# Patient Record
Sex: FEMALE | Race: WHITE | NOT HISPANIC OR LATINO | Employment: OTHER
[De-identification: names, ages, dates, MRNs, and addresses within clinical notes are randomized per-mention and may not be internally consistent; named-entity substitution may affect disease eponyms.]

---

## 2017-10-22 ENCOUNTER — HEALTH MAINTENANCE LETTER (OUTPATIENT)
Age: 27
End: 2017-10-22

## 2020-07-21 ENCOUNTER — TRANSFERRED RECORDS (OUTPATIENT)
Dept: HEALTH INFORMATION MANAGEMENT | Facility: CLINIC | Age: 30
End: 2020-07-21

## 2020-08-02 ENCOUNTER — TRANSFERRED RECORDS (OUTPATIENT)
Dept: HEALTH INFORMATION MANAGEMENT | Facility: CLINIC | Age: 30
End: 2020-08-02

## 2021-08-05 ENCOUNTER — OFFICE VISIT (OUTPATIENT)
Dept: FAMILY MEDICINE | Facility: CLINIC | Age: 31
End: 2021-08-05
Payer: COMMERCIAL

## 2021-08-05 VITALS
BODY MASS INDEX: 36.07 KG/M2 | HEART RATE: 80 BPM | DIASTOLIC BLOOD PRESSURE: 68 MMHG | TEMPERATURE: 98.8 F | OXYGEN SATURATION: 97 % | WEIGHT: 238 LBS | RESPIRATION RATE: 20 BRPM | HEIGHT: 68 IN | SYSTOLIC BLOOD PRESSURE: 116 MMHG

## 2021-08-05 DIAGNOSIS — D12.6 ADENOMATOUS POLYP OF COLON, UNSPECIFIED PART OF COLON: ICD-10-CM

## 2021-08-05 DIAGNOSIS — Z15.09 LYNCH SYNDROME: Primary | ICD-10-CM

## 2021-08-05 DIAGNOSIS — Z80.0 FH: COLON CANCER IN RELATIVE <50 YEARS OLD: ICD-10-CM

## 2021-08-05 PROBLEM — E66.813 CLASS 3 SEVERE OBESITY DUE TO EXCESS CALORIES IN ADULT (H): Status: ACTIVE | Noted: 2018-05-29

## 2021-08-05 PROBLEM — E66.01 CLASS 3 SEVERE OBESITY DUE TO EXCESS CALORIES IN ADULT (H): Status: ACTIVE | Noted: 2018-05-29

## 2021-08-05 PROCEDURE — 99385 PREV VISIT NEW AGE 18-39: CPT | Performed by: PHYSICIAN ASSISTANT

## 2021-08-05 ASSESSMENT — MIFFLIN-ST. JEOR: SCORE: 1844.09

## 2021-08-05 NOTE — PROGRESS NOTES
SUBJECTIVE:   CC: Katelyn Erickson is an 31 year old woman who presents for preventive health visit.       Patient has been advised of split billing requirements and indicates understanding: Yes  Healthy Habits:    Getting at least 3 servings of Calcium per day:  Yes    Bi-annual eye exam:  NO    Dental care twice a year:  Yes    Sleep apnea or symptoms of sleep apnea:  None    Diet:  Regular (no restrictions)    Frequency of exercise:  None    Taking medications regularly:  Not Applicable    Barriers to taking medications:  Not applicable    Medication side effects:  Not applicable    PHQ-2 Total Score:    Additional concerns today:  Yes    Katelyn is here for a physical and to establish care.  She and her family moved from [place] about a year ago.  They have not had medical insurance until just recently.    FH colon cancer: around the time of her move Katelyn did genetic counseling because her mother passed away from colon cancer at age 33 and passed away at 34.  Two maternal aunts also dx with colon cancer under age 50 and positive for the Jean Syndrome gene.    Katelyn has been doing yearly (or mostly yearly) colonoscopy screenings since approx age 19.  She had one done a year ago in which a 10mm polyp was found- not precancerous.  She will need an order today to continue colon cancer screening.  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  Possible pelvic US needed.      Pap smear done on this date: 3/2021 (approximately), by this group: [facility], results were normal.         Today's PHQ-2 Score: No flowsheet data found.    Abuse: Current or Past (Physical, Sexual or Emotional) - No  Do you feel safe in your environment? Yes    Have you ever done Advance Care Planning? (For example, a Health Directive, POLST, or a discussion with a medical provider or your loved ones about your wishes): No, advance care planning information given to patient  "to review.  Patient declined advance care planning discussion at this time.    Social History     Tobacco Use     Smoking status: Former Smoker     Types: Cigarettes     Smokeless tobacco: Never Used   Substance Use Topics     Alcohol use: Yes         No flowsheet data found.    Reviewed orders with patient.  Reviewed health maintenance and updated orders accordingly - Yes  Labs reviewed in EPIC    Breast Cancer Screening:  Any new diagnosis of family breast, ovarian, or bowel cancer? No    FHS-7: No flowsheet data found.    possible early screen due to strong FH cancer- will see oncology soon and can discuss  Pertinent mammograms are reviewed under the imaging tab.    History of abnormal Pap smear: NO - age 30-65 PAP every 5 years with negative HPV co-testing recommended- LUISITO filled out today to obtain from [facility].     Reviewed and updated as needed this visit by clinical staff  Tobacco  Allergies  Meds      Soc Hx        Reviewed and updated as needed this visit by Provider                    Review of Systems  CONSTITUTIONAL: NEGATIVE for fever, chills, change in weight  INTEGUMENTARU/SKIN: NEGATIVE for worrisome rashes, moles or lesions  EYES: NEGATIVE for vision changes or irritation  ENT: NEGATIVE for ear, mouth and throat problems  RESP: NEGATIVE for significant cough or SOB  BREAST: NEGATIVE for masses, tenderness or discharge  CV: NEGATIVE for chest pain, palpitations or peripheral edema  GI: NEGATIVE for nausea, abdominal pain, heartburn, or change in bowel habits  : NEGATIVE for unusual urinary or vaginal symptoms. Periods are regular.  MUSCULOSKELETAL: NEGATIVE for significant arthralgias or myalgia  NEURO: NEGATIVE for weakness, dizziness or paresthesias  PSYCHIATRIC: NEGATIVE for changes in mood or affect     OBJECTIVE:   /68 (BP Location: Right arm, Patient Position: Sitting, Cuff Size: Adult Large)   Pulse 80   Temp 98.8  F (37.1  C) (Oral)   Resp 20   Ht 1.721 m (5' 7.75\") " "  Wt 108 kg (238 lb)   SpO2 97%   BMI 36.46 kg/m    Physical Exam  GENERAL: healthy, alert and no distress  EYES: Eyes grossly normal to inspection, PERRL and conjunctivae and sclerae normal  HENT: ear canals and TM's normal, nose and mouth without ulcers or lesions  NECK: no adenopathy, no asymmetry, masses, or scars and thyroid normal to palpation  RESP: lungs clear to auscultation - no rales, rhonchi or wheezes  BREAST: declined  CV: regular rate and rhythm, normal S1 S2, no S3 or S4, no murmur, click or rub, no peripheral edema and peripheral pulses strong  MS: no gross musculoskeletal defects noted, no edema  SKIN: no suspicious lesions or rashes  NEURO: Normal strength and tone, mentation intact and speech normal  PSYCH: mentation appears normal, affect normal/bright    Diagnostic Test Results:  Labs reviewed in Epic    ASSESSMENT/PLAN:   1. Jean syndrome  Referral for oncology to discuss ongoing management.  Colonoscopy ordered today.  - Adult Gastro Ref - Procedure Only; Future  - Oncology/Hematology Adult Referral; Future    2. FH: colon cancer in relative <50 years old    - Adult Gastro Ref - Procedure Only; Future    3. Adenomatous polyp of colon, unspecified part of colon  Manage with GI and Oncology      Patient has been advised of split billing requirements and indicates understanding: Yes  COUNSELING:  Reviewed preventive health counseling, as reflected in patient instructions    Estimated body mass index is 36.46 kg/m  as calculated from the following:    Height as of this encounter: 1.721 m (5' 7.75\").    Weight as of this encounter: 108 kg (238 lb).    Weight management plan: Discussed healthy diet and exercise guidelines    She reports that she has quit smoking. Her smoking use included cigarettes. She has never used smokeless tobacco.      Counseling Resources:  ATP IV Guidelines  Pooled Cohorts Equation Calculator  Breast Cancer Risk Calculator  BRCA-Related Cancer Risk Assessment: FHS-7 " Tool  FRAX Risk Assessment  ICSI Preventive Guidelines  Dietary Guidelines for Americans, 2010  USDA's MyPlate  ASA Prophylaxis  Lung CA Screening    RANJIT [facility]

## 2021-08-05 NOTE — PROGRESS NOTES
{PROVIDER CHARTING PREFERENCE:216017}    Subjective   Katelyn is a 31 year old who presents for the following health issues {ACCOMPANIED BY STATEMENT (Optional):358857}    HPI     {SUPERLIST (Optional):211806}  {additonal problems for provider to add (Optional):516412}    Review of Systems   {ROS COMP (Optional):873052}      Objective    There were no vitals taken for this visit.  There is no height or weight on file to calculate BMI.  Physical Exam   {Exam List (Optional):237114}    {Diagnostic Test Results (Optional):336833}    {AMBULATORY ATTESTATION (Optional):422673}

## 2021-08-11 ENCOUNTER — TRANSCRIBE ORDERS (OUTPATIENT)
Dept: OTHER | Age: 31
End: 2021-08-11

## 2021-08-11 DIAGNOSIS — Z15.09 LYNCH SYNDROME: Primary | ICD-10-CM

## 2021-08-17 DIAGNOSIS — Z11.59 ENCOUNTER FOR SCREENING FOR OTHER VIRAL DISEASES: ICD-10-CM

## 2021-09-05 ENCOUNTER — LAB (OUTPATIENT)
Dept: URGENT CARE | Facility: URGENT CARE | Age: 31
End: 2021-09-05
Attending: INTERNAL MEDICINE
Payer: COMMERCIAL

## 2021-09-05 DIAGNOSIS — Z11.59 ENCOUNTER FOR SCREENING FOR OTHER VIRAL DISEASES: ICD-10-CM

## 2021-09-05 PROCEDURE — U0005 INFEC AGEN DETEC AMPLI PROBE: HCPCS

## 2021-09-05 PROCEDURE — U0003 INFECTIOUS AGENT DETECTION BY NUCLEIC ACID (DNA OR RNA); SEVERE ACUTE RESPIRATORY SYNDROME CORONAVIRUS 2 (SARS-COV-2) (CORONAVIRUS DISEASE [COVID-19]), AMPLIFIED PROBE TECHNIQUE, MAKING USE OF HIGH THROUGHPUT TECHNOLOGIES AS DESCRIBED BY CMS-2020-01-R: HCPCS

## 2021-09-06 LAB — SARS-COV-2 RNA RESP QL NAA+PROBE: NEGATIVE

## 2021-09-08 ENCOUNTER — HOSPITAL ENCOUNTER (OUTPATIENT)
Facility: CLINIC | Age: 31
Discharge: HOME | End: 2021-09-08
Attending: INTERNAL MEDICINE | Admitting: INTERNAL MEDICINE
Payer: COMMERCIAL

## 2021-09-08 VITALS
RESPIRATION RATE: 16 BRPM | SYSTOLIC BLOOD PRESSURE: 105 MMHG | OXYGEN SATURATION: 98 % | HEIGHT: 68 IN | WEIGHT: 240 LBS | DIASTOLIC BLOOD PRESSURE: 60 MMHG | HEART RATE: 66 BPM | BODY MASS INDEX: 36.37 KG/M2

## 2021-09-08 LAB
COLONOSCOPY: NORMAL
UPPER GI ENDOSCOPY: NORMAL

## 2021-09-08 PROCEDURE — G0500 MOD SEDAT ENDO SERVICE >5YRS: HCPCS | Performed by: INTERNAL MEDICINE

## 2021-09-08 PROCEDURE — 250N000009 HC RX 250: Performed by: INTERNAL MEDICINE

## 2021-09-08 PROCEDURE — 99153 MOD SED SAME PHYS/QHP EA: CPT | Performed by: INTERNAL MEDICINE

## 2021-09-08 PROCEDURE — 45378 DIAGNOSTIC COLONOSCOPY: CPT | Performed by: INTERNAL MEDICINE

## 2021-09-08 PROCEDURE — 250N000011 HC RX IP 250 OP 636: Performed by: INTERNAL MEDICINE

## 2021-09-08 PROCEDURE — G0105 COLORECTAL SCRN; HI RISK IND: HCPCS | Performed by: INTERNAL MEDICINE

## 2021-09-08 PROCEDURE — 43235 EGD DIAGNOSTIC BRUSH WASH: CPT | Performed by: INTERNAL MEDICINE

## 2021-09-08 RX ORDER — FENTANYL CITRATE 50 UG/ML
50-100 INJECTION, SOLUTION INTRAMUSCULAR; INTRAVENOUS
Status: COMPLETED | OUTPATIENT
Start: 2021-09-08 | End: 2021-09-08

## 2021-09-08 RX ORDER — EPINEPHRINE 1 MG/ML
0.1 INJECTION, SOLUTION, CONCENTRATE INTRAVENOUS
Status: DISCONTINUED | OUTPATIENT
Start: 2021-09-08 | End: 2021-09-08 | Stop reason: HOSPADM

## 2021-09-08 RX ORDER — ONDANSETRON 2 MG/ML
4 INJECTION INTRAMUSCULAR; INTRAVENOUS
Status: DISCONTINUED | OUTPATIENT
Start: 2021-09-08 | End: 2021-09-08 | Stop reason: HOSPADM

## 2021-09-08 RX ORDER — FLUMAZENIL 0.1 MG/ML
0.2 INJECTION, SOLUTION INTRAVENOUS
Status: DISCONTINUED | OUTPATIENT
Start: 2021-09-08 | End: 2021-09-08 | Stop reason: HOSPADM

## 2021-09-08 RX ORDER — ATROPINE SULFATE 0.4 MG/ML
0.4 AMPUL (ML) INJECTION
Status: DISCONTINUED | OUTPATIENT
Start: 2021-09-08 | End: 2021-09-08 | Stop reason: HOSPADM

## 2021-09-08 RX ORDER — SIMETHICONE 40MG/0.6ML
133 SUSPENSION, DROPS(FINAL DOSAGE FORM)(ML) ORAL
Status: DISCONTINUED | OUTPATIENT
Start: 2021-09-08 | End: 2021-09-08 | Stop reason: HOSPADM

## 2021-09-08 RX ORDER — LIDOCAINE 40 MG/G
CREAM TOPICAL
Status: DISCONTINUED | OUTPATIENT
Start: 2021-09-08 | End: 2021-09-08 | Stop reason: HOSPADM

## 2021-09-08 RX ORDER — FENTANYL CITRATE 50 UG/ML
25-50 INJECTION, SOLUTION INTRAMUSCULAR; INTRAVENOUS
Status: DISCONTINUED | OUTPATIENT
Start: 2021-09-08 | End: 2021-09-08 | Stop reason: HOSPADM

## 2021-09-08 RX ADMIN — TOPICAL ANESTHETIC 0.5 ML: 200 SPRAY DENTAL; PERIODONTAL at 11:04

## 2021-09-08 RX ADMIN — FENTANYL CITRATE 100 MCG: 50 INJECTION, SOLUTION INTRAMUSCULAR; INTRAVENOUS at 11:04

## 2021-09-08 RX ADMIN — MIDAZOLAM 1 MG: 1 INJECTION INTRAMUSCULAR; INTRAVENOUS at 11:23

## 2021-09-08 RX ADMIN — FENTANYL CITRATE 50 MCG: 50 INJECTION, SOLUTION INTRAMUSCULAR; INTRAVENOUS at 11:22

## 2021-09-08 RX ADMIN — MIDAZOLAM 2 MG: 1 INJECTION INTRAMUSCULAR; INTRAVENOUS at 11:04

## 2021-09-08 ASSESSMENT — MIFFLIN-ST. JEOR: SCORE: 1857.13

## 2021-09-08 NOTE — LETTER
August 20, 2021      Katelyn Erickson  [address] [place] [id number]        Dear Katelyn,     Please be aware that coverage of these services is subject to the terms and limitations of your health insurance plan.  Call member services at your health plan with any benefit or coverage questions.    Thank you for choosing [facility]. You are scheduled for the following service(s):    Date:  9/08/2021 Wednesday             Procedure:  COLONOSCOPY  Doctor:        Dr. Flores   Arrival Time:  2:00 pm *Enter and check in at the Main Hospital Entrance*  Procedure Time:  2:30 pm      Location:   [facility]        Endoscopy Department, First Floor         [address] [place], [place] [address]      [phone] or [phone] (Natalie) to reschedule        MIRALAX -GATORADE  PREP  Colonoscopy is the most accurate test to detect colon polyps and colon cancer; and the only test where polyps can be removed. During this procedure, a doctor examines the lining of your large intestine and rectum through a flexible tube.   Transportation  You must arrange for a ride for the day of your procedure with a responsible adult. A taxi , Uber, etc, is not an option unless you are accompanied by a responsible adult. If you fail to arrange transportation with a responsible adult, your procedure will be cancelled and rescheduled.    Purchase the  following supplies at your local pharmacy:  - 2 (two) bisacodyl tablets: each tablet contains 5 mg.  (Dulcolax  laxative NOT Dulcolax  stool softener)   - 1 (one) 8.3 oz bottle of Polyethylene Glycol (PEG) 3350 Powder   (MiraLAX , Smooth LAX , ClearLAX  or equivalent)  - 64 oz Gatorade    Regular Gatorade, Gatorade G2 , Powerade , Powerade Zero  or Pedialyte  is acceptable. Red colored flavors are not allowed; all other colors (yellow, green, orange, purple and blue) are okay. It is also okay to buy two 2.12 oz packets of powdered  Gatorade that can be mixed with water to a total volume of 64 oz of liquid.  - 1 (one) 10 oz bottle of Magnesium Citrate (Red colored flavors are not allowed)  It is also okay for you to use a 0.5 oz package of powdered magnesium citrate (17 g) mixed with 10 oz of water.      PREPARATION FOR COLONOSCOPY    7 days before:    Discontinue fiber supplements and medications containing iron. This includes Metamucil  and Fibercon ; and multivitamins with iron.    3 days before:    Begin a low-fiber diet. A low-fiber diet helps making the cleanout more effective.     Examples of a low-fiber diet include (but are not limited to): white bread, white rice, pasta, crackers, fish, chicken, eggs, ground beef, creamy peanut butter, cooked/steamed/boiled vegetables, canned fruit, bananas, melons, milk, plain yogurt cheese, salad dressing and other condiments.     The following are not allowed on a low-fiber diet: seeds, nuts, popcorn, bran, whole wheat, corn, quinoa, raw fruits and vegetables, berries and dried fruit, beans and lentils.    For additional details on low-fiber diet, please refer to the table on the last page.    2 days before:    Continue the low-fiber diet.     Drink at least 8 glasses of water throughout the day.     Stop eating solid foods at 11:45 pm.    1 day before:    In the morning: begin a clear liquid diet (liquids you can see through).     Examples of a clear liquid diet include: water, clear broth or bouillon, Gatorade, Pedialyte or Powerade, carbonated and non-carbonated soft drinks (Sprite , 7-Up , ginger ale), strained fruit juices without pulp (apple, white grape, white cranberry), Jell-O  and popsicles.     The following are not allowed on a clear liquid diet: red liquids, alcoholic beverages, dairy products (milk, creamer, and yogurt), protein shakes, creamy broths, juice with pulp and chewing tobacco.    At noon: take 2 (two) bisacodyl tablets     At 4 (and no later than 6pm): start drinking the  Miralax-Gatorade preparation (8.3 oz of Miralax mixed with 64 oz of Gatorade in a large pitcher). Drink 1(one) 8 oz glass every 15 minutes thereafter, until the mixture is gone.    COLON CLEANSING TIPS: drink adequate amounts of fluids before and after your colon cleansing to prevent dehydration. Stay near a toilet because you will have diarrhea. Even if you are sitting on the toilet, continue to drink the cleansing solution every 15 minutes. If you feel nauseous or vomit, rinse your mouth with water, take a 15 to 30-minute-break and then continue drinking the solution. You will be uncomfortable until the stool has flushed from your colon (in about 2 to 4 hours). You may feel chilled.    Day of your procedure  You may take all of your morning medications including blood pressure medications, blood thinners (if you have not been instructed to stop these by our office), methadone, anti-seizure medications with sips of water 3 hours prior to your procedure or earlier. Do not take insulin or vitamins prior to your procedure. Continue the clear liquid diet.   4 hours prior: drink 10 oz of magnesium citrate. It may be easier to drink it with a straw.    STOP consuming all liquids after that.     Do not take anything by mouth during this time.     Allow extra time to travel to your procedure as you may need to stop and use a restroom along the way.    You are ready for the procedure, if you followed all instructions and your stool is no longer formed, but clear or yellow liquid. If you are unsure whether your colon is clean, please call our office at [phone] before you leave for your appointment.    Bring the following to your procedure:  - Insurance Card/Photo ID.   - List of current medications including over-the-counter medications and supplements.   - Your rescue inhaler if you currently use one to control asthma.    Canceling or rescheduling your appointment:   If you must cancel or reschedule your appointment,  please call [phone] as soon as possible.      COLONOSCOPY PRE-PROCEDURE CHECKLIST    If you have diabetes, ask your regular doctor for diet and medication restrictions.  If you take an anticoagulant or anti-platelet medication (such as Coumadin , Lovenox , Pradaxa , Xarelto , Eliquis , etc.), please call your primary doctor for advice on holding this medication.  If you take aspirin you may continue to do so.  If you are or may be pregnant, please discuss the risks and benefits of this procedure with your doctor.        What happens during a colonoscopy?    Plan to spend up to two hours, starting at registration time, at the endoscopy center the day of your procedure. The colonoscopy takes an average of 15 to 30 minutes. Recovery time is about 30 minutes.      Before the exam:    You will change into a gown.    Your medical history and medication list will be reviewed with you, unless that has been done over the phone prior to the procedure.     A nurse will insert an intravenous (IV) line into your hand or arm.    The doctor will meet with you and will give you a consent form to sign.  During the exam:     Medicine will be given through the IV line to help you relax.     Your heart rate and oxygen levels will be monitored. If your blood pressure is low, you may be given fluids through the IV line.     The doctor will insert a flexible hollow tube, called a colonoscope, into your rectum. The scope will be advanced slowly through the large intestine (colon).    You may have a feeling of fullness or pressure.     If an abnormal tissue or a polyp is found, the doctor may remove it through the endoscope for closer examination, or biopsy. Tissue removal is painless    After the exam:           Any tissue samples removed during the exam will be sent to a lab for evaluation. It may take 5-7 working days for you to be notified of the results.     A nurse will provide you with complete discharge instructions before you  leave the endoscopy center. Be sure to ask the nurse for specific instructions if you take blood thinners such as Aspirin, Coumadin or Plavix.     The doctor will prepare a full report for you and for the physician who referred you for the procedure.     Your doctor will talk with you about the initial results of your exam.      Medication given during the exam will prohibit you from driving for the rest of the day.     Following the exam, you may resume your normal diet. Your first meal should be light, no greasy foods. Avoid alcohol until the next day.     You may resume your regular activities the day after the procedure.         LOW-FIBER DIET    Foods RECOMMENDED Foods to AVOID   Breads, Cereal, Rice and Pasta:   White bread, rolls, biscuits, croissant and katia toast.   Waffles, Singaporean toast and pancakes.   White rice, noodles, pasta, macaroni and peeled cooked potatoes.   Plain crackers and saltines.   Cooked cereals: farina, cream of rice.   Cold cereals: Puffed Rice , Rice Krispies , Corn Flakes  and Special K    Breads, Cereal, Rice and Pasta:   Breads or rolls with nuts, seeds or fruit.   Whole wheat, pumpernickel, rye breads and cornbread.   Potatoes with skin, brown or wild rice, and kasha (buckwheat).     Vegetables:   Tender cooked and canned vegetables without seeds: carrots, asparagus tips, green or wax beans, pumpkin, spinach, lima beans. Vegetables:   Raw or steamed vegetables.   Vegetables with seeds.   Sauerkraut.   Winter squash, peas, broccoli, Brussel sprouts, cabbage, onions, cauliflower, baked beans, peas and corn.   Fruits:   Strained fruit juice.   Canned fruit, except pineapple.   Ripe bananas and melon. Fruits:   Prunes and prune juice.   Raw fruits.   Dried fruits: figs, dates and raisins.   Milk/Dairy:   Milk: plain or flavored.   Yogurt, custard and ice cream.   Cheese and cottage cheese Milk/Dairy:     Meat and other proteins:   ground, well-cooked tender beef, lamb, ham, veal,  pork, fish, poultry and organ meats.   Eggs.   Peanut butter without nuts. Meat and other proteins:   Tough, fibrous meats with gristle.   Dry beans, peas and lentils.   Peanut butter with nuts.   Tofu.   Fats, Snack, Sweets, Condiments and Beverages:   Margarine, butter, oils, mayonnaise, sour cream and salad dressing, plain gravy.   Sugar, hard candy, clear jelly, honey and syrup.   Spices, cooked herbs, bouillon, broth and soups made with allowed vegetable, ketchup and mustard.   Coffee, tea and carbonated drinks.   Plain cakes, cookies and pretzels.   Gelatin, plain puddings, custard, ice cream, sherbet and popsicles. Fats, Snack, Sweets, Condiments and Beverages:   Nuts, seeds and coconut.   Jam, marmalade and preserves.   Pickles, olives, relish and horseradish.   All desserts containing nuts, seeds, dried fruit and coconut; or made from whole grains or bran.   Candy made with nuts or seeds.   Popcorn.       DIRECTIONS TO THE ENDOSCOPY DEPARTMENT    From the north ([facility])  Take [address] South, exit on [address] [address]. Get into the left hand mark, turn left (east), go one-half mile to [address] and turn left. Go north to the second stoplight, take a right on [address] and follow it to the Main Hospital entrance.    From the south ([facility])  Take 35N to the [address]E split and exit on [address] [address]. On [address] [address], turn left (west) to [address]. Turn right (north) on [address]. Go north to the second stoplight, take a right on [address] and follow it to the Main Hospital entrance.    From the east via 35E ([facility])  Take 35E south to [address] [address] exit. Turn right on [address] [address]. Go west to [address]. Turn right (north) on [address]. Go to the second stoplight, take a right on [address] to the Main Hospital entrance.    From the east via [address] ([facility])  Take [address] to [address].  Turn left (south) on [address] to [address], turn left (east) on [address] and follow it to the Main Hospital entrance.    From the west via [address] ([place])  Take [address] east to [address]. Turn right (south) on [address] to [address], turn left (east) on [address] and follow it to the Main Hospital entrance.

## 2021-09-08 NOTE — H&P
Pre-Endoscopy History and Physical     Katelyn Erickson MRN# [id number]   YOB: 1990 Age: 31 year old     Date of Procedure: 9/8/2021  Primary care provider: No Ref-Primary, Physician  Type of Endoscopy: Colonoscopy with possible biopsy, possible polypectomy and Gastroscopy with possible biopsy, possible dilation  Reason for Procedure: screen  Type of Anesthesia Anticipated: Conscious Sedation    HPI:    Katelyn is a 31 year old female who will be undergoing the above procedure.      A history and physical has been performed. The patient's medications and allergies have been reviewed. The risks and benefits of the procedure and the sedation options and risks were discussed with the patient.  All questions were answered and informed consent was obtained.      She denies a personal or family history of anesthesia complications or bleeding disorders.     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 No pertinent past medical history.     Past Surgical History:   Procedure Laterality Date     COLONOSCOPY         Social History     Tobacco Use     Smoking status: Former Smoker     Types: Cigarettes     Smokeless tobacco: Never Used   Substance Use Topics     Alcohol use: Yes     Comment: rarely       Family History   Problem Relation Age of Onset     Colon Cancer Mother      Colon Cancer Maternal Aunt      Colon Cancer Maternal Aunt      Colon Cancer Maternal Grandmother        Prior to Admission medications    Not on File       No Known Allergies     REVIEW OF SYSTEMS:   5 point ROS negative except as noted above in HPI, including Gen., Resp., CV, GI &   "system review.    PHYSICAL EXAM:   There were no vitals taken for this visit. Estimated body mass index is 36.46 kg/m  as calculated from the following:    Height as of 8/5/21: 1.721 m (5' 7.75\").    Weight as of 8/5/21: 108 kg (238 lb).   GENERAL APPEARANCE: alert, and oriented  MENTAL STATUS: alert  AIRWAY EXAM: Mallampatti Class I (visualization of the soft palate, fauces, uvula, anterior and posterior pillars)  RESP: lungs clear to auscultation - no rales, rhonchi or wheezes  CV: regular rates and rhythm  DIAGNOSTICS:    Not indicated    IMPRESSION   ASA Class 1 - Healthy patient, no medical problems    PLAN:   Plan for Colonoscopy with possible biopsy, possible polypectomy and Gastroscopy with possible biopsy, possible dilation. We discussed the risks, benefits and alternatives and the patient wished to proceed.    The above has been forwarded to the consulting provider.      Signed Electronically by: Joseph Flores MD  September 8, 2021          "

## 2021-09-08 NOTE — LETTER
August 20, 2021      Katelyn Erickson  [address] [place] [id number]        Dear Katelyn,     Thank you for choosing [facility]. You are scheduled for the following service(s).   Please be aware that coverage of these services is subject to the terms and limitations of your health insurance plan.  Call member services at your health plan with any benefit or coverage questions.    Date:    9/08/2021 Wednesday      Procedure: UPPER ENDOSCOPY & COLONOSCOPY  Doctor:  Dr. Flores          Arrival Time:   2:00 pm *Enter and check in at the Main Hospital Entrance  Procedure Time:   2:30 pm  Location:   [facility]        Endoscopy Department, First Floor (Enter throug the Main Doors) *         [address] [place], [place] [address]      [phone] or [phone] () to reschedule       Upper Endoscopy or Esophagogastroduodenoscopy (EGD) is a test performed to evaluate symptoms of persistent abdominal pain, nausea, vomiting or difficulty swallowing. It may also be used to treat various conditions of the upper gastrointestinal (GI) tract, such as bleeding, narrowing or abnormal growths. During the procedure, a doctor examines the lining of your esophagus, stomach and the first part of your small intestine through a thin, flexible tube called an endoscope. If growths or other abnormalities are found during the procedure, the doctor may remove the abnormal tissue (biopsy) for further examination.     Colonoscopy is the most accurate test to detect colon polyps and colon cancer; and the only test where polyps can be removed. During this procedure, a doctor examines the lining of your large intestine and rectum through a flexible tube.     Transportation  You must arrange for a ride for the day of your procedure with a responsible adult. A taxi , Uber, etc, is not an option unless you are accompanied by a responsible adult. If you fail to arrange  transportation with a responsible adult, your procedure will be cancelled and rescheduled.    What happens during an upper endoscopy?  On the day of your procedure, plan to spend up to one and a half hours after your arrival at the endoscopy center. The exam itself takes about 5 to 10 minutes.  Before the exam:  - You will change into a gown.   - Your medical history and medication list will be reviewed with you, unless it has already been done over the phone.   - A nurse will insert an intravenous (IV) line into your hand or arm.  - The doctor will talk to you and give you a consent form to sign.    During the exam:  - Medicine will be given through the IV line to help you relax and feel comfortable.   - Your heart rate and oxygen levels will be monitored. If your blood pressure is low, you may be given fluids through the IV line.   - The doctor will insert a flexible, hollow tube, called an endoscope, into your mouth and will advance it slowly through the esophagus, stomach and duodenum (the first part of your small intestine).   - You may have a feeling of pressure or fullness.   - If you have difficulty swallowing, and the doctor finds a narrowing in your esophagus, it may be possible for the area to be expanded-dilated during the exam.   - If abnormal tissue is found, the doctor may remove it through the endoscope (biopsy it) for closer examination. The tissue removal is painless.    After the exam:  - Any tissue samples removed during the exam will be sent to a lab for evaluation. It may take 5 to 7 working days for you to be notified of the results  - The doctor will prepare a full report for the physician who referred you for the upper endoscopy.   - The doctor will talk with you about the initial results of your exam.   - You may feel bloated after the procedure. That is normal and should not last long.   - Your throat may feel sore for a short time.   - Following the exam, you may resume your normal diet.  Avoid alcohol until the next day.   - You may resume your regular activities the day after the procedure.   - Medication given during the exam will prohibit you from driving for the rest of the day.  - A nurse will provide you with complete discharge instructions before you leave the endoscopy center. Be sure to ask the nurse for specific instructions if you take blood thinners such as Aspirin , Coumadin , Lovenox , Plavix , etc.       PREPARATION FOR THE UPPER ENDOSCOPY  To ensure a successful exam, please follow all instructions carefully.      The night before your exam:    STOP eating solid foods at 11:45 pm.     Clear liquids are okay to drink (examples: Gatorade , apple juice, clear broth,coffee or tea without milk or cream, etc.).     DO NOT drink red liquids or alcoholic beverages.    The day of your exam:    STOP drinking clear liquids 4 hours before your exam.     You may take your usual medications with 4 oz. of water, but it needs to be at least 4 hours prior to your procedure.    When you leave for the procedure:    Bring a list of all of your current medications, including any allergy or over-the-counter medications, unless you have already reviewed that with an Endoscopy RN over the phone.     Bring a photo ID as well as up-to-date insurance information, such as your insurance card and any referral forms that might be required by your payer.           COLONOSCOPY:  MIRALAX -GATORADE  PREP  Purchase the  following supplies at your local pharmacy:  - 2 (two) bisacodyl tablets: each tablet contains 5 mg.  (Dulcolax  laxative NOT Dulcolax  stool softener)   - 1 (one) 8.3 oz bottle of Polyethylene Glycol (PEG) 3350 Powder   (MiraLAX , Smooth LAX , ClearLAX  or equivalent)  - 64 oz Gatorade    Regular Gatorade, Gatorade G2 , Powerade , Powerade Zero  or Pedialyte  is acceptable. Red colored flavors are not allowed; all other colors (yellow, green, orange, purple and blue) are okay. It is also okay to buy  two 2.12 oz packets of powdered Gatorade that can be mixed with water to a total volume of 64 oz of liquid.  - 1 (one) 10 oz bottle of Magnesium Citrate (Red colored flavors are not allowed)  It is also okay for you to use a 0.5 oz package of powdered magnesium citrate (17 g) mixed with 10 oz of water.      PREPARATION FOR COLONOSCOPY  7 days before:    Discontinue fiber supplements and medications containing iron. This includes Metamucil  and Fibercon ; and multivitamins with iron.    3 days before:    Begin a low-fiber diet. A low-fiber diet helps making the cleanout more effective.     Examples of a low-fiber diet include (but are not limited to): white bread, white rice, pasta, crackers, fish, chicken, eggs, ground beef, creamy peanut butter, cooked/steamed/boiled vegetables, canned fruit, bananas, melons, milk, plain yogurt cheese, salad dressing and other condiments.     The following are not allowed on a low-fiber diet: seeds, nuts, popcorn, bran, whole wheat, corn, quinoa, raw fruits and vegetables, berries and dried fruit, beans and lentils.    For additional details on low-fiber diet, please refer to the table on the last page.    2 days before:    Continue the low-fiber diet.     Drink at least 8 glasses of water throughout the day.     Stop eating solid foods at 11:45 pm.    1 day before:    In the morning: begin a clear liquid diet (liquids you can see through).     Examples of a clear liquid diet include: water, clear broth or bouillon, Gatorade, Pedialyte or Powerade, carbonated and non-carbonated soft drinks (Sprite , 7-Up , ginger ale), strained fruit juices without pulp (apple, white grape, white cranberry), Jell-O  and popsicles.     The following are not allowed on a clear liquid diet: red liquids, alcoholic beverages, dairy products (milk, creamer, and yogurt), protein shakes, creamy broths, juice with pulp and chewing tobacco.    At noon: take 2 (two) bisacodyl tablets     At 4 (and no later  than 6pm): start drinking the Miralax-Gatorade preparation (8.3 oz of Miralax mixed with 64 oz of Gatorade in a large pitcher). Drink 1(one) 8 oz glass every 15 minutes thereafter, until the mixture is gone.    COLON CLEANSING TIPS: drink adequate amounts of fluids before and after your colon cleansing to prevent dehydration. Stay near a toilet because you will have diarrhea. Even if you are sitting on the toilet, continue to drink the cleansing solution every 15 minutes. If you feel nauseous or vomit, rinse your mouth with water, take a 15 to 30-minute-break and then continue drinking the solution. You will be uncomfortable until the stool has flushed from your colon (in about 2 to 4 hours). You may feel chilled.    Day of your procedure  You may take all of your morning medications including blood pressure medications, blood thinners (if you have not been instructed to stop these by our office), methadone, anti-seizure medications with sips of water 3 hours prior to your procedure or earlier. Do not take insulin or vitamins prior to your procedure. Continue the clear liquid diet.   4 hours prior: drink 10 oz of magnesium citrate. It may be easier to drink it with a straw.    STOP consuming all liquids after that.     Do not take anything by mouth during this time.     Allow extra time to travel to your procedure as you may need to stop and use a restroom along the way.  You are ready for the procedure, if you followed all instructions and your stool is no longer formed, but clear or yellow liquid. If you are unsure whether your colon is clean, please call our office at [phone] before you leave for your appointment.  Bring the following to your procedure:  - Insurance Card/Photo ID.   - List of current medications including over-the-counter medications and supplements.   - Your rescue inhaler if you currently use one to control asthma.    Canceling or rescheduling your appointment:   If you must cancel or  reschedule your appointment, please call [phone] as soon as possible.      COLONOSCOPY PRE-PROCEDURE CHECKLIST  If you have diabetes, ask your regular doctor for diet and medication restrictions.  If you take an anticoagulant or anti-platelet medication (such as Coumadin , Lovenox , Pradaxa , Xarelto , Eliquis , etc.), please call your primary doctor for advice on holding this medication.  If you take aspirin you may continue to do so.  If you are or may be pregnant, please discuss the risks and benefits of this procedure with your doctor.    What happens during a colonoscopy?    Plan to spend up to two hours, starting at registration time, at the endoscopy center the day of your procedure. The colonoscopy takes an average of 15 to 30 minutes. Recovery time is about 30 minutes.      Before the exam:    You will change into a gown.    Your medical history and medication list will be reviewed with you, unless that has been done over the phone prior to the procedure.     A nurse will insert an intravenous (IV) line into your hand or arm.    The doctor will meet with you and will give you a consent form to sign.  During the exam:     Medicine will be given through the IV line to help you relax.     Your heart rate and oxygen levels will be monitored. If your blood pressure is low, you may be given fluids through the IV line.     The doctor will insert a flexible hollow tube, called a colonoscope, into your rectum. The scope will be advanced slowly through the large intestine (colon).    You may have a feeling of fullness or pressure.     If an abnormal tissue or a polyp is found, the doctor may remove it through the endoscope for closer examination, or biopsy. Tissue removal is painless    After the exam:           Any tissue samples removed during the exam will be sent to a lab for evaluation. It may take 5-7 working days for you to be notified of the results.     A nurse will provide you with complete discharge  instructions before you leave the endoscopy center. Be sure to ask the nurse for specific instructions if you take blood thinners such as Aspirin, Coumadin or Plavix.     The doctor will prepare a full report for you and for the physician who referred you for the procedure.     Your doctor will talk with you about the initial results of your exam.      Medication given during the exam will prohibit you from driving for the rest of the day.     Following the exam, you may resume your normal diet. Your first meal should be light, no greasy foods. Avoid alcohol until the next day.     You may resume your regular activities the day after the procedure.     LOW-FIBER DIET    Foods RECOMMENDED Foods to AVOID   Breads, Cereal, Rice and Pasta:   White bread, rolls, biscuits, croissant and katia toast.   Waffles, Croatian toast and pancakes.   White rice, noodles, pasta, macaroni and peeled cooked potatoes.   Plain crackers and saltines.   Cooked cereals: farina, cream of rice.   Cold cereals: Puffed Rice , Rice Krispies , Corn Flakes  and Special K    Breads, Cereal, Rice and Pasta:   Breads or rolls with nuts, seeds or fruit.   Whole wheat, pumpernickel, rye breads and cornbread.   Potatoes with skin, brown or wild rice, and kasha (buckwheat).     Vegetables:   Tender cooked and canned vegetables without seeds: carrots, asparagus tips, green or wax beans, pumpkin, spinach, lima beans. Vegetables:   Raw or steamed vegetables (w/ or without seeds)   Sauerkraut.   Winter squash, peas, broccoli, Brussel sprouts, cabbage, onions, cauliflower, baked beans, peas and corn.   Fruits:   Strained fruit juice.   Canned fruit, except pineapple.   Ripe bananas and melon. Fruits:   Prunes and prune juice.   Raw fruits.   Dried fruits: figs, dates and raisins.   Milk/Dairy:   Milk: plain or flavored; yogurt; ice cream.   Custard; cheese and cottage cheese Milk/Dairy:     Meat and other proteins:   Ground, well-cooked tender beef, lamb, ham,  veal, pork, fish, poultry, organ meats and eggs.   Peanut butter without nuts. Meat and other proteins:   Tough, fibrous meats with gristle.   Dry beans and peas; lentils and Tofu   Peanut butter with nuts.   Fats, Snack, Sweets, Condiments and Beverages:   Margarine, butter, oils, mayonnaise, sour cream and salad dressing, plain gravy.   Sugar, hard candy, clear jelly, honey and syrup.   Spices, cooked herbs, bouillon, broth and soups made with allowed vegetable, ketchup and mustard.   Coffee, tea and carbonated drinks.   Plain cakes, cookies and pretzels.   Gelatin, plain puddings, custard, ice cream, sherbet and popsicles. Fats, Snack, Sweets, Condiments and Beverages:   Nuts, seeds and coconut.   Jam, marmalade and preserves.   Pickles, olives, relish and horseradish.   All desserts containing nuts, seeds, dried fruit and coconut; or made from whole grains or bran.   Candy made with nuts or seeds.   Popcorn.         DIRECTIONS TO THE ENDOSCOPY DEPARTMENT    From the north ([facility])  Take [address], exit on [address] [address]. Get into the left hand mark, turn left (east), go one-half mile to [address] and turn left. Go north to the second stoplight, take a right on [address] and follow it to the Main Hospital entrance.    From the south ([facility])  Take [address]N to the [address] split and exit on [address] [address]. On [address] [address], turn left (west) to [address]. Turn right (north) on [address]. Go north to the second stoplight, take a right on [address] and follow it to the Main Hospital entrance.    From the east via 35E ([facility])  Take [address] south to [address] [address] exit. Turn right on [address] [address]. Go west to [address]. Turn right (north) on [address]. Go to the second stoplight, take a right on [address] to the Main Hospital entrance.    From the east via [address] ([facility])  Take [address] to [address]. Turn left (south) on [address] to [address], turn left (east) on [address] and follow it to the Main Hospital entrance.    From the west via [address] ([place], [place])  Take [address] east to [address]. Turn right (south) on [address] to [address], turn left (east) on [address] and follow it to the Main Hospital entrance.

## 2021-11-21 ENCOUNTER — HEALTH MAINTENANCE LETTER (OUTPATIENT)
Age: 31
End: 2021-11-21

## 2021-12-06 ENCOUNTER — E-VISIT (OUTPATIENT)
Dept: URGENT CARE | Facility: URGENT CARE | Age: 31
End: 2021-12-06
Payer: COMMERCIAL

## 2021-12-06 DIAGNOSIS — Z20.822 SUSPECTED COVID-19 VIRUS INFECTION: Primary | ICD-10-CM

## 2021-12-06 PROCEDURE — 99421 OL DIG E/M SVC 5-10 MIN: CPT | Performed by: FAMILY MEDICINE

## 2021-12-06 NOTE — PATIENT INSTRUCTIONS
Dear Katelyn Erickson,    Your symptoms show that you may have coronavirus (COVID-19). This illness can cause fever, cough and trouble breathing. Many people get a mild case and get better on their own. Some people can get very sick.    Will I be tested for COVID-19?  We would like to test you for Covid-19 virus. I have placed orders for this test.     To schedule: go to your [employer] home page and scroll down to the section that says  You have an appointment that needs to be scheduled  and click the large green button that says  Schedule Now  and follow the steps to find the next available openings.    If you are unable to complete these [employer] scheduling steps, please call [phone] to schedule your testing.     Return to work/school/ guidance:  Please let your workplace manager and staffing office know when your quarantine ends     We can t give you an exact date as it depends on the above. You can calculate this on your own or work with your manager/staffing office to calculate this. (For example if you were exposed on 10/4, you would have to quarantine for 14 full days. That would be through 10/18. You could return on 10/19.)      If you receive a positive COVID-19 test result, follow the guidance of the those who are giving you the results. Usually the return to work is 10 (or in some cases 20 days from symptom onset.) If you work at [facility], you must also be cleared by Employee Occupational Health and Safety to return to work.        If you receive a negative COVID-19 test result and did not have a high risk exposure to someone with a known positive COVID-19 test, you can return to work once you're free of fever for 24 hours without fever-reducing medication and your symptoms are improving or resolved.      If you receive a negative COVID-19 test and If you had a high risk exposure to someone who has tested positive for COVID-19 then you can return to work 14 days after your last contact  with the positive individual    Note: If you have ongoing exposure to the covid positive person, this quarantine period may be more than 14 days. (For example, if you are continued to be exposed to your child who tested positive and cannot isolate from them, then the quarantine of 7-14 days can't start until your child is no longer contagious. This is typically 10 days from onset of the child's symptoms. So the total duration may be 17-24 days in this case.)    Sign up for [employer].   We know it's scary to hear that you might have COVID-19. We want to track your symptoms to make sure you're okay over the next 2 weeks. Please look for an email from [employer]--this is a free, online program that we'll use to keep in touch. To sign up, follow the link in the email you will receive. Learn more at [email or web address]    How can I take care of myself?    Get lots of rest. Drink extra fluids (unless a doctor has told you not to)    Take Tylenol (acetaminophen) or ibuprofen for fever or pain. If you have liver or kidney problems, ask your family doctor if it's okay to take Tylenol o ibuprofen    If you have other health problems (like cancer, heart failure, an organ transplant or severe kidney disease): Call your specialty clinic if you don't feel better in the next 2 days.    Know when to call 911. Emergency warning signs include:  o Trouble breathing or shortness of breath  o Pain or pressure in the chest that doesn't go away  o Feeling confused like you haven't felt before, or not being able to wake up  o Bluish-colored lips or face    Where can I get more information?  [facility] - About COVID-19:   [email or web address]    CDC - What to Do If You're Sick:   www.cdc.gov/coronavirus/2019-ncov/about/steps-when-sick.html    December 6, 2021  RE:  Katelyn Erickson                                                                                                                  [id number]  [facility] [address]      To whom it may concern:    I evaluated Katelyn Erickson on December 6, 2021. Katelyn Erickson should be excused from work/school.     They should let their workplace manager and staffing office know when their quarantine ends.    We can not give an exact date as it depends on the information below. They can calculate this on their own or work with their manager/staffing office to calculate this. (For example if they were exposed on 10/04, they would have to quarantine for 14 full days. That would be through 10/18. They could return on 10/19.)    Quarantine Guidelines:      If patient receives a positive COVID-19 test result, they should follow the guidance of those who are giving the results. Usually the return to work is 10 (or in some cases 20 days from symptom onset.) If they work at [employer], they must be cleared by Employee Occupational Health and Safety to return to work.        If patient receives a negative COVID-19 test result and did not have a high risk exposure to someone with a known positive COVID-19 test, they can return to work once they're free of fever for 24 hours without fever-reducing medication and their symptoms are improving or resolved.      If patient receives a negative COVID-19 test and if they had a high risk exposure to someone who has tested positive for COVID-19 then they can return to work 14 days after their last contact with the positive individual    Note: If there is ongoing exposure to the covid positive person, this quarantine period may be longer than 14 days. (For example, if they are continually exposed to their child, who tested positive and cannot isolate from them, then the quarantine of 7-14 days can't start until their child is no longer contagious. This is typically 10 days from onset to the child's symptoms. So the total duration may be 17-24 days in this case.)     Sincerely,  Patrick Bermeo, DO

## 2021-12-07 ENCOUNTER — LAB (OUTPATIENT)
Dept: URGENT CARE | Facility: URGENT CARE | Age: 31
End: 2021-12-07
Attending: FAMILY MEDICINE
Payer: COMMERCIAL

## 2021-12-07 DIAGNOSIS — Z20.822 SUSPECTED COVID-19 VIRUS INFECTION: ICD-10-CM

## 2021-12-07 PROCEDURE — U0003 INFECTIOUS AGENT DETECTION BY NUCLEIC ACID (DNA OR RNA); SEVERE ACUTE RESPIRATORY SYNDROME CORONAVIRUS 2 (SARS-COV-2) (CORONAVIRUS DISEASE [COVID-19]), AMPLIFIED PROBE TECHNIQUE, MAKING USE OF HIGH THROUGHPUT TECHNOLOGIES AS DESCRIBED BY CMS-2020-01-R: HCPCS

## 2021-12-07 PROCEDURE — U0005 INFEC AGEN DETEC AMPLI PROBE: HCPCS

## 2021-12-08 LAB — SARS-COV-2 RNA RESP QL NAA+PROBE: NEGATIVE

## 2022-04-12 ENCOUNTER — OFFICE VISIT (OUTPATIENT)
Dept: FAMILY MEDICINE | Facility: CLINIC | Age: 32
End: 2022-04-12
Payer: COMMERCIAL

## 2022-04-12 VITALS
TEMPERATURE: 98.2 F | OXYGEN SATURATION: 100 % | BODY MASS INDEX: 39.13 KG/M2 | HEIGHT: 68 IN | HEART RATE: 91 BPM | WEIGHT: 258.2 LBS | SYSTOLIC BLOOD PRESSURE: 120 MMHG | DIASTOLIC BLOOD PRESSURE: 84 MMHG | RESPIRATION RATE: 15 BRPM

## 2022-04-12 DIAGNOSIS — E66.09 CLASS 2 OBESITY DUE TO EXCESS CALORIES WITHOUT SERIOUS COMORBIDITY WITH BODY MASS INDEX (BMI) OF 39.0 TO 39.9 IN ADULT: ICD-10-CM

## 2022-04-12 DIAGNOSIS — E66.812 CLASS 2 OBESITY DUE TO EXCESS CALORIES WITHOUT SERIOUS COMORBIDITY WITH BODY MASS INDEX (BMI) OF 39.0 TO 39.9 IN ADULT: ICD-10-CM

## 2022-04-12 DIAGNOSIS — Z12.4 CERVICAL CANCER SCREENING: ICD-10-CM

## 2022-04-12 DIAGNOSIS — Z13.29 SCREENING FOR ENDOCRINE DISORDER: ICD-10-CM

## 2022-04-12 DIAGNOSIS — Z13.220 SCREENING FOR HYPERLIPIDEMIA: ICD-10-CM

## 2022-04-12 DIAGNOSIS — Z00.00 ROUTINE GENERAL MEDICAL EXAMINATION AT A HEALTH CARE FACILITY: Primary | ICD-10-CM

## 2022-04-12 DIAGNOSIS — R53.83 FATIGUE, UNSPECIFIED TYPE: ICD-10-CM

## 2022-04-12 DIAGNOSIS — Z30.432 ENCOUNTER FOR REMOVAL OF INTRAUTERINE CONTRACEPTIVE DEVICE: ICD-10-CM

## 2022-04-12 DIAGNOSIS — Z13.1 SCREENING FOR DIABETES MELLITUS: ICD-10-CM

## 2022-04-12 PROBLEM — E66.01 CLASS 3 SEVERE OBESITY DUE TO EXCESS CALORIES IN ADULT (H): Status: RESOLVED | Noted: 2018-05-29 | Resolved: 2022-04-12

## 2022-04-12 PROBLEM — E66.813 CLASS 3 SEVERE OBESITY DUE TO EXCESS CALORIES IN ADULT (H): Status: RESOLVED | Noted: 2018-05-29 | Resolved: 2022-04-12

## 2022-04-12 LAB — HBA1C MFR BLD: 5.5 % (ref 0–5.6)

## 2022-04-12 PROCEDURE — 99395 PREV VISIT EST AGE 18-39: CPT | Mod: 25 | Performed by: NURSE PRACTITIONER

## 2022-04-12 PROCEDURE — 58301 REMOVE INTRAUTERINE DEVICE: CPT | Performed by: NURSE PRACTITIONER

## 2022-04-12 PROCEDURE — 84443 ASSAY THYROID STIM HORMONE: CPT | Performed by: NURSE PRACTITIONER

## 2022-04-12 PROCEDURE — 86376 MICROSOMAL ANTIBODY EACH: CPT | Performed by: NURSE PRACTITIONER

## 2022-04-12 PROCEDURE — 87624 HPV HI-RISK TYP POOLED RSLT: CPT | Performed by: NURSE PRACTITIONER

## 2022-04-12 PROCEDURE — 82947 ASSAY GLUCOSE BLOOD QUANT: CPT | Performed by: NURSE PRACTITIONER

## 2022-04-12 PROCEDURE — 82728 ASSAY OF FERRITIN: CPT | Performed by: NURSE PRACTITIONER

## 2022-04-12 PROCEDURE — 83036 HEMOGLOBIN GLYCOSYLATED A1C: CPT | Performed by: NURSE PRACTITIONER

## 2022-04-12 PROCEDURE — 80061 LIPID PANEL: CPT | Performed by: NURSE PRACTITIONER

## 2022-04-12 PROCEDURE — 99213 OFFICE O/P EST LOW 20 MIN: CPT | Mod: 25 | Performed by: NURSE PRACTITIONER

## 2022-04-12 PROCEDURE — 36415 COLL VENOUS BLD VENIPUNCTURE: CPT | Performed by: NURSE PRACTITIONER

## 2022-04-12 PROCEDURE — G0145 SCR C/V CYTO,THINLAYER,RESCR: HCPCS | Performed by: NURSE PRACTITIONER

## 2022-04-12 PROCEDURE — 84439 ASSAY OF FREE THYROXINE: CPT | Performed by: NURSE PRACTITIONER

## 2022-04-12 ASSESSMENT — ENCOUNTER SYMPTOMS
ARTHRALGIAS: 0
SHORTNESS OF BREATH: 0
CHILLS: 0
FEVER: 0
BREAST MASS: 0
SORE THROAT: 0
HEARTBURN: 0
HEADACHES: 0
DIZZINESS: 0
HEMATOCHEZIA: 0
HEMATURIA: 0
EYE PAIN: 0
MYALGIAS: 0
JOINT SWELLING: 0
NAUSEA: 0
PARESTHESIAS: 0
DYSURIA: 0
PALPITATIONS: 0
DIARRHEA: 0
CONSTIPATION: 0
COUGH: 0
ABDOMINAL PAIN: 0
WEAKNESS: 0
NERVOUS/ANXIOUS: 1
FREQUENCY: 0

## 2022-04-12 ASSESSMENT — PAIN SCALES - GENERAL: PAINLEVEL: NO PAIN (0)

## 2022-04-12 NOTE — PROGRESS NOTES
SUBJECTIVE:   CC: Katelyn Erickson is an 31 year old woman who presents for preventive health visit.     Patient has been advised of split billing requirements and indicates understanding: Yes     Healthy Habits:     Getting at least 3 servings of Calcium per day:  Yes    Bi-annual eye exam:  NO    Dental care twice a year:  Yes    Sleep apnea or symptoms of sleep apnea:  None    Diet:  Regular (no restrictions)    Frequency of exercise:  2-3 days/week    Duration of exercise:  15-30 minutes    Taking medications regularly:  Not Applicable    Medication side effects:  Not applicable    PHQ-2 Total Score: 2    Additional concerns today:  Yes    Thyroid concern  Losing a lot of hair, fatigue, weight gain- struggling to lose weight, dry skin, anxiety, depression. Wants blood work done to confirm if she has issues       IUD Removal:  SUBJECTIVE:    Is a pregnancy test required: No.  Was a consent obtained?  Yes, verbal    Katelyn Erickson is a 31 year old female,, No LMP recorded (lmp unknown). (Menstrual status: IUD). who presents today for IUD removal. Her current IUD was placed 4 ago. She has not had problems with the IUD. She requests removal of the IUD because she desires to have removed.  Suspects it may be making it difficult to lose weight.       PROCEDURE:    A speculum exam was performed and the cervix was visualized. The IUD string was visualized. Using ring forceps, the string  was grasped and the IUD removed intact.    POST PROCEDURE:    The patient tolerated the procedure well. Patient was discharged in stable condition.    Call if bleeding, pain or fever occur. and pt does not want to start alternative birth control.  Condoms for contraception.     Has a hard time losing weight.   Has IUD for 4 years.   Would like this removed today.   Currently not getting periods.   Without IUD would get period every 32 days.   No longer wanting to be on birth control.  She wants to be off hormones, let  "cycles get back to \"normal\".   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           Today's PHQ-2 Score:   PHQ-2 ( 1999 Pfizer) 4/12/2022   Q1: Little interest or pleasure in doing things 1   Q2: Feeling down, depressed or hopeless 1   PHQ-2 Score 2   Q1: Little interest or pleasure in doing things Several days   Q2: Feeling down, depressed or hopeless Several days   PHQ-2 Score 2       Abuse: Current or Past (Physical, Sexual or Emotional) - No  Do you feel safe in your environment? Yes        Social History     Tobacco Use     Smoking status: Former Smoker     Types: Cigarettes     Smokeless tobacco: Never Used     Tobacco comment: half a pack a day   Substance Use Topics     Alcohol use: Yes     Comment: rarely         Alcohol Use 4/12/2022   Prescreen: >3 drinks/day or >7 drinks/week? Not Applicable       Reviewed orders with patient.  Reviewed health maintenance and updated orders accordingly - Yes  Labs reviewed in EPIC  BP Readings from Last 3 Encounters:   04/12/22 120/84   09/08/21 105/60   08/05/21 116/68    Wt Readings from Last 3 Encounters:   04/12/22 117.1 kg (258 lb 3.2 oz)   09/08/21 108.9 kg (240 lb)   08/05/21 108 kg (238 lb)                  Current Outpatient Medications   Medication Sig Dispense Refill     NO ACTIVE MEDICATIONS        No Known Allergies    Breast Cancer Screening:    FHS-7:   Breast CA Risk Assessment (FHS-7) 4/12/2022   Did any of your first-degree relatives have breast or ovarian cancer? No   Did any of your relatives have bilateral breast cancer? No   Did any man in your family have breast cancer? No   Did any woman in your family have breast and ovarian cancer? No   Did any woman in your family have breast cancer before age 50 y? No   Do you have 2 or more relatives with breast and/or ovarian cancer? No   Do you have 2 or more relatives with breast and/or bowel cancer? Yes   Hx of lewis syndrome; seeing cancer risk clinic at [facility] " will ask about breast cancer screening at next appt.   Patient under 40 years of age: Routine Mammogram Screening not recommended.   Pertinent mammograms are reviewed under the imaging tab.    History of abnormal Pap smear: NO - age 30- 65 PAP every 3 years recommended  PAP / HPV 1/8/2010   PAP (Historical) NIL     Reviewed and updated as needed this visit by clinical staff   Tobacco  Allergies  Meds  Problems  Med Hx  Surg Hx  Fam Hx  Soc   Hx          Reviewed and updated as needed this visit by Provider   Tobacco  Allergies  Meds  Problems  Med Hx  Surg Hx  Fam Hx           Past Medical History:   Diagnosis Date     NO ACTIVE PROBLEMS       Past Surgical History:   Procedure Laterality Date     COLONOSCOPY  1/23/2012    Procedure:COLONOSCOPY; COLONOSCOPY; Surgeon:JAKOB PETERS; Location: GI     COLONOSCOPY       COLONOSCOPY N/A 9/8/2021    Procedure: COLONOSCOPY;  Surgeon: Joseph Flores MD;  Location:  GI     ESOPHAGOSCOPY, GASTROSCOPY, DUODENOSCOPY (EGD), COMBINED N/A 9/8/2021    Procedure: ESOPHAGOGASTRODUODENOSCOPY (EGD);  Surgeon: Joseph Flores MD;  Location:  GI     NO HISTORY OF SURGERY         Review of Systems   Constitutional: Negative for chills and fever.   HENT: Negative for congestion, ear pain, hearing loss and sore throat.    Eyes: Negative for pain and visual disturbance.   Respiratory: Negative for cough and shortness of breath.    Cardiovascular: Negative for chest pain, palpitations and peripheral edema.   Gastrointestinal: Negative for abdominal pain, constipation, diarrhea, heartburn, hematochezia and nausea.   Breasts:  Negative for tenderness, breast mass and discharge.   Genitourinary: Negative for dysuria, frequency, genital sores, hematuria, pelvic pain, urgency, vaginal bleeding and vaginal discharge.   Musculoskeletal: Negative for arthralgias, joint swelling and myalgias.   Skin: Negative for rash.   Neurological: Negative for dizziness, weakness,  "headaches and paresthesias.   Psychiatric/Behavioral: Negative for mood changes. The patient is nervous/anxious.           OBJECTIVE:   /84 (BP Location: Right arm, Patient Position: Sitting, Cuff Size: Adult Large)   Pulse 91   Temp 98.2  F (36.8  C) (Oral)   Resp 15   Ht 1.727 m (5' 8\")   Wt 117.1 kg (258 lb 3.2 oz)   LMP  (LMP Unknown)   SpO2 100%   Breastfeeding Unknown   BMI 39.26 kg/m    Physical Exam  GENERAL: healthy, alert and no distress  EYES: Eyes grossly normal to inspection, PERRL and conjunctivae and sclerae normal  HENT: ear canals and TM's normal, nose and mouth without ulcers or lesions  NECK: no adenopathy, no asymmetry, masses, or scars and thyroid normal to palpation  RESP: lungs clear to auscultation - no rales, rhonchi or wheezes  BREAST: normal without masses, tenderness or nipple discharge and no palpable axillary masses or adenopathy  CV: regular rate and rhythm, normal S1 S2, no S3 or S4, no murmur, click or rub, no peripheral edema and peripheral pulses strong  ABDOMEN: soft, nontender, no hepatosplenomegaly, no masses and bowel sounds normal   (female): normal female external genitalia, normal urethral meatus, vaginal mucosa pink, moist, well rugated, and normal cervix/adnexa/uterus without masses or discharge. IUD strings visible exiting the OS  MS: no gross musculoskeletal defects noted, no edema  SKIN: no suspicious lesions or rashes  NEURO: Normal strength and tone, mentation intact and speech normal  PSYCH: mentation appears normal, affect normal/bright    Diagnostic Test Results:  Labs reviewed in Epic    ASSESSMENT/PLAN:   1. Routine general medical examination at a health care facility  Reviewed age appropriate screenings and immunizations. Declines covid vaccine today.     2. Cervical cancer screening  - Pap Screen with HPV - recommended age 30 - 65 years    3. Screening for endocrine disorder  Will check thryoid due to symptoms.   - TSH; Future  - T4, free; " "Future  - Thyroid peroxidase antibody; Future  - TSH  - T4, free  - Thyroid peroxidase antibody    4. Screening for hyperlipidemia  screen  - Lipid panel reflex to direct LDL Fasting; Future  - Lipid panel reflex to direct LDL Fasting    5. Screening for diabetes mellitus  Hx of GDM.   - Glucose; Future  - Hemoglobin A1c; Future  - Glucose  - Hemoglobin A1c    6. Class 2 obesity due to excess calories without serious comorbidity with body mass index (BMI) of 39.0 to 39.9 in adult  Will check thyroid, if abnormal may be contributing.  She has felt IUD is making it hard to lose weight.  IUD removed today.  Focus on diet, portions and exercise.  Can track intact in [employer] or similar lolis.  Has done weight watchers and the past and was not helpful with weight loss.  If after 1-2 months of dedicated focus has not been losing weight consider weight management clinic referral.     7. Encounter for removal of intrauterine contraceptive device  - REMOVE INTRAUTERINE DEVICE        COUNSELING:  Reviewed preventive health counseling, as reflected in patient instructions       Regular exercise       Healthy diet/nutrition       Contraception       Family planning       Colorectal Cancer Screening    Estimated body mass index is 39.26 kg/m  as calculated from the following:    Height as of this encounter: 1.727 m (5' 8\").    Weight as of this encounter: 117.1 kg (258 lb 3.2 oz).    Weight management plan: Discussed healthy diet and exercise guidelines    She reports that she has quit smoking. Her smoking use included cigarettes. She has never used smokeless tobacco.      Counseling Resources:  ATP IV Guidelines  Pooled Cohorts Equation Calculator  Breast Cancer Risk Calculator  BRCA-Related Cancer Risk Assessment: FHS-7 Tool  FRAX Risk Assessment  ICSI Preventive Guidelines  Dietary Guidelines for Americans, 2010  USDA's MyPlate  ASA Prophylaxis  Lung CA Screening    WINDY [facility] " [place]

## 2022-04-13 LAB
CHOLEST SERPL-MCNC: 131 MG/DL
FASTING STATUS PATIENT QL REPORTED: NO
FASTING STATUS PATIENT QL REPORTED: NO
GLUCOSE BLD-MCNC: 96 MG/DL (ref 70–99)
HDLC SERPL-MCNC: 51 MG/DL
LDLC SERPL CALC-MCNC: 68 MG/DL
NONHDLC SERPL-MCNC: 80 MG/DL
T4 FREE SERPL-MCNC: 0.94 NG/DL (ref 0.76–1.46)
THYROPEROXIDASE AB SERPL-ACNC: 17 IU/ML
TRIGL SERPL-MCNC: 58 MG/DL
TSH SERPL DL<=0.005 MIU/L-ACNC: 1.15 MU/L (ref 0.4–4)

## 2022-04-14 LAB — FERRITIN SERPL-MCNC: 63 NG/ML (ref 12–150)

## 2022-04-15 LAB
BKR LAB AP GYN ADEQUACY: NORMAL
BKR LAB AP GYN INTERPRETATION: NORMAL
BKR LAB AP HPV REFLEX: NORMAL
BKR LAB AP PREVIOUS ABNORMAL: NORMAL
PATH REPORT.COMMENTS IMP SPEC: NORMAL
PATH REPORT.COMMENTS IMP SPEC: NORMAL
PATH REPORT.RELEVANT HX SPEC: NORMAL

## 2022-04-18 LAB
HUMAN PAPILLOMA VIRUS 16 DNA: NEGATIVE
HUMAN PAPILLOMA VIRUS 18 DNA: NEGATIVE
HUMAN PAPILLOMA VIRUS FINAL DIAGNOSIS: NORMAL
HUMAN PAPILLOMA VIRUS OTHER HR: NEGATIVE

## 2022-11-02 ENCOUNTER — ALLIED HEALTH/NURSE VISIT (OUTPATIENT)
Dept: FAMILY MEDICINE | Facility: CLINIC | Age: 32
End: 2022-11-02
Payer: COMMERCIAL

## 2022-11-02 DIAGNOSIS — Z23 NEED FOR PROPHYLACTIC VACCINATION AND INOCULATION AGAINST INFLUENZA: Primary | ICD-10-CM

## 2022-11-02 PROCEDURE — 99207 PR NO CHARGE NURSE ONLY: CPT

## 2022-11-02 PROCEDURE — 90471 IMMUNIZATION ADMIN: CPT

## 2022-11-02 PROCEDURE — 90686 IIV4 VACC NO PRSV 0.5 ML IM: CPT

## 2022-11-10 ENCOUNTER — E-VISIT (OUTPATIENT)
Dept: URGENT CARE | Facility: CLINIC | Age: 32
End: 2022-11-10
Payer: COMMERCIAL

## 2022-11-10 DIAGNOSIS — J20.9 ACUTE BRONCHITIS WITH SYMPTOMS > 10 DAYS: Primary | ICD-10-CM

## 2022-11-10 PROCEDURE — 99207 PR NON-BILLABLE SERV PER CHARTING: CPT | Performed by: NURSE PRACTITIONER

## 2022-11-10 NOTE — PATIENT INSTRUCTIONS
Dear Katelyn Erickson,    We are sorry you are not feeling well. Based on the responses you provided, it is recommended that you be seen in-person in urgent care so we can better evaluate your symptoms. Please click here to find the nearest urgent care location to you.   You will not be charged for this Visit. Thank you for trusting us with your care.    Danielito Conrad NP

## 2022-11-11 ENCOUNTER — OFFICE VISIT (OUTPATIENT)
Dept: URGENT CARE | Facility: URGENT CARE | Age: 32
End: 2022-11-11
Payer: COMMERCIAL

## 2022-11-11 VITALS
SYSTOLIC BLOOD PRESSURE: 135 MMHG | HEART RATE: 112 BPM | DIASTOLIC BLOOD PRESSURE: 89 MMHG | TEMPERATURE: 98.1 F | OXYGEN SATURATION: 97 %

## 2022-11-11 DIAGNOSIS — R05.1 ACUTE COUGH: Primary | ICD-10-CM

## 2022-11-11 LAB
BASOPHILS # BLD AUTO: 0 10E3/UL (ref 0–0.2)
BASOPHILS NFR BLD AUTO: 0 %
EOSINOPHIL # BLD AUTO: 0.1 10E3/UL (ref 0–0.7)
EOSINOPHIL NFR BLD AUTO: 1 %
ERYTHROCYTE [DISTWIDTH] IN BLOOD BY AUTOMATED COUNT: 13.1 % (ref 10–15)
HCT VFR BLD AUTO: 41.9 % (ref 35–47)
HGB BLD-MCNC: 14.1 G/DL (ref 11.7–15.7)
LYMPHOCYTES # BLD AUTO: 1 10E3/UL (ref 0.8–5.3)
LYMPHOCYTES NFR BLD AUTO: 14 %
MCH RBC QN AUTO: 30.9 PG (ref 26.5–33)
MCHC RBC AUTO-ENTMCNC: 33.7 G/DL (ref 31.5–36.5)
MCV RBC AUTO: 92 FL (ref 78–100)
MONOCYTES # BLD AUTO: 0.6 10E3/UL (ref 0–1.3)
MONOCYTES NFR BLD AUTO: 8 %
NEUTROPHILS # BLD AUTO: 5.8 10E3/UL (ref 1.6–8.3)
NEUTROPHILS NFR BLD AUTO: 77 %
PLATELET # BLD AUTO: 252 10E3/UL (ref 150–450)
RBC # BLD AUTO: 4.57 10E6/UL (ref 3.8–5.2)
WBC # BLD AUTO: 7.6 10E3/UL (ref 4–11)

## 2022-11-11 PROCEDURE — 36415 COLL VENOUS BLD VENIPUNCTURE: CPT | Performed by: NURSE PRACTITIONER

## 2022-11-11 PROCEDURE — 85025 COMPLETE CBC W/AUTO DIFF WBC: CPT | Performed by: NURSE PRACTITIONER

## 2022-11-11 PROCEDURE — 99214 OFFICE O/P EST MOD 30 MIN: CPT | Performed by: NURSE PRACTITIONER

## 2022-11-11 NOTE — PROGRESS NOTES
Chief Complaint   Patient presents with     Urgent Care     Cough which started a month ago, and fever last night.     SUBJECTIVE:  Katelyn Erickson is a 32 year old female presenting with cough feverish tightness shortness of breath body aches fatigue thick mucus and chest for a month and then worsening yesterday.  Her kids have H. influenzae and were both put on antibiotics for ear infections.  She is wondering if she needs an antibiotic. No history of blood clots hemoptysis calf pain smoking.    Past Medical History:   Diagnosis Date     NO ACTIVE PROBLEMS      NO ACTIVE MEDICATIONS,     No current facility-administered medications on file prior to visit.    Social History     Tobacco Use     Smoking status: Former     Types: Cigarettes     Smokeless tobacco: Never     Tobacco comments:     half a pack a day   Substance Use Topics     Alcohol use: Yes     Comment: rarely     No Known Allergies    Review of Systems   All systems negative except for those listed above in HPI.    OBJECTIVE:   /89 (BP Location: Right arm, Patient Position: Sitting, Cuff Size: Adult Large)   Pulse 112   Temp 98.1  F (36.7  C) (Oral)   SpO2 97%      Physical Exam  Vitals reviewed.   Constitutional:       General: She is not in acute distress.     Appearance: Normal appearance. She is well-developed and well-nourished. She is not ill-appearing, toxic-appearing or diaphoretic.   HENT:      Head: Normocephalic and atraumatic.      Right Ear: Tympanic membrane and ear canal normal.      Left Ear: Tympanic membrane and ear canal normal.      Nose: Nose normal.      Mouth/Throat:      Pharynx: No oropharyngeal exudate or posterior oropharyngeal erythema.   Eyes:      Extraocular Movements: EOM normal.      Conjunctiva/sclera: Conjunctivae normal.      Pupils: Pupils are equal, round, and reactive to light.   Cardiovascular:      Rate and Rhythm: Normal rate.      Pulses: Normal pulses and intact distal pulses.   Pulmonary:       Effort: No respiratory distress.      Breath sounds: No stridor. No wheezing, rhonchi or rales.   Chest:      Chest wall: No tenderness.   Musculoskeletal:         General: Normal range of motion.      Cervical back: Normal range of motion and neck supple.   Lymphadenopathy:      Cervical: Cervical adenopathy present.   Skin:     General: Skin is warm.      Capillary Refill: Capillary refill takes less than 2 seconds.      Findings: No rash.   Neurological:      Mental Status: She is alert and oriented to person, place, and time.   Psychiatric:         Mood and Affect: Mood normal.         Behavior: Behavior normal.       ASSESSMENT:    ICD-10-CM    1. Acute cough  R05.1 CBC with platelets and differential        PLAN:     Likely a viral URI versus multiple viruses running the course  CBC pending we call if need for antibiotic  Lungs clear vital stable hold on x-ray  Rest! Your body needs more rest to heal.  Drink plenty of fluids (warm fluids like tea or soup are soothing and reduce cough)  Sit in the bathroom with a hot shower running and breathe in the steam.  Honey may soothe your sore throat and help manage your cough- may take straight or in warm water with lemon juice.  Avoid smoke (cigarettes, bonfires, fireplace, wood burning stoves).  Take Tylenol or an NSAID such as ibuprofen or naproxen as needed for pain.  Delsym (dextromethorphan polistirex) is an over the counter cough medication that lasts 12 hours.   Mucinex or Robitussin (guiafenesin) thin mucus and may help it to loosen more quickly  Good handwashing is the best way to prevent spread of germs  Present to emergency room if you develop trouble breathing, swallowing or cough-up blood.  Follow up with your primary care provider if symptoms worsen or fail to improve as expected.    Follow up with primary care provider with any problems, questions or concerns or if symptoms worsen or fail to improve. Patient agreed to plan and verbalized  understanding.    Katelyn Stark, [facility]

## 2023-01-14 ENCOUNTER — HEALTH MAINTENANCE LETTER (OUTPATIENT)
Age: 33
End: 2023-01-14

## 2023-02-01 ENCOUNTER — PATIENT OUTREACH (OUTPATIENT)
Dept: GASTROENTEROLOGY | Facility: CLINIC | Age: 33
End: 2023-02-01
Payer: COMMERCIAL

## 2023-02-01 NOTE — PROGRESS NOTES
Patient outreach for colon screening. Left message with call back information and requested return call.    Outreach attempts this year: 1    Number of years outreach attempted: 1    Outreach Status: Continue standard outreach.     Lo Castaneda RN on 2/1/2023 at 12:08 PM

## 2023-02-01 NOTE — PROGRESS NOTES
Date Health Maintenance Colonoscopy Procedure Due: 09/08/2022  Last Colonoscopy Date: 09/08/2021  Any additional colonoscopy procedures in CE or Media tabs: No  Prep issues with last colonoscopy? No  Constipation Diagnosis? No    Insurance coverage ([employer] not covered unless pt is Transplant pt): [employer] [place]   PCP in system (if not in system,not eligible for protocol): Arjun Wood PA-C   Date of last PCP appt (if not in last 3 yrs, protocol excludes them): 08/05/2021    Home O2 Use? No  Respiratory Concerns? No  Hospitalizations within the last year? No  JOLIE? No  If JOLIE, severity? NA     Pulmonary HTN? No    Pregnant? No    Phentermine? No  Naltrexone?  No    Liver Disease? No  Recent INR: NA    Recent Paracentesis: No  If paracentesis; message provider for ASC vs Hospital: NA      CKD 4/5 or ESRD? No    Anesthesia issues on last colonoscopy or history of malignant hyperthermia: No    Lo Castaneda RN on 2/1/2023

## 2023-02-08 NOTE — PROGRESS NOTES
Called and spoke with patient, she had a colonoscopy in September 2022 with Shawn [facility]. She plans to continue her screenings there for now.     Colon Screen Outreach Status: INACTIVE: patient is receiving colon screening and follow up outside of [facility].     Lo Castaneda RN on 2/8/2023 at 1:20 PM

## 2023-06-02 ENCOUNTER — OFFICE VISIT (OUTPATIENT)
Dept: URGENT CARE | Facility: URGENT CARE | Age: 33
End: 2023-06-02
Payer: COMMERCIAL

## 2023-06-02 ENCOUNTER — HEALTH MAINTENANCE LETTER (OUTPATIENT)
Age: 33
End: 2023-06-02

## 2023-06-02 VITALS
DIASTOLIC BLOOD PRESSURE: 74 MMHG | SYSTOLIC BLOOD PRESSURE: 118 MMHG | WEIGHT: 258 LBS | TEMPERATURE: 99.1 F | OXYGEN SATURATION: 98 % | RESPIRATION RATE: 14 BRPM | HEART RATE: 120 BPM | BODY MASS INDEX: 39.23 KG/M2

## 2023-06-02 DIAGNOSIS — J02.0 STREP PHARYNGITIS: Primary | ICD-10-CM

## 2023-06-02 LAB — DEPRECATED S PYO AG THROAT QL EIA: POSITIVE

## 2023-06-02 PROCEDURE — 87880 STREP A ASSAY W/OPTIC: CPT | Performed by: PHYSICIAN ASSISTANT

## 2023-06-02 PROCEDURE — 99213 OFFICE O/P EST LOW 20 MIN: CPT | Performed by: PHYSICIAN ASSISTANT

## 2023-06-02 RX ORDER — AMOXICILLIN 500 MG/1
500 CAPSULE ORAL 2 TIMES DAILY
Qty: 20 CAPSULE | Refills: 0 | Status: SHIPPED | OUTPATIENT
Start: 2023-06-02 | End: 2023-06-12

## 2023-06-02 NOTE — PROGRESS NOTES
Assessment & Plan:      Problem List Items Addressed This Visit    None  Visit Diagnoses     Strep pharyngitis    -  Primary    Relevant Medications    amoxicillin (AMOXIL) 500 MG capsule    Other Relevant Orders    Streptococcus A Rapid Screen w/Reflex to PCR - Clinic Collect (Completed)        Medical Decision Making  Patient presents with throat pain, cough, ear pains, and subjective fevers for 24 hours.  Rapid strep is positive.  We will treat patient with oral antibiotics.  Change toothbrush in 72 hours.  Discussed treatment and symptomatic care.  Allergies and medication interactions reviewed.  Discussed signs of worsening symptoms and when to follow-up with PCP if no symptom improvement.     Subjective:      Katelyn Erickson is a 32 year old female here for evaluation of throat pain, cough, ear pains, and subjective fevers.  Onset of symptoms was 24 hours ago.     The following portions of the patient's history were reviewed and updated as appropriate: allergies, current medications, and problem list.     Review of Systems  Pertinent items are noted in HPI.    Allergies  No Known Allergies    Family History   Problem Relation Age of Onset     Colon Cancer Mother      Colon Cancer Maternal Aunt      Colon Cancer Maternal Aunt      Colon Cancer Maternal Grandmother      Cancer - colorectal Mother         diagnosed at age 33 and  at age 34     Cancer - colorectal Maternal Grandmother          age 53     Cancer Maternal Grandfather         pancreatic cancer     Cancer - colorectal Maternal Aunt        Social History     Tobacco Use     Smoking status: Former     Types: Cigarettes     Smokeless tobacco: Never     Tobacco comments:     half a pack a day   Vaping Use     Vaping status: Former     Passive vaping exposure: Yes   Substance Use Topics     Alcohol use: Yes     Comment: rarely        Objective:      /74 (BP Location: Right arm, Patient Position: Chair, Cuff Size: Adult Regular)   Pulse  120   Temp 99.1  F (37.3  C) (Oral)   Resp 14   Wt 117 kg (258 lb)   LMP 05/27/2023   SpO2 98%   Breastfeeding No   BMI 39.23 kg/m    General appearance - alert, well appearing, and in no distress and non-toxic  Ears - bilateral TM's and external ear canals normal  Nose - normal and patent, no erythema, discharge or polyps  Mouth - Posterior pharynx appears moderate erythematous with moderate tonsillar swelling and erythema, no exudate  Neck - Moderate bilateral anterior cervical lymphadenopathy  Chest - clear to auscultation, no wheezes, rales or rhonchi, symmetric air entry  Heart - normal rate, regular rhythm, normal S1, S2, no murmurs, rubs, clicks or gallops     Lab & Imaging Results    Results for orders placed or performed in visit on 06/02/23   Streptococcus A Rapid Screen w/Reflex to PCR - Clinic Collect     Status: Abnormal    Specimen: Throat; Swab   Result Value Ref Range    Group A Strep antigen Positive (A) Negative       I personally reviewed these results and discussed findings with the patient.    The use of Dragon/Playrcart dictation services was used to construct the content of this note; any grammatical errors are non-intentional. Please contact the author directly if you are in need of any clarification.

## 2023-06-02 NOTE — PATIENT INSTRUCTIONS
Throat    Your rapid strep test was positive today. We will treat with a course of antibiotics. Please complete the full course of antibiotics. You can take your medication with food and with a probiotic such as Culturelle to prevent stomach irritation. You will be contagious for 24 hours following initiation of the medication.    You may use Tylenol or Motrin for pain and fevers.    May drink warm tea, gargle saline solution, or use throat lozenges to sooth throat pain.    Change toothbrush after 72 hours of starting the antibiotic to prevent reinfection.    Watch for resolution of symptoms in the next few days. If you continue to have high fevers, begin to have difficulty swallowing or breathing, notice worsening neck pain, or difficulty moving neck, please return to clinic or present to the emergency room immediately. Otherwise, follow up with your primary care provider as needed.

## 2023-09-05 ENCOUNTER — E-VISIT (OUTPATIENT)
Dept: URGENT CARE | Facility: CLINIC | Age: 33
End: 2023-09-05
Payer: COMMERCIAL

## 2023-09-05 DIAGNOSIS — N76.0 ACUTE VAGINITIS: Primary | ICD-10-CM

## 2023-09-05 PROCEDURE — 99421 OL DIG E/M SVC 5-10 MIN: CPT | Performed by: FAMILY MEDICINE

## 2023-09-06 NOTE — PATIENT INSTRUCTIONS
Thank you for choosing us for your care. Given your symptoms, I would like you to do a lab-only visit to determine what is causing them.  I have placed the orders.  Please schedule an appointment with the lab right here in [facility], or call [phone].  I will let you know when the results are back and next steps to take.  Vaginitis: Care Instructions  Overview     Vaginitis is soreness or infection of the vagina. This common problem can cause itching and burning. And it can cause a change in vaginal discharge. Sometimes it can cause pain during sex. Vaginitis may be caused by bacteria, yeast, or other germs. Some infections that cause it are caught from a sexual partner. Bath products, spermicides, and douches can irritate the vagina too.  This problem can also happen during and after menopause. A drop in estrogen levels during this time can cause dryness, soreness, and pain during sex.  Your doctor can give you medicine to treat vaginitis. And home care may help you feel better. For certain types of infections, your sex partner(s) must be treated too.  Follow-up care is a key part of your treatment and safety. Be sure to make and go to all appointments, and call your doctor if you are having problems. It's also a good idea to know your test results and keep a list of the medicines you take.  How can you care for yourself at home?  Take your medicines exactly as prescribed. Call your doctor if you think you are having a problem with your medicine.  Ask your doctor if your sex partner(s) also needs treatment.  Do not eat or drink anything that has alcohol if you are taking metronidazole (Flagyl).  Wash your vulva daily with water. You also can use a mild, unscented soap if you want.  Do not use scented bath products. And do not use vaginal sprays or douches.  Change out of wet or damp clothes as soon as possible. Wear cotton underwear. And avoid tight clothing that could increase moisture.  Put a washcloth soaked  "in cool water on the area to relieve itching. Or you can take cool baths.  If you have dryness because of menopause, use estrogen cream or pills that your doctor prescribes.  Ask your doctor about when it is okay to have sex.  Use a personal lubricant before sex if you have dryness. Examples are Astroglide, K-Y Jelly, and Wet Lubricant Gel.  When should you call for help?   Call your doctor now or seek immediate medical care if:    You have a fever.     You have new or increased pain in your vagina or pelvis.     You have new or worse vaginal itching or discharge.   Watch closely for changes in your health, and be sure to contact your doctor if:    You have bleeding other than your period.     You do not get better as expected.   Where can you learn more?  Go to https://www.[employer].net/patiented  Enter F219 in the search box to learn more about \"Vaginitis: Care Instructions.\"  Current as of: August 2, 2022               Content Version: 13.7    9486-4099 [employer].   Care instructions adapted under license by your healthcare professional. If you have questions about a medical condition or this instruction, always ask your healthcare professional. [employer] disclaims any warranty or liability for your use of this information.      "

## 2023-09-07 ENCOUNTER — LAB (OUTPATIENT)
Dept: LAB | Facility: CLINIC | Age: 33
End: 2023-09-07
Payer: COMMERCIAL

## 2023-09-07 DIAGNOSIS — N76.0 ACUTE VAGINITIS: ICD-10-CM

## 2023-09-07 LAB
CLUE CELLS: ABNORMAL
TRICHOMONAS, WET PREP: ABNORMAL
WBC'S/HIGH POWER FIELD, WET PREP: ABNORMAL
YEAST, WET PREP: ABNORMAL

## 2023-09-07 PROCEDURE — 87210 SMEAR WET MOUNT SALINE/INK: CPT

## 2023-09-08 ENCOUNTER — OFFICE VISIT (OUTPATIENT)
Dept: FAMILY MEDICINE | Facility: CLINIC | Age: 33
End: 2023-09-08
Payer: COMMERCIAL

## 2023-09-08 VITALS
RESPIRATION RATE: 16 BRPM | WEIGHT: 250.6 LBS | DIASTOLIC BLOOD PRESSURE: 82 MMHG | HEIGHT: 68 IN | OXYGEN SATURATION: 96 % | SYSTOLIC BLOOD PRESSURE: 125 MMHG | HEART RATE: 66 BPM | BODY MASS INDEX: 37.98 KG/M2 | TEMPERATURE: 98 F

## 2023-09-08 DIAGNOSIS — N89.8 VAGINAL ITCHING: Primary | ICD-10-CM

## 2023-09-08 DIAGNOSIS — Z12.11 SCREEN FOR COLON CANCER: ICD-10-CM

## 2023-09-08 LAB
ALBUMIN UR-MCNC: NEGATIVE MG/DL
APPEARANCE UR: CLEAR
BILIRUB UR QL STRIP: NEGATIVE
COLOR UR AUTO: YELLOW
GLUCOSE UR STRIP-MCNC: NEGATIVE MG/DL
HGB UR QL STRIP: NEGATIVE
KETONES UR STRIP-MCNC: NEGATIVE MG/DL
LEUKOCYTE ESTERASE UR QL STRIP: NEGATIVE
NITRATE UR QL: NEGATIVE
PH UR STRIP: 6 [PH] (ref 5–7)
SP GR UR STRIP: <=1.005 (ref 1–1.03)
UROBILINOGEN UR STRIP-ACNC: 0.2 E.U./DL

## 2023-09-08 PROCEDURE — 99213 OFFICE O/P EST LOW 20 MIN: CPT | Performed by: PHYSICIAN ASSISTANT

## 2023-09-08 PROCEDURE — 81003 URINALYSIS AUTO W/O SCOPE: CPT | Performed by: PHYSICIAN ASSISTANT

## 2023-09-08 RX ORDER — CLINDAMYCIN HCL 300 MG
300 CAPSULE ORAL 2 TIMES DAILY
Qty: 14 CAPSULE | Refills: 0 | Status: SHIPPED | OUTPATIENT
Start: 2023-09-08 | End: 2023-09-15

## 2023-09-08 NOTE — PROGRESS NOTES
Assessment & Plan     Vaginal itching  Wet prep shows WBCs. Will treat empirically for BV. If not improving or continues to wax and wane follow up with OB/GYN.  UA negative for infection.  She denies any concern for STDs, monogamous with .  - UA Macroscopic with reflex to Microscopic and Culture - Lab Collect; Future  - UA Macroscopic with reflex to Microscopic and Culture - Lab Collect  - Ob/Gyn Referral; Future  - clindamycin (CLEOCIN) 300 MG capsule; Take 1 capsule (300 mg) by mouth 2 times daily for 7 days    Screen for colon cancer  Patient completes this with [facility]. Has scheduled for September 25th 2023    Review of the result(s) of each unique test - UA, wet prep  Ordering of each unique test  Prescription drug management    Keena Mariscal PA-C  [facility]      Travis Zepeda is a 33 year old, presenting for the following health issues:  Vaginal Problem (Was on antibiotics in June and had a yeast infection. Has done the OTC treatment and that seemed to help. But feels like it is reoccuring)        9/8/2023     9:09 AM   Additional Questions   Roomed by Deneen Mabry       History of Present Illness       Reason for visit:  Vaginal itching burning discharge  Symptom onset:  More than a month    She eats 2-3 servings of fruits and vegetables daily.She consumes 0 sweetened beverage(s) daily.She exercises with enough effort to increase her heart rate 10 to 19 minutes per day.  She exercises with enough effort to increase her heart rate 3 or less days per week.   She is taking medications regularly.       Vaginal Symptoms  Onset/Duration: sunday  Description:  Vaginal Discharge: creamy- white/yellow  Itching (Pruritis): YES  Burning sensation:  YES  Odor: YES  Symptoms coming and going for the past 2-3 months  Accompanying Signs & Symptoms:  Urinary symptoms: No  Abdominal pain: No  Fever: No  History:   Sexually active: YES (no concern for STD)  New Partner:  "No  Possibility of Pregnancy:  No  Recent antibiotic use: YES- in June  Previous vaginitis issues: YES- but only once  Precipitating or alleviating factors: monistat cream leftover has been using  Therapies tried and outcome: Monistat    In June was treated for strep and had yeast infection later that month. Since that time she has had symptoms of itching that comes and goes.      Review of Systems   Genitourinary:  Positive for vaginal discharge.         Objective    /82 (BP Location: Right arm, Patient Position: Sitting, Cuff Size: Adult Large)   Pulse 66   Temp 98  F (36.7  C) (Oral)   Resp 16   Ht 1.727 m (5' 8\")   Wt 113.7 kg (250 lb 9.6 oz)   LMP 08/06/2023 (Exact Date)   SpO2 96%   BMI 38.10 kg/m    Body mass index is 38.1 kg/m .  Physical Exam   GENERAL: No acute distress  HEENT: Normocephalic  NEURO: Alert and non-focal     Results for orders placed or performed in visit on 09/08/23 (from the past 24 hour(s))   UA Macroscopic with reflex to Microscopic and Culture - Lab Collect    Specimen: Urine, Clean Catch   Result Value Ref Range    Color Urine Yellow Colorless, Straw, Light Yellow, Yellow    Appearance Urine Clear Clear    Glucose Urine Negative Negative mg/dL    Bilirubin Urine Negative Negative    Ketones Urine Negative Negative mg/dL    Specific Gravity Urine <=1.005 1.003 - 1.035    Blood Urine Negative Negative    pH Urine 6.0 5.0 - 7.0    Protein Albumin Urine Negative Negative mg/dL    Urobilinogen Urine 0.2 0.2, 1.0 E.U./dL    Nitrite Urine Negative Negative    Leukocyte Esterase Urine Negative Negative    Narrative    Microscopic not indicated                   "

## 2023-09-25 ENCOUNTER — TRANSFERRED RECORDS (OUTPATIENT)
Dept: MULTI SPECIALTY CLINIC | Facility: CLINIC | Age: 33
End: 2023-09-25
Payer: COMMERCIAL

## 2023-10-07 ASSESSMENT — ENCOUNTER SYMPTOMS
HEMATOCHEZIA: 0
JOINT SWELLING: 0
SORE THROAT: 0
HEADACHES: 0
NERVOUS/ANXIOUS: 0
DIZZINESS: 0
WEAKNESS: 0
PALPITATIONS: 0
CONSTIPATION: 0
CHILLS: 0
SHORTNESS OF BREATH: 0
BREAST MASS: 0
MYALGIAS: 0
FEVER: 0
FREQUENCY: 0
HEARTBURN: 0
DYSURIA: 0
ABDOMINAL PAIN: 0
COUGH: 0
HEMATURIA: 0
EYE PAIN: 0
NAUSEA: 0
ARTHRALGIAS: 0
DIARRHEA: 0
PARESTHESIAS: 0

## 2023-10-11 ENCOUNTER — OFFICE VISIT (OUTPATIENT)
Dept: FAMILY MEDICINE | Facility: CLINIC | Age: 33
End: 2023-10-11
Payer: COMMERCIAL

## 2023-10-11 ENCOUNTER — TELEPHONE (OUTPATIENT)
Dept: FAMILY MEDICINE | Facility: CLINIC | Age: 33
End: 2023-10-11

## 2023-10-11 ENCOUNTER — PRE VISIT (OUTPATIENT)
Dept: ONCOLOGY | Facility: CLINIC | Age: 33
End: 2023-10-11

## 2023-10-11 VITALS
WEIGHT: 255 LBS | DIASTOLIC BLOOD PRESSURE: 85 MMHG | HEART RATE: 78 BPM | TEMPERATURE: 98.4 F | RESPIRATION RATE: 20 BRPM | SYSTOLIC BLOOD PRESSURE: 128 MMHG | OXYGEN SATURATION: 98 % | HEIGHT: 68 IN | BODY MASS INDEX: 38.65 KG/M2

## 2023-10-11 DIAGNOSIS — F33.1 MODERATE EPISODE OF RECURRENT MAJOR DEPRESSIVE DISORDER (H): ICD-10-CM

## 2023-10-11 DIAGNOSIS — R53.83 FATIGUE, UNSPECIFIED TYPE: ICD-10-CM

## 2023-10-11 DIAGNOSIS — Z12.11 SCREEN FOR COLON CANCER: ICD-10-CM

## 2023-10-11 DIAGNOSIS — Z15.09 LYNCH SYNDROME: ICD-10-CM

## 2023-10-11 DIAGNOSIS — Z00.00 ROUTINE GENERAL MEDICAL EXAMINATION AT A HEALTH CARE FACILITY: Primary | ICD-10-CM

## 2023-10-11 DIAGNOSIS — F41.9 ANXIETY: ICD-10-CM

## 2023-10-11 LAB
ERYTHROCYTE [DISTWIDTH] IN BLOOD BY AUTOMATED COUNT: 12.6 % (ref 10–15)
FERRITIN SERPL-MCNC: 85 NG/ML (ref 6–175)
HCT VFR BLD AUTO: 40.3 % (ref 35–47)
HGB BLD-MCNC: 13.5 G/DL (ref 11.7–15.7)
MCH RBC QN AUTO: 30.3 PG (ref 26.5–33)
MCHC RBC AUTO-ENTMCNC: 33.5 G/DL (ref 31.5–36.5)
MCV RBC AUTO: 90 FL (ref 78–100)
PLATELET # BLD AUTO: 360 10E3/UL (ref 150–450)
RBC # BLD AUTO: 4.46 10E6/UL (ref 3.8–5.2)
WBC # BLD AUTO: 6.7 10E3/UL (ref 4–11)

## 2023-10-11 PROCEDURE — 85027 COMPLETE CBC AUTOMATED: CPT | Performed by: NURSE PRACTITIONER

## 2023-10-11 PROCEDURE — 99395 PREV VISIT EST AGE 18-39: CPT | Mod: 25 | Performed by: NURSE PRACTITIONER

## 2023-10-11 PROCEDURE — 36415 COLL VENOUS BLD VENIPUNCTURE: CPT | Performed by: NURSE PRACTITIONER

## 2023-10-11 PROCEDURE — 90471 IMMUNIZATION ADMIN: CPT | Performed by: NURSE PRACTITIONER

## 2023-10-11 PROCEDURE — 99214 OFFICE O/P EST MOD 30 MIN: CPT | Mod: 25 | Performed by: NURSE PRACTITIONER

## 2023-10-11 PROCEDURE — 90686 IIV4 VACC NO PRSV 0.5 ML IM: CPT | Performed by: NURSE PRACTITIONER

## 2023-10-11 PROCEDURE — 82728 ASSAY OF FERRITIN: CPT | Performed by: NURSE PRACTITIONER

## 2023-10-11 RX ORDER — BUPROPION HYDROCHLORIDE 150 MG/1
150 TABLET ORAL EVERY MORNING
Qty: 30 TABLET | Refills: 1 | Status: SHIPPED | OUTPATIENT
Start: 2023-10-11 | End: 2023-11-13

## 2023-10-11 ASSESSMENT — ENCOUNTER SYMPTOMS
BREAST MASS: 0
FEVER: 0
HEMATURIA: 0
FREQUENCY: 0
COUGH: 0
CONSTIPATION: 0
HEARTBURN: 0
SORE THROAT: 0
WEAKNESS: 0
SHORTNESS OF BREATH: 0
CHILLS: 0
PARESTHESIAS: 0
PALPITATIONS: 0
DYSURIA: 0
HEADACHES: 0
ARTHRALGIAS: 0
ABDOMINAL PAIN: 0
NAUSEA: 0
NERVOUS/ANXIOUS: 0
EYE PAIN: 0
HEMATOCHEZIA: 0
MYALGIAS: 0
JOINT SWELLING: 0
DIZZINESS: 0
DIARRHEA: 0

## 2023-10-11 ASSESSMENT — ANXIETY QUESTIONNAIRES
GAD7 TOTAL SCORE: 15
6. BECOMING EASILY ANNOYED OR IRRITABLE: MORE THAN HALF THE DAYS
2. NOT BEING ABLE TO STOP OR CONTROL WORRYING: MORE THAN HALF THE DAYS
5. BEING SO RESTLESS THAT IT IS HARD TO SIT STILL: MORE THAN HALF THE DAYS
IF YOU CHECKED OFF ANY PROBLEMS ON THIS QUESTIONNAIRE, HOW DIFFICULT HAVE THESE PROBLEMS MADE IT FOR YOU TO DO YOUR WORK, TAKE CARE OF THINGS AT HOME, OR GET ALONG WITH OTHER PEOPLE: VERY DIFFICULT
1. FEELING NERVOUS, ANXIOUS, OR ON EDGE: MORE THAN HALF THE DAYS
GAD7 TOTAL SCORE: 15
7. FEELING AFRAID AS IF SOMETHING AWFUL MIGHT HAPPEN: SEVERAL DAYS
3. WORRYING TOO MUCH ABOUT DIFFERENT THINGS: NEARLY EVERY DAY

## 2023-10-11 ASSESSMENT — PATIENT HEALTH QUESTIONNAIRE - PHQ9
SUM OF ALL RESPONSES TO PHQ QUESTIONS 1-9: 13
5. POOR APPETITE OR OVEREATING: NEARLY EVERY DAY

## 2023-10-11 ASSESSMENT — PAIN SCALES - GENERAL: PAINLEVEL: NO PAIN (0)

## 2023-10-11 NOTE — PROGRESS NOTES
SUBJECTIVE:   CC: Katelyn is an 33 year old who presents for preventive health visit.       10/11/2023     9:08 AM   Additional Questions   Roomed by Michelle         10/11/2023     9:08 AM   Patient Reported Additional Medications   Patient reports taking the following new medications none       Healthy Habits:     Getting at least 3 servings of Calcium per day:  Yes    Bi-annual eye exam:  NO    Dental care twice a year:  Yes    Sleep apnea or symptoms of sleep apnea:  None    Diet:  Regular (no restrictions)    Frequency of exercise:  2-3 days/week    Duration of exercise:  15-30 minutes    Taking medications regularly:  Yes    Medication side effects:  Not applicable    Additional concerns today:  No    Please abstract the following data from this visit with this patient into the appropriate field in Epic:    Tests that can be patient reported without a hard copy:    Colonoscopy done on this date: 9/25/2023 (approximately), by this group: [facility], results were normal/negative.         Abnormal Mood Symptoms  Onset: ongoing for awhile  Description:   Depression: YES- seasonal, during the winter  Anxiety: YES  Accompanying Signs & Symptoms:  Still participating in activities that you used to enjoy: YES  Fatigue: YES  Irritability: YES- sometimes  Difficulty concentrating: YES  Changes in appetite: no  Problems with sleep: no  Heart racing/beating fast : no  Thoughts of hurting yourself or others: none  History:   Recent stress: no  Prior depression hospitalization: None  Family history of depression: YES- maternal aunts, possibly mother  Family history of anxiety: YES- maternal aunts, possibly mother  Precipitating factors:   Alcohol/drug use: no  Alleviating factors:  Journaling, deep breathing    Therapies Tried and outcome: None    Feels like she has always had anxiety. Never wanted to bring it up.  Moved to [place] from [place] during covid.  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  [place] are hard, staying home more. When the sun is out she feels better.       10/11/2023    10:06 AM   KEYLA-7 SCORE   Total Score 15           10/11/2023    10:06 AM   PHQ   PHQ-9 Total Score 13   Q9: Thoughts of better off dead/self-harm past 2 weeks Not at all           Social History     Tobacco Use    Smoking status: Former     Types: Cigarettes     Passive exposure: Past    Smokeless tobacco: Never    Tobacco comments:     half a pack a day   Substance Use Topics    Alcohol use: Yes     Comment: rarely             10/7/2023     5:46 PM   Alcohol Use   Prescreen: >3 drinks/day or >7 drinks/week? No     Reviewed orders with patient.  Reviewed health maintenance and updated orders accordingly - Yes  Labs reviewed in EPIC  BP Readings from Last 3 Encounters:   10/11/23 128/85   09/08/23 125/82   06/02/23 118/74    Wt Readings from Last 3 Encounters:   10/11/23 115.7 kg (255 lb)   09/08/23 113.7 kg (250 lb 9.6 oz)   06/02/23 117 kg (258 lb)                  Current Outpatient Medications   Medication Sig Dispense Refill    buPROPion (WELLBUTRIN XL) 150 MG 24 hr tablet Take 1 tablet (150 mg) by mouth every morning 30 tablet 1    NO ACTIVE MEDICATIONS        No Known Allergies    Breast Cancer Screening:    FHS-7:       4/12/2022    10:07 AM 10/7/2023     5:47 PM   Breast CA Risk Assessment (FHS-7)   Did any of your first-degree relatives have breast or ovarian cancer? No No   Did any of your relatives have bilateral breast cancer? No No   Did any man in your family have breast cancer? No No   Did any woman in your family have breast and ovarian cancer? No No   Did any woman in your family have breast cancer before age 50 y? No No   Do you have 2 or more relatives with breast and/or ovarian cancer? No No   Do you have 2 or more relatives with breast and/or bowel cancer? Yes Yes       Patient under 40 years of age: Routine Mammogram Screening not recommended.   Pertinent  mammograms are reviewed under the imaging tab.    History of abnormal Pap smear: NO - age 30-65 PAP every 5 years with negative HPV co-testing recommended      Latest Ref Rng & Units 4/12/2022    10:49 AM 1/8/2010    12:00 AM   PAP / HPV   PAP  Negative for Intraepithelial Lesion or Malignancy (NILM)     PAP (Historical)   NIL    HPV 16 DNA Negative Negative     HPV 18 DNA Negative Negative     Other HR HPV Negative Negative       Reviewed and updated as needed this visit by clinical staff   Tobacco  Allergies  Meds  Problems  Med Hx  Surg Hx  Fam Hx          Reviewed and updated as needed this visit by Provider   Tobacco  Allergies  Meds  Problems  Med Hx  Surg Hx  Fam Hx         Past Medical History:   Diagnosis Date    NO ACTIVE PROBLEMS       Past Surgical History:   Procedure Laterality Date    COLONOSCOPY  1/23/2012    Procedure:COLONOSCOPY; COLONOSCOPY; Surgeon:JAKOB PETERS; Location: GI    COLONOSCOPY      COLONOSCOPY N/A 9/8/2021    Procedure: COLONOSCOPY;  Surgeon: Joseph Flores MD;  Location:  GI    ESOPHAGOSCOPY, GASTROSCOPY, DUODENOSCOPY (EGD), COMBINED N/A 9/8/2021    Procedure: ESOPHAGOGASTRODUODENOSCOPY (EGD);  Surgeon: Joseph Flores MD;  Location:  GI    NO HISTORY OF SURGERY         Review of Systems   Constitutional:  Negative for chills and fever.   HENT:  Negative for congestion, ear pain, hearing loss and sore throat.    Eyes:  Negative for pain and visual disturbance.   Respiratory:  Negative for cough and shortness of breath.    Cardiovascular:  Negative for chest pain, palpitations and peripheral edema.   Gastrointestinal:  Negative for abdominal pain, constipation, diarrhea, heartburn, hematochezia and nausea.   Breasts:  Negative for tenderness, breast mass and discharge.   Genitourinary:  Negative for dysuria, frequency, genital sores, hematuria, pelvic pain, urgency, vaginal bleeding and vaginal discharge.   Musculoskeletal:  Negative for arthralgias,  "joint swelling and myalgias.   Skin:  Negative for rash.   Neurological:  Negative for dizziness, weakness, headaches and paresthesias.   Psychiatric/Behavioral:  Negative for mood changes. The patient is not nervous/anxious.           OBJECTIVE:   /85 (BP Location: Right arm, Patient Position: Sitting, Cuff Size: Adult Large)   Pulse 78   Temp 98.4  F (36.9  C) (Oral)   Resp 20   Ht 1.715 m (5' 7.5\")   Wt 115.7 kg (255 lb)   LMP 09/12/2023 (Exact Date)   SpO2 98%   BMI 39.35 kg/m    Physical Exam  GENERAL: healthy, alert and no distress  EYES: Eyes grossly normal to inspection, PERRL and conjunctivae and sclerae normal  HENT: ear canals and TM's normal, nose and mouth without ulcers or lesions  NECK: no adenopathy, no asymmetry, masses, or scars and thyroid normal to palpation  RESP: lungs clear to auscultation - no rales, rhonchi or wheezes  BREAST: normal without masses, tenderness or nipple discharge and no palpable axillary masses or adenopathy  CV: regular rate and rhythm, normal S1 S2, no S3 or S4, no murmur, click or rub, no peripheral edema and peripheral pulses strong  ABDOMEN: soft, nontender, no hepatosplenomegaly, no masses and bowel sounds normal  MS: no gross musculoskeletal defects noted, no edema  SKIN: no suspicious lesions or rashes  NEURO: Normal strength and tone, mentation intact and speech normal  PSYCH: mentation appears normal, affect normal/bright    Diagnostic Test Results:  Labs reviewed in Epic    ASSESSMENT/PLAN:   1. Routine general medical examination at a health care facility  Reviewed age appropriate screenings and immunizations.  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     2. Screen for colon cancer  Completed last month at [facility], normal.     3. Jean syndrome  Was being followed by [facility], but not happy.  Referral for cancer risk management through [facility].   - Adult Oncology/Hematology  Referral; Future    4. Fatigue, unspecified " "type  She c/o increased fatigue about a day prior to onset of period and continues while she has her period.  She does note periods are heavy, lasting for 3-4 days.  Will check labs; if normal suspect hormonal.   - CBC with platelets; Future  - Ferritin; Future  - OFFICE/OUTPT VISIT,EST,LEVL IV  - CBC with platelets  - Ferritin    5. Anxiety  New diagnosis.  Will have her start on wellbutrin. Follow-up in 1 month.   - buPROPion (WELLBUTRIN XL) 150 MG 24 hr tablet; Take 1 tablet (150 mg) by mouth every morning  Dispense: 30 tablet; Refill: 1  - OFFICE/OUTPT VISIT,EST,LEVL IV    6. Moderate episode of recurrent major depressive disorder (H)  New diagnosis.  Sounds like there is a strong seasonal component.  Will have her start on wellbutrin.  Follow-up in 1 month.   - buPROPion (WELLBUTRIN XL) 150 MG 24 hr tablet; Take 1 tablet (150 mg) by mouth every morning  Dispense: 30 tablet; Refill: 1  - OFFICE/OUTPT VISIT,EST,LEVL IV        COUNSELING:  Reviewed preventive health counseling, as reflected in patient instructions      BMI:   Estimated body mass index is 39.35 kg/m  as calculated from the following:    Height as of this encounter: 1.715 m (5' 7.5\").    Weight as of this encounter: 115.7 kg (255 lb).   Weight management plan: Discussed healthy diet and exercise guidelines      She reports that she has quit smoking. Her smoking use included cigarettes. She has been exposed to tobacco smoke. She has never used smokeless tobacco.          Rose Phelps, WINDY CNP  [facility]  "

## 2023-10-11 NOTE — Clinical Note
Please abstract the following data from this visit with this patient into the appropriate field in Epic:  Tests that can be patient reported without a hard copy:  Colonoscopy done on this date: 9/25/2023 (approximately), by this group: [facility], results were normal/negative.

## 2023-10-11 NOTE — TELEPHONE ENCOUNTER
Called to assist patient in making a follow up appointment for Rose Phelps NP. Patient did not answer, LMTCB or instructed to make appointment on mychart.     Lina Dhillon MA

## 2023-10-11 NOTE — TELEPHONE ENCOUNTER
"RECORDS STATUS - ALL OTHER DIAGNOSIS    Jean Syndrome   RECORDS RECEIVED FROM: [facility]    Appt Date: TBD NN WQ    Action    Action Taken 10/11/2023 12:19pm KEB   I called pt Katelyn- She was seen at [facility] in [place] ([facility])    She didn't have any genetic counseling. She just had genetic testing done.     She will email the genetic reports to my work email.     10/17/2023 4:59pm KEB   I called pt Katelyn again - she is still looking for her genetic test results.     I'll call [facility] tomorrow to request the genetic results.     10/18/2023 4:22pm KEB   I called [facility] Ph: [phone] #6 #1 #2- their phone went to .     10/19/2023 12:46pm KEB   I called [facility] - I faxed over a request to Fax: [phone]    10/23/2023 4:26pm KEB   I called [facility] again.. their phone went to . I left a  requesting a call back.     10/25/2023 4:04pm KEB   I received a fax back form [facility] stating that they don't have anything on the patient. Maybe the patient has a different last name in their system?     I called pt Katelyn again - the explained that [facility] said that \"record type not found\".     I sent an ib-message to the  Team to let them know we are having issues getting genetic records from [facility].     I sent another fax request with the maiden name: Cristi    10/30/2023 2:52pm KEB   I called [facility] to follow up on my request to see if they have the patient under the last name \"Rich\" in their system. Ph: [phone] #6 #1 #2- they pulled up the pt's chart. They haven't receive my request...they asked for the pt's  several times.. They confirmed the pt's last name is under Rich in their system.     They are having a lot of issues with their computer system. I was put on hold again.. the pt was seen at their [place] Location. PH: [phone]- I left a detailed vm for the medical records dept.     2023 12:41pm MACK   I called [facility] ([facility] " Baljit) Ph: [phone] - Fax: [phone]  I sent a STAT fax over right away. The first fax didn't successfully go through. I sent another request and that fax went through.     I called [facility] again -they haven't received the fax but it's probably still coming through. They will call me once the fax has been received.     12:59pm [facility]   I immediately got a call back- they received the fax! Records will be sent over shortly.     11/6/2023 1:40pm [facility] said they have no genetic records to share on pt Katelyn. I sent an ib-message to the [facility] Team to let them know I've exhausted all my attempts.     I called pt Katelyn to let her know [facility] denied another fax request. Katelyn will try to collect the records.  She will send any records to my work email.

## 2023-11-06 ENCOUNTER — PATIENT OUTREACH (OUTPATIENT)
Dept: ONCOLOGY | Facility: CLINIC | Age: 33
End: 2023-11-06
Payer: COMMERCIAL

## 2023-11-06 NOTE — PROGRESS NOTES
Writer received Cancer Risk Management Program referral, referred for:    Patient had testing done with [facility], results of testing in [facility] dated 8/20/2020, unable to book debra         Reviewed for appropriate plan, and sent to New Patient Scheduling for completion.

## 2023-11-08 ENCOUNTER — MYC REFILL (OUTPATIENT)
Dept: FAMILY MEDICINE | Facility: CLINIC | Age: 33
End: 2023-11-08
Payer: COMMERCIAL

## 2023-11-08 DIAGNOSIS — F33.1 MODERATE EPISODE OF RECURRENT MAJOR DEPRESSIVE DISORDER (H): ICD-10-CM

## 2023-11-08 DIAGNOSIS — F41.9 ANXIETY: ICD-10-CM

## 2023-11-08 RX ORDER — BUPROPION HYDROCHLORIDE 150 MG/1
150 TABLET ORAL EVERY MORNING
Qty: 30 TABLET | Refills: 1 | OUTPATIENT
Start: 2023-11-08

## 2023-11-13 ENCOUNTER — VIRTUAL VISIT (OUTPATIENT)
Dept: FAMILY MEDICINE | Facility: CLINIC | Age: 33
End: 2023-11-13
Payer: COMMERCIAL

## 2023-11-13 DIAGNOSIS — F41.9 ANXIETY: ICD-10-CM

## 2023-11-13 DIAGNOSIS — F33.1 MODERATE EPISODE OF RECURRENT MAJOR DEPRESSIVE DISORDER (H): ICD-10-CM

## 2023-11-13 PROCEDURE — 99214 OFFICE O/P EST MOD 30 MIN: CPT | Mod: VID | Performed by: NURSE PRACTITIONER

## 2023-11-13 RX ORDER — BUPROPION HYDROCHLORIDE 150 MG/1
150 TABLET ORAL EVERY MORNING
Qty: 90 TABLET | Refills: 1 | Status: SHIPPED | OUTPATIENT
Start: 2023-11-13 | End: 2024-06-04

## 2023-11-13 ASSESSMENT — ANXIETY QUESTIONNAIRES
6. BECOMING EASILY ANNOYED OR IRRITABLE: SEVERAL DAYS
GAD7 TOTAL SCORE: 2
5. BEING SO RESTLESS THAT IT IS HARD TO SIT STILL: NOT AT ALL
2. NOT BEING ABLE TO STOP OR CONTROL WORRYING: NOT AT ALL
1. FEELING NERVOUS, ANXIOUS, OR ON EDGE: SEVERAL DAYS
GAD7 TOTAL SCORE: 2
3. WORRYING TOO MUCH ABOUT DIFFERENT THINGS: NOT AT ALL
7. FEELING AFRAID AS IF SOMETHING AWFUL MIGHT HAPPEN: NOT AT ALL
IF YOU CHECKED OFF ANY PROBLEMS ON THIS QUESTIONNAIRE, HOW DIFFICULT HAVE THESE PROBLEMS MADE IT FOR YOU TO DO YOUR WORK, TAKE CARE OF THINGS AT HOME, OR GET ALONG WITH OTHER PEOPLE: SOMEWHAT DIFFICULT

## 2023-11-13 ASSESSMENT — PATIENT HEALTH QUESTIONNAIRE - PHQ9
SUM OF ALL RESPONSES TO PHQ QUESTIONS 1-9: 2
5. POOR APPETITE OR OVEREATING: NOT AT ALL

## 2023-11-13 NOTE — PROGRESS NOTES
Katelyn is a 33 year old who is being evaluated via a billable video visit.      How would you like to obtain your AVS? MyChart  If the video visit is dropped, the invitation should be resent by: Text to cell phone: [phone]  Will anyone else be joining your video visit? No          Assessment & Plan     Anxiety  Improved with starting wellbutrin.  Will have her continue on current medication and follow-up in 6 mos.   - buPROPion (WELLBUTRIN XL) 150 MG 24 hr tablet; Take 1 tablet (150 mg) by mouth every morning    Moderate episode of recurrent major depressive disorder (H)  Improved with starting wellbutrin.  Will have her continue on current medication and follow-up in 6 mos.   - buPROPion (WELLBUTRIN XL) 150 MG 24 hr tablet; Take 1 tablet (150 mg) by mouth every morning             WINDY Schuster CNP  [facility]    Subjective   Katelyn is a 33 year old, presenting for the following health issues:  Video Visit and Recheck Medication        11/13/2023     1:21 PM   Additional Questions   Roomed by Michelle         11/13/2023     1:21 PM   Patient Reported Additional Medications   Patient reports taking the following new medications none       History of Present Illness       Reason for visit:  Medication    She eats 2-3 servings of fruits and vegetables daily.She consumes 0 sweetened beverage(s) daily.She exercises with enough effort to increase her heart rate 20 to 29 minutes per day.  She exercises with enough effort to increase her heart rate 3 or less days per week.   She is taking medications regularly.       Depression and Anxiety Follow-Up  How are you doing with your depression since your last visit? Improved   How are you doing with your anxiety since your last visit?  Improved   Are you having other symptoms that might be associated with depression or anxiety? No  Have you had a significant life event? Grief or Loss   Do you have any concerns with your use of alcohol or other  drugs? No    Social History     Tobacco Use    Smoking status: Former     Types: Cigarettes     Passive exposure: Past    Smokeless tobacco: Never    Tobacco comments:     half a pack a day   Vaping Use    Vaping Use: Former   Substance Use Topics    Alcohol use: Yes     Comment: rarely    Drug use: Never         10/11/2023    10:06 AM 11/13/2023     1:22 PM   PHQ   PHQ-9 Total Score 13 2   Q9: Thoughts of better off dead/self-harm past 2 weeks Not at all Not at all         10/11/2023    10:06 AM 11/13/2023     1:24 PM   KEYLA-7 SCORE   Total Score 15 2     Started on wellbutrin last month.   Has seen an improvement in both mood and anxiety symptoms.   Maybe feeling a little tired with medication, but nothing bothersome.   Would like to continue medication.     Suicide Assessment Five-step Evaluation and Treatment (SAFE-T)          Review of Systems   Constitutional, HEENT, cardiovascular, pulmonary, gi and gu systems are negative, except as otherwise noted.      Objective    Vitals - Patient Reported  Pulse (Patient Reported): 90  Pain Score: No Pain (0)      Vitals:  No vitals were obtained today due to virtual visit.    Physical Exam   GENERAL: Healthy, alert and no distress  EYES: Eyes grossly normal to inspection.  No discharge or erythema, or obvious scleral/conjunctival abnormalities.  RESP: No audible wheeze, cough, or visible cyanosis.  No visible retractions or increased work of breathing.    SKIN: Visible skin clear. No significant rash, abnormal pigmentation or lesions.  NEURO: Cranial nerves grossly intact.  Mentation and speech appropriate for age.  PSYCH: Mentation appears normal, affect normal/bright, judgement and insight intact, normal speech and appearance well-groomed.    No results found for this or any previous visit (from the past 24 hour(s)).            Video-Visit Details    Type of service:  Video Visit     Originating Location (pt. Location): Home    Distant Location (provider location):   Off-site  Platform used for Video Visit: Mili

## 2023-11-29 ENCOUNTER — PATIENT OUTREACH (OUTPATIENT)
Dept: GASTROENTEROLOGY | Facility: CLINIC | Age: 33
End: 2023-11-29
Payer: COMMERCIAL

## 2023-12-21 NOTE — TELEPHONE ENCOUNTER
RECORDS STATUS - ALL OTHER DIAGNOSIS      RECORDS RECEIVED FROM: [facility]   DATE RECEIVED:    NOTES STATUS DETAILS   OFFICE NOTE from referring provider Epic 11/13/23: WINDY Caicedo CNP   OFFICE NOTE from genetic counselor [facility] 7/21/20: Irais Silva, MS, [facility]   OPERATIVE REPORT [facility] 7/7/20: Colonoscopy   MEDICATION LIST [facility]    LABS     PATHOLOGY REPORTS Report in [facility] 7/7/20: [id number]    ANYTHING RELATED TO DIAGNOSIS Epic Most recent 10/11/23   GENONOMIC TESTING     TYPE: External: [facility]  (Report requested) 7/21/20:

## 2024-01-12 ENCOUNTER — ONCOLOGY VISIT (OUTPATIENT)
Dept: ONCOLOGY | Facility: CLINIC | Age: 34
End: 2024-01-12
Attending: NURSE PRACTITIONER
Payer: COMMERCIAL

## 2024-01-12 ENCOUNTER — PATIENT OUTREACH (OUTPATIENT)
Dept: ONCOLOGY | Facility: CLINIC | Age: 34
End: 2024-01-12

## 2024-01-12 VITALS
DIASTOLIC BLOOD PRESSURE: 74 MMHG | HEART RATE: 83 BPM | SYSTOLIC BLOOD PRESSURE: 115 MMHG | TEMPERATURE: 98.5 F | WEIGHT: 254.8 LBS | RESPIRATION RATE: 16 BRPM | HEIGHT: 68 IN | BODY MASS INDEX: 38.62 KG/M2 | OXYGEN SATURATION: 98 %

## 2024-01-12 DIAGNOSIS — Z12.11 SPECIAL SCREENING FOR MALIGNANT NEOPLASMS, COLON: ICD-10-CM

## 2024-01-12 DIAGNOSIS — Z15.09 LYNCH SYNDROME: ICD-10-CM

## 2024-01-12 DIAGNOSIS — Z12.6 SCREENING FOR BLADDER CANCER: ICD-10-CM

## 2024-01-12 DIAGNOSIS — Z15.09 MLH1-RELATED LYNCH SYNDROME (HNPCC2): Primary | ICD-10-CM

## 2024-01-12 DIAGNOSIS — Z12.83 SKIN CANCER SCREENING: ICD-10-CM

## 2024-01-12 PROBLEM — K63.5 POLYP OF COLON: Status: ACTIVE | Noted: 2022-09-26

## 2024-01-12 PROCEDURE — 99417 PROLNG OP E/M EACH 15 MIN: CPT | Performed by: CLINICAL NURSE SPECIALIST

## 2024-01-12 PROCEDURE — 99205 OFFICE O/P NEW HI 60 MIN: CPT | Performed by: CLINICAL NURSE SPECIALIST

## 2024-01-12 PROCEDURE — 99213 OFFICE O/P EST LOW 20 MIN: CPT | Performed by: CLINICAL NURSE SPECIALIST

## 2024-01-12 ASSESSMENT — PAIN SCALES - GENERAL: PAINLEVEL: NO PAIN (0)

## 2024-01-12 NOTE — NURSING NOTE
"Oncology Rooming Note    January 12, 2024 11:32 AM   Katelyn Erickson is a 33 year old female who presents for:    Chief Complaint   Patient presents with    Oncology Clinic Visit     SY lewis syndrome      Initial Vitals: /74 (BP Location: Right arm, Patient Position: Sitting, Cuff Size: Adult Large)   Pulse 83   Temp 98.5  F (36.9  C) (Oral)   Resp 16   Ht 1.715 m (5' 7.52\")   Wt 115.6 kg (254 lb 12.8 oz)   SpO2 98%   BMI 39.30 kg/m   Estimated body mass index is 39.3 kg/m  as calculated from the following:    Height as of this encounter: 1.715 m (5' 7.52\").    Weight as of this encounter: 115.6 kg (254 lb 12.8 oz). Body surface area is 2.35 meters squared.  No Pain (0) Comment: Data Unavailable   No LMP recorded.  Allergies reviewed: Yes  Medications reviewed: Yes    Medications: Medication refills not needed today.  Pharmacy name entered into [facility]:    [facility] [place], [place] - [address] [address]  [id number] [place], [place] - [address]  [address]    Frailty Screening:   Is the patient here for a new oncology consult visit in cancer care? 1. Yes. Over the past month, have you experienced difficulty or required a caregiver to assist with:   1. Balance, walking or general mobility (including any falls)? NO  2. Completion of self-care tasks such as bathing, dressing, toileting, grooming/hygiene?  NO  3. Concentration or memory that affects your daily life?  NO       Clinical concerns: none    [place]"

## 2024-01-12 NOTE — PROGRESS NOTES
"New Patient Hematology / Oncology Nurse Navigator Note     Referral Date: 1/12/24    Referring provider:   Annette Szymanski APRN CNS     Referring Clinic/Organization: [facility]     Referred to: GynOnc    Requested provider (if applicable): First available - did not specify     Evaluation for : \"Discussion of age to consider RR surgery \"     Clinical History (per Nurse review of records provided):    Oncology Visit today with Cydney-- BOOKMARKED  4/12/22 PAP/HPV -- BOOKMARKED      Clinical Assessment / Barriers to Care (Per Nurse):  Pt lives in [place], [place]    Records Location: [facility]     Records Needed:   N/A    Additional testing needed prior to consult:   N/A    Referral updates and Plan:   Referral received, chart reviewed, scheduling instructions updated and routed to NPS to arrange consult with GynOnc as requested. Will follow-up as needed as patient already discussed today with Cydney directly     Tara Baires, BSN, RN, PHN, OCN  Hematology/Oncology Nurse Navigator  [facility]  [phone]    "

## 2024-01-12 NOTE — LETTER
2024         RE: Katelyn Erickson  [address] [facility] [address]        Dear Colleague,    Thank you for referring your patient, Katelyn Erickson, to the [facility]. Please see a copy of my visit note below.    Oncology Risk Management Consultation:  Date on this visit: 2024    Katelyn Erickson is referred by SANTO Shook,  for an oncology risk management consultation. 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    Primary Physician: WINDY Shook CNP    History Of Present Illness:  Ms. Erickson is a 33 year old female who presents with MLH1+ associated Jean Syndrome.    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  Of note, she tested negative for mutations in  30 other genes (APC, DEBORAH, BAP1, BARD1, BMPR1A, BRCA1, BRCA2, BRIP1, CDH1, CDKN2A, CDK4, CHEK2, EPCAM, GREM1, MITF, MLH1, MSH2, MSH6, MUTYH, NBN, PALB2, PMS2, POLE, POLD1, PTEN, RAD51C, RAD51D, SMAD4, STK11, and TP53) associated with inherited cancer risks.     Past Medical/Surgical History:  Menarche: Age 13  First live birth: age 22  Pre-menopausal  OC use: 6-8 years  Ovaries, uterus, and fallopian tubes intact  Smokin PPD x 7 years  Alcohol: 1 beverage per week    Screening History:   2010: colonoscopy: The rectum, sigmoid colon, descending colon, splenic flexure, transverse colon, hepatic flexure, ascending colon, cecum, appendiceal orifice, ileocecal valve and terminal ileum are normal. The examined portion of the ileum was normal.   2012: colonoscopy: The rectum, sigmoid colon, descending colon, splenic flexure, transverse colon, hepatic flexure, ascending colon, cecum, appendiceal orifice, ileocecal valve and terminal ileum are normal.  "The examined portion of the ileum was normal.   2021: The entire examined colon is normal on direct and retroflexion views. No specimens collected.   2021: Upper GI Endoscopy: Normal esophagus. Normal stomach. Normal examined duodenum. No specimens collected. Repeat endoscopy in 5 years ().   2023: Colonoscopy. \"Negative\" per [facility]    At this visit she denies any changes in her bowels including constipation, diarrhea, bloating or new fatigue.     Past Medical History:   Diagnosis Date    MLH1-related Jean syndrome (HNPCC2) 2024    NO ACTIVE PROBLEMS      Past Surgical History:   Procedure Laterality Date    COLONOSCOPY  2012    Procedure:COLONOSCOPY; COLONOSCOPY; Surgeon:JAKOB PETERS; Location: GI    COLONOSCOPY      COLONOSCOPY N/A 2021    Procedure: COLONOSCOPY;  Surgeon: Joseph Flores MD;  Location:  GI    ESOPHAGOSCOPY, GASTROSCOPY, DUODENOSCOPY (EGD), COMBINED N/A 2021    Procedure: ESOPHAGOGASTRODUODENOSCOPY (EGD);  Surgeon: Joseph Flores MD;  Location:  GI    NO HISTORY OF SURGERY         Allergies:  Allergies as of 2024    (No Known Allergies)       Current Medications:  Current Outpatient Medications   Medication Sig Dispense Refill    buPROPion (WELLBUTRIN XL) 150 MG 24 hr tablet Take 1 tablet (150 mg) by mouth every morning 90 tablet 1    NO ACTIVE MEDICATIONS           Family History:  Family History   Problem Relation Age of Onset    Cancer - colorectal Mother         diagnosed at age 33 and  at age 34    Jean Syndrome Mother         MLH1    Colon Cancer Maternal Grandmother     Cancer - colorectal Maternal Grandmother 49         age 53    Diabetes Maternal Grandmother     Cancer Maternal Grandfather         pancreatic cancer, liver cancer?    Colon Cancer Maternal Aunt 37    Jean Syndrome Maternal Aunt         MLH1    Colon Cancer Maternal Aunt 48    Jean Syndrome Maternal Aunt         MLH1    Cancer - colorectal Maternal Aunt  "       Social History:  Social History     Socioeconomic History    Marital status:      Spouse name: Not on file    Number of children: Not on file    Years of education: Not on file    Highest education level: Not on file   Occupational History     Employer: [employer]   Tobacco Use    Smoking status: Former     Types: Cigarettes     Passive exposure: Past    Smokeless tobacco: Never    Tobacco comments:     half a pack a day   Vaping Use    Vaping Use: Former   Substance and Sexual Activity    Alcohol use: Yes     Comment: rarely    Drug use: Never    Sexual activity: Yes     Partners: Male     Birth control/protection: Male Surgical   Other Topics Concern    Parent/sibling w/ CABG, MI or angioplasty before 65F 55M? No   Social History Narrative    ** Merged History Encounter **         Single, lives with boyfriend and his family.     Social Determinants of Health     Financial Resource Strain: Low Risk  (10/7/2023)    Financial Resource Strain     Within the past 12 months, have you or your family members you live with been unable to get utilities (heat, electricity) when it was really needed?: No   Food Insecurity: Low Risk  (10/7/2023)    Food Insecurity     Within the past 12 months, did you worry that your food would run out before you got money to buy more?: No     Within the past 12 months, did the food you bought just not last and you didn t have money to get more?: No   Transportation Needs: Low Risk  (10/7/2023)    Transportation Needs     Within the past 12 months, has lack of transportation kept you from medical appointments, getting your medicines, non-medical meetings or appointments, work, or from getting things that you need?: No   Physical Activity: Not on file   Stress: Not on file   Social Connections: Not on file   Interpersonal Safety: Low Risk  (10/11/2023)    Interpersonal Safety     Do you feel physically and emotionally safe where you currently live?: Yes     Within the  "past 12 months, have you been hit, slapped, kicked or otherwise physically hurt by someone?: No     Within the past 12 months, have you been humiliated or emotionally abused in other ways by your partner or ex-partner?: No   Housing Stability: Low Risk  (10/7/2023)    Housing Stability     Do you have housing? : Yes     Are you worried about losing your housing?: No       Physical Exam:  /74 (BP Location: Right arm, Patient Position: Sitting, Cuff Size: Adult Large)   Pulse 83   Temp 98.5  F (36.9  C) (Oral)   Resp 16   Ht 1.715 m (5' 7.52\")   Wt 115.6 kg (254 lb 12.8 oz)   SpO2 98%   BMI 39.30 kg/m    GENERAL: Healthy, alert and no distress  EYES: Eyes grossly normal to inspection.  No discharge or erythema, or obvious scleral/conjunctival abnormalities.  RESP: No audible wheeze, cough, or visible cyanosis.  No visible retractions or increased work of breathing.    SKIN: Visible skin clear. No significant rash, abnormal pigmentation or lesions.  NEURO: Cranial nerves grossly intact.  Mentation and speech appropriate for age.  PSYCH: Mentation appears normal, affect normal/bright, judgement and insight intact, normal speech and appearance well-groomed.      Laboratory/Imaging Studies  No results found for any visits on 01/12/24.    ASSESSMENT  We discussed the differences between cancer risk for the general population and those with MLH1+ mutations, according to the NCCN Guidelines.  We also discussed that inheriting a mutation does not mean that a person will develop cancer, but rather that they are at increased risk.       Katelyn is well informed on the MLH1 genetic mutation and has been getting annual colonoscopies for some time. She reports having polyps identified on her colonoscopies in 2020, 2021, and 2022. I have requested records for these visits. Her last colonoscopy was in October 2023 at [facility] and the results were \"negative.\" She will be due for another colonoscopy in October of this year. "     We discussed her risk for endometrial and ovarian cancer. She has spoken with a gynecologist before, however she was not ready for surgery at that time. I have placed an order for her to meet with gyn/onc to revisit this discussion. She may want to consider having a hysterectomy soon and waiting a few more years to have her ovaries removed.     I have placed a referral for to Dr. Suazo for a general skin screening and to look for sebaceous adenomas that would be related to the lewis syndrome.     Pathogenic variants in MLH+ is estimated to be found in 1:1946 people in the general population (Sandip et al 2017).     Following is a table from NCCN Guidelines V.2.2023 Lewis Syndrome, indicating the cancer risks associated with MLH1+ deleterious mutations.  Site  Estimated Average Age of Presentation  Cumulative Risk for Diagnosis Through Age 80 Cumulative Risk for Diagnosis Through Lifetime for people in the general population     Colorectal    44 years   46%-61%    4.1%   Endometrial 49 years 34%-54%    3.1%   Ovarian    46 years 4%-20%    1.1%   Renal pelvis and/or ureter    59-60 years 0.2%-5%    Less than 1%   Bladder    59 years 2%-7%    2.4%   Gastric  52 years 5%-7%    0.8%   Small bowel 47 years 0.4%-11%    0.3%   Pancreas    No data 6.2%    1.7%   Biliary tract    50 years 1.9%-3.7%    0.2%   Prostate    63 years 4.4%-13.8%    11.6%   Breast (female)    No data <15%   12.6%   Brain    No data 0.7%-1.7%    0.5%     Katelyn has a good understanding that her MLH1+ genetic mutation equates to having one working copy of the mis-match repair gene for DNA.     We also discussed following  a healthy lifestyle plan recommended by both NCCN and the American Cancer Society that can reduce the risk of cancer:  1 Limit alcohol consumption to less than 1 drink per day (1 drink=5 oz.wine, 12 oz. Beer or 1.5 oz. 80-proof liquor) for women or 2 drinks per day for men.     2. Exercise per American Cancer Society guidelines of  at least 150 minutes of moderate-intensity activity or 75 minutes of vigorous activity each week. (Or a combination of both.) Exercise should be spread  out over the week. Aim for 45-60 minutes per day.    3. Maintain a healthy weight with a Body Mass Index between 19-24.9.    4. Do not use tobacco products and limit exposure to passive smoke.    5. Avoid processed meats (ham, craig, salami, hot dogs, sausages). Eat little, if any.     6. Limit red meat (pork, beef and lamb) to 12-18 oz per week or less.    7. Limit consumption of sugar sweetened drinks, fast foods, processed foods and foods high in starch or sugar content.    8. Eat about 90 grams (3 servings) of whole grain foods pr day. Whole grains offer Vitamin E, ligans, phytoestrogens, zinc, selenium, antioxidants, resistant starch and copper. Research shows that eating 3 servings of whole grains can reduce the risk for colon cancer about 17%.  Focus on vegetables, fruits, beans and plant based foods.    Individualized Surveillance Plan for Jean Syndrome   (MLH1+,  MSH2+ and EPCAM+ mutation carriers)   Based on NCCN Guidelines Version 2.2024   Type of Screening Recommendation Last Done Next Due   Colon Cancer Screening Colonoscopy at age 20-25 years or 2-5 years prior to the earliest colon cancer in the family if it is diagnosed prior to age 25.     Repeat every 1-2 years.    9/25/2023: colonoscopy normal   September 2024   Endometrial and Ovarian Cancer    Epithelial Ovarian Cancer risk    4-20% for MLH1+   8-38% for EPCAM and MSH2+ (strong evidence) Prophylactic hysterectomy and bilateral salpingo-oophorectomy (BSO) can be considered by women who have completed childbearing.       Discuss     Referral to gyn/onc for continued discussion        Screening using endometrial sampling is an option every 1-2 years; women should be aware that dysfunctional uterine bleeding warrants evaluation.    Data do not support ovarian cancer screening for Jean Syndrome.  Annual transvaginal ultrasound and  tests may be considered at a clinician s discretion.     Discuss     See above     Pancreatic Screening  Risk is <5%-10%    Screen carriers with family history of pancreatic cancer. Starting at 50    Annual contrast-enhanced MRI/MRCP and/or EUS, with consideration of shorter screening intervals for individuals found to have worrisome abnormalities on screening.     Most small cystic lesions found on screening will not warrant biopsy, surgical resection, or any other intervention.     Discuss     Start at age 50   Gastric and small bowel cancer Upper GI screening every 2-4 years, starting at age 30 9/8/2021: Normal exam Upper GI Endoscopy in 2025   Urothelial cancer Consider annual urinalysis at age 30-35. MSH2+ carriers, especially males are most at risk.   UA ordered January 2025   Dermatology Routine dermatological screening. Discuss Referral placed for Dr. Suazo   Prostate Screening  Annual PSA test, refer to Urology if elevated; MSH2+ (30-32% lifetime risk) and MLH1+ (up to 17%). MSH6+ (up to 5%). PMS2+ (not well established)   NA   NA   Central Nervous System cancer Annual physical/neurological examinations starting at age 25-30.   1/12/2024 January 2025   There is an increased incidence of prostate cancer; no additional screening recommendations are made at this time.    There are data to suggest that aspirin may decrease the risk of colon cancer in Jean Syndrome.  However, optimal dose and duration of aspirin therapy is uncertain at this time.    There have been suggestions that there is an increased risk for breast cancer in Jean Syndrome patients, up to 15%. However, there is not enough evidence to support increased screening above average risk breast cancer screening; management should be based on personal family history.       I spent a total of 88 minutes on the day of the visit. Please see the note for further information on patient assessment and treatment.      Annette Szymanski, DNP, APRN, AGCNS-BC  Clinical Nurse Specialist  Cancer Risk Management Program  [facility]    The patient was seen in conjunction with me today.  I agree with the exam assessment and  am in agreement with the plan.    Cydney Rachel, APRN-CNS, OCN, AGN-BC  Clinical Nurse Specialist  Cancer Risk Management Program  [facility]      CC: Rose Phelps, WINDY CNP

## 2024-01-12 NOTE — PATIENT INSTRUCTIONS
Individualized Surveillance Plan for Jean Syndrome   (MLH1+,  MSH2+ and EPCAM+ mutation carriers)   Based on NCCN Guidelines Version 2.2024   Type of Screening Recommendation Last Done Next Due   Colon Cancer Screening Colonoscopy at age 20-25 years or 2-5 years prior to the earliest colon cancer in the family if it is diagnosed prior to age 25.     Repeat every 1-2 years.     9/25/2023: colonoscopy normal    September 2024   Endometrial and Ovarian Cancer     Epithelial Ovarian Cancer risk    4-20% for MLH1+   8-38% for EPCAM and MSH2+ (strong evidence) Prophylactic hysterectomy and bilateral salpingo-oophorectomy (BSO) can be considered by women who have completed childbearing.          Discuss             Screening using endometrial sampling is an option every 1-2 years; women should be aware that dysfunctional uterine bleeding warrants evaluation.     Data do not support ovarian cancer screening for Jean Syndrome. Annual transvaginal ultrasound and  tests may be considered at a clinician s discretion.       Discuss        Pancreatic Screening  Risk is <5%-10%     Screen carriers with family history of pancreatic cancer. Starting at 50     Annual contrast-enhanced MRI/MRCP and/or EUS, with consideration of shorter screening intervals for individuals found to have worrisome abnormalities on screening.      Most small cystic lesions found on screening will not warrant biopsy, surgical resection, or any other intervention.       Discuss     Gastric and small bowel cancer Upper GI screening every 2-4 years, starting at age 30 9/8/2021: Normal exam     Urothelial cancer Consider annual urinalysis at age 30-35. MSH2+ carriers, especially males are most at risk.    UA soon     Dermatology Routine dermatological screening.       Prostate Screening  Annual PSA test, refer to Urology if elevated; MSH2+ (30-32% lifetime risk) and MLH1+ (up to 17%). MSH6+ (up to 5%). PMS2+ (not well established)    NA     Central  Nervous System cancer Annual physical/neurological examinations starting at age 25-30.    1/12/2024     There is an increased incidence of prostate cancer; no additional screening recommendations are made at this time.     There are data to suggest that aspirin may decrease the risk of colon cancer in Jean Syndrome.  However, optimal dose and duration of aspirin therapy is uncertain at this time.     There have been suggestions that there is an increased risk for breast cancer in Jean Syndrome patients, up to 15%. However, there is not enough evidence to support increased screening above average risk breast cancer screening; management should be based on personal family history.

## 2024-01-12 NOTE — PROGRESS NOTES
Oncology Risk Management Consultation:  Date on this visit: 2024    Katelyn Erickson is referred by SANTO Shook,  for an oncology risk management consultation. 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    Primary Physician: WINDY Shook CNP    History Of Present Illness:  Ms. Erickson is a 33 year old female who presents with MLH1+ associated Jean Syndrome.    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  Of note, she tested negative for mutations in  30 other genes (APC, DEBORAH, BAP1, BARD1, BMPR1A, BRCA1, BRCA2, BRIP1, CDH1, CDKN2A, CDK4, CHEK2, EPCAM, GREM1, MITF, MLH1, MSH2, MSH6, MUTYH, NBN, PALB2, PMS2, POLE, POLD1, PTEN, RAD51C, RAD51D, SMAD4, STK11, and TP53) associated with inherited cancer risks.     Past Medical/Surgical History:  Menarche: Age 13  First live birth: age 22  Pre-menopausal  OC use: 6-8 years  Ovaries, uterus, and fallopian tubes intact  Smokin PPD x 7 years  Alcohol: 1 beverage per week    Screening History:   2010: colonoscopy: The rectum, sigmoid colon, descending colon, splenic flexure, transverse colon, hepatic flexure, ascending colon, cecum, appendiceal orifice, ileocecal valve and terminal ileum are normal. The examined portion of the ileum was normal.   2012: colonoscopy: The rectum, sigmoid colon, descending colon, splenic flexure, transverse colon, hepatic flexure, ascending colon, cecum, appendiceal orifice, ileocecal valve and terminal ileum are normal. The examined portion of the ileum was normal.   2021: The entire examined colon is normal on direct and retroflexion views. No specimens collected.   2021: Upper GI Endoscopy: Normal esophagus. Normal stomach. Normal examined duodenum. No specimens  "collected. Repeat endoscopy in 5 years ().   2023: Colonoscopy. \"Negative\" per [facility]    At this visit she denies any changes in her bowels including constipation, diarrhea, bloating or new fatigue.     Past Medical History:   Diagnosis Date    MLH1-related Jean syndrome (HNPCC2) 2024    NO ACTIVE PROBLEMS      Past Surgical History:   Procedure Laterality Date    COLONOSCOPY  2012    Procedure:COLONOSCOPY; COLONOSCOPY; Surgeon:JAKOB PETERS; Location: GI    COLONOSCOPY      COLONOSCOPY N/A 2021    Procedure: COLONOSCOPY;  Surgeon: Joseph Flores MD;  Location:  GI    ESOPHAGOSCOPY, GASTROSCOPY, DUODENOSCOPY (EGD), COMBINED N/A 2021    Procedure: ESOPHAGOGASTRODUODENOSCOPY (EGD);  Surgeon: Joseph Flores MD;  Location:  GI    NO HISTORY OF SURGERY         Allergies:  Allergies as of 2024    (No Known Allergies)       Current Medications:  Current Outpatient Medications   Medication Sig Dispense Refill    buPROPion (WELLBUTRIN XL) 150 MG 24 hr tablet Take 1 tablet (150 mg) by mouth every morning 90 tablet 1    NO ACTIVE MEDICATIONS           Family History:  Family History   Problem Relation Age of Onset    Cancer - colorectal Mother         diagnosed at age 33 and  at age 34    Jean Syndrome Mother         MLH1    Colon Cancer Maternal Grandmother     Cancer - colorectal Maternal Grandmother 49         age 53    Diabetes Maternal Grandmother     Cancer Maternal Grandfather         pancreatic cancer, liver cancer?    Colon Cancer Maternal Aunt 37    Jean Syndrome Maternal Aunt         MLH1    Colon Cancer Maternal Aunt 48    Jean Syndrome Maternal Aunt         MLH1    Cancer - colorectal Maternal Aunt        Social History:  Social History     Socioeconomic History    Marital status:      Spouse name: Not on file    Number of children: Not on file    Years of education: Not on file    Highest education level: Not on file   Occupational History     " Employer: [employer]   Tobacco Use    Smoking status: Former     Types: Cigarettes     Passive exposure: Past    Smokeless tobacco: Never    Tobacco comments:     half a pack a day   Vaping Use    Vaping Use: Former   Substance and Sexual Activity    Alcohol use: Yes     Comment: rarely    Drug use: Never    Sexual activity: Yes     Partners: Male     Birth control/protection: Male Surgical   Other Topics Concern    Parent/sibling w/ CABG, MI or angioplasty before 65F 55M? No   Social History Narrative    ** Merged History Encounter **         Single, lives with boyfriend and his family.     Social Determinants of Health     Financial Resource Strain: Low Risk  (10/7/2023)    Financial Resource Strain     Within the past 12 months, have you or your family members you live with been unable to get utilities (heat, electricity) when it was really needed?: No   Food Insecurity: Low Risk  (10/7/2023)    Food Insecurity     Within the past 12 months, did you worry that your food would run out before you got money to buy more?: No     Within the past 12 months, did the food you bought just not last and you didn t have money to get more?: No   Transportation Needs: Low Risk  (10/7/2023)    Transportation Needs     Within the past 12 months, has lack of transportation kept you from medical appointments, getting your medicines, non-medical meetings or appointments, work, or from getting things that you need?: No   Physical Activity: Not on file   Stress: Not on file   Social Connections: Not on file   Interpersonal Safety: Low Risk  (10/11/2023)    Interpersonal Safety     Do you feel physically and emotionally safe where you currently live?: Yes     Within the past 12 months, have you been hit, slapped, kicked or otherwise physically hurt by someone?: No     Within the past 12 months, have you been humiliated or emotionally abused in other ways by your partner or ex-partner?: No   Housing Stability: Low Risk   "(10/7/2023)    Housing Stability     Do you have housing? : Yes     Are you worried about losing your housing?: No       Physical Exam:  /74 (BP Location: Right arm, Patient Position: Sitting, Cuff Size: Adult Large)   Pulse 83   Temp 98.5  F (36.9  C) (Oral)   Resp 16   Ht 1.715 m (5' 7.52\")   Wt 115.6 kg (254 lb 12.8 oz)   SpO2 98%   BMI 39.30 kg/m    GENERAL: Healthy, alert and no distress  EYES: Eyes grossly normal to inspection.  No discharge or erythema, or obvious scleral/conjunctival abnormalities.  RESP: No audible wheeze, cough, or visible cyanosis.  No visible retractions or increased work of breathing.    SKIN: Visible skin clear. No significant rash, abnormal pigmentation or lesions.  NEURO: Cranial nerves grossly intact.  Mentation and speech appropriate for age.  PSYCH: Mentation appears normal, affect normal/bright, judgement and insight intact, normal speech and appearance well-groomed.      Laboratory/Imaging Studies  No results found for any visits on 01/12/24.    ASSESSMENT  We discussed the differences between cancer risk for the general population and those with MLH1+ mutations, according to the NCCN Guidelines.  We also discussed that inheriting a mutation does not mean that a person will develop cancer, but rather that they are at increased risk.       Katelyn is well informed on the MLH1 genetic mutation and has been getting annual colonoscopies for some time. She reports having polyps identified on her colonoscopies in 2020, 2021, and 2022. I have requested records for these visits. Her last colonoscopy was in October 2023 at [facility] and the results were \"negative.\" She will be due for another colonoscopy in October of this year.     We discussed her risk for endometrial and ovarian cancer. She has spoken with a gynecologist before, however she was not ready for surgery at that time. I have placed an order for her to meet with gyn/onc to revisit this discussion. She may want to " consider having a hysterectomy soon and waiting a few more years to have her ovaries removed.     I have placed a referral for to Dr. Suazo for a general skin screening and to look for sebaceous adenomas that would be related to the lewis syndrome.     Pathogenic variants in MLH+ is estimated to be found in 1:1946 people in the general population (Sandip et al 2017).     Following is a table from NCCN Guidelines V.2.2023 Lewis Syndrome, indicating the cancer risks associated with MLH1+ deleterious mutations.  Site  Estimated Average Age of Presentation  Cumulative Risk for Diagnosis Through Age 80 Cumulative Risk for Diagnosis Through Lifetime for people in the general population     Colorectal    44 years   46%-61%    4.1%   Endometrial 49 years 34%-54%    3.1%   Ovarian    46 years 4%-20%    1.1%   Renal pelvis and/or ureter    59-60 years 0.2%-5%    Less than 1%   Bladder    59 years 2%-7%    2.4%   Gastric  52 years 5%-7%    0.8%   Small bowel 47 years 0.4%-11%    0.3%   Pancreas    No data 6.2%    1.7%   Biliary tract    50 years 1.9%-3.7%    0.2%   Prostate    63 years 4.4%-13.8%    11.6%   Breast (female)    No data <15%   12.6%   Brain    No data 0.7%-1.7%    0.5%     Katelyn has a good understanding that her MLH1+ genetic mutation equates to having one working copy of the mis-match repair gene for DNA.     We also discussed following  a healthy lifestyle plan recommended by both NCCN and the American Cancer Society that can reduce the risk of cancer:  1 Limit alcohol consumption to less than 1 drink per day (1 drink=5 oz.wine, 12 oz. Beer or 1.5 oz. 80-proof liquor) for women or 2 drinks per day for men.     2. Exercise per American Cancer Society guidelines of at least 150 minutes of moderate-intensity activity or 75 minutes of vigorous activity each week. (Or a combination of both.) Exercise should be spread  out over the week. Aim for 45-60 minutes per day.    3. Maintain a healthy weight with a Body Mass  Index between 19-24.9.    4. Do not use tobacco products and limit exposure to passive smoke.    5. Avoid processed meats (ham, craig, salami, hot dogs, sausages). Eat little, if any.     6. Limit red meat (pork, beef and lamb) to 12-18 oz per week or less.    7. Limit consumption of sugar sweetened drinks, fast foods, processed foods and foods high in starch or sugar content.    8. Eat about 90 grams (3 servings) of whole grain foods pr day. Whole grains offer Vitamin E, ligans, phytoestrogens, zinc, selenium, antioxidants, resistant starch and copper. Research shows that eating 3 servings of whole grains can reduce the risk for colon cancer about 17%.  Focus on vegetables, fruits, beans and plant based foods.    Individualized Surveillance Plan for Jean Syndrome   (MLH1+,  MSH2+ and EPCAM+ mutation carriers)   Based on NCCN Guidelines Version 2.2024   Type of Screening Recommendation Last Done Next Due   Colon Cancer Screening Colonoscopy at age 20-25 years or 2-5 years prior to the earliest colon cancer in the family if it is diagnosed prior to age 25.     Repeat every 1-2 years.    9/25/2023: colonoscopy normal   September 2024   Endometrial and Ovarian Cancer    Epithelial Ovarian Cancer risk    4-20% for MLH1+   8-38% for EPCAM and MSH2+ (strong evidence) Prophylactic hysterectomy and bilateral salpingo-oophorectomy (BSO) can be considered by women who have completed childbearing.       Discuss     Referral to gyn/onc for continued discussion        Screening using endometrial sampling is an option every 1-2 years; women should be aware that dysfunctional uterine bleeding warrants evaluation.    Data do not support ovarian cancer screening for Jean Syndrome. Annual transvaginal ultrasound and  tests may be considered at a clinician s discretion.     Discuss     See above     Pancreatic Screening  Risk is <5%-10%    Screen carriers with family history of pancreatic cancer. Starting at 50    Annual  contrast-enhanced MRI/MRCP and/or EUS, with consideration of shorter screening intervals for individuals found to have worrisome abnormalities on screening.     Most small cystic lesions found on screening will not warrant biopsy, surgical resection, or any other intervention.     Discuss     Start at age 50   Gastric and small bowel cancer Upper GI screening every 2-4 years, starting at age 30 9/8/2021: Normal exam Upper GI Endoscopy in 2025   Urothelial cancer Consider annual urinalysis at age 30-35. MSH2+ carriers, especially males are most at risk.   UA ordered January 2025   Dermatology Routine dermatological screening. Discuss Referral placed for Dr. Suazo   Prostate Screening  Annual PSA test, refer to Urology if elevated; MSH2+ (30-32% lifetime risk) and MLH1+ (up to 17%). MSH6+ (up to 5%). PMS2+ (not well established)   NA   NA   Central Nervous System cancer Annual physical/neurological examinations starting at age 25-30.   1/12/2024 January 2025   There is an increased incidence of prostate cancer; no additional screening recommendations are made at this time.    There are data to suggest that aspirin may decrease the risk of colon cancer in Jean Syndrome.  However, optimal dose and duration of aspirin therapy is uncertain at this time.    There have been suggestions that there is an increased risk for breast cancer in Jean Syndrome patients, up to 15%. However, there is not enough evidence to support increased screening above average risk breast cancer screening; management should be based on personal family history.       I spent a total of 88 minutes on the day of the visit. Please see the note for further information on patient assessment and treatment.     Annette Szymanski, FRANK, APRN, [facility] Center[facility]-BC  Clinical Nurse Specialist  Cancer Risk Management Program  [facility]    The patient was seen in conjunction with me today.  I agree with the exam assessment and  am in agreement with the  plan.    Cydney Rachel, WINDY-CNS, OCN, AGN-BC  Clinical Nurse Specialist  Cancer Risk Management Program  [facility]      CC: WINDY Shook CNP

## 2024-01-17 NOTE — TELEPHONE ENCOUNTER
RECORDS STATUS - ALL OTHER DIAGNOSIS      RECORDS RECEIVED FROM: [facility]   DATE RECEIVED:    NOTES STATUS DETAILS   OFFICE NOTE from referring provider Epic WINDY Covington CNS   OFFICE NOTE from medical oncologist Epic 1/12/24: WINDY Tovar CNP   OPERATIVE REPORT [facility] 7/7/20: Colonoscopy    MEDICATION LIST [facility]    LABS     PATHOLOGY REPORTS Report in [facility]  7/7/20: [phone]     ANYTHING RELATED TO DIAGNOSIS Epic Most recent 10/11/23

## 2024-01-18 ENCOUNTER — E-VISIT (OUTPATIENT)
Dept: FAMILY MEDICINE | Facility: CLINIC | Age: 34
End: 2024-01-18
Payer: COMMERCIAL

## 2024-01-18 ENCOUNTER — ONCOLOGY VISIT (OUTPATIENT)
Dept: ONCOLOGY | Facility: CLINIC | Age: 34
End: 2024-01-18
Payer: COMMERCIAL

## 2024-01-18 ENCOUNTER — PRE VISIT (OUTPATIENT)
Dept: ONCOLOGY | Facility: CLINIC | Age: 34
End: 2024-01-18
Payer: COMMERCIAL

## 2024-01-18 VITALS
DIASTOLIC BLOOD PRESSURE: 75 MMHG | WEIGHT: 254.7 LBS | BODY MASS INDEX: 39.28 KG/M2 | HEART RATE: 61 BPM | SYSTOLIC BLOOD PRESSURE: 114 MMHG | OXYGEN SATURATION: 97 % | RESPIRATION RATE: 16 BRPM | TEMPERATURE: 98.3 F

## 2024-01-18 DIAGNOSIS — Z15.09 MLH1-RELATED LYNCH SYNDROME (HNPCC2): ICD-10-CM

## 2024-01-18 DIAGNOSIS — E66.812 CLASS 2 OBESITY DUE TO EXCESS CALORIES WITHOUT SERIOUS COMORBIDITY WITH BODY MASS INDEX (BMI) OF 39.0 TO 39.9 IN ADULT: Primary | ICD-10-CM

## 2024-01-18 DIAGNOSIS — E66.09 CLASS 2 OBESITY DUE TO EXCESS CALORIES WITHOUT SERIOUS COMORBIDITY WITH BODY MASS INDEX (BMI) OF 39.0 TO 39.9 IN ADULT: Primary | ICD-10-CM

## 2024-01-18 PROCEDURE — 99207 PR NON-BILLABLE SERV PER CHARTING: CPT | Performed by: NURSE PRACTITIONER

## 2024-01-18 PROCEDURE — 99213 OFFICE O/P EST LOW 20 MIN: CPT | Performed by: OBSTETRICS & GYNECOLOGY

## 2024-01-18 PROCEDURE — 99205 OFFICE O/P NEW HI 60 MIN: CPT | Performed by: OBSTETRICS & GYNECOLOGY

## 2024-01-18 RX ORDER — HEPARIN SODIUM 5000 [USP'U]/.5ML
5000 INJECTION, SOLUTION INTRAVENOUS; SUBCUTANEOUS
Status: CANCELLED | OUTPATIENT
Start: 2024-01-18

## 2024-01-18 RX ORDER — PHENAZOPYRIDINE HYDROCHLORIDE 200 MG/1
200 TABLET, FILM COATED ORAL ONCE
Status: CANCELLED | OUTPATIENT
Start: 2024-01-18 | End: 2024-01-18

## 2024-01-18 RX ORDER — CEFAZOLIN SODIUM 2 G/50ML
2 SOLUTION INTRAVENOUS SEE ADMIN INSTRUCTIONS
Status: CANCELLED | OUTPATIENT
Start: 2024-01-18

## 2024-01-18 RX ORDER — CEFAZOLIN SODIUM 2 G/50ML
2 SOLUTION INTRAVENOUS
Status: CANCELLED | OUTPATIENT
Start: 2024-01-18

## 2024-01-18 RX ORDER — METRONIDAZOLE 500 MG/100ML
500 INJECTION, SOLUTION INTRAVENOUS
Status: CANCELLED | OUTPATIENT
Start: 2024-01-18

## 2024-01-18 ASSESSMENT — PAIN SCALES - GENERAL: PAINLEVEL: NO PAIN (0)

## 2024-01-18 NOTE — LETTER
2024         RE: Katelyn Erickson  [address] [facility] [address]        Dear Colleague,    Thank you for referring your patient, Katelyn Erickson, to the [facility]. Please see a copy of my visit note below.    GYNECOLOGIC  ONCOLOGY CONSULT    Referring provider:    WINDY Covington CNP  [address] [place] [address]   RE: Katelyn Erickson  : 1990  MO: 2024    CC: Jean Syndrome, MLH 1 mutation    HPI: Ms Katelyn Erickson is a 33 year old female who presents for consultation regarding surgery for risk reduction for her known Jean syndrome .     She presents along. She has carefully considered options and presents today to proceed with surgery, She would like to have a total hysterectomy and removal of both fallopian tubes now. At a later time she will return and plan to have her ovaries removed, for now she would like to keep her ovaries. She has completed child bearing and is happy with her family size. No active contraception. No abnormal vaginal spotting, regular cycles.    Genetic Testin2020- Genetic testing performed tested positive for the deleterious MLH1 mutation, known as c.350C>T (p.[id number]).  Of note, she tested negative for mutations in  30 other genes (APC, DEBORAH, BAP1, BARD1, BMPR1A, BRCA1, BRCA2, BRIP1, CDH1, CDKN2A, CDK4, CHEK2, EPCAM, GREM1, MITF, MLH1, MSH2, MSH6, MUTYH, NBN, PALB2, PMS2, POLE, POLD1, PTEN, RAD51C, RAD51D, SMAD4, STK11, and TP53) associated with inherited cancer risks.     She is closely followed with Upper GI endoscopy and colonoscopy which were negative.       OBGYN history and Health Maintenance:    Last Pap Smear: 2022 Negative, HPV negative    Past Medical History:   Diagnosis Date    MLH1-related Jean syndrome (HNPCC2) 2024    NO ACTIVE PROBLEMS        Past Surgical History:   Procedure Laterality Date    COLONOSCOPY  2012    Procedure:COLONOSCOPY; COLONOSCOPY; Surgeon:FRAN  JAKOB BRODERICK; Location: GI    COLONOSCOPY      COLONOSCOPY N/A 2021    Procedure: COLONOSCOPY;  Surgeon: Joseph Flores MD;  Location:  GI    ESOPHAGOSCOPY, GASTROSCOPY, DUODENOSCOPY (EGD), COMBINED N/A 2021    Procedure: ESOPHAGOGASTRODUODENOSCOPY (EGD);  Surgeon: Joseph Flores MD;  Location: [facility]    NO HISTORY OF SURGERY            Current Outpatient Medications   Medication Sig Dispense Refill    buPROPion (WELLBUTRIN XL) 150 MG 24 hr tablet Take 1 tablet (150 mg) by mouth every morning 90 tablet 1         No Known Allergies    Social History:  Social History     Tobacco Use    Smoking status: Former     Types: Cigarettes     Passive exposure: Past    Smokeless tobacco: Never    Tobacco comments:     half a pack a day   Substance Use Topics    Alcohol use: Yes     Comment: rarely         Family History:   The patient's family history is notable for   Family History   Problem Relation Age of Onset    Cancer - colorectal Mother         diagnosed at age 33 and  at age 34    Jean Syndrome Mother         MLH1    Colon Cancer Maternal Grandmother     Cancer - colorectal Maternal Grandmother 49         age 53    Diabetes Maternal Grandmother     Cancer Maternal Grandfather         pancreatic cancer    Colon Cancer Maternal Aunt 37    Jean Syndrome Maternal Aunt         MLH1    Colon Cancer Maternal Aunt 48    Jean Syndrome Maternal Aunt         MLH1         Physical Exam:     /75 (BP Location: Right arm, Patient Position: Sitting, Cuff Size: Adult Large)   Pulse 61   Temp 98.3  F (36.8  C) (Oral)   Resp 16   Wt 115.5 kg (254 lb 11.2 oz)   SpO2 97%   BMI 39.28 kg/m    Body mass index is 39.28 kg/m .    General: Alert and oriented, no acute distress  Psych: Mood stable  Cardiovascular: RRR, no murmors  Pulmonary: Lungs clear . Normal respiratory effort  GI: No distention. No masses. No hernia.   Lymph: No enlarge lymph nodes in neck or groin  : Normal external genitalia. Cervix  small. No lesion, midline uterus, no adnexal mass      Assessment:  Katelyn Erickson is a 33 year old woman with a MLH1 mutation, Jean syndrome. She has completed childbearing.    Risk for endometrial cancer is increased 35 - 40%   Ovarian cancer risk at 4-20%    Plan:     1.)    Reviewed approach to surgery for total laparoscopic hysterectomy, removal of bilateral fallopian tubes. Risks, benefits and alterative to surgery reviewed and surgical consent obtained.    2.) Labs and/or tests ordered include:  AM of surgery including UPT         Elida Gutierres M.D., MPH,  F.A.C.O.G.  Professor  Department of Ob/Gyn and Women's Health  Division of Gynecologic Oncology  [facility]  [phone]      Time: total time spent today, 60 , including preparation, review of outside records, face to face counseling, and documentation.

## 2024-01-18 NOTE — NURSING NOTE
"Oncology Rooming Note    January 18, 2024 10:52 AM   Katelyn Erickson is a 33 year old female who presents for:    Chief Complaint   Patient presents with    Oncology Clinic Visit     MLH1-related Jean syndrome (HNPCC2)     Initial Vitals: /75 (BP Location: Right arm, Patient Position: Sitting, Cuff Size: Adult Large)   Pulse 61   Temp 98.3  F (36.8  C) (Oral)   Resp 16   Wt 115.5 kg (254 lb 11.2 oz)   SpO2 97%   BMI 39.28 kg/m   Estimated body mass index is 39.28 kg/m  as calculated from the following:    Height as of 1/12/24: 1.715 m (5' 7.52\").    Weight as of this encounter: 115.5 kg (254 lb 11.2 oz). Body surface area is 2.35 meters squared.  No Pain (0) Comment: Data Unavailable   No LMP recorded.  Allergies reviewed: Yes  Medications reviewed: Yes    Medications: Medication refills not needed today.  Pharmacy name entered into [employer]:    [facility] [place], [place] - [address]  [id number] [place], [place] - [address]  [address]    Frailty Screening:   Is the patient here for a new oncology consult visit in cancer care? 2. No      Clinical concerns: none       Bindu Jarrell              "

## 2024-01-18 NOTE — NURSING NOTE
Hysterectomy form completed and scanned into urgent HIM     RN completed  pre-operative teaching     Reviewed:  What to expect with surgery  Incison care  Restrictions  Diet after  Symptoms of concern  Driving  Bowel care   Showering after  Pain management and expectations     RN answered all the patients questions to the best of her ability    Education folder given     Bessy Contreras RN

## 2024-01-19 ENCOUNTER — TELEPHONE (OUTPATIENT)
Dept: ONCOLOGY | Facility: CLINIC | Age: 34
End: 2024-01-19
Payer: COMMERCIAL

## 2024-01-19 NOTE — TELEPHONE ENCOUNTER
Spoke with patient. She was looking to have DOS moved from 2/23 to 3/11, as she would like to meet with OBGYN prior to discuss hormone therapy if she has ovaries taken out.   DOS was updated to 3/11 and post op was changed accordingly.   Lizbeth Holliday on 1/19/2024 at 3:20 PM

## 2024-01-19 NOTE — TELEPHONE ENCOUNTER
LVM for patient asking for a retuen call.     Patient is schedule for surgery with: Dr. Gutierres    Surgery Date: 2/23     Location: [facility]    H&P: already completed by surgeon will complete and/or update on day of surgery as needed      Post-op:  3/14, in-person visit,      Patient will receive a phone call from pre-admission nurses 3-5 days prior to surgery with arrival time and NPO instructions.    Patient aware times are subject to change up until day before surgery.     Patient questions/concerns: N/A     Surgery packet was provided to patient during appointment      Lizbeth Holliday on 1/19/2024 at 1:06 PM

## 2024-02-01 NOTE — PROGRESS NOTES
GYNECOLOGIC  ONCOLOGY CONSULT    Referring provider:    Annette Szymanski, WINDY CNP  [address] [place] [address]   RE: Katelyn Erickson  : 1990  MO: 2024    CC: Jean Syndrome, MLH 1 mutation    HPI: Ms Katelyn Erickson is a 33 year old female who presents for consultation regarding surgery for risk reduction for her known Jean syndrome .     She presents along. She has carefully considered options and presents today to proceed with surgery, She would like to have a total hysterectomy and removal of both fallopian tubes now. At a later time she will return and plan to have her ovaries removed, for now she would like to keep her ovaries. She has completed child bearing and is happy with her family size. No active contraception. No abnormal vaginal spotting, regular cycles.    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  Of note, she tested negative for mutations in  30 other genes (APC, DEBORAH, BAP1, BARD1, BMPR1A, BRCA1, BRCA2, BRIP1, CDH1, CDKN2A, CDK4, CHEK2, EPCAM, GREM1, MITF, MLH1, MSH2, MSH6, MUTYH, NBN, PALB2, PMS2, POLE, POLD1, PTEN, RAD51C, RAD51D, SMAD4, STK11, and TP53) associated with inherited cancer risks.     She is closely followed with Upper GI endoscopy and colonoscopy which were negative.       OBGYN history and Health Maintenance:    Last Pap Smear: 2022 Negative, HPV negative    Past Medical History:   Diagnosis Date    MLH1-related Jean syndrome (HNPCC2) 2024    NO ACTIVE PROBLEMS        Past Surgical History:   Procedure Laterality Date    COLONOSCOPY  2012    Procedure:COLONOSCOPY; COLONOSCOPY; Surgeon:JAKOB PETERS; Location: GI    COLONOSCOPY      COLONOSCOPY N/A 2021    Procedure: COLONOSCOPY;  Surgeon: Joseph Flores MD;  Location:  GI    ESOPHAGOSCOPY, GASTROSCOPY, DUODENOSCOPY (EGD), COMBINED N/A 2021    Procedure:  ESOPHAGOGASTRODUODENOSCOPY (EGD);  Surgeon: Joseph Flores MD;  Location: [facility]    NO HISTORY OF SURGERY            Current Outpatient Medications   Medication Sig Dispense Refill    buPROPion (WELLBUTRIN XL) 150 MG 24 hr tablet Take 1 tablet (150 mg) by mouth every morning 90 tablet 1         No Known Allergies    Social History:  Social History     Tobacco Use    Smoking status: Former     Types: Cigarettes     Passive exposure: Past    Smokeless tobacco: Never    Tobacco comments:     half a pack a day   Substance Use Topics    Alcohol use: Yes     Comment: rarely         Family History:   The patient's family history is notable for   Family History   Problem Relation Age of Onset    Cancer - colorectal Mother         diagnosed at age 33 and  at age 34    Jean Syndrome Mother         MLH1    Colon Cancer Maternal Grandmother     Cancer - colorectal Maternal Grandmother 49         age 53    Diabetes Maternal Grandmother     Cancer Maternal Grandfather         pancreatic cancer    Colon Cancer Maternal Aunt 37    Jean Syndrome Maternal Aunt         MLH1    Colon Cancer Maternal Aunt 48    Jean Syndrome Maternal Aunt         MLH1         Physical Exam:     /75 (BP Location: Right arm, Patient Position: Sitting, Cuff Size: Adult Large)   Pulse 61   Temp 98.3  F (36.8  C) (Oral)   Resp 16   Wt 115.5 kg (254 lb 11.2 oz)   SpO2 97%   BMI 39.28 kg/m    Body mass index is 39.28 kg/m .    General: Alert and oriented, no acute distress  Psych: Mood stable  Cardiovascular: RRR, no murmors  Pulmonary: Lungs clear . Normal respiratory effort  GI: No distention. No masses. No hernia.   Lymph: No enlarge lymph nodes in neck or groin  : Normal external genitalia. Cervix small. No lesion, midline uterus, no adnexal mass      Assessment:  Katelyn Erickson is a 33 year old woman with a MLH1 mutation, Jean syndrome. She has completed childbearing.    Risk for endometrial cancer is increased 35 - 40%    Ovarian cancer risk at 4-20%    Plan:     1.)    Reviewed approach to surgery for total laparoscopic hysterectomy, removal of bilateral fallopian tubes. Risks, benefits and alterative to surgery reviewed and surgical consent obtained.    2.) Labs and/or tests ordered include:  AM of surgery including UPT         Elida Gutierres M.D., MPH,  F.A.CNakulONakulGNakul  Professor  Department of Ob/Gyn and Women's Health  Division of Gynecologic Oncology  [facility]  [phone]      Time: total time spent today, 60 , including preparation, review of outside records, face to face counseling, and documentation.

## 2024-02-20 ENCOUNTER — LAB (OUTPATIENT)
Dept: LAB | Facility: CLINIC | Age: 34
End: 2024-02-20
Payer: COMMERCIAL

## 2024-02-20 ENCOUNTER — OFFICE VISIT (OUTPATIENT)
Dept: ENDOCRINOLOGY | Facility: CLINIC | Age: 34
End: 2024-02-20
Payer: COMMERCIAL

## 2024-02-20 VITALS
HEIGHT: 68 IN | BODY MASS INDEX: 38.52 KG/M2 | WEIGHT: 254.2 LBS | DIASTOLIC BLOOD PRESSURE: 82 MMHG | SYSTOLIC BLOOD PRESSURE: 133 MMHG | OXYGEN SATURATION: 96 % | HEART RATE: 70 BPM

## 2024-02-20 DIAGNOSIS — Z15.09 LYNCH SYNDROME: ICD-10-CM

## 2024-02-20 DIAGNOSIS — E66.09 CLASS 2 OBESITY DUE TO EXCESS CALORIES WITHOUT SERIOUS COMORBIDITY WITH BODY MASS INDEX (BMI) OF 39.0 TO 39.9 IN ADULT: ICD-10-CM

## 2024-02-20 DIAGNOSIS — E66.812 CLASS 2 OBESITY DUE TO EXCESS CALORIES WITHOUT SERIOUS COMORBIDITY WITH BODY MASS INDEX (BMI) OF 39.0 TO 39.9 IN ADULT: ICD-10-CM

## 2024-02-20 DIAGNOSIS — E66.09 CLASS 2 OBESITY DUE TO EXCESS CALORIES WITHOUT SERIOUS COMORBIDITY WITH BODY MASS INDEX (BMI) OF 39.0 TO 39.9 IN ADULT: Primary | ICD-10-CM

## 2024-02-20 DIAGNOSIS — E66.812 CLASS 2 OBESITY DUE TO EXCESS CALORIES WITHOUT SERIOUS COMORBIDITY WITH BODY MASS INDEX (BMI) OF 39.0 TO 39.9 IN ADULT: Primary | ICD-10-CM

## 2024-02-20 DIAGNOSIS — Z12.6 SCREENING FOR BLADDER CANCER: ICD-10-CM

## 2024-02-20 LAB
ALBUMIN UR-MCNC: NEGATIVE MG/DL
APPEARANCE UR: CLEAR
BACTERIA #/AREA URNS HPF: ABNORMAL /HPF
BILIRUB UR QL STRIP: NEGATIVE
COLOR UR AUTO: YELLOW
ERYTHROCYTE [DISTWIDTH] IN BLOOD BY AUTOMATED COUNT: 12.8 % (ref 10–15)
GLUCOSE UR STRIP-MCNC: NEGATIVE MG/DL
HBA1C MFR BLD: 5.4 % (ref 0–5.6)
HCT VFR BLD AUTO: 40.1 % (ref 35–47)
HGB BLD-MCNC: 13.2 G/DL (ref 11.7–15.7)
HGB UR QL STRIP: NEGATIVE
KETONES UR STRIP-MCNC: NEGATIVE MG/DL
LEUKOCYTE ESTERASE UR QL STRIP: ABNORMAL
MCH RBC QN AUTO: 29.9 PG (ref 26.5–33)
MCHC RBC AUTO-ENTMCNC: 32.9 G/DL (ref 31.5–36.5)
MCV RBC AUTO: 91 FL (ref 78–100)
NITRATE UR QL: NEGATIVE
PH UR STRIP: 5.5 [PH] (ref 5–7)
PLATELET # BLD AUTO: 344 10E3/UL (ref 150–450)
RBC # BLD AUTO: 4.42 10E6/UL (ref 3.8–5.2)
RBC #/AREA URNS AUTO: ABNORMAL /HPF
SP GR UR STRIP: 1.01 (ref 1–1.03)
SQUAMOUS #/AREA URNS AUTO: ABNORMAL /LPF
UROBILINOGEN UR STRIP-ACNC: 0.2 E.U./DL
WBC # BLD AUTO: 7.4 10E3/UL (ref 4–11)
WBC #/AREA URNS AUTO: ABNORMAL /HPF

## 2024-02-20 PROCEDURE — 99000 SPECIMEN HANDLING OFFICE-LAB: CPT

## 2024-02-20 PROCEDURE — 99205 OFFICE O/P NEW HI 60 MIN: CPT | Performed by: NURSE PRACTITIONER

## 2024-02-20 PROCEDURE — 85027 COMPLETE CBC AUTOMATED: CPT

## 2024-02-20 PROCEDURE — 82306 VITAMIN D 25 HYDROXY: CPT

## 2024-02-20 PROCEDURE — 82607 VITAMIN B-12: CPT

## 2024-02-20 PROCEDURE — 80053 COMPREHEN METABOLIC PANEL: CPT

## 2024-02-20 PROCEDURE — 82728 ASSAY OF FERRITIN: CPT

## 2024-02-20 PROCEDURE — 84590 ASSAY OF VITAMIN A: CPT | Mod: 90

## 2024-02-20 PROCEDURE — 36415 COLL VENOUS BLD VENIPUNCTURE: CPT

## 2024-02-20 PROCEDURE — 83970 ASSAY OF PARATHORMONE: CPT

## 2024-02-20 PROCEDURE — 83036 HEMOGLOBIN GLYCOSYLATED A1C: CPT

## 2024-02-20 PROCEDURE — 81001 URINALYSIS AUTO W/SCOPE: CPT

## 2024-02-20 RX ORDER — TOPIRAMATE 25 MG/1
TABLET, FILM COATED ORAL
Qty: 90 TABLET | Refills: 1 | Status: SHIPPED | OUTPATIENT
Start: 2024-02-20 | End: 2024-05-10

## 2024-02-20 ASSESSMENT — PAIN SCALES - GENERAL: PAINLEVEL: NO PAIN (0)

## 2024-02-20 NOTE — Clinical Note
NBS> not clearly a surgery candidate- BMI 38 without comorbid. Doing Fibroscan now. Tasklist done. Please send packet and letters.

## 2024-02-20 NOTE — PROGRESS NOTES
"New Bariatric Surgery Consultation Note    2024    RE: Katelyn Erickson  MR#: [id number]  : 1990      Referring provider:       2/15/2024     2:47 PM   --   Who referred you Rose phelps       Chief Complaint/Reason for visit: evaluation for possible weight loss surgery    Dear Rose Phelps, WINDY CNP (General),    I had the pleasure of seeing your patient, Katelyn Erickson, to evaluate her obesity and consider her for possible weight loss surgery.  As you know, Katelyn Erickson is 33 year old.  She has a height of 5' 7.75\"[Measured[, a weight of 254 lbs 3.2 oz, and calculated Body mass index is 38.94 kg/m ..  Full intake/assessment was done to determine barriers to weight loss success and develop a treatment plan.    2023 with PCP - weight visit - remove IUD 258lb     Assessment & Plan   Problem List Items Addressed This Visit        Digestive    Class 2 obesity due to excess calories without serious comorbidity with body mass index (BMI) of 39.0 to 39.9 in adult - Primary     Very early onset weight struggles, always larger than peers despite not having obesity. Dad was really strict about eating and found weight gain upon moving out on own. Weight gain compounded by working nights. Now, weight stable for the last decade despite significant efforts to lose weight. For example, no weight loss with weight watchers despite being able to stick to the plan. With family hx of STERN syndrome she is planning for hysterectomy and would like to further manage cancer risks by reducing weight and managing comorbidities. Would like to consider bariatric surgery and weight management.     Describes significant thoughts about food and cravings in the evenings with limited hunger during the day. Eating pattern in the evening varies based on schedule with kids. Does note some mindless eating too in the evenings. Discussed trial of topiramate.     Not clearly a candidate for surgery. BMI is 38 so " she'll need qualifying comorbidity for surgery. At risk for NAFLD so we'll screen for that now with fibroscan. At low risk for advanced fibrosis.     Plan:   Monthly dietitian visits   Schedule dietitian class and nurse class   Meet with Dr. Hurley in 1-2 months  Schedule psych eval  See me in 3 months   Start topiramate   Baseline labs when able   Schedule fibroscan when able          Relevant Medications    topiramate (TOPAMAX) 25 MG tablet    Other Relevant Orders    Adult Mental Health  Referral    CBC with platelets (Completed)    Comprehensive metabolic panel (Completed)    Ferritin (Completed)    Hemoglobin A1c (Completed)    Parathyroid Hormone Intact (Completed)    Vitamin A    Vitamin B12 (Completed)    Vitamin D Deficiency (Completed)    Fibrosis Scan          HISTORY OF PRESENT ILLNESS:      2/15/2024     2:47 PM   Weight Loss History Reviewed with Patient   How long have you been overweight? Since early childhood   What is the most that you have ever weighed 260   What is the most weight you have lost? 18   I have tried the following methods to lose weight Watching portions or calories    Exercise    Weight Watchers   I have tried the following weight loss medications? 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 - leaving ovaries for now     More gain with second pregnancy- gestation diabetes   Able to lose back baseline weight with each pregnancy   Never able to lose more than 18lb - ww and going to the gym - after pregnancy     With subsequent attempts with WW can stick to the plan but then doesn't see any benefit (no weight loss)     Kids are 10, 7, 5- wants to be healthy for kids      Aunt had bariatric surgery    Mother  of cancer at age 33yo     CO-MORBIDITIES OF OBESITY INCLUDE:      2/15/2024     2:47 PM   --   I have the following health issues associated with obesity None of the above     Occasional reflux, not needing to take anything   Sleep- wakes rested, sleeps through night, no excessive daytime sleepiness   STERN syndrome     FIB-4 Calculation: 0.36 at 2024  2:01 PM  Calculated from:  SGOT/AST: 20 U/L at 2024  2:01 PM  SGPT/ALT: 26 U/L at 2024  2:01 PM  Platelets: 344 10e3/uL at 2024  2:01 PM  Age: 33 years     PAST MEDICAL HISTORY:  Past Medical History:   Diagnosis Date     MLH1-related Stern syndrome (HNPCC2) 2024     NO ACTIVE PROBLEMS        PAST SURGICAL HISTORY:  Past Surgical History:   Procedure Laterality Date     COLONOSCOPY  2012    Procedure:COLONOSCOPY; COLONOSCOPY; Surgeon:JAKOB PETERS; Location: GI     COLONOSCOPY       COLONOSCOPY N/A 2021    Procedure: COLONOSCOPY;  Surgeon: Joseph Flores MD;  Location:  GI     ESOPHAGOSCOPY, GASTROSCOPY, DUODENOSCOPY (EGD), COMBINED N/A 2021    Procedure: ESOPHAGOGASTRODUODENOSCOPY (EGD);  Surgeon: Joseph Flores MD;  Location:  GI     NO HISTORY OF SURGERY         FAMILY HISTORY:   Family History   Problem Relation Age of Onset     Cancer - colorectal Mother         diagnosed at age 33 and  at age 34     Stern Syndrome Mother         MLH1     Colon Cancer Maternal Grandmother      Cancer - colorectal Maternal Grandmother 49         age 53     Diabetes Maternal Grandmother      Cancer Maternal Grandfather         pancreatic cancer     Colon Cancer Maternal Aunt 37     Stern Syndrome Maternal Aunt         MLH1     Colon Cancer Maternal Aunt 48     Stern Syndrome Maternal Aunt         MLH1       SOCIAL HISTORY:       2/15/2024     2:47 PM   Social History Questions Reviewed With Patient   Which best describes your employment status (select all that apply) I work part-time    I am a stay-at-home  parent or spouse   If you work, 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  and MIL   Who can you count on for support throughout your weight loss surgery journey?  and MIL       Do you have support to drive you to clinic for weight checks/clinic visits, to and from surgery and stay with you after surgery? YES     HABITS:      2/15/2024     2:47 PM   --   How often do you drink alcohol? Monthly or less   If you do drink alcohol, how many drinks might you have in a day? (one drink = 5 oz. wine, 1 can/bottle of beer, 1 shot liquor) 1 or 2   Do you currently use any of the following Nicotine products? No   Have you ever used any of the following nicotine products? Cigarettes   If you previously used any of these products, what year did you quit? 2016   Have you or are you currently using street drugs or prescription strength medication for which you do not have a prescription for? No   Do you have a history of chemical dependency (alcohol or drug abuse)? No     Currently taking narcotic/opioids No    PSYCHOLOGICAL HISTORY:       2/15/2024     2:47 PM   Psychological History Reviewed With Patient   Have you ever attempted suicide? Never.   Have you had thoughts of suicide in the past year? No   Have you ever been hospitalized for mental illness or a suicide attempt? Never.   Do you have a history of chronic pain? No   Have you ever been diagnosed with fibromyalgia? No   Are you currently being treated for any of the following? (select all that apply) Depression    Anxiety   Are you currently seeing a therapist or counselor? No   Are you currently seeing a psychiatrist? No     Mental health has been really stable     ROS:      2/15/2024     2:47 PM   --   Skin Varicose veins   HEENT None of these   Musculoskeletal Back pain   Cardiovascular Shortness of  breath with activity   Pulmonary Shortness of breath with activity    Snoring   Gastrointestinal None of the above   Genitourinary None of the above   Hematological None of the above   Neurological None of the above   Female only Regular menstrual cycles    None of the above       EATING BEHAVIORS:      2/15/2024     2:47 PM   --   Have you or anyone else thought that you had an eating disorder? No   Do you currently binge eat (eat a large amount of food in a short time)? No   Are you an emotional eater? Yes   Do you get up to eat after falling asleep? No   Can you afford 3 meals a day? Yes   Can you afford 50-60 dollars a month for vitamins? Yes     Not really hungry during the day   Breakfast- 11am - OR lunch around 130pm (starving then)- sometimes hard to get satisfied   Dinner at 5pm with kids OR 9pm   Sometimes eating in the evenings- boredom     Craves - sweets- difficult to stop thinking about this - even when trying to satisfy with something else     EXERCISE:      2/15/2024     2:47 PM   --   How often do you exercise? Daily   What is the duration of your exercise (in minutes)? 30 Minutes   What types of exercise do you do? walking   What keeps you from being more active? I should be more active but I just have not gotten around to it    Lack of Time       MEDICATIONS:  Current Outpatient Medications   Medication Sig Dispense Refill     buPROPion (WELLBUTRIN XL) 150 MG 24 hr tablet Take 1 tablet (150 mg) by mouth every morning 90 tablet 1     topiramate (TOPAMAX) 25 MG tablet 25mg at bedtime for week 1, 50mg at bedtime for 1 week, and 75mg at bedtime thereafter 90 tablet 1       Is patient on biologics or immunomodulators? NO    ALLERGIES:  No Known Allergies    Office Visit on 10/11/2023   Component Date Value Ref Range Status     WBC Count 10/11/2023 6.7  4.0 - 11.0 10e3/uL Final     RBC Count 10/11/2023 4.46  3.80 - 5.20 10e6/uL Final     Hemoglobin 10/11/2023 13.5  11.7 - 15.7 g/dL Final     Hematocrit  10/11/2023 40.3  35.0 - 47.0 % Final     MCV 10/11/2023 90  78 - 100 fL Final     MCH 10/11/2023 30.3  26.5 - 33.0 pg Final     MCHC 10/11/2023 33.5  31.5 - 36.5 g/dL Final     RDW 10/11/2023 12.6  10.0 - 15.0 % Final     Platelet Count 10/11/2023 360  150 - 450 10e3/uL Final     Ferritin 10/11/2023 85  6 - 175 ng/mL Final       Computed FIB-4 Calculation unavailable. One or more values for this score either were not found within the given timeframe or did not fit some other criterion.    Fib-4 < 1.3: No further evaluation at this point, unless other concerns    - If the Fib-4 is >2.67  Fibroscan and elective liver clinic referral    - Intermediate Fib-4 scores: Get a Fibroscan, consider repeating this in 1-2 years.    Anti-obesity medication ROS:    HEENT  Hx of glaucoma: No    Cardiovascular  CAD:No  HTN:No    Gastrointestinal  GERD: rare  Constipation/diarrhea/GI issues:No  Liver Dz:No  Computed FIB-4 Calculation unavailable. One or more values for this score either were not found within the given timeframe or did not fit some other criterion.    H/O Pancreatitis:No    Psychiatric  Bipolar: No  Anxiety:Yes  Depression:Yes  History of alcohol/drug abuse: No  Hx of eating disorder:No    Endocrine  Personal or family hx of MTC or MEN2:No  Diabetes/prediabetes: No    Neurologic:  Hx of seizures: No  Hx of migraines: No  Memory Impairment: No  Chronic pain/opioids use: No      History of kidney stones: No  Kidney disease: No  Current birth control:   had vasectomy, scheduled for hysterectomy next month        9/8/2023     9:06 AM   ALISON Score (Last Two)   ALISON Raw Score 37   Activation Score 79.2   ALISON Level 4         PHYSICAL EXAM:  Objective    Physical Exam   GENERAL: alert and no distress  EYES: Eyes grossly normal to inspection.  No discharge or erythema, or obvious scleral/conjunctival abnormalities.  RESP: No audible wheeze, cough, or visible cyanosis.    SKIN: Visible skin clear. No significant rash,  abnormal pigmentation or lesions.  NEURO: Cranial nerves grossly intact.  Mentation and speech appropriate for age.  PSYCH: Appropriate affect, tone, and pace of words        In summary, Katelyn Erickson has Class II obesity with a body mass index of Body mass index is 38.94 kg/m . kg/m2 and the comorbidities stated above. She completed an informational seminar and is a possible candidate for the laparoscopic gastric sleeve.  She will have to complete the following pre-requisites:    Today in the office we discussed gastric sleeve surgery. Preoperative, perioperative, and postoperative processes, management, and follow up were addressed.  Risks and benefits were outlined including the risk of death, staple line leak (1-2%), PE, DVT, ulcer, worsening GERD, N/V, stricture, hernia, wound infection, weight regain, and vitamin deficiencies. I emphasized exercise and activity along with appropriate food choice as the main foundation for weight loss with surgery providing surgical reinforcement of this.  All questions were answered.  A goal sheet and support group handout were given to the patient.      If you have not already watched our online seminar please go to [email or web address]    Weight loss requirement: 10 prior to surgery. Will have final weight check 2-3 weeks prior to surgery at anesthesia or nurse pre-op teaching visit.    -Need current weight confirmation at primary clinic or weight management clinic No    Bariatric labs ordered, call for a lab only appointment at any [facility] lab. To find a lab location near you, please call [phone]. Please let us know if orders need to be faxed to a non [facility] lab.    Schedule bariatric psych eval as soon as possible.  List of psychologists will be sent to you via [employer] or given to you in clinic.     Call Craig Lambert at [phone] to discuss insurance coverage for bariatric surgery.  Please check with your insurance regarding  bariatric surgery coverage also. Craig can also help you with scheduling psych eval if you are having difficulties.    The following clearance letters are needed: Letters will be sent to you via [employer] or given to you in clinic  - Letter of support from primary care provider. Provider can submit through electronic medical record or fax to [phone] - do fibroscan, psych eval, letter of support from primary care   Smoking cessation and nicotine test needed: No    Birth control after surgery discussed. Patient instructed that 2 forms of birth control required after surgery and to avoid pregnancy for at least 18 months after surgery: NA    NEXT VISITS: A  should reach out to you to schedule the following appointments.  If they do not reach you please call [phone] to schedule the following appointments:    -See dietitian in 1 month and monthly for 6 months    -See Steffi in 3 months to follow up on pre-op weight loss and weight loss medications    -See Dr Mccormack in 2 month for bariatric surgeon visit. Discuss bariatric surgery.        Once the patient has completed the requirements in their task list and there are no further recommendations, the pt will be allowed to see the surgeon of her choice for consultation on the laparoscopic gastric sleeve surgery. Patient verbalizes understanding of the process to surgery and expectations for the postoperative period including the need for lifelong lifestyle changes, vitamin supplementation, and laboratory monitoring.    Sincerely,     Steffi Sánchez NP      60 minutes spent by me on the date of the encounter doing chart review, history and exam, documentation and further activities per the note

## 2024-02-20 NOTE — ASSESSMENT & PLAN NOTE
Very early onset weight struggles, always larger than peers despite not having obesity. Dad was really strict about eating and found weight gain upon moving out on own. Weight gain compounded by working nights. Now, weight stable for the last decade despite significant efforts to lose weight. For example, no weight loss with weight watchers despite being able to stick to the plan. With family hx of STERN syndrome she is planning for hysterectomy and would like to further manage cancer risks by reducing weight and managing comorbidities. Would like to consider bariatric surgery and weight management.     Describes significant thoughts about food and cravings in the evenings with limited hunger during the day. Eating pattern in the evening varies based on schedule with kids. Does note some mindless eating too in the evenings. Discussed trial of topiramate.     Not clearly a candidate for surgery. BMI is 38 so she'll need qualifying comorbidity for surgery. At risk for NAFLD so we'll screen for that now with fibroscan. At low risk for advanced fibrosis.     Plan:   Monthly dietitian visits   Schedule dietitian class and nurse class   Meet with Dr. Hurley in 1-2 months  Schedule psych eval  See me in 3 months   Start topiramate   Baseline labs when able   Schedule fibroscan when able

## 2024-02-20 NOTE — PATIENT INSTRUCTIONS
"Devon Zepeda, it was nice to meet you today!  Thank you for allowing us the privilege of caring for you. We hope we provided you with the excellent service you deserve.   Please let us know if there is anything else we can do for you so that we can be sure you are completely satisfied with your care experience.    To ensure the quality of our services you may be receiving a patient satisfaction survey from an independent patient satisfaction monitoring company.    The greatest compliment you can give is a \"Likely to Recommend\"    Your visit was with Steffi Sánchez NP today.    Instructions per today's visit:     Follow up  plan:  Monthly dietitian visits   Schedule dietitian class and nurse class   Meet with Dr. Hurley in 1-2 months  Schedule psych eval  See me in 3 months   Start topiramate   Baseline labs when able   Schedule fibroscan   ___________________________________________________________________________  Important contact and scheduling information:  Please call our contact center at [phone] to schedule your next appointments.  For any nursing questions or concerns call Sarina Hurt LPN at [phone] or Huyen Godwin RN at [phone]  Please call during clinic hours Monday through Friday 8:00a - 4:00p if you have questions or you can contact us via [employer] at anytime and we will reply during clinic hours.    Lab results will be communicated through My Chart or letter (if My Chart not used). Please call the clinic if you have not received communication after 1 week or if you have any questions.?  Clinic Fax: [phone]  __________________________________________________________________________    If labs were ordered today:    Please make an appointment to have them drawn at your convenience.     To schedule the Lab Appointment using [employer]:  Select \"Schedule an Appointment\"  Select \"Lab Only\"  For \"A couple of questions\", select \"Other\"  For \"Which locations work for you?, select the location and set " up the appointment    To schedule by phone call [phone] to schedule a lab only appointment at any [facility] lab.  ___________________________________________________________________________  Work with [facility] !  Virtual Sessions are Available through [facility] Clinics    To learn more, call to schedule a free, Health  Q&A appointment: [phone]     What is Health Coaching?  Do you know what you are supposed to do, but you just aren't doing it?  Then, HEALTH COACHING may help you!   Get unstuck and move forward with the support of a professionally trained [employer] (National Board-Certified Health and ) who uses evidence-based approaches to help you move forward with healthy lifestyle changes in the areas of weight loss, stress management and overall well-being.    Health Coaches help you identify goals that will work best for you. Health Coaches provide support and encouragement with overcoming barriers and help you to find inspiration and motivation to lead a healthy lifestyle.    Option one:  Health Coaching 3-Pack; Three, 30-minute Health Coaching Visits, for $99  Visits are done virtually (phone or video)  This is a self pay service; we do not accept insurance for praveen coaching.    Option two:   The 24 week Plan; 11 Health Coaching Visits, and a 7 months subscription to [employer]-- on-demand fitness, nutrition and mindfulness classes, for $499 (employee discounts may be available). Participants will also meet regularly with a weight management Medical Provider and a Registered/Licensed Dietician.  This is a self-pay service; we do not accept insurance for health coaching.    To Schedule a free Health  Q&A appointment to learn more,  call [phone].  ____________________________________________________________________  [facility]  Healthy Lifestyle Group    Healthy Lifestyle Group  This is a 60  "minute virtual coaching group for those who want to lead a healthier lifestyle. Come together to set goals and overcome barriers in a supportive group environment. We will address the four pillars of health--nutrition, exercise, sleep and emotional well-being.  This group is highly recommended for those who are participating in the 24 week Healthy Lifestyle Plan and our Health Coaching sessions.    WHEN: This group meets the first Friday of the month, 12:30 PM - 1:30 PM online, via a zoom meeting.      FACILITATOR: Led by National Board Certified Health and , Mary Jane Chambers, [facility].    TO REGISTER: Please call the Call Center at [phone] to register. You will get an appointment to attend in [employer]. Fifteen minutes prior to the meeting, complete the e-check in and you will get the link to join the meeting.  There is no charge to attend this group and space is limited.      2023 and 2024 Meeting Topics and Dates:    November 3: Introduction to Mindfulness (Learn simple and effective mindfulness practices and how it can benefit you)    December 8: Let's Talk (guided discussion on our wins and challenges)    January 5: New Years Vision: Manifest your Best 2024! (Guided imagery,  journaling and discussion)    February 2: Let's Talk    March 1: 10 Percent Happier by Blu Agarwal (Book Bites; a guided discussion on the nuggets of wisdom from favorite wellness books; no need to read the book but highly encouraged)    April 5: Let's Talk    May 3: \"Essentialism; The Disciplined Pursuit of Less by Vivek Vicente (book bites discussion)    June 7: Let's Talk    July 5: NO MEETING, off for the 4th of July Holiday    August 2: The Blue Zones, Secrets for Living a Longer Life by Blu Mccormick (book bites discussion)      If you would like bariatric surgery specific support group info please let your care team know.         Thank you,   [facility] Comprehensive Weight Management Team      MEDICATION STARTED AT " THIS APPOINTMENT  We are starting topiramate at bedtime.  Start one tab, 25 mg, for a week. Go up to 50 mg (2 tabs) for the next week. At the third week, take   3 tabs (75 mg).  Stay at 3 tabs until you are seen again. Contact the nurse via [employer] or call [phone] if you have any questions or concerns. (Do not stop taking it if you don't think it's working. For some people it works even though they do not feel much different.)    Topiramate (Topamax) is a medication that is used most often to treat migraine headaches or for seizures. It has also been found to help with weight loss. Although it's not currently FDA approved for weight loss, it has been used safely for a number of years to help people who are carrying extra weight.     Just how topiramate helps with weight loss has not been exactly determined. However it seems to work on areas of the brain to quiet down signals related to eating.      Topiramate may make you:    >feel less interest in eating in between meals   >think less about food and eating   >find it easier to push the plate away   >find giving up pop easier    >have an easier time eating less    For some of our patients, the pills work right away. They feel and think quite differently about food. Other patients don't feel much of a change but find in fact they have lost weight! Like all weight loss medications, topiramate works best when you help it work.  This means:    1) Have less tempting high calorie (fattening) food around the house or office    2) Have lower calorie food (fruits, vegetables,low fat meats and dairy) for snacks    3) Eat out only one time or less each week.   4) Eat your meals at a table with the TV or computer off.    Side-effects. Topiramate is generally well tolerated. The main side-effects we see are:   Tingling in hands,feet, or face (usually not very troublesome)   Mental confusion and word finding trouble (about 10% of patients have this.)     Feeling sleepy or a  bit dopey- this goes away very soon after starting.    One of the dangers of topiramate is the possibility of birth defects--if you get pregnant when you are on it, there is the risk that your baby will be born with a cleft lip or palate.  If you are on topiramate and of child bearing age, you need to be on a reliable form of birth control or refrain from sexual intercourse.     Please refer to the pharmacy insert for more information on side-effects. Since many pharmacists are not familiar with the use of topiramate in weight loss, calling the clinic will get you the most accurate information on the use of this medication for weight loss.     In order to get refills of this or any medication we prescribe you must be seen in the medical weight mgmt clinic every 2-3 months.

## 2024-02-20 NOTE — NURSING NOTE
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    FH: colon cancer in relative <50 years old    Gestational diabetes mellitus (GDM) affecting pregnancy, antepartum    Jean syndrome    Adenomatous polyp of colon, unspecified part of colon    Class 2 obesity due to excess calories without serious comorbidity with body mass index (BMI) of 39.0 to 39.9 in adult    Anxiety    Moderate episode of recurrent major depressive disorder (H)    Polyp of colon    )  (   Current Outpatient Medications   Medication Sig Dispense Refill    buPROPion (WELLBUTRIN XL) 150 MG 24 hr tablet Take 1 tablet (150 mg) by mouth every morning 90 tablet 1    )  ( Diabetes Eval:    )    ( Pain Eval:  No Pain (0) )    ( Wound Eval:       )    (   History   Smoking Status    Former    Types: Cigarettes   Smokeless Tobacco    Never    )    ( Signed By:  Gonzalez Villeda, EMT; February 20, 2024; 9:03 AM )    "

## 2024-02-20 NOTE — LETTER
"2024       RE: Katelyn Erickson  [address]  [facility] [address]     Dear Colleague,    Thank you for referring your patient, Katelyn Erickson, to the [facility] [place] at [facility]. Please see a copy of my visit note below.    New Bariatric Surgery Consultation Note    2024    RE: Katelyn Erickson  MR#: [id number]  : 1990      Referring provider:       2/15/2024     2:47 PM   --   Who referred you Rose moe       Chief Complaint/Reason for visit: evaluation for possible weight loss surgery    Dear Lenin, Rose Ramon, APRN CNP (General),    I had the pleasure of seeing your patient, Katelyn Erickson, to evaluate her obesity and consider her for possible weight loss surgery.  As you know, Katelyn Erickson is 33 year old.  She has a height of 5' 7.75\"[Measured[, a weight of 254 lbs 3.2 oz, and calculated Body mass index is 38.94 kg/m ..  Full intake/assessment was done to determine barriers to weight loss success and develop a treatment plan.    2023 with PCP - weight visit - remove IUD 258lb     Assessment & Plan   Problem List Items Addressed This Visit          Digestive    Class 2 obesity due to excess calories without serious comorbidity with body mass index (BMI) of 39.0 to 39.9 in adult - Primary     Very early onset weight struggles, always larger than peers despite not having obesity. Dad was really strict about eating and found weight gain upon moving out on own. Weight gain compounded by working nights. Now, weight stable for the last decade despite significant efforts to lose weight. For example, no weight loss with weight watchers despite being able to stick to the plan. With family hx of STERN syndrome she is planning for hysterectomy and would like to further manage cancer risks by reducing weight and managing comorbidities. Would like to consider bariatric surgery and weight " management.     Describes significant thoughts about food and cravings in the evenings with limited hunger during the day. Eating pattern in the evening varies based on schedule with kids. Does note some mindless eating too in the evenings. Discussed trial of topiramate.     Not clearly a candidate for surgery. BMI is 38 so she'll need qualifying comorbidity for surgery. At risk for NAFLD so we'll screen for that now with fibroscan. At low risk for advanced fibrosis.     Plan:   Monthly dietitian visits   Schedule dietitian class and nurse class   Meet with Dr. Hurley in 1-2 months  Schedule psych eval  See me in 3 months   Start topiramate   Baseline labs when able   Schedule fibroscan when able          Relevant Medications    topiramate (TOPAMAX) 25 MG tablet    Other Relevant Orders    Adult Mental Health  Referral    CBC with platelets (Completed)    Comprehensive metabolic panel (Completed)    Ferritin (Completed)    Hemoglobin A1c (Completed)    Parathyroid Hormone Intact (Completed)    Vitamin A    Vitamin B12 (Completed)    Vitamin D Deficiency (Completed)    Fibrosis Scan          HISTORY OF PRESENT ILLNESS:      2/15/2024     2:47 PM   Weight Loss History Reviewed with Patient   How long have you been overweight? Since early childhood   What is the most that you have ever weighed 260   What is the most weight you have lost? 18   I have tried the following methods to lose weight Watching portions or calories    Exercise    Weight Watchers   I have tried the following weight loss medications? 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 - leaving ovaries for now     More gain with second pregnancy- gestation diabetes   Able to  lose back baseline weight with each pregnancy   Never able to lose more than 18lb - ww and going to the gym - after pregnancy     With subsequent attempts with WW can stick to the plan but then doesn't see any benefit (no weight loss)     Kids are 10, 7, 5- wants to be healthy for kids      Aunt had bariatric surgery   Mother  of cancer at age 33yo     CO-MORBIDITIES OF OBESITY INCLUDE:      2/15/2024     2:47 PM   --   I have the following health issues associated with obesity None of the above     Occasional reflux, not needing to take anything   Sleep- wakes rested, sleeps through night, no excessive daytime sleepiness   STERN syndrome     FIB-4 Calculation: 0.36 at 2024  2:01 PM  Calculated from:  SGOT/AST: 20 U/L at 2024  2:01 PM  SGPT/ALT: 26 U/L at 2024  2:01 PM  Platelets: 344 10e3/uL at 2024  2:01 PM  Age: 33 years     PAST MEDICAL HISTORY:  Past Medical History:   Diagnosis Date    MLH1-related Stern syndrome (HNPCC2) 2024    NO ACTIVE PROBLEMS        PAST SURGICAL HISTORY:  Past Surgical History:   Procedure Laterality Date    COLONOSCOPY  2012    Procedure:COLONOSCOPY; COLONOSCOPY; Surgeon:JAKOB PETERS; Location: [facility]    COLONOSCOPY      COLONOSCOPY N/A 2021    Procedure: COLONOSCOPY;  Surgeon: Joseph Flores MD;  Location:  GI    ESOPHAGOSCOPY, GASTROSCOPY, DUODENOSCOPY (EGD), COMBINED N/A 2021    Procedure: ESOPHAGOGASTRODUODENOSCOPY (EGD);  Surgeon: Joseph Flores MD;  Location:  GI    NO HISTORY OF SURGERY         FAMILY HISTORY:   Family History   Problem Relation Age of Onset    Cancer - colorectal Mother         diagnosed at age 33 and  at age 34    Stern Syndrome Mother         MLH1    Colon Cancer Maternal Grandmother     Cancer - colorectal Maternal Grandmother 49         age 53    Diabetes Maternal Grandmother     Cancer Maternal Grandfather         pancreatic cancer    Colon Cancer Maternal Aunt 37    Stern Syndrome Maternal  Aunt         MLH1    Colon Cancer Maternal Aunt 48    Jean Syndrome Maternal Aunt         MLH1       SOCIAL HISTORY:       2/15/2024     2:47 PM   Social History Questions Reviewed With Patient   Which best describes your employment status (select all that apply) I work part-time    I am a stay-at-home parent or spouse   If you work, 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  and MIL   Who can you count on for support throughout your weight loss surgery journey?  and MIL       Do you have support to drive you to clinic for weight checks/clinic visits, to and from surgery and stay with you after surgery? YES     HABITS:      2/15/2024     2:47 PM   --   How often do you drink alcohol? Monthly or less   If you do drink alcohol, how many drinks might you have in a day? (one drink = 5 oz. wine, 1 can/bottle of beer, 1 shot liquor) 1 or 2   Do you currently use any of the following Nicotine products? No   Have you ever used any of the following nicotine products? Cigarettes   If you previously used any of these products, what year did you quit? 2016   Have you or are you currently using street drugs or prescription strength medication for which you do not have a prescription for? No   Do you have a history of chemical dependency (alcohol or drug abuse)? No     Currently taking narcotic/opioids No    PSYCHOLOGICAL HISTORY:       2/15/2024     2:47 PM   Psychological History Reviewed With Patient   Have you ever attempted suicide? Never.   Have you had thoughts of suicide in the past year? No   Have you ever been hospitalized for mental illness or a suicide attempt? Never.   Do you have a history of chronic pain? No   Have you ever been diagnosed with fibromyalgia? No   Are you currently being treated for any of the following? (select all that apply)  Depression    Anxiety   Are you currently seeing a therapist or counselor? No   Are you currently seeing a psychiatrist? No     Mental health has been really stable     ROS:      2/15/2024     2:47 PM   --   Skin Varicose veins   HEENT None of these   Musculoskeletal Back pain   Cardiovascular Shortness of breath with activity   Pulmonary Shortness of breath with activity    Snoring   Gastrointestinal None of the above   Genitourinary None of the above   Hematological None of the above   Neurological None of the above   Female only Regular menstrual cycles    None of the above       EATING BEHAVIORS:      2/15/2024     2:47 PM   --   Have you or anyone else thought that you had an eating disorder? No   Do you currently binge eat (eat a large amount of food in a short time)? No   Are you an emotional eater? Yes   Do you get up to eat after falling asleep? No   Can you afford 3 meals a day? Yes   Can you afford 50-60 dollars a month for vitamins? Yes     Not really hungry during the day   Breakfast- 11am - OR lunch around 130pm (starving then)- sometimes hard to get satisfied   Dinner at 5pm with kids OR 9pm   Sometimes eating in the evenings- boredom     Craves - sweets- difficult to stop thinking about this - even when trying to satisfy with something else     EXERCISE:      2/15/2024     2:47 PM   --   How often do you exercise? Daily   What is the duration of your exercise (in minutes)? 30 Minutes   What types of exercise do you do? walking   What keeps you from being more active? I should be more active but I just have not gotten around to it    Lack of Time       MEDICATIONS:  Current Outpatient Medications   Medication Sig Dispense Refill    buPROPion (WELLBUTRIN XL) 150 MG 24 hr tablet Take 1 tablet (150 mg) by mouth every morning 90 tablet 1    topiramate (TOPAMAX) 25 MG tablet 25mg at bedtime for week 1, 50mg at bedtime for 1 week, and 75mg at bedtime thereafter 90 tablet 1       Is patient on biologics or  immunomodulators? NO    ALLERGIES:  No Known Allergies    Office Visit on 10/11/2023   Component Date Value Ref Range Status    WBC Count 10/11/2023 6.7  4.0 - 11.0 10e3/uL Final    RBC Count 10/11/2023 4.46  3.80 - 5.20 10e6/uL Final    Hemoglobin 10/11/2023 13.5  11.7 - 15.7 g/dL Final    Hematocrit 10/11/2023 40.3  35.0 - 47.0 % Final    MCV 10/11/2023 90  78 - 100 fL Final    MCH 10/11/2023 30.3  26.5 - 33.0 pg Final    MCHC 10/11/2023 33.5  31.5 - 36.5 g/dL Final    RDW 10/11/2023 12.6  10.0 - 15.0 % Final    Platelet Count 10/11/2023 360  150 - 450 10e3/uL Final    Ferritin 10/11/2023 85  6 - 175 ng/mL Final       Computed FIB-4 Calculation unavailable. One or more values for this score either were not found within the given timeframe or did not fit some other criterion.    Fib-4 < 1.3: No further evaluation at this point, unless other concerns    - If the Fib-4 is >2.67  Fibroscan and elective liver clinic referral    - Intermediate Fib-4 scores: Get a Fibroscan, consider repeating this in 1-2 years.    Anti-obesity medication ROS:    HEENT  Hx of glaucoma: No    Cardiovascular  CAD:No  HTN:No    Gastrointestinal  GERD: rare  Constipation/diarrhea/GI issues:No  Liver Dz:No  Computed FIB-4 Calculation unavailable. One or more values for this score either were not found within the given timeframe or did not fit some other criterion.    H/O Pancreatitis:No    Psychiatric  Bipolar: No  Anxiety:Yes  Depression:Yes  History of alcohol/drug abuse: No  Hx of eating disorder:No    Endocrine  Personal or family hx of MTC or MEN2:No  Diabetes/prediabetes: No    Neurologic:  Hx of seizures: No  Hx of migraines: No  Memory Impairment: No  Chronic pain/opioids use: No      History of kidney stones: No  Kidney disease: No  Current birth control:   had vasectomy, scheduled for hysterectomy next month        9/8/2023     9:06 AM   ALISON Score (Last Two)   ALISON Raw Score 37   Activation Score 79.2   ALISON Level 4          PHYSICAL EXAM:  Objective    Physical Exam   GENERAL: alert and no distress  EYES: Eyes grossly normal to inspection.  No discharge or erythema, or obvious scleral/conjunctival abnormalities.  RESP: No audible wheeze, cough, or visible cyanosis.    SKIN: Visible skin clear. No significant rash, abnormal pigmentation or lesions.  NEURO: Cranial nerves grossly intact.  Mentation and speech appropriate for age.  PSYCH: Appropriate affect, tone, and pace of words        In summary, Katelyn Erickson has Class II obesity with a body mass index of Body mass index is 38.94 kg/m . kg/m2 and the comorbidities stated above. She completed an informational seminar and is a possible candidate for the laparoscopic gastric sleeve.  She will have to complete the following pre-requisites:    Today in the office we discussed gastric sleeve surgery. Preoperative, perioperative, and postoperative processes, management, and follow up were addressed.  Risks and benefits were outlined including the risk of death, staple line leak (1-2%), PE, DVT, ulcer, worsening GERD, N/V, stricture, hernia, wound infection, weight regain, and vitamin deficiencies. I emphasized exercise and activity along with appropriate food choice as the main foundation for weight loss with surgery providing surgical reinforcement of this.  All questions were answered.  A goal sheet and support group handout were given to the patient.      If you have not already watched our online seminar please go to [email or web address]    Weight loss requirement: 10 prior to surgery. Will have final weight check 2-3 weeks prior to surgery at anesthesia or nurse pre-op teaching visit.    -Need current weight confirmation at primary clinic or weight management clinic No    Bariatric labs ordered, call for a lab only appointment at [facility] lab. To find a lab location near you, please call [phone]. Please let us know if orders need to be faxed  to a [facility] lab.    Schedule bariatric psych eval as soon as possible.  List of psychologists will be sent to you via [employer] or given to you in clinic.     Call Craig Lambert at [phone] to discuss insurance coverage for bariatric surgery.  Please check with your insurance regarding bariatric surgery coverage also. Craig can also help you with scheduling psych eval if you are having difficulties.    The following clearance letters are needed: Letters will be sent to you via [employer] or given to you in clinic  - Letter of support from primary care provider. Provider can submit through electronic medical record or fax to [phone]  - do fibroscan, psych eval, letter of support from primary care   Smoking cessation and nicotine test needed: No    Birth control after surgery discussed. Patient instructed that 2 forms of birth control required after surgery and to avoid pregnancy for at least 18 months after surgery: NA    NEXT VISITS: A  should reach out to you to schedule the following appointments.  If they do not reach you please call [phone] to schedule the following appointments:    -See dietitian in 1 month and monthly for 6 months    -See Steffi in 3 months to follow up on pre-op weight loss and weight loss medications    -See Dr Mccormack in 2 month for bariatric surgeon visit. Discuss bariatric surgery.        Once the patient has completed the requirements in their task list and there are no further recommendations, the pt will be allowed to see the surgeon of her choice for consultation on the laparoscopic gastric sleeve surgery. Patient verbalizes understanding of the process to surgery and expectations for the postoperative period including the need for lifelong lifestyle changes, vitamin supplementation, and laboratory monitoring.    Sincerely,     Steffi Sánchez NP      60 minutes spent by me on the date of the encounter doing chart review, history and exam, documentation and further  activities per the note

## 2024-02-21 LAB
ALBUMIN SERPL BCG-MCNC: 4.4 G/DL (ref 3.5–5.2)
ALP SERPL-CCNC: 63 U/L (ref 40–150)
ALT SERPL W P-5'-P-CCNC: 26 U/L (ref 0–50)
ANION GAP SERPL CALCULATED.3IONS-SCNC: 11 MMOL/L (ref 7–15)
AST SERPL W P-5'-P-CCNC: 20 U/L (ref 0–45)
BILIRUB SERPL-MCNC: 0.4 MG/DL
BUN SERPL-MCNC: 9.8 MG/DL (ref 6–20)
CALCIUM SERPL-MCNC: 9.8 MG/DL (ref 8.6–10)
CHLORIDE SERPL-SCNC: 105 MMOL/L (ref 98–107)
CREAT SERPL-MCNC: 0.73 MG/DL (ref 0.51–0.95)
DEPRECATED HCO3 PLAS-SCNC: 22 MMOL/L (ref 22–29)
EGFRCR SERPLBLD CKD-EPI 2021: >90 ML/MIN/1.73M2
FERRITIN SERPL-MCNC: 82 NG/ML (ref 6–175)
GLUCOSE SERPL-MCNC: 94 MG/DL (ref 70–99)
POTASSIUM SERPL-SCNC: 4 MMOL/L (ref 3.4–5.3)
PROT SERPL-MCNC: 7.4 G/DL (ref 6.4–8.3)
PTH-INTACT SERPL-MCNC: 51 PG/ML (ref 15–65)
SODIUM SERPL-SCNC: 138 MMOL/L (ref 135–145)
VIT B12 SERPL-MCNC: 386 PG/ML (ref 232–1245)
VIT D+METAB SERPL-MCNC: 35 NG/ML (ref 20–50)

## 2024-02-22 ENCOUNTER — CARE COORDINATION (OUTPATIENT)
Dept: ENDOCRINOLOGY | Facility: CLINIC | Age: 34
End: 2024-02-22
Payer: COMMERCIAL

## 2024-02-22 LAB
ANNOTATION COMMENT IMP: NORMAL
RETINYL PALMITATE SERPL-MCNC: <0.02 MG/L
VIT A SERPL-MCNC: 0.56 MG/L

## 2024-02-22 NOTE — PROGRESS NOTES
Bariatric Task List updated.  Bariatric information/clearance letters sent to patient via [employer].    Bariatric Task List    Fax:  Please fax all paperwork to: [phone] -     Status:  Is patient a candidate for bariatric surgery?:    -     Cleared to schedule surgeon consult?:  cleared to schedule surgeon consult -     Status:    -     Surgeon: WIse -     Tentative surgery month/year:   -        Insurance: Insurance:  bcbs -      Contact insurance to discuss coverage:   -       Cigna: PCP Recommendation and Medical Clearance:    -     HP Referral:    -      Advanced beneficiary notification (ABN) for Medicare patients for RD visits   and surgery:   -      Weight history:   -     Other:    -        Patient Info: Initial Weight:  254 -     Date of Initial Weight/Height:  2/20/2024 -     Goal Weight (lbs):  244 -     Required Weight Loss:  10 -     Surgery Type:  sleeve gastrectomy -     Multidisciplinary Meeting:    -        Dietician Visits: Structured weight loss required by insurance?:    -     Dietician Visit 1:  Needed -     Dietician Visit 2:  Needed -     Dietician Visit 3:  Needed -     Dietician Visit 4:  Needed -     Dietician Visit 5:    -     Dietician Visit 6:  Needed -     Dietician Visit additional:  Needed - monthly until surgery   Clearance from dietician to see surgeon?:    -     Dietician Notes:    -        Psychological Evaluation: Psych eval:  Needed - letter sent to pt   Therapist letter of support:    -     Psychiatrist letter of support:    -     Establish care with therapist:    -     Complete eating disorder evaluation:    -     Letter of clearance from therapist/eating disorder program:    -     Other:    -        Lab Work: Complete Blood Count:  Completed - 2/20/24   Comprehensive Metabolic Panel:  Completed - 2/20/24   Vitamin D:  Completed - 2/20/24   PTH:  Completed - 2/20/24   Hgb A1c:  Completed - 2/20/24    Lipids: Completed - 2/20/24    TSH ([facility], [facility] [place] [place]): Completed -  "2/20/24     Ferritin:   -       Folate:   -       Testosterone, Total and Free:   -     Thiamine:   -     Vitamin A: Completed - 2/20/24   Vitamin B12: Completed - 2/20/24   Zinc:   -     C-peptide:   -     H. pylori:    -     MRSA (2 swabs, minimum 48 hours apart):   -     Nicotine Testing:    -     Recheck Vitamin D:   -     Other:    -        Consults/ Clearance Sleep Medicine:  Needed - letter sent to pt RR   Cardiac:    -     Pain:   -     Dental:    -     Endocrine:    -     Gastroenterology:    -     Vascular Medicine:    -     Hematology:    -     Medical Weight Management:   -     Physical Therapy/Exercise:    -     Nephrology:    -     Neurology:    -     Pulmonology:    -     Rheumatology:    -     Other:    -     Other:    -     Other:    -        Testing: UGI:    -     EGD:    -     Sleep Study:   -     Other:   -     Other:    -        PCP: Establish care with PCP:  Completed -     Follow up with PCP:    -     PCP letter of support:  Needed - letter sent to pt RR      Health Maintenance: Colonoscopy(> 50 yrs or family hx):    -     Mammogram (> 40 yrs or family hx):    -     Pap Smear (women):   -     Other:   -     Other:    -        Stopping Smoking/ Alcohol Use/Cannabis Use: Quit tobacco use (3 months smoke free)?:    -     Quit date:    -     Quit alcohol use:   -     Quit date:   -     Other:   -     Quit date:   -        Patient Education:  Information Session:    -     Attended New Consult Class?:   -     Given \"Making your decision\" handout?:    -     Given \"A Roadmap to you Weight Loss Surgery\" handout?:   -     Given \"Epic Care Companion\" information?:   -     Attended support group?:    -     Support plan in place?:    -     Research consents signed?:    -     Avoid NSAIDS/ Alternate Plan for Pain:   -        Additional Surgery Requirements: Review Coag plan:    -     HgA1c <8:    -     Inpatient pain consult:    -     Final nicotine screen:    -     Dental work complete:    -     Birth " control plan:    -     Gallstone prevention plan (Actigall for 6 months postop):   -     Other:   -     Other:   -        Final Tasks:  Before surgery online preop class:    -     After surgery online class:    -     Nurse visit for information:    -     Weight Check:   -     History and Physical: Pre-Assessment Clinic (PAC) -     -     Final labs per clinic:   -     See MTM Pharmacist for medication review and plan for after surgery (if DM, transplant hx, greater than 10 meds):   -     Chest xray per clinic:   -     Electrocardiogram (ECG) per clinic:   -     Schedule postop appointments:   -     Other:   -   -        Notes:   -

## 2024-02-23 ENCOUNTER — VIRTUAL VISIT (OUTPATIENT)
Dept: ENDOCRINOLOGY | Facility: CLINIC | Age: 34
End: 2024-02-23
Payer: COMMERCIAL

## 2024-02-23 VITALS — WEIGHT: 254.2 LBS | BODY MASS INDEX: 38.52 KG/M2 | HEIGHT: 68 IN

## 2024-02-23 DIAGNOSIS — Z71.3 NUTRITIONAL COUNSELING: Primary | ICD-10-CM

## 2024-02-23 DIAGNOSIS — E66.9 OBESITY: ICD-10-CM

## 2024-02-23 PROCEDURE — 97804 MEDICAL NUTRITION GROUP: CPT | Mod: 95

## 2024-02-23 PROCEDURE — 99207 PR NO CHARGE LOS: CPT | Mod: 95

## 2024-02-23 NOTE — PROGRESS NOTES
"Video-Visit Details    Type of service:  Video Visit    Video Start Time:  11:02 am    Video End Time: 12:00 pm    Originating Location (pt. Location): Home    Distant Location (provider location):  Offsite (providers home)     Platform used for Video Visit: Other: Zoom    New Bariatric Nutrition Class Note    Reason For Visit: Nutrition Class     Katelyn Erickson is a 33 year old presenting today for virtual bariatric nutrition class and consultation.  Pt is interested in weight loss surgery.     Pt referred by Steffi Sánchez NP on February 20, 2024.    CO-MORBIDITIES OF OBESITY INCLUDE:        2/15/2024     2:47 PM   --   I have the following health issues associated with obesity None of the above       SUPPORT:      2/15/2024     2:47 PM   Support System Reviewed With Patient   Who do you have in your support network that can be available to help you for the first 2 weeks after surgery?  and MIL   Who can you count on for support throughout your weight loss surgery journey?  and MIL       ANTHROPOMETRICS:  Estimated body mass index is 38.94 kg/m  as calculated from the following:    Height as of this encounter: 1.721 m (5' 7.75\").    Weight as of this encounter: 115.3 kg (254 lb 3.2 oz).        2/15/2024     2:47 PM   --   I have tried the following methods to lose weight Watching portions or calories    Exercise    Weight Watchers           2/15/2024     2:47 PM   Weight Loss Questions Reviewed With Patient   How long have you been overweight? Since early childhood         NUTRITION HISTORY:    Patient attended virtual WLS nutrition class today via Zoom.         2/15/2024     2:47 PM   Recall Diet Questions Reviewed With Patient   Describe what you typically consume for breakfast (typical or most recent) Scrambled eggs english muffine   How many ounces of water, or other low calorie drinks, do you drink daily (8 oz=1 glass)? 64 oz or more   How many ounces of caffeine (coffee, tea, pop) do you drink " [facility]  [address] [facility]  [address] [place] [address]  [phone]     daily (8 oz=1 glass)? 16 oz   How many ounces of carbonated (pop, beer, sparkling water) drinks do you drinky daily (8 oz=1 glass)? 0 oz   How many ounces of juice, pop, sweet tea, sports drinks, protein drinks, other sweetened drinks, do you drink daily (8 oz=1 glass)? 0 oz   How many ounces of milk do you drink daily (8 oz=1 glass) 0 oz   How often do you drink alcohol? Monthly or less   If you do drink alcohol, how many drinks might you have in a day? (one drink = 5 oz. wine, 1 can/bottle of beer, 1 shot liquor) 1 or 2           2/15/2024     2:47 PM   Eating Habits   What foods do you crave? Sweets           2/15/2024     2:47 PM   Dining Out History Reviewed With Patient   How often do you dine out? Rarely.   Where do you dine out? (select all that apply) fast food chains   What types of food do you order when you dine out? Hamburger           2/15/2024     2:47 PM   Physical Activity Reviewed With Patient   How often do you exercise? Daily   What is the duration of your exercise (in minutes)? 30 Minutes   What types of exercise do you do? walking   What keeps you from being more active? I should be more active but I just have not gotten around to it    Lack of Time       EXERCISE:        2/15/2024     2:47 PM   --   How often do you exercise? Daily   What is the duration of your exercise (in minutes)? 30 Minutes   What types of exercise do you do? walking   What keeps you from being more active? I should be more active but I just have not gotten around to it    Lack of Time       NUTRITION DIAGNOSIS:  Obesity r/t long history of positive energy balance aeb BMI >30 kg/m2.    INTERVENTION:  Patient attended [facility] Nutrition Class today.    Intervention Provided/Education Provided on post-op diet guidelines, vitamins/minerals essential post-operatively, GI anatomy of bariatric surgeries, ways to help prepare for post-op diet guidelines pre-operatively, portion/calorie-control, mindful eating and sources of  protein. Provided pt with list of goals and handouts below via [employer]. Presentation slides provided via email.          2/15/2024     2:47 PM   Questions Reviewed With Patient   How ready are you to make changes regarding your weight? Number 1 = Not ready at all to make changes up to 10 = very ready. 7   How confident are you that you can change? 1 = Not confident that you will be successful making changes up to 10 = very confident. 7       Expected Engagement: good    GOALS:  Relating To Eating:  - Eat slowly (20-30 minutes per meal), chewing foods well (25 chews per bite/applesauce consistency)  - Focus on eating smaller portion sizes at meals and snacks    Relating to beverages:  - Reduce caffeine/carbonation/calorie containing beverages  - Separate fluids from meals by 30 minutes before, during, and after eating  - Drink 48-64 ounces of fluid per day. Small, frequent sips between meals.    Relating to activity:  Increase activity as able      Protein Sources for Weight Loss  [email or web address]     Carbohydrates  [email or web address]     Mindful Eating  [email or web address]     Post-op Diet Handouts:  Diet Guidelines after Weight-loss Surgery  [email or web address]     Your Stage 1 Diet: Clear Liquids  [email or web address]     Your Stage 2 Diet: Low-fat Full Liquids  [email or web address]     Your Stage 3 Diet: Pureed Foods  [email or web address]     Pureed Recipes  [email or web address]    Your Stage 4 Diet: Soft Foods  [email or web address]    Your Stage 5 Diet: Regular Foods  [email or web address]    Supplements after Sleeve Gastrectomy, Gastric Bypass or Single Anastomosis Duodenal Switch  [email or web address]    Keeping Track of Fluids  [email or web address]      Follow up: Monthly until surgery recommended, scheduled with Juanis Montenegro 3/18/24    Time spent with patient: 58 minutes.  Randi Goldberg  DEON Keene, PABLO, LD

## 2024-02-23 NOTE — PATIENT INSTRUCTIONS
GOALS:  Relating To Eating:  - Eat slowly (20-30 minutes per meal), chewing foods well (25 chews per bite/applesauce consistency)  - Focus on eating smaller portion sizes at meals and snacks    Relating to beverages:  - Reduce caffeine/carbonation/calorie containing beverages  - Separate fluids from meals by 30 minutes before, during, and after eating  - Drink 48-64 ounces of fluid per day. Small, frequent sips between meals.    Relating to activity:  Increase activity as able      Protein Sources for Weight Loss  [email or web address]     Carbohydrates  [email or web address]     Mindful Eating  [email or web address]     Post-op Diet Handouts:  Diet Guidelines after Weight-loss Surgery  [email or web address]     Your Stage 1 Diet: Clear Liquids  [email or web address]     Your Stage 2 Diet: Low-fat Full Liquids  [email or web address]     Your Stage 3 Diet: Pureed Foods  [email or web address]     Pureed Recipes  [email or web address]    Your Stage 4 Diet: Soft Foods  [email or web address]    Your Stage 5 Diet: Regular Foods  [email or web address]    Supplements after Sleeve Gastrectomy, Gastric Bypass or Single Anastomosis Duodenal Switch  [email or web address]    Keeping Track of Fluids  [email or web address]      Follow up: Monthly until surgery recommended, scheduled with Juanis Montenegro 3/18/24    Randi Goldberg (Duncan), DEON, RD, LD  Clinic #: [phone]

## 2024-02-27 ENCOUNTER — VIRTUAL VISIT (OUTPATIENT)
Dept: ENDOCRINOLOGY | Facility: CLINIC | Age: 34
End: 2024-02-27
Payer: COMMERCIAL

## 2024-02-27 VITALS — BODY MASS INDEX: 38.54 KG/M2 | HEIGHT: 68 IN | WEIGHT: 254.3 LBS

## 2024-02-27 DIAGNOSIS — E66.09 CLASS 2 OBESITY DUE TO EXCESS CALORIES WITHOUT SERIOUS COMORBIDITY WITH BODY MASS INDEX (BMI) OF 39.0 TO 39.9 IN ADULT: Primary | ICD-10-CM

## 2024-02-27 DIAGNOSIS — E66.812 CLASS 2 OBESITY DUE TO EXCESS CALORIES WITHOUT SERIOUS COMORBIDITY WITH BODY MASS INDEX (BMI) OF 39.0 TO 39.9 IN ADULT: Primary | ICD-10-CM

## 2024-02-27 PROCEDURE — 99207 PR NO CHARGE NURSE ONLY: CPT | Mod: 95

## 2024-02-28 ENCOUNTER — OFFICE VISIT (OUTPATIENT)
Dept: OBGYN | Facility: CLINIC | Age: 34
End: 2024-02-28
Payer: COMMERCIAL

## 2024-02-28 VITALS
WEIGHT: 256 LBS | BODY MASS INDEX: 40.18 KG/M2 | SYSTOLIC BLOOD PRESSURE: 126 MMHG | HEIGHT: 67 IN | DIASTOLIC BLOOD PRESSURE: 70 MMHG

## 2024-02-28 DIAGNOSIS — Z71.89 COUNSELING FOR HORMONE REPLACEMENT THERAPY: Primary | ICD-10-CM

## 2024-02-28 PROCEDURE — 99203 OFFICE O/P NEW LOW 30 MIN: CPT | Performed by: OBSTETRICS & GYNECOLOGY

## 2024-02-28 NOTE — PROGRESS NOTES
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  - Pt will consider and if has oophorectomy will let me know and can start on ERT (or combined HRT if endometriosis noted on surgical findings or pathology).      Review of the result(s) of each unique test - 4/12/2022 Pap WNL, HPV Neg  30 minutes spent by me on the date of the encounter doing chart review, review of test results, patient visit, and documentation             No follow-ups on file.    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 Gutierres of [facility] in 2 weeks, is here to establish care in case she changes her mind and opts for oophorectomy to be done as well and needs HRT. She wanted info on HRT.                       Objective    /70 (BP Location: Right arm, Patient Position: Sitting, Cuff Size: Adult Large)   Ht 1.702 m (5' 7\")   Wt 116.1 kg (256 lb)   LMP 02/23/2024   BMI 40.10 kg/m    Body mass index is 40.1 kg/m .  Physical Exam  Constitutional:       General: She is not in acute distress.     Appearance: Normal appearance. She is obese. She is not ill-appearing.   Neurological:      Mental Status: She is alert.                    Signed Electronically by: Tyrell Benavides MD    "

## 2024-02-28 NOTE — NURSING NOTE
"Chief Complaint   Patient presents with    Consult     Discuss hormone replacement therapy        Initial /70 (BP Location: Right arm, Patient Position: Sitting, Cuff Size: Adult Large)   Ht 1.702 m (5' 7\")   Wt 116.1 kg (256 lb)   LMP 2024   BMI 40.10 kg/m   Estimated body mass index is 40.1 kg/m  as calculated from the following:    Height as of this encounter: 1.702 m (5' 7\").    Weight as of this encounter: 116.1 kg (256 lb).  BP completed using cuff size: large    Questioned patient about current smoking habits.  Pt. has never smoked.          The following HM Due: NONE    Atul Mak CMA                "

## 2024-03-01 NOTE — PROGRESS NOTES
New Bariatric Patient Class    Katelyn Erickson attended the New Consult WLS class today.     Patient's current comorbidities include:  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    FH: colon cancer in relative <50 years old    Gestational diabetes mellitus (GDM) affecting pregnancy, antepartum    Jean syndrome    Adenomatous polyp of colon, unspecified part of colon    Class 2 obesity due to excess calories without serious comorbidity with body mass index (BMI) of 39.0 to 39.9 in adult    Anxiety    Moderate episode of recurrent major depressive disorder (H)    Polyp of colon       Current Outpatient Medications   Medication Sig Dispense Refill    buPROPion (WELLBUTRIN XL) 150 MG 24 hr tablet Take 1 tablet (150 mg) by mouth every morning 90 tablet 1    topiramate (TOPAMAX) 25 MG tablet 25mg at bedtime for week 1, 50mg at bedtime for 1 week, and 75mg at bedtime thereafter 90 tablet 1       The following items were reviewed and questions answered.  Goal weight  Labs  Psych clearance  Specialty Clearances  Task list  Preop diet and exercise changes  Postop diet requirements  Postop medication requirements  Postop exercise  Postop lifetime behavior and diet changes    Patient will continue to meet with COOPER, Dietician and Surgeon as required preoperatively.    All questions answered.  Patient instructed to contact the office for any questions and to notify office of any updates/clearances completed.       Bariatric Task List    Fax:  Please fax all paperwork to: [phone] -     Status:  Is patient a candidate for bariatric surgery?:    -     Cleared to schedule surgeon consult?:  cleared to schedule surgeon consult -     Status:    -     Surgeon: WIse -     Tentative surgery month/year:   -        Insurance: Insurance:  bcbs -      Contact insurance to discuss coverage: Needed -       Cigna: PCP  Recommendation and Medical Clearance:    -     HP Referral:    -      Advanced beneficiary notification (ABN) for Medicare patients for RD visits   and surgery:   -      Weight history:   -     Other:    -        Patient Info: Initial Weight:  254 -     Date of Initial Weight/Height:  2/20/2024 -     Goal Weight (lbs):  244 -     Required Weight Loss:  10 -     Surgery Type:  sleeve gastrectomy -     Multidisciplinary Meeting:    -        Dietician Visits: Structured weight loss required by insurance?:    -     Dietician Visit 1:  Needed -     Dietician Visit 2:  Needed -     Dietician Visit 3:  Needed -     Dietician Visit 4:  Needed -     Dietician Visit 5:    -     Dietician Visit 6:  Needed -     Dietician Visit additional:  Needed - monthly until surgery   Clearance from dietician to see surgeon?:    -     Dietician Notes:    -        Psychological Evaluation: Psych eval:  Needed - letter sent to pt   Therapist letter of support:    -     Psychiatrist letter of support:    -     Establish care with therapist:    -     Complete eating disorder evaluation:    -     Letter of clearance from therapist/eating disorder program:    -     Other:    -        Lab Work: Complete Blood Count:  Completed - 2/20/24   Comprehensive Metabolic Panel:  Completed - 2/20/24   Vitamin D:  Completed - 2/20/24   PTH:  Completed - 2/20/24   Hgb A1c:  Completed - 2/20/24    Lipids: Completed - 2/20/24    TSH (DREAARE, [facility], [place] MA): Completed - 2/20/24     Ferritin:   -       Folate:   -       Testosterone, Total and Free:   -     Thiamine:   -     Vitamin A: Completed - 2/20/24   Vitamin B12: Completed - 2/20/24   Zinc:   -     C-peptide:   -     H. pylori:    -     MRSA (2 swabs, minimum 48 hours apart):   -     Nicotine Testing:    -     Recheck Vitamin D:   -     Other:    -        Consults/ Clearance Sleep Medicine:  Needed - letter sent to pt RR   Cardiac:    -     Pain:   -     Dental:    -     Endocrine:    -     Gastroenterology:   "  -     Vascular Medicine:    -     Hematology:    -     Medical Weight Management:   -     Physical Therapy/Exercise:    -     Nephrology:    -     Neurology:    -     Pulmonology:    -     Rheumatology:    -     Other:    -     Other:    -     Other:    -        Testing: UGI:    -     EGD:    -     Sleep Study:   -     Other:   -     Other:    -        PCP: Establish care with PCP:  Completed -     Follow up with PCP:    -     PCP letter of support:  Needed - letter sent to pt RR      Health Maintenance: Colonoscopy(> 50 yrs or family hx):    -     Mammogram (> 40 yrs or family hx):    -     Pap Smear (women):   -     Other:   -     Other:    -        Stopping Smoking/ Alcohol Use/Cannabis Use: Quit tobacco use (3 months smoke free)?:    -     Quit date:    -     Quit alcohol use:   -     Quit date:   -     Other:   -     Quit date:   -        Patient Education:  Information Session:  Needed -     Attended New Consult Class?: Completed - 2/27/24 - AS   Given \"Making your decision\" handout?:  Yes -     Given \"A Roadmap to you Weight Loss Surgery\" handout?: Yes -     Given \"Epic Care Companion\" information?: Yes -     Attended support group?:  Needed -     Support plan in place?:  Needed -     Research consents signed?:    -     Avoid NSAIDS/ Alternate Plan for Pain:   -        Additional Surgery Requirements: Review Coag plan:    -     HgA1c <8:    -     Inpatient pain consult:    -     Final nicotine screen:    -     Dental work complete:    -     Birth control plan:    -     Gallstone prevention plan (Actigall for 6 months postop):   -     Other:   -     Other:   -        Final Tasks:  Before surgery online preop class:  Needed -     After surgery online class:  Needed -     Nurse visit for information:  Needed -     Weight Check: Needed -     History and Physical: Pre-Assessment Clinic (PAC) -   Needed -     Final labs per clinic: Needed -     See MTM Pharmacist for medication review and plan for after surgery " (if DM, transplant hx, greater than 10 meds):   -     Chest xray per clinic:   -     Electrocardiogram (ECG) per clinic:   -     Schedule postop appointments: Needed -     Other:   -   -        Notes:   -

## 2024-03-01 NOTE — PATIENT INSTRUCTIONS
Katelyn Erickson,    Thank you for taking time to attend the New Bariatric class.  Below are items to be mindful of as you work through the process as you wait for your surgery date.    In-Clinic Weight:  If you have not had an official in-clinic weight, please call [phone] to schedule a nurse visit for an in-clinic weight.  If you live a distance from the Clinic and Surgery Center, you can have the weight done with your PCP.  On the day of your weight, weigh at home in the same clothes you will weigh in at the clinic.  That way we can get a better correlation of the scales.    Labs: if you have not had your initial labs done yet, please schedule an appointment with a [facility] lab.  If you need to have your labs faxed to an outside lab, contact our office.    Clearances: work on getting scheduled for your Psych clearance and contact your other providers for other clearances/letters of support that are needed.  As you get your clearances, you are welcome to send a [employer] message and we will keep an eye out for the reports/letters.  Our fax is: [phone].  Electronic copies can always be uploaded into [employer] and sent as an attachment to a [employer] message.    Dietician visits: attend your dietician visits monthly.  Missed appointments can delay your surgery date.  All patients are required to attend monthly dietician visits until their surgery.    Nutrition/Intake Modification:  Decrease portion sizes.  Read labels and portion out food according to the portion on the container.    Take time to read the nutrition information for recipes.  You will be surprised where hidden carbohydrates can be.  Make sure you are getting in at least 60 grams of protein daily - roughly 20 grams per meal.  Decrease/eliminate carbs in the form of chips, crackers, pasta, rice, pancakes, waffles, bagels, and white potatoes.  Replace with healthy vegetables.  Increase water intake to at least 64 ounces each day.  Take at least 30  minutes to eat your meal.  Be mindful of your meal when eating.  Chew your food well.  Savor the flavors and textures and be mindful of your mood.  Switch to caffeine and carbonation free beverages.    Mental Health:  Take a look at your past and current relationship with food.  Work with a therapist/psychiatrist/counselor/psychologist to discuss your mental health as it relates to food and food intake.  Attend a support group.    Activity:  Get active!  It is important to be mobile after surgery.  It helps with weight loss, healthy muscles & bones, and decreases chances of blood clots.  Start slow and have small goals.  If you currently have mobility issues, start with chair-based exercises and work your way up to standing exercises.    8. Support Group Information  Please click the following link for Support Group Information and Times: [email or web address]    9.  Your Task List:    Bariatric Task List    Fax:  Please fax all paperwork to: [phone] -     Status:  Is patient a candidate for bariatric surgery?:    -     Cleared to schedule surgeon consult?:  cleared to schedule surgeon consult -     Status:    -     Surgeon: WIse -     Tentative surgery month/year:   -        Insurance: Insurance:  [facility] -      Contact insurance to discuss coverage: Needed -       Cigna: PCP Recommendation and Medical Clearance:    -     HP Referral:    -      Advanced beneficiary notification (ABN) for Medicare patients for RD visits   and surgery:   -      Weight history:   -     Other:    -        Patient Info: Initial Weight:  254 -     Date of Initial Weight/Height:  2/20/2024 -     Goal Weight (lbs):  244 -     Required Weight Loss:  10 -     Surgery Type:  sleeve gastrectomy -     Multidisciplinary Meeting:    -        Dietician Visits: Structured weight loss required by insurance?:    -     Dietician Visit 1:  Needed -     Dietician Visit 2:  Needed -     Dietician  "Visit 3:  Needed -     Dietician Visit 4:  Needed -     Dietician Visit 5:   Needed -     Dietician Visit 6:  Needed -     Dietician Visit additional:  Needed - monthly until surgery   Clearance from dietician to see surgeon?:    -     Dietician Notes:    -        Psychological Evaluation: Psych eval:  Needed - letter sent to pt   Therapist letter of support:    -     Psychiatrist letter of support:    -     Establish care with therapist:    -     Complete eating disorder evaluation:    -     Letter of clearance from therapist/eating disorder program:    -     Other:    -        Lab Work: Complete Blood Count:  Completed - 2/20/24   Comprehensive Metabolic Panel:  Completed - 2/20/24   Vitamin D:  Completed - 2/20/24   PTH:  Completed - 2/20/24   Hgb A1c:  Completed - 2/20/24    Lipids: Completed - 2/20/24    TSH (JUSTINA, [facility], [place] MA): Completed - 2/20/24     Ferritin:   -  Completed - 2/20/24     Folate:   -       Testosterone, Total and Free:   -     Thiamine:   -     Vitamin A: Completed - 2/20/24   Vitamin B12: Completed - 2/20/24   Other:    -        Consults/ Clearance Sleep Medicine:  Needed - letter sent to pt RR   Other:    -        PCP: Establish care with PCP:  Completed -     Follow up with PCP:    -     PCP letter of support:  Needed - letter sent to pt RR      Health Maintenance: Colonoscopy(> 50 yrs or family hx):    -     Mammogram (> 40 yrs or family hx):    -     Pap Smear (women):   -     Other:   -     Other:    -        Patient Education:  Information Session:  Needed -     Attended New Consult Class?: Completed - 2/27/24 - AS   Given \"Making your decision\" handout?:  Yes -     Given \"A Roadmap to you Weight Loss Surgery\" handout?: Yes -     Given \"Epic Care Companion\" information?: Yes -     Attended support group?:  Needed -     Support plan in place?:  Needed -     Research consents signed?:    -     Avoid NSAIDS/ Alternate Plan for Pain:   -        Additional Surgery Requirements: Review Coag " plan:    -     HgA1c <8:    -     Inpatient pain consult:    -     Final nicotine screen:    -     Dental work complete:    -     Birth control plan:    -     Gallstone prevention plan (Actigall for 6 months postop):   -     Other:   -     Other:   -        Final Tasks:  Before surgery online preop class:  Needed -     After surgery online class:  Needed -     Nurse visit for information:  Needed -     Weight Check: Needed -     History and Physical: Pre-Assessment Clinic (PAC) -   Needed -     Final labs per clinic: Needed -     See MTM Pharmacist for medication review and plan for after surgery (if DM, transplant hx, greater than 10 meds):   -     Chest xray per clinic:   -     Electrocardiogram (ECG) per clinic:   -     Schedule postop appointments: Needed -     Other:   -   -        Notes:   -             Please feel free to reach out if you have any questions.    Sincerely,    Huyen Hodgson RN, BSN  RN Care Coordinator  Bariatric Surgery and Comprehensive Weight Management  Phone: [phone]  Fax: [phone]  [phone]

## 2024-03-06 ENCOUNTER — ALLIED HEALTH/NURSE VISIT (OUTPATIENT)
Dept: ENDOCRINOLOGY | Facility: CLINIC | Age: 34
End: 2024-03-06
Payer: COMMERCIAL

## 2024-03-06 ENCOUNTER — TRANSFERRED RECORDS (OUTPATIENT)
Dept: HEALTH INFORMATION MANAGEMENT | Facility: CLINIC | Age: 34
End: 2024-03-06

## 2024-03-06 ENCOUNTER — TELEPHONE (OUTPATIENT)
Dept: ENDOCRINOLOGY | Facility: CLINIC | Age: 34
End: 2024-03-06

## 2024-03-06 ENCOUNTER — PATIENT OUTREACH (OUTPATIENT)
Dept: ONCOLOGY | Facility: CLINIC | Age: 34
End: 2024-03-06

## 2024-03-06 DIAGNOSIS — K76.0 NAFLD (NONALCOHOLIC FATTY LIVER DISEASE): Primary | ICD-10-CM

## 2024-03-06 DIAGNOSIS — E66.812 CLASS 2 OBESITY DUE TO EXCESS CALORIES WITHOUT SERIOUS COMORBIDITY WITH BODY MASS INDEX (BMI) OF 39.0 TO 39.9 IN ADULT: ICD-10-CM

## 2024-03-06 DIAGNOSIS — E66.09 CLASS 2 OBESITY DUE TO EXCESS CALORIES WITHOUT SERIOUS COMORBIDITY WITH BODY MASS INDEX (BMI) OF 39.0 TO 39.9 IN ADULT: ICD-10-CM

## 2024-03-06 PROCEDURE — 91200 LIVER ELASTOGRAPHY: CPT | Mod: 26

## 2024-03-06 NOTE — PROGRESS NOTES
RN spoke with the bariatric team/RN    Per their recommendation:   Stop as planned and review this morning   Restart the day after surgery and taper up like when she originally started the medication     Patient updated     Bessy Contreras RN

## 2024-03-06 NOTE — PROGRESS NOTES
EXAMINATION:    Liver FibroScan    DATE OF EXAM:  3/6/24    INDICATION:    Liver fibrosis assessment      A series of at least 10 Vibration Controlled Transient Elastography (VCTETM) measurements was performed by  placing the probe XL (M, XL) over the center of the liver parenchyma and mechanically inducing a 50 Hertz  shear wave.    Each resulting VCTETM measurement was analyzed to determine shear wave propagation speed and  calculate the equivalent liver stiffness.    All measurements were reviewed by the  and physician fortechnical accuracy. Data variability across the acquired measurements was quantified with IQR/Median Percentage.    FINDINGS:    The median liver stiffness was 4.2 kPa with IQR/Median percentage of 25 %.    The measure CAP ultrasound attenuation rate value was 355 dB/m.

## 2024-03-06 NOTE — TELEPHONE ENCOUNTER
Reason for Call:  Other prescription    Detailed comments: Becca smith/[facility] calling for the pt regarding her being off medication prior to surgery, tapering & restarting. Please advise.    Phone Number Patient can be reached at: Other phone number:  Becca [phone]    Best Time: any    Can we leave a detailed message on this number? YES    Call taken on 3/6/2024 at 11:01 AM by Bethany Armendariz

## 2024-03-06 NOTE — TELEPHONE ENCOUNTER
Patient reached out with more questions about medication taper versus not and when to restart and if she will have to rebuild after surgery.     Patient would like the two teams to talk    Patient would like to know   ~If stops medication tomorrow night instead of 24 hours prior to surgery does this change how she tapers back up or if she only has to stop 24 hours prior and can start back up can she just re-start at regular dose     ~ Is it best to just stop tomorrow night    ~ When should she start the medication after surgery and will she have to taper back up and if so how     RN reached out to weight clinic RN but had to leave a message and call back ene Contreras RN

## 2024-03-06 NOTE — TELEPHONE ENCOUNTER
Called and spoke with Becca in regards to patient's Topiramate. Patient was told to hold Topiramate 24 hours prior to her procedure, per anesthesia team. Patient messaged in saying she needed to stop 7 days before her procedure, which she is now only 5 days away and still taking. She was given instructions to decrease down from 75mg to 50mg tonight, 25mg tomorrow night and then stop. She can resume the medication (taper back up to 75mg daily dose over 2-3 weeks) the day after her procedure. Becca will relay to patient and her team. No further questions.

## 2024-03-07 PROBLEM — K76.0 NAFLD (NONALCOHOLIC FATTY LIVER DISEASE): Status: ACTIVE | Noted: 2024-03-07

## 2024-03-08 ENCOUNTER — ANESTHESIA EVENT (OUTPATIENT)
Dept: SURGERY | Facility: CLINIC | Age: 34
End: 2024-03-08
Payer: COMMERCIAL

## 2024-03-08 NOTE — ANESTHESIA PREPROCEDURE EVALUATION
Anesthesia Pre-Procedure Evaluation    Patient: Katelyn Erickson   MRN: [id number] : 1990        Procedure : Procedure(s):  TOTAL LAPAROSCOPIC HYSTERECTOMY, WITH BILATERAL SALPINGECTOMY          Past Medical History:   Diagnosis Date    MLH1-related Jean syndrome (HNPCC2) 2024    NO ACTIVE PROBLEMS       Past Surgical History:   Procedure Laterality Date    COLONOSCOPY  2012    Procedure:COLONOSCOPY; COLONOSCOPY; Surgeon:JAKOB PETERS; Location:[facility]    COLONOSCOPY      COLONOSCOPY N/A 2021    Procedure: COLONOSCOPY;  Surgeon: Joseph Flores MD;  Location: [facility]    ESOPHAGOSCOPY, GASTROSCOPY, DUODENOSCOPY (EGD), COMBINED N/A 2021    Procedure: ESOPHAGOGASTRODUODENOSCOPY (EGD);  Surgeon: Joseph Flores MD;  Location: [facility]    NO HISTORY OF SURGERY        No Known Allergies   Social History     Tobacco Use    Smoking status: Former     Types: Cigarettes     Passive exposure: Past    Smokeless tobacco: Never    Tobacco comments:     half a pack a day   Substance Use Topics    Alcohol use: Yes     Comment: rarely      Wt Readings from Last 1 Encounters:   24 116.1 kg (256 lb)        Anesthesia Evaluation   Pt has had prior anesthetic. Type: MAC.    No history of anesthetic complications       ROS/MED HX  ENT/Pulmonary:     (+)     JOLIE risk factors,   obese,                             (-) tobacco use, asthma, COPD, sleep apnea and recent URI   Neurologic:     (+)    no peripheral neuropathy                         (-) no seizures, no CVA, no TIA and migraines   Cardiovascular:    (-) hypertension, FERRARO, orthopnea/PND, syncope and irregular heartbeat/palpitations   METS/Exercise Tolerance:     Hematologic:       Musculoskeletal:       GI/Hepatic: Comment:   Jean syndrome    (+)             liver disease (NAFLD),    (-) GERD   Renal/Genitourinary:       Endo:     (+)               Obesity (BMI 40),    (-) Type II DM and thyroid disease   Psychiatric/Substance Use:      "  Infectious Disease:       Malignancy:       Other:            Physical Exam    Airway        Mallampati: III   TM distance: > 3 FB   Neck ROM: full   Mouth opening: > 3 cm    Respiratory Devices and Support         Dental       (+) Minor Abnormalities - some fillings, tiny chips      Cardiovascular          Rhythm and rate: regular and normal     Pulmonary           breath sounds clear to auscultation           OUTSIDE LABS:  CBC:   Lab Results   Component Value Date    WBC 7.4 02/20/2024    WBC 6.7 10/11/2023    HGB 13.2 02/20/2024    HGB 13.5 10/11/2023    HCT 40.1 02/20/2024    HCT 40.3 10/11/2023     02/20/2024     10/11/2023     BMP:   Lab Results   Component Value Date     02/20/2024    POTASSIUM 4.0 02/20/2024    CHLORIDE 105 02/20/2024    CO2 22 02/20/2024    BUN 9.8 02/20/2024    CR 0.73 02/20/2024    GLC 94 02/20/2024    GLC 96 04/12/2022     COAGS: No results found for: \"PTT\", \"INR\", \"FIBR\"  POC:   Lab Results   Component Value Date    HCG Positive (A) 09/06/2012    HCGS  09/07/2010     Negative   This test provides a presumptive diagnosis of pregnancy or non-pregnancy. A   confirmed pregnancy diagnosis should only be made by a physician after all   clinical and laboratory findings have been evaluated.     HEPATIC:   Lab Results   Component Value Date    ALBUMIN 4.4 02/20/2024    PROTTOTAL 7.4 02/20/2024    ALT 26 02/20/2024    AST 20 02/20/2024    ALKPHOS 63 02/20/2024    BILITOTAL 0.4 02/20/2024     OTHER:   Lab Results   Component Value Date    A1C 5.4 02/20/2024    NICOLLE 9.8 02/20/2024    TSH 1.15 04/12/2022    T4 0.94 04/12/2022       Anesthesia Plan    ASA Status:  3       Anesthesia Type: General.     - Airway: ETT   Induction: Intravenous.      Techniques and Equipment:     - Lines/Monitors: 2nd IV     Consents    Anesthesia Plan(s) and associated risks, benefits, and realistic alternatives discussed. Questions answered and patient/representative(s) expressed understanding.     " "- Discussed: Risks, Benefits and Alternatives for BOTH SEDATION and the PROCEDURE were discussed     - Discussed with:  Patient      - Extended Intubation/Ventilatory Support Discussed: No.      - Patient is DNR/DNI Status: No     Use of blood products discussed: Yes.     - Discussed with: Patient.     - Consented: consented to blood products     Postoperative Care    Pain management: Multi-modal analgesia.   PONV prophylaxis: Ondansetron (or other 5HT-3), Dexamethasone or Solumedrol     Comments:               Keena Low MD    I have reviewed the pertinent notes and labs in the chart from the past 30 days and (re)examined the patient.  Any updates or changes from those notes are reflected in this note.              # Severe Obesity: Estimated body mass index is 40.1 kg/m  as calculated from the following:    Height as of 2/28/24: 1.702 m (5' 7\").    Weight as of 2/28/24: 116.1 kg (256 lb).      "

## 2024-03-11 ENCOUNTER — HOSPITAL ENCOUNTER (OUTPATIENT)
Facility: CLINIC | Age: 34
Discharge: HOME | End: 2024-03-11
Attending: OBSTETRICS & GYNECOLOGY | Admitting: OBSTETRICS & GYNECOLOGY
Payer: COMMERCIAL

## 2024-03-11 ENCOUNTER — ANESTHESIA (OUTPATIENT)
Dept: SURGERY | Facility: CLINIC | Age: 34
End: 2024-03-11
Payer: COMMERCIAL

## 2024-03-11 VITALS
RESPIRATION RATE: 16 BRPM | WEIGHT: 254.19 LBS | HEIGHT: 67 IN | DIASTOLIC BLOOD PRESSURE: 68 MMHG | HEART RATE: 82 BPM | OXYGEN SATURATION: 100 % | SYSTOLIC BLOOD PRESSURE: 130 MMHG | TEMPERATURE: 97.6 F | BODY MASS INDEX: 39.9 KG/M2

## 2024-03-11 DIAGNOSIS — G89.18 POSTOPERATIVE PAIN: ICD-10-CM

## 2024-03-11 DIAGNOSIS — Z15.09 MLH1-RELATED LYNCH SYNDROME (HNPCC2): Primary | ICD-10-CM

## 2024-03-11 LAB
ABO/RH(D): NORMAL
ANTIBODY SCREEN: NEGATIVE
ERYTHROCYTE [DISTWIDTH] IN BLOOD BY AUTOMATED COUNT: 12.8 % (ref 10–15)
HCG UR QL: NEGATIVE
HCT VFR BLD AUTO: 38.7 % (ref 35–47)
HGB BLD-MCNC: 13.2 G/DL (ref 11.7–15.7)
HOLD SPECIMEN: NORMAL
MCH RBC QN AUTO: 30.6 PG (ref 26.5–33)
MCHC RBC AUTO-ENTMCNC: 34.1 G/DL (ref 31.5–36.5)
MCV RBC AUTO: 90 FL (ref 78–100)
PLATELET # BLD AUTO: 379 10E3/UL (ref 150–450)
RBC # BLD AUTO: 4.32 10E6/UL (ref 3.8–5.2)
SPECIMEN EXPIRATION DATE: NORMAL
WBC # BLD AUTO: 7.5 10E3/UL (ref 4–11)

## 2024-03-11 PROCEDURE — 58571 TLH W/T/O 250 G OR LESS: CPT | Performed by: REGISTERED NURSE

## 2024-03-11 PROCEDURE — 88307 TISSUE EXAM BY PATHOLOGIST: CPT | Mod: 26 | Performed by: PATHOLOGY

## 2024-03-11 PROCEDURE — 250N000025 HC SEVOFLURANE, PER MIN: Performed by: OBSTETRICS & GYNECOLOGY

## 2024-03-11 PROCEDURE — 258N000003 HC RX IP 258 OP 636: Performed by: REGISTERED NURSE

## 2024-03-11 PROCEDURE — 250N000013 HC RX MED GY IP 250 OP 250 PS 637: Performed by: STUDENT IN AN ORGANIZED HEALTH CARE EDUCATION/TRAINING PROGRAM

## 2024-03-11 PROCEDURE — 81025 URINE PREGNANCY TEST: CPT | Performed by: OBSTETRICS & GYNECOLOGY

## 2024-03-11 PROCEDURE — 250N000011 HC RX IP 250 OP 636: Performed by: OBSTETRICS & GYNECOLOGY

## 2024-03-11 PROCEDURE — 86900 BLOOD TYPING SEROLOGIC ABO: CPT | Performed by: OBSTETRICS & GYNECOLOGY

## 2024-03-11 PROCEDURE — 250N000011 HC RX IP 250 OP 636: Mod: JZ | Performed by: OBSTETRICS & GYNECOLOGY

## 2024-03-11 PROCEDURE — 58571 TLH W/T/O 250 G OR LESS: CPT | Performed by: ANESTHESIOLOGY

## 2024-03-11 PROCEDURE — 250N000011 HC RX IP 250 OP 636: Performed by: REGISTERED NURSE

## 2024-03-11 PROCEDURE — 710N000012 HC RECOVERY PHASE 2, PER MINUTE: Performed by: OBSTETRICS & GYNECOLOGY

## 2024-03-11 PROCEDURE — 88112 CYTOPATH CELL ENHANCE TECH: CPT | Mod: TC | Performed by: OBSTETRICS & GYNECOLOGY

## 2024-03-11 PROCEDURE — 999N000141 HC STATISTIC PRE-PROCEDURE NURSING ASSESSMENT: Performed by: OBSTETRICS & GYNECOLOGY

## 2024-03-11 PROCEDURE — 85027 COMPLETE CBC AUTOMATED: CPT | Performed by: OBSTETRICS & GYNECOLOGY

## 2024-03-11 PROCEDURE — 36415 COLL VENOUS BLD VENIPUNCTURE: CPT | Performed by: OBSTETRICS & GYNECOLOGY

## 2024-03-11 PROCEDURE — 710N000009 HC RECOVERY PHASE 1, LEVEL 1, PER MIN: Performed by: OBSTETRICS & GYNECOLOGY

## 2024-03-11 PROCEDURE — 88112 CYTOPATH CELL ENHANCE TECH: CPT | Mod: 26 | Performed by: PATHOLOGY

## 2024-03-11 PROCEDURE — 250N000009 HC RX 250: Performed by: REGISTERED NURSE

## 2024-03-11 PROCEDURE — 258N000003 HC RX IP 258 OP 636: Performed by: STUDENT IN AN ORGANIZED HEALTH CARE EDUCATION/TRAINING PROGRAM

## 2024-03-11 PROCEDURE — 88302 TISSUE EXAM BY PATHOLOGIST: CPT | Mod: 26 | Performed by: PATHOLOGY

## 2024-03-11 PROCEDURE — 272N000001 HC OR GENERAL SUPPLY STERILE: Performed by: OBSTETRICS & GYNECOLOGY

## 2024-03-11 PROCEDURE — 88302 TISSUE EXAM BY PATHOLOGIST: CPT | Mod: TC,XS | Performed by: OBSTETRICS & GYNECOLOGY

## 2024-03-11 PROCEDURE — 250N000013 HC RX MED GY IP 250 OP 250 PS 637: Performed by: OBSTETRICS & GYNECOLOGY

## 2024-03-11 PROCEDURE — 370N000017 HC ANESTHESIA TECHNICAL FEE, PER MIN: Performed by: OBSTETRICS & GYNECOLOGY

## 2024-03-11 PROCEDURE — 360N000077 HC SURGERY LEVEL 4, PER MIN: Performed by: OBSTETRICS & GYNECOLOGY

## 2024-03-11 PROCEDURE — 250N000011 HC RX IP 250 OP 636: Performed by: STUDENT IN AN ORGANIZED HEALTH CARE EDUCATION/TRAINING PROGRAM

## 2024-03-11 RX ORDER — ONDANSETRON 4 MG/1
4 TABLET, ORALLY DISINTEGRATING ORAL EVERY 30 MIN PRN
Status: DISCONTINUED | OUTPATIENT
Start: 2024-03-11 | End: 2024-03-11 | Stop reason: HOSPADM

## 2024-03-11 RX ORDER — PROPOFOL 10 MG/ML
INJECTION, EMULSION INTRAVENOUS PRN
Status: DISCONTINUED | OUTPATIENT
Start: 2024-03-11 | End: 2024-03-11

## 2024-03-11 RX ORDER — OXYCODONE HYDROCHLORIDE 5 MG/1
5-10 TABLET ORAL EVERY 4 HOURS PRN
Qty: 6 TABLET | Refills: 0 | Status: SHIPPED | OUTPATIENT
Start: 2024-03-11 | End: 2024-05-03

## 2024-03-11 RX ORDER — FENTANYL CITRATE 50 UG/ML
INJECTION, SOLUTION INTRAMUSCULAR; INTRAVENOUS PRN
Status: DISCONTINUED | OUTPATIENT
Start: 2024-03-11 | End: 2024-03-11

## 2024-03-11 RX ORDER — HYDROXYZINE HYDROCHLORIDE 10 MG/1
10 TABLET, FILM COATED ORAL
Status: DISCONTINUED | OUTPATIENT
Start: 2024-03-11 | End: 2024-03-11 | Stop reason: HOSPADM

## 2024-03-11 RX ORDER — SODIUM CHLORIDE, SODIUM LACTATE, POTASSIUM CHLORIDE, CALCIUM CHLORIDE 600; 310; 30; 20 MG/100ML; MG/100ML; MG/100ML; MG/100ML
INJECTION, SOLUTION INTRAVENOUS CONTINUOUS PRN
Status: DISCONTINUED | OUTPATIENT
Start: 2024-03-11 | End: 2024-03-11

## 2024-03-11 RX ORDER — OXYCODONE HYDROCHLORIDE 10 MG/1
10 TABLET ORAL
Status: DISCONTINUED | OUTPATIENT
Start: 2024-03-11 | End: 2024-03-11 | Stop reason: HOSPADM

## 2024-03-11 RX ORDER — METRONIDAZOLE 500 MG/100ML
500 INJECTION, SOLUTION INTRAVENOUS
Status: COMPLETED | OUTPATIENT
Start: 2024-03-11 | End: 2024-03-11

## 2024-03-11 RX ORDER — CEFAZOLIN SODIUM/WATER 2 G/20 ML
2 SYRINGE (ML) INTRAVENOUS SEE ADMIN INSTRUCTIONS
Status: DISCONTINUED | OUTPATIENT
Start: 2024-03-11 | End: 2024-03-11 | Stop reason: HOSPADM

## 2024-03-11 RX ORDER — IBUPROFEN 600 MG/1
600 TABLET, FILM COATED ORAL EVERY 6 HOURS PRN
Qty: 12 TABLET | Refills: 0 | Status: SHIPPED | OUTPATIENT
Start: 2024-03-11 | End: 2024-05-03

## 2024-03-11 RX ORDER — ONDANSETRON 2 MG/ML
4 INJECTION INTRAMUSCULAR; INTRAVENOUS EVERY 30 MIN PRN
Status: DISCONTINUED | OUTPATIENT
Start: 2024-03-11 | End: 2024-03-11 | Stop reason: HOSPADM

## 2024-03-11 RX ORDER — BUPIVACAINE HYDROCHLORIDE 5 MG/ML
INJECTION, SOLUTION EPIDURAL; INTRACAUDAL PRN
Status: DISCONTINUED | OUTPATIENT
Start: 2024-03-11 | End: 2024-03-11 | Stop reason: HOSPADM

## 2024-03-11 RX ORDER — SODIUM CHLORIDE, SODIUM LACTATE, POTASSIUM CHLORIDE, CALCIUM CHLORIDE 600; 310; 30; 20 MG/100ML; MG/100ML; MG/100ML; MG/100ML
INJECTION, SOLUTION INTRAVENOUS CONTINUOUS
Status: DISCONTINUED | OUTPATIENT
Start: 2024-03-11 | End: 2024-03-11 | Stop reason: HOSPADM

## 2024-03-11 RX ORDER — FENTANYL CITRATE 50 UG/ML
50 INJECTION, SOLUTION INTRAMUSCULAR; INTRAVENOUS EVERY 5 MIN PRN
Status: DISCONTINUED | OUTPATIENT
Start: 2024-03-11 | End: 2024-03-11 | Stop reason: HOSPADM

## 2024-03-11 RX ORDER — CEFAZOLIN SODIUM/WATER 2 G/20 ML
2 SYRINGE (ML) INTRAVENOUS
Status: COMPLETED | OUTPATIENT
Start: 2024-03-11 | End: 2024-03-11

## 2024-03-11 RX ORDER — AMOXICILLIN 250 MG
1-2 CAPSULE ORAL 2 TIMES DAILY
Qty: 30 TABLET | Refills: 0 | Status: SHIPPED | OUTPATIENT
Start: 2024-03-11

## 2024-03-11 RX ORDER — ACETAMINOPHEN 325 MG/1
650 TABLET ORAL EVERY 6 HOURS PRN
Qty: 60 TABLET | Refills: 0 | Status: SHIPPED | OUTPATIENT
Start: 2024-03-11 | End: 2024-05-03

## 2024-03-11 RX ORDER — HEPARIN SODIUM 5000 [USP'U]/.5ML
5000 INJECTION, SOLUTION INTRAVENOUS; SUBCUTANEOUS
Status: COMPLETED | OUTPATIENT
Start: 2024-03-11 | End: 2024-03-11

## 2024-03-11 RX ORDER — ONDANSETRON 2 MG/ML
INJECTION INTRAMUSCULAR; INTRAVENOUS PRN
Status: DISCONTINUED | OUTPATIENT
Start: 2024-03-11 | End: 2024-03-11

## 2024-03-11 RX ORDER — NALOXONE HYDROCHLORIDE 0.4 MG/ML
0.1 INJECTION, SOLUTION INTRAMUSCULAR; INTRAVENOUS; SUBCUTANEOUS
Status: DISCONTINUED | OUTPATIENT
Start: 2024-03-11 | End: 2024-03-11 | Stop reason: HOSPADM

## 2024-03-11 RX ORDER — LIDOCAINE HYDROCHLORIDE 20 MG/ML
INJECTION, SOLUTION INFILTRATION; PERINEURAL PRN
Status: DISCONTINUED | OUTPATIENT
Start: 2024-03-11 | End: 2024-03-11

## 2024-03-11 RX ORDER — OXYCODONE HYDROCHLORIDE 5 MG/1
5 TABLET ORAL
Status: COMPLETED | OUTPATIENT
Start: 2024-03-11 | End: 2024-03-11

## 2024-03-11 RX ORDER — LIDOCAINE 40 MG/G
CREAM TOPICAL
Status: DISCONTINUED | OUTPATIENT
Start: 2024-03-11 | End: 2024-03-11 | Stop reason: HOSPADM

## 2024-03-11 RX ORDER — PHENAZOPYRIDINE HYDROCHLORIDE 200 MG/1
200 TABLET, FILM COATED ORAL ONCE
Status: COMPLETED | OUTPATIENT
Start: 2024-03-11 | End: 2024-03-11

## 2024-03-11 RX ORDER — FENTANYL CITRATE 50 UG/ML
25 INJECTION, SOLUTION INTRAMUSCULAR; INTRAVENOUS EVERY 5 MIN PRN
Status: DISCONTINUED | OUTPATIENT
Start: 2024-03-11 | End: 2024-03-11 | Stop reason: HOSPADM

## 2024-03-11 RX ORDER — HYDROMORPHONE HCL IN WATER/PF 6 MG/30 ML
0.2 PATIENT CONTROLLED ANALGESIA SYRINGE INTRAVENOUS EVERY 5 MIN PRN
Status: DISCONTINUED | OUTPATIENT
Start: 2024-03-11 | End: 2024-03-11 | Stop reason: HOSPADM

## 2024-03-11 RX ORDER — HYDROMORPHONE HCL IN WATER/PF 6 MG/30 ML
0.4 PATIENT CONTROLLED ANALGESIA SYRINGE INTRAVENOUS EVERY 5 MIN PRN
Status: DISCONTINUED | OUTPATIENT
Start: 2024-03-11 | End: 2024-03-11 | Stop reason: HOSPADM

## 2024-03-11 RX ORDER — ACETAMINOPHEN 325 MG/1
975 TABLET ORAL ONCE
Status: COMPLETED | OUTPATIENT
Start: 2024-03-11 | End: 2024-03-11

## 2024-03-11 RX ORDER — DEXAMETHASONE SODIUM PHOSPHATE 4 MG/ML
INJECTION, SOLUTION INTRA-ARTICULAR; INTRALESIONAL; INTRAMUSCULAR; INTRAVENOUS; SOFT TISSUE PRN
Status: DISCONTINUED | OUTPATIENT
Start: 2024-03-11 | End: 2024-03-11

## 2024-03-11 RX ADMIN — ONDANSETRON 4 MG: 2 INJECTION INTRAMUSCULAR; INTRAVENOUS at 07:34

## 2024-03-11 RX ADMIN — Medication 50 MG: at 07:35

## 2024-03-11 RX ADMIN — PHENYLEPHRINE HYDROCHLORIDE 100 MCG: 10 INJECTION INTRAVENOUS at 08:56

## 2024-03-11 RX ADMIN — DEXAMETHASONE SODIUM PHOSPHATE 8 MG: 4 INJECTION, SOLUTION INTRA-ARTICULAR; INTRALESIONAL; INTRAMUSCULAR; INTRAVENOUS; SOFT TISSUE at 07:34

## 2024-03-11 RX ADMIN — HEPARIN SODIUM 5000 UNITS: 5000 INJECTION, SOLUTION INTRAVENOUS; SUBCUTANEOUS at 06:32

## 2024-03-11 RX ADMIN — FENTANYL CITRATE 50 MCG: 50 INJECTION, SOLUTION INTRAMUSCULAR; INTRAVENOUS at 09:55

## 2024-03-11 RX ADMIN — Medication 20 MG: at 09:09

## 2024-03-11 RX ADMIN — SODIUM CHLORIDE, POTASSIUM CHLORIDE, SODIUM LACTATE AND CALCIUM CHLORIDE: 600; 310; 30; 20 INJECTION, SOLUTION INTRAVENOUS at 10:39

## 2024-03-11 RX ADMIN — PHENYLEPHRINE HYDROCHLORIDE 100 MCG: 10 INJECTION INTRAVENOUS at 07:58

## 2024-03-11 RX ADMIN — FENTANYL CITRATE 100 MCG: 50 INJECTION INTRAMUSCULAR; INTRAVENOUS at 07:34

## 2024-03-11 RX ADMIN — LIDOCAINE HYDROCHLORIDE 100 MG: 20 INJECTION, SOLUTION INFILTRATION; PERINEURAL at 07:34

## 2024-03-11 RX ADMIN — Medication 30 MG: at 07:57

## 2024-03-11 RX ADMIN — FENTANYL CITRATE 25 MCG: 50 INJECTION, SOLUTION INTRAMUSCULAR; INTRAVENOUS at 10:04

## 2024-03-11 RX ADMIN — METRONIDAZOLE 500 MG: 500 INJECTION, SOLUTION INTRAVENOUS at 06:42

## 2024-03-11 RX ADMIN — OXYCODONE HYDROCHLORIDE 5 MG: 5 TABLET ORAL at 10:47

## 2024-03-11 RX ADMIN — PROPOFOL 200 MG: 10 INJECTION, EMULSION INTRAVENOUS at 07:34

## 2024-03-11 RX ADMIN — PHENYLEPHRINE HYDROCHLORIDE 100 MCG: 10 INJECTION INTRAVENOUS at 08:13

## 2024-03-11 RX ADMIN — MIDAZOLAM 2 MG: 1 INJECTION INTRAMUSCULAR; INTRAVENOUS at 07:25

## 2024-03-11 RX ADMIN — FENTANYL CITRATE 25 MCG: 50 INJECTION, SOLUTION INTRAMUSCULAR; INTRAVENOUS at 10:20

## 2024-03-11 RX ADMIN — SODIUM CHLORIDE, POTASSIUM CHLORIDE, SODIUM LACTATE AND CALCIUM CHLORIDE: 600; 310; 30; 20 INJECTION, SOLUTION INTRAVENOUS at 07:28

## 2024-03-11 RX ADMIN — HYDROMORPHONE HYDROCHLORIDE 0.5 MG: 1 INJECTION, SOLUTION INTRAMUSCULAR; INTRAVENOUS; SUBCUTANEOUS at 08:48

## 2024-03-11 RX ADMIN — SODIUM CHLORIDE, POTASSIUM CHLORIDE, SODIUM LACTATE AND CALCIUM CHLORIDE: 600; 310; 30; 20 INJECTION, SOLUTION INTRAVENOUS at 08:52

## 2024-03-11 RX ADMIN — PHENAZOPYRIDINE 200 MG: 200 TABLET ORAL at 06:11

## 2024-03-11 RX ADMIN — Medication 20 MG: at 08:45

## 2024-03-11 RX ADMIN — Medication 20 MG: at 08:22

## 2024-03-11 RX ADMIN — SUGAMMADEX 200 MG: 100 INJECTION, SOLUTION INTRAVENOUS at 09:29

## 2024-03-11 RX ADMIN — PHENYLEPHRINE HYDROCHLORIDE 100 MCG: 10 INJECTION INTRAVENOUS at 08:17

## 2024-03-11 RX ADMIN — Medication 2 G: at 07:39

## 2024-03-11 RX ADMIN — ACETAMINOPHEN 975 MG: 325 TABLET, FILM COATED ORAL at 10:47

## 2024-03-11 ASSESSMENT — COPD QUESTIONNAIRES: COPD: 0

## 2024-03-11 ASSESSMENT — ACTIVITIES OF DAILY LIVING (ADL)
ADLS_ACUITY_SCORE: 18
ADLS_ACUITY_SCORE: 16
ADLS_ACUITY_SCORE: 18

## 2024-03-11 ASSESSMENT — ENCOUNTER SYMPTOMS
ORTHOPNEA: 0
SEIZURES: 0

## 2024-03-11 ASSESSMENT — LIFESTYLE VARIABLES: TOBACCO_USE: 0

## 2024-03-11 NOTE — ANESTHESIA PROCEDURE NOTES
Airway       Patient location during procedure: OR       Procedure Start/Stop Times: 3/11/2024 7:38 AM  Staff -        CRNA: Phoenix Thompson APRN CRNA       Performed By: CRNA  Consent for Airway        Urgency: elective  Indications and Patient Condition       Indications for airway management: misty-procedural       Induction type:intravenous       Mask difficulty assessment: 1 - vent by mask    Final Airway Details       Final airway type: endotracheal airway       Successful airway: ETT - single and Oral  Endotracheal Airway Details        ETT size (mm): 7.0       Successful intubation technique: direct laryngoscopy       DL Blade Type: MAC 3       Grade View of Cords: 1       Adjucts: stylet       Position: Right       Measured from: lips       Secured at (cm): 22       Bite block used: None    Post intubation assessment        Placement verified by: capnometry and equal breath sounds        Number of attempts at approach: 1       Number of other approaches attempted: 0       Secured with: tape       Ease of procedure: easy       Dentition: Intact and Unchanged    Medication(s) Administered   Medication Administration Time: 3/11/2024 7:38 AM

## 2024-03-11 NOTE — OP NOTE
Gynecologic Oncology Operative Note     Katelyn Erickson  MRN: [id number]  : 1990  3/11/2024      Preoperative Diagnosis:   MLH-1 related Jean syndrome    Postoperative Diagnosis:   Same s/p procedures      Procedure:   Total laparoscopic hysterectomy with bilateral salpingectomy, pelvic washings     Surgeon: Elida Gutierres MD  Assistants: Renata Varghese MD PGY-2     Anesthesia: GETA  Complications: None  EBL: 30 mL   IVF: 1200 mL crystalloid   UOP: 300 mL     Findings:   1. EUA/SSE: Normal external genitalia, normal urethral orifice. Normal premenopausal sized, mobile uterus and cervix. Smooth vagina without nodularity. No adnexal fullness or lymphadenopathy.  2. Laparoscopy: Normal appearing uterus, bilateral fallopian tubes, and ovaries. Left ovary with physiologic peritoneal adhesions to colon and left pelvic sidewall. Bilateral ureters in normal anatomic locations. Normal appearing liver edge, diaphragm, greater curvature of stomach, and bowel on brief survey.     Specimens:   ID Type Source Tests Collected by Time Destination   1 : pelvic washings Washings Pelvis NON-GYNECOLOGIC CYTOLOGY Elida Gutierres MD 3/11/2024  8:23 AM    2 : left fallopian tube Tissue Fallopian Tube, Left SURGICAL PATHOLOGY EXAM Elida Gutierres MD 3/11/2024  8:27 AM    3 : right fallopian tube Tissue Fallopian Tube, Right SURGICAL PATHOLOGY EXAM Elida Gutierres MD 3/11/2024  8:36 AM    4 : uterus and cervix Tissue Uterus, Cervix SURGICAL PATHOLOGY EXAM Elida Gutierres MD 3/11/2024  9:00 AM      Indications:  Katelyn Erickson is a 33 year old female who presented with Jean syndrome desiring risk-reducing hysterectomy and bilateral salpingectomy. A total laparoscopic hysterectomy with bilateral salpingectomy and pelvic washings was recommended. All risks, benefits, and alternatives were discussed, and written informed consent was obtained.      Procedure:   The patient was brought to the operating room where adequate  general anesthesia was administered. She was placed in the dorsal lithotomy position, and exam under anesthesia revealed the findings noted above. She was prepped and draped in the usual sterile fashion, and rivera catheter was placed. The umbilicus was everted with penetrating towel clamps, and a 5mm infraumbilical horizontal incision was made. A Veress needle was placed. Intraperitoneal placement was suggested by the water drop test and an opening pressure of less than 5 mmHg.  The abdomen was then insufflated to a maximum pressure of 15 mmHg.  The Veress needle was removed and a 5 mm trocar placed.  The laparoscope was placed, and survey of the abdomen revealed the findings noted above. No trauma from entry was noted. There was no overt evidence of metastatic disease. Attention was turned to the patient's right. At a point 3 cm superiomedial from the ASIS, a 12 mm incision was made, and a 12 mm trocar was placed under direct visualization. Attention was then turned to the patient's left. At a point 3cm superiomedial to the ASIS, a 5 mm incision was made, and a 5 mm trocar was placed under direct visualization. The patient was placed in steep Trendelenburg position. Pelvic washings were obtained.      Attention was turned to the vagina. The cervix was visualized with a speculum and grasped with a tenaculum. The uterus sounded to 8cm. The cervix was serially dilated without difficulty.  A medium Karol ring and Dunia uterine manipulator were placed. The speculum and tenaculum were removed.     Attention was returned to the abdomen. The left round ligament was grasped and transected using the monopolar Endoshears. The posterior leaf of the broad ligament was then incised and the ureter clearly identified. Attention was turned to the right lower quadrant. The right round ligament was grasped and transected using the monopolar Endoshears. The posterior leaf of the broad ligament was then incised and the ureter clearly  identified. Starting on the patient's left, the mesosalpinx was serially cauterized and cut using the Ligasure until the cornua was reached. The fallopian tube was cauterized, transected, and removed from the abdomen. The Ligasure was then used to cauterize the utero-ovarian ligament  Attention was turned to the patient's right and same process repeated to remove the fallopian tube and transect the utero-ovarian ligament. The anterior leaf of the broad ligament was then incised along the bladder reflection using the Endoshears. A bladder flap was mobilized, and the peritoneum on the vesicouterine fold was pushed away from the uterus and cervix. Returning to the patient's left side, the Ligasure was used to skeletonize, cauterize, and ligate the uterine artery. The Ligasure was then used from the contralateral side to cauterize and cut the cardinal ligament and dissect to the level of the Koh cup. The same process was repeated on the patient's right side. The vagina was then transected along the Koh cup using the monopolar Endoshears, resulting in separation of the uterus. The uterus was then delivered through the vagina, and the pneumo-occluder balloon was reinserted to maintain pneumoperitoneum. The vaginal vault was closed with two running 0 V-lock sutures using the Endostitch starting from the cuff corners moving toward the midline.  The pelvis was irrigated. All beds and pedicles were hemostatic. The 12 mm fascial incision was closed with an 0 Vicryl suture using the PMI device.  All skin incisions were closed using 4-0 Monocryl and covered with Dermabond.     Attention was turned to the vagina. The rivera and pneumo-occluder balloon were removed. The vagina was inspected without signs of laceration or bleeding.     All sponge, needle, and instrument counts were correct. The patient tolerated the procedure well and was transferred to recovery in stable condition. Dr. Gutierres was present and scrubbed for the entire  procedure.     Renata Varghese MD  Obstetrics & Gynecology, PGY-2  3/11/2024 10:06 AM       As the primary surgeon, I was scrubbed and present for the full course of the procedure.    Elida Gutierres M.D.

## 2024-03-11 NOTE — ANESTHESIA CARE TRANSFER NOTE
Patient: Katelyn Erickson    Procedure: Procedure(s):  TOTAL LAPAROSCOPIC HYSTERECTOMY, WITH BILATERAL SALPINGECTOMY       Diagnosis: MLH1-related Jean syndrome (HNPCC2) [Z15.09]  Diagnosis Additional Information: No value filed.    Anesthesia Type:   General     Note:    Oropharynx: oropharynx clear of all foreign objects and spontaneously breathing  Level of Consciousness: drowsy  Oxygen Supplementation: nasal cannula  Level of Supplemental Oxygen (L/min / FiO2): 2  Independent Airway: airway patency satisfactory and stable  Dentition: dentition unchanged  Vital Signs Stable: post-procedure vital signs reviewed and stable  Report to RN Given: handoff report given  Patient transferred to: PACU    Handoff Report: Identifed the Patient, Identified the Reponsible Provider, Reviewed the pertinent medical history, Discussed the surgical course, Reviewed Intra-OP anesthesia mangement and issues during anesthesia, Set expectations for post-procedure period and Allowed opportunity for questions and acknowledgement of understanding    Vitals:  Vitals Value Taken Time   /74 03/11/24 0940   Temp     Pulse 99 03/11/24 0946   Resp 20 03/11/24 0946   SpO2 100 % 03/11/24 0946   Vitals shown include unfiled device data.    Electronically Signed By: WINDY Rubio CRNA  March 11, 2024  9:47 AM

## 2024-03-11 NOTE — DISCHARGE INSTRUCTIONS
Contacting your Doctor -   To contact a doctor, call Dr. Gutierres at [phone]:  [phone] and ask for the resident on call for Gynecology (answered 24 hours a day)   Emergency Department:  [facility]: [phone] 911 if you are in need of immediate or emergent help

## 2024-03-11 NOTE — BRIEF OP NOTE
[facility]    Brief Operative Note    Pre-operative diagnosis: MLH1-related Jean syndrome (HNPCC2) [Z15.09]  Post-operative diagnosis Same as pre-operative diagnosis    Procedure: TOTAL LAPAROSCOPIC HYSTERECTOMY, WITH BILATERAL SALPINGECTOMY, N/A - Abdomen    Surgeon: Surgeon(s) and Role:     * Elida Gutierres MD - Primary     * Renata Varghese MD - Resident - Assisting  Anesthesia: General   Estimated Blood Loss: 30 mL  UOP: 300 mL  IVF: 1200 mL    Drains: None  Specimens:   ID Type Source Tests Collected by Time Destination   1 : pelvic washings Washings Pelvis NON-GYNECOLOGIC CYTOLOGY Elida Gutierres MD 3/11/2024  8:23 AM    2 : left fallopian tube Tissue Fallopian Tube, Left SURGICAL PATHOLOGY EXAM Elida Gutierres MD 3/11/2024  8:27 AM    3 : right fallopian tube Tissue Fallopian Tube, Right SURGICAL PATHOLOGY EXAM Elida Gutierres MD 3/11/2024  8:36 AM    4 : uterus and cervix Tissue Uterus, Cervix SURGICAL PATHOLOGY EXAM Elida Gutierres MD 3/11/2024  9:00 AM      Findings:   Normal bimanual exam. Normal abdominal survey, fallopian tubes, ovaries, uterus. Left ovary with physiologic peritoneal adhesions to colon and left pelvic sidewall. Hemostasis at end of case.  Complications: None.  Implants: * No implants in log *    Renata Varghese MD  Obstetrics & Gynecology, PGY-2  3/11/2024 9:36 AM

## 2024-03-11 NOTE — ANESTHESIA POSTPROCEDURE EVALUATION
Patient: Katelyn Erickson    Procedure: Procedure(s):  TOTAL LAPAROSCOPIC HYSTERECTOMY, WITH BILATERAL SALPINGECTOMY       Anesthesia Type:  General    Note:  Disposition: Outpatient   Postop Pain Control: Uneventful            Sign Out: Well controlled pain   PONV: No   Neuro/Psych: Uneventful            Sign Out: Acceptable/Baseline neuro status   Airway/Respiratory: Uneventful            Sign Out: Acceptable/Baseline resp. status   CV/Hemodynamics: Uneventful            Sign Out: Acceptable CV status; No obvious hypovolemia; No obvious fluid overload   Other NRE: NONE   DID A NON-ROUTINE EVENT OCCUR? No           Last vitals:  Vitals Value Taken Time   /64 03/11/24 1030   Temp 36.5  C (97.7  F) 03/11/24 0940   Pulse 72 03/11/24 1045   Resp 0 03/11/24 0950   SpO2 97 % 03/11/24 1045   Vitals shown include unfiled device data.    Electronically Signed By: Ryan Saenz MD  March 11, 2024  10:46 AM

## 2024-03-11 NOTE — PROGRESS NOTES
Gynecologic Oncology   Postoperative Progress Note  3/11/2024    S: Patient is doing well postoperatively. Pain is well controlled. Ambulating without dizziness. Voiding spontaneously. Tolerating PO without nausea or vomiting. Denies chest pain, shortness of breath, dizziness, or other concerns at this time.     O:  Vitals:    03/11/24 1100 03/11/24 1115 03/11/24 1147 03/11/24 1224   BP: 117/73 117/69 129/78 130/68   BP Location:   Right arm    Cuff Size:       Pulse: 88 76 80 82   Resp: 19 16 16 16   Temp: 97.9  F (36.6  C)  97.6  F (36.4  C)    TempSrc: Oral  Oral    SpO2: 98% 100% 100% 100%   Weight:       Height:         Gen: no acute distress  Cardio: well-perfused, regular rate and rhythm  Resp: breathing comfortably on room air, normal work of breathing  Abdomen: soft, appropriately tender, non-distended  Incision(s): clean/dry/intact and covered with dermabond    A/P: 33 year old POD#0 s/p TLH, BS. Doing well in the early post-operative period. Meeting goals for discharge.    Dz: Jean syndrome  FEN/GI: tolerating PO without nausea/vomiting; advance as tolerated  Pain: adequately controlled with scheduled Tylenol, ibuprofen, and PRN oxycodone  : voiding spontaneously    Dispo: to home    Renata Varghese MD  Obstetrics & Gynecology, PGY-2  3/11/2024 12:27 PM

## 2024-03-13 LAB
PATH REPORT.COMMENTS IMP SPEC: NORMAL
PATH REPORT.FINAL DX SPEC: NORMAL
PATH REPORT.GROSS SPEC: NORMAL
PATH REPORT.MICROSCOPIC SPEC OTHER STN: NORMAL
PATH REPORT.RELEVANT HX SPEC: NORMAL

## 2024-03-14 LAB
PATH REPORT.COMMENTS IMP SPEC: NORMAL
PATH REPORT.COMMENTS IMP SPEC: NORMAL
PATH REPORT.FINAL DX SPEC: NORMAL
PATH REPORT.GROSS SPEC: NORMAL
PATH REPORT.RELEVANT HX SPEC: NORMAL
PHOTO IMAGE: NORMAL

## 2024-03-15 NOTE — PROGRESS NOTES
"Video-Visit Details    Type of service:  Video Visit    Video Start Time: 9:30 AM   Video End Time: 9:48 AM     Originating Location (pt. Location): Home    Distant Location (provider location): Offsite (providers home) [facility] [place]     Platform used for Video Visit: Mili    New Bariatric Nutrition Consultation Note    Reason For Visit: Nutrition Assessment    Katelyn Erickson is a 33 year old presenting today for new bariatric nutrition consult.   Patient is interested in weight loss surgery with Dr. Hurley expected surgery in TBD.  Patient is accompanied by self.  This is patient's 2nd of 6 required nutrition visits prior to surgery. Patient attended the WLS nutrition class on 2/23/24.     Pt referred by Steffi Sánchez NP  on February 20, 2024.     CO-MORBIDITIES OF OBESITY INCLUDE:        2/15/2024     2:47 PM   --   I have the following health issues associated with obesity None of the above       SUPPORT:      2/15/2024     2:47 PM   Support System Reviewed With Patient   Who do you have in your support network that can be available to help you for the first 2 weeks after surgery?  and MIL   Who can you count on for support throughout your weight loss surgery journey?  and MIL     ANTHROPOMETRICS:  Initial consult weight: 254 lb on 2/20/24   Estimated body mass index is 39.81 kg/m  as calculated from the following:    Height as of 3/11/24: 1.702 m (5' 7\").    Weight as of 3/11/24: 115.3 kg (254 lb 3.1 oz).  Current Weight: 254 lb per pt report      Required weight loss goal pre-op: -10 lbs from initial consult weight (goal weight 244 lbs or less before surgery)        2/15/2024     2:47 PM   --   I have tried the following methods to lose weight Watching portions or calories    Exercise    Weight Watchers           2/15/2024     2:47 PM   Weight Loss Questions Reviewed With Patient   How long have you been overweight? Since early childhood     MEDICATION FOR " WEIGHT LOSS: Topiramate     VITAMIN/MINERAL SUPPLEMENTS: None     NUTRITION HISTORY:  Food allergies: NKFA  Food intolerances: none   Previous experience with diet modification for weight loss: weight watchers and exercise   Barriers to lifestyle change: reports none     Per Steffi's note: Patient describes significant thoughts about food and cravings in the evenings with limited hunger during the day.     Patient herself is trying to eat less meat. Trying to increase her vegetables. No overnight eating    Recent Diet Recall:  Breakfast: coffee with Premier Protein   Lunch: 11 AM if at home, scrambled eggs with cottage and spinach with English muffin or toast. If goes into office will eat at 1 or 2 pm, Leftovers   Dinner: Tacos, spaghetti, chicken. Protein + vegetable + carb.   Snacks: After the kids go to bed - crackers and Nutella  Beverages: Water, soda seldomly.   Alcohol: None for the most part, seldomly   Dining Out: Not very often. Twice a month.     Patient recently had a total laparoscopic hysterectomy on 3/11/24.         2/15/2024     2:47 PM   Recall Diet Questions Reviewed With Patient   Describe what you typically consume for breakfast (typical or most recent) Scrambled eggs english muffine   How many ounces of water, or other low calorie drinks, do you drink daily (8 oz=1 glass)? 64 oz or more   How many ounces of caffeine (coffee, tea, pop) do you drink daily (8 oz=1 glass)? 16 oz   How many ounces of carbonated (pop, beer, sparkling water) drinks do you drinky daily (8 oz=1 glass)? 0 oz   How many ounces of juice, pop, sweet tea, sports drinks, protein drinks, other sweetened drinks, do you drink daily (8 oz=1 glass)? 0 oz   How many ounces of milk do you drink daily (8 oz=1 glass) 0 oz   How often do you drink alcohol? Monthly or less   If you do drink alcohol, how many drinks might you have in a day? (one drink = 5 oz. wine, 1 can/bottle of beer, 1 shot liquor) 1 or 2           2/15/2024     2:47 PM    Eating Habits   What foods do you crave? Sweets           2/15/2024     2:47 PM   Dining Out History Reviewed With Patient   How often do you dine out? Rarely.   Where do you dine out? (select all that apply) fast food chains   What types of food do you order when you dine out? Hamburger     EXERCISE: Walked everyday 1.5-2 miles prior to surgery. 5 week physical activity restriction. Can't lift more than 10 lbs.         2/15/2024     2:47 PM   --   How often do you exercise? Daily   What is the duration of your exercise (in minutes)? 30 Minutes   What types of exercise do you do? walking   What keeps you from being more active? 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   Patient has 3 kids ages 10, 7, 5.   Aunt had bariatric surgery.     NUTRITION DIAGNOSIS:  Obesity r/t long history of positive energy balance aeb BMI >30 kg/m2.    INTERVENTION:  Intervention Provided/Education Provided on post-op diet guidelines, vitamins/minerals essential post-operatively, GI anatomy of bariatric surgeries, ways to help prepare for post-op diet guidelines pre-operatively, portion/calorie-control, mindful eating and sources of protein.  Patient demonstrates understanding.     Personal barriers to making and continuing required life changes have been identified, and strategies to overcome those barriers have been recommended AND family and social supports have been assessed and strategies to strengthen those supports have been recommended.    Provided pt with list of goals, RD contact information and resources listed below via [facility].           2/15/2024     2:47 PM   Questions Reviewed With Patient   How ready are you to make changes regarding your weight? Number 1 = Not ready at all to make changes up to 10 = very ready. 7   How confident  "are you that you can change? 1 = Not confident that you will be successful making changes up to 10 = very confident. 7     Expected Engagement: good    GOALS:  Relating To Eating:  Eat slowly (20-30 minutes per meal), chewing foods well (25 chews per bite/applesauce consistency)  Eat 3 meals per day  Consume 60-90 g protein per day    Relating to beverages:  Reduce caffeine/carbonation/calorie containing beverages  Separate fluids from meals by 30 minutes before, during, and after eating  Drink 48-64 ounces of fluid per day    Relating to dietary supplements:  Start thinking about a post op multivitamin     Relating to activity:  Increase activity as able    The Plate Method  [email or web address]    Protein Sources for Weight Loss  [email or web address]     Quick/Easy Protein Sources:  Hard boiled eggs  Part-skim cheese sticks  Baby Bell cheese rounds  Low-fat/low-sugar Greek yogurt  Low-fat cottage cheese  Lean deli meat (chicken/turkey/ham)  Roasted chickpeas or lentils  Nuts   Turkey meat stick  Protein shake/bar  \"P3\" snack (cheese, nuts, deli meat)  Aldi's \"Protein Bread\"   \"Egglife\" egg white wrap    Tuna/salmon can/packet     Non-meat protein Ideas  Quinoa  Eggs  Dairy (Cottage cheese, low fat cheese, greek yogurt)   Nuts  Beans  Lentils  Protein pasta   Nutritional yeast  Garden of life raw meal powder  Liquid aminos  Homemade meats - a taco meat could be made with chopped cauliflower/mushroom as an example   Hummus (could do homemade if preferred)  Hemp hearts   Tofu  Seitan     Complex Carbohydrates high in protein  Quinoa  Brown Rice  Chick Pea  Buckwheat  Sweet Potatoes  Lentils  Legumes  Zhu Beans  Black Beans   Farro   Kidney Beans     Carbohydrates  [email or web address]     Mindful Eating  [email or web address]     Summary of Volumetrics Eating Plan  [email or web address]     Diet Guidelines after Weight Loss Surgery  [email or web address] "     Seated Exercises for Arms and Legs (can be done before or after surgery)  [email or web address]    Follow Up: April 17.     Time spent with patient: 18 minutes.  Tina Montenegro RD, LD

## 2024-03-18 ENCOUNTER — VIRTUAL VISIT (OUTPATIENT)
Dept: ENDOCRINOLOGY | Facility: CLINIC | Age: 34
End: 2024-03-18
Payer: COMMERCIAL

## 2024-03-18 DIAGNOSIS — Z71.3 NUTRITIONAL COUNSELING: Primary | ICD-10-CM

## 2024-03-18 DIAGNOSIS — E66.09 CLASS 2 OBESITY DUE TO EXCESS CALORIES WITHOUT SERIOUS COMORBIDITY WITH BODY MASS INDEX (BMI) OF 39.0 TO 39.9 IN ADULT: ICD-10-CM

## 2024-03-18 DIAGNOSIS — K76.0 NAFLD (NONALCOHOLIC FATTY LIVER DISEASE): ICD-10-CM

## 2024-03-18 DIAGNOSIS — E66.812 CLASS 2 OBESITY DUE TO EXCESS CALORIES WITHOUT SERIOUS COMORBIDITY WITH BODY MASS INDEX (BMI) OF 39.0 TO 39.9 IN ADULT: ICD-10-CM

## 2024-03-18 PROCEDURE — 97802 MEDICAL NUTRITION INDIV IN: CPT | Mod: 95

## 2024-03-18 PROCEDURE — 99207 PR NO CHARGE LOS: CPT | Mod: 95

## 2024-03-18 NOTE — NURSING NOTE
Is the patient currently in the state of [place]? YES    Visit mode:VIDEO    If the visit is dropped, the patient can be reconnected by: VIDEO VISIT: Text to cell phone:   Telephone Information:   Mobile [phone]       Will anyone else be joining the visit? NO  (If patient encounters technical issues they should call [phone])    How would you like to obtain your AVS? MyChart    Are changes needed to the allergy or medication list? Pt stated no changes to allergies and Pt stated no med changes    Reason for visit: Follow Up    Camille Bartholomew VVF    No current wt to report

## 2024-03-18 NOTE — LETTER
"3/18/2024       RE: Katelyn Erickson  [address] [facility] [address]     Dear Colleague,    Thank you for referring your patient, Katelyn Erickson, to the [facility] [place] at [facility]. Please see a copy of my visit note below.    Video-Visit Details    Type of service:  Video Visit    Video Start Time: 9:30 AM   Video End Time: 9:48 AM     Originating Location (pt. Location): Home    Distant Location (provider location): Offsite (providers home) [facility] [place]     Platform used for Video Visit: Litepoint    New Bariatric Nutrition Consultation Note    Reason For Visit: Nutrition Assessment    Katelyn Erickson is a 33 year old presenting today for new bariatric nutrition consult.   Patient is interested in weight loss surgery with Dr. Hurley expected surgery in TBD.  Patient is accompanied by self.  This is patient's 2nd of 6 required nutrition visits prior to surgery. Patient attended the WLS nutrition class on 2/23/24.     Pt referred by Steffi Sánchez NP  on February 20, 2024.     CO-MORBIDITIES OF OBESITY INCLUDE:        2/15/2024     2:47 PM   --   I have the following health issues associated with obesity None of the above       SUPPORT:      2/15/2024     2:47 PM   Support System Reviewed With Patient   Who do you have in your support network that can be available to help you for the first 2 weeks after surgery?  and MIL   Who can you count on for support throughout your weight loss surgery journey?  and MIL     ANTHROPOMETRICS:  Initial consult weight: 254 lb on 2/20/24   Estimated body mass index is 39.81 kg/m  as calculated from the following:    Height as of 3/11/24: 1.702 m (5' 7\").    Weight as of 3/11/24: 115.3 kg (254 lb 3.1 oz).  Current Weight: 254 lb per pt report      Required weight loss goal pre-op: -10 lbs from initial consult weight (goal weight 244 lbs or " less before surgery)        2/15/2024     2:47 PM   --   I have tried the following methods to lose weight Watching portions or calories    Exercise    Weight Watchers           2/15/2024     2:47 PM   Weight Loss Questions Reviewed With Patient   How long have you been overweight? Since early childhood     MEDICATION FOR WEIGHT LOSS: Topiramate     VITAMIN/MINERAL SUPPLEMENTS: None     NUTRITION HISTORY:  Food allergies: NKFA  Food intolerances: none   Previous experience with diet modification for weight loss: weight watchers and exercise   Barriers to lifestyle change: reports none     Per Steffi's note: Patient describes significant thoughts about food and cravings in the evenings with limited hunger during the day.     Patient herself is trying to eat less meat. Trying to increase her vegetables. No overnight eating    Recent Diet Recall:  Breakfast: coffee with Premier Protein   Lunch: 11 AM if at home, scrambled eggs with cottage and spinach with English muffin or toast. If goes into office will eat at 1 or 2 pm, Leftovers   Dinner: Tacos, spaghetti, chicken. Protein + vegetable + carb.   Snacks: After the kids go to bed - crackers and Nutella  Beverages: Water, soda seldomly.   Alcohol: None for the most part, seldomly   Dining Out: Not very often. Twice a month.     Patient recently had a total laparoscopic hysterectomy on 3/11/24.         2/15/2024     2:47 PM   Recall Diet Questions Reviewed With Patient   Describe what you typically consume for breakfast (typical or most recent) Scrambled eggs english muffine   How many ounces of water, or other low calorie drinks, do you drink daily (8 oz=1 glass)? 64 oz or more   How many ounces of caffeine (coffee, tea, pop) do you drink daily (8 oz=1 glass)? 16 oz   How many ounces of carbonated (pop, beer, sparkling water) drinks do you drinky daily (8 oz=1 glass)? 0 oz   How many ounces of juice, pop, sweet tea, sports drinks, protein drinks, other sweetened drinks,  do you drink daily (8 oz=1 glass)? 0 oz   How many ounces of milk do you drink daily (8 oz=1 glass) 0 oz   How often do you drink alcohol? Monthly or less   If you do drink alcohol, how many drinks might you have in a day? (one drink = 5 oz. wine, 1 can/bottle of beer, 1 shot liquor) 1 or 2           2/15/2024     2:47 PM   Eating Habits   What foods do you crave? Sweets           2/15/2024     2:47 PM   Dining Out History Reviewed With Patient   How often do you dine out? Rarely.   Where do you dine out? (select all that apply) fast food chains   What types of food do you order when you dine out? Hamburger     EXERCISE: Walked everyday 1.5-2 miles prior to surgery. 5 week physical activity restriction. Can't lift more than 10 lbs.         2/15/2024     2:47 PM   --   How often do you exercise? Daily   What is the duration of your exercise (in minutes)? 30 Minutes   What types of exercise do you do? walking   What keeps you from being more active? 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   Patient has 3 kids ages 10, 7, 5.   Aunt had bariatric surgery.     NUTRITION DIAGNOSIS:  Obesity r/t long history of positive energy balance aeb BMI >30 kg/m2.    INTERVENTION:  Intervention Provided/Education Provided on post-op diet guidelines, vitamins/minerals essential post-operatively, GI anatomy of bariatric surgeries, ways to help prepare for post-op diet guidelines pre-operatively, portion/calorie-control, mindful eating and sources of protein.  Patient demonstrates understanding.     Personal barriers to making and continuing required life changes have been identified, and strategies to overcome those barriers have been recommended AND family and social supports have been assessed and strategies to strengthen those supports have  "been recommended.    Provided pt with list of goals, RD contact information and resources listed below via [facility].           2/15/2024     2:47 PM   Questions Reviewed With Patient   How ready are you to make changes regarding your weight? Number 1 = Not ready at all to make changes up to 10 = very ready. 7   How confident are you that you can change? 1 = Not confident that you will be successful making changes up to 10 = very confident. 7     Expected Engagement: good    GOALS:  Relating To Eating:  Eat slowly (20-30 minutes per meal), chewing foods well (25 chews per bite/applesauce consistency)  Eat 3 meals per day  Consume 60-90 g protein per day    Relating to beverages:  Reduce caffeine/carbonation/calorie containing beverages  Separate fluids from meals by 30 minutes before, during, and after eating  Drink 48-64 ounces of fluid per day    Relating to dietary supplements:  Start thinking about a post op multivitamin     Relating to activity:  Increase activity as able    The Plate Method  [email or web address]    Protein Sources for Weight Loss  [email or web address]     Quick/Easy Protein Sources:  Hard boiled eggs  Part-skim cheese sticks  Baby Bell cheese rounds  Low-fat/low-sugar Greek yogurt  Low-fat cottage cheese  Lean deli meat (chicken/turkey/ham)  Roasted chickpeas or lentils  Nuts   Turkey meat stick  Protein shake/bar  \"P3\" snack (cheese, nuts, deli meat)  Aldi's \"Protein Bread\"   \"Egglife\" egg white wrap    Tuna/salmon can/packet     Non-meat protein Ideas  Quinoa  Eggs  Dairy (Cottage cheese, low fat cheese, greek yogurt)   Nuts  Beans  Lentils  Protein pasta   Nutritional yeast  Garden of life raw meal powder  Liquid aminos  Homemade meats - a taco meat could be made with chopped cauliflower/mushroom as an example   Hummus (could do homemade if preferred)  Hemp hearts   Tofu  Seitan     Complex Carbohydrates high in protein  Quinoa  Brown Rice  Chick Pea  Buckwheat  Sweet " Potatoes  Lentils  Legumes  Zhu Beans  Black Beans   Farro   Kidney Beans     Carbohydrates  [email or web address]     Mindful Eating  [email or web address]     Summary of Volumetrics Eating Plan  [email or web address]     Diet Guidelines after Weight Loss Surgery  [email or web address]     Seated Exercises for Arms and Legs (can be done before or after surgery)  [email or web address]    Follow Up: April 17.     Time spent with patient: 18 minutes.  Tina Montenegro RD, LD

## 2024-03-18 NOTE — PATIENT INSTRUCTIONS
"Devon Zepeda,     It was nice to meet you today.     Follow-up with RD on April 17.     Thank you,    Tina Montenegro, PABLO, LD  If you would like to schedule or reschedule an appointment with the RD, please call [phone]    Nutrition Goals  Relating To Eating:  Eat slowly (20-30 minutes per meal), chewing foods well (25 chews per bite/applesauce consistency)  Eat 3 meals per day  Consume 60-90 g protein per day    Relating to beverages:  Reduce caffeine/carbonation/calorie containing beverages  Separate fluids from meals by 30 minutes before, during, and after eating  Drink 48-64 ounces of fluid per day    Relating to dietary supplements:  Start thinking about a post op multivitamin     Relating to activity:  Increase activity as able    The Plate Method  [email or web address]    Protein Sources for Weight Loss  [email or web address]     Quick/Easy Protein Sources:  Hard boiled eggs  Part-skim cheese sticks  Baby Bell cheese rounds  Low-fat/low-sugar Greek yogurt  Low-fat cottage cheese  Lean deli meat (chicken/turkey/ham)  Roasted chickpeas or lentils  Nuts   Turkey meat stick  Protein shake/bar  \"P3\" snack (cheese, nuts, deli meat)  Aldi's \"Protein Bread\"   \"Egglife\" egg white wrap    Tuna/salmon can/packet     Non-meat protein Ideas  Quinoa  Eggs  Dairy (Cottage cheese, low fat cheese, greek yogurt)   Nuts  Beans  Lentils  Protein pasta   Nutritional yeast  Garden of life raw meal powder  Liquid aminos  Homemade meats - a taco meat could be made with chopped cauliflower/mushroom as an example   Hummus (could do homemade if preferred)  Hemp hearts   Tofu  Seitan     Complex Carbohydrates high in protein  Quinoa  Brown Rice  Chick Pea  Buckwheat  Sweet Potatoes  Lentils  Legumes  Zhu Beans  Black Beans   Farro   Kidney Beans     Carbohydrates  [email or web address]     Mindful Eating  [email or web address]     Summary of Volumetrics Eating Plan  [email or web address] "     Diet Guidelines after Weight Loss Surgery  [email or web address]     Seated Exercises for Arms and Legs (can be done before or after surgery)  [email or web address]    COMPREHENSIVE WEIGHT MANAGEMENT PROGRAM  VIRTUAL SUPPORT GROUPS    At [facility], our Comprehensive Weight Management program offers on-line support groups for patients who are working on weight loss and considering, preparing for, or have had weight loss surgery.     There is no cost for this opportunity.  You are invited to attend the?Virtual Support Groups?provided by any of the following locations:    [facility] via Microsoft Teams with Loree Armendariz RN  2.   [place] via CreationFlow with Lj Waldron, PhD, LP  3.   [place] via CreationFlow with Mary Jane Li RN  4.   [facility] via a Zoom Meeting with NATALIA Lennon    The following Support Group information can also be found on our website:  [email or web address]      [facility] Weight Loss Surgery Support Group  The support group is a patient-lead forum that meets monthly to share experiences, encouragement and education. It is open to those who have had weight loss surgery, are scheduled for surgery, or are considering surgery.   WHEN: This group meets on the 3rd Wednesday of each month from 5:00PM - 6:00PM virtually using Microsoft Teams.   FACILITATOR: Led by Loree Armendariz RD, OBI, RN, the program's Clinical Coordinator.   TO REGISTER: Please contact the clinic via DailyWorth or call the nurse line directly at [phone] to inform our staff that you would like an invite sent to you and to let us know the email you would like the invite sent to. Prior to the meeting, a link with directions on how to join the meeting will be sent to you.    2024 Meetings   January 17  February 21  March 20  April 17  May 15  Linda 19      [facility]  "and [facility] Bariatric Care Support Group?  This is open to all pre- and post- operative bariatric surgery patients as well as their support system.   WHEN: This group meets the 3rd Tuesday of each month from 6:30 PM - 8:00 PM virtually using Microsoft Teams.   FACILITATOR: Led by Lj Waldron, Ph.D who is a Licensed Psychologist with the [facility] Comprehensive Weight Management Program.   TO REGISTER: Please send an email to Lj Waldron, Ph.D.,  at?[email or web address]?if you would like an invitation to the group. Prior to the meeting, a link with directions on how to join the meeting will be sent to you.    2024 Meetings January 16: \"Medication Management and Bariatric Surgery\", January Melton, PharmD, Pharmacy Resident at [facility]  February 20: \"A Bariatric Surgery Patient's Perspective\", ANGELA Olson, [facility], Behavioral Health Clinician at [facility]  March 19  April 16  May 21  Linda 18: \"Nutritional Labeling\", Dietitian speaker to be announced.  November 19: \"Holiday Eating\", Dietitian speaker to be announced.    [facility] and [facility] Post-Operative Bariatric Surgery Support Group  This is a support group for [facility] bariatric patients (and those external to [facility]) who have had bariatric surgery and are at least 3 months post-surgery.  WHEN: This support group meets the 4th Wednesday of the month from 11:00 AM - 12:00 PM virtually using Microsoft Teams.   FACILITATOR: Led by Certified Bariatric Nurse, Mary Jane Li RN.   TO REGISTER: Please send an email to Mary Jane at [email or web address] if you would like an invitation to the group.  Prior to the meeting, a link with directions on how to join the meeting will be sent to you.    2024 Meetings  January 24  February 28  March 27  April 24  May 22  Linda 26    [facility]" "[facility] Healthy Lifestyle Group?  This is a 60 minute virtual coaching group for those who want to lead a healthier lifestyle. Come together to set goals and overcome barriers in a supportive group environment. We will address the four pillars of health: nutrition, exercise, sleep and emotional well-being.  This group is highly recommended for those who are participating in the 24 week Healthy Lifestyle Plan and our Health Coaching sessions.  WHEN: This group meets the 1st Friday of the month, 12:30 PM - 1:30 PM online, via a zoom meeting.    FACILITATOR: Led by National Board Certified Health and , Mary Jane Chambers [facility].   TO REGISTER: Please call the Call Center at [phone] to register. You will get an appointment to attend in [place]. Fifteen minutes prior to the meeting, complete the e-check in and you will get the link to join the meeting.    There is no charge to attend this group and space is limited.     2024 Meetings  January 5: \"New Years Vision: Manifest your Best 2024!\" (guided imagery,  journaling and discussion)  February 2: \"Let's Talk\"  March 1: \"10 Percent Happier\" by Blu Agarwal (Book Bites - a guided discussion on the nuggets of wisdom from favorite wellness books, no need to read the book but highly encouraged)  April 5: \"Let's Talk\"  May 3: \"Essentialism: The Disciplined Pursuit of Less\" by Vivek Landers (Book Bites discussion)  June 7: \"Let's Talk\"  July 5: NO MEETING, off for the 4th of July Holiday  August 2: \"The Blue Zones, Secrets for Living a Longer Life\" by Blu Mccormick (Book Bites discussion)        "

## 2024-04-01 NOTE — PROGRESS NOTES
GYNECOLOGIC  ONCOLOGY CLINIC NOTE    Referring provider:    Annette Szymanski, WINDY CNP  [address] [place] [address]   RE: Katelyn Erickson  : 1990  MO: 25     CC: Jean Syndrome, MLH 1 mutation    HPI: Ms Katelyn Erickson is a 33 year old female who presents  post risk reduction surgery with total abdominal hysterectomy and bilateral salpingectomy done in 3/11/2024.      Recovery from surgery is going well, not using any pain medications. Some discomfort along right lower quadrant. No vaginal bleeding or abnormal discharge.    Course to date  She presents along. She has carefully considered options and presents today to proceed with surgery, She would like to have a total hysterectomy and removal of both fallopian tubes now. At a later time she will return and plan to have her ovaries removed, for now she would like to keep her ovaries. She has completed child bearing and is happy with her family size. No active contraception. No abnormal vaginal spotting, regular cycles.    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  Of note, she tested negative for mutations in  30 other genes (APC, DEBORAH, BAP1, BARD1, BMPR1A, BRCA1, BRCA2, BRIP1, CDH1, CDKN2A, CDK4, CHEK2, EPCAM, GREM1, MITF, MLH1, MSH2, MSH6, MUTYH, NBN, PALB2, PMS2, POLE, POLD1, PTEN, RAD51C, RAD51D, SMAD4, STK11, and TP53) associated with inherited cancer risks.     She is closely followed with Upper GI endoscopy and colonoscopy which were negative.     3/11/24 Surgery: Total laparoscopic hysterectomy with bilateral salpingectomy, pelvic washings    Pathology: Benign Uterus, cervix and bilateral fallopian tubes  Cytology - negative      OBGYN history and Health Maintenance:    Last Pap Smear: 2022 Negative, HPV negative    Past Medical History:   Diagnosis Date    MLH1-related Jean syndrome (HNPCC2) 2024    NO ACTIVE PROBLEMS         Past Surgical History:   Procedure Laterality Date    COLONOSCOPY  1/23/2012    Procedure:COLONOSCOPY; COLONOSCOPY; Surgeon:JAKOB PETERS; Location: GI    COLONOSCOPY      COLONOSCOPY N/A 9/8/2021    Procedure: COLONOSCOPY;  Surgeon: Joseph Flores MD;  Location:  GI    ESOPHAGOSCOPY, GASTROSCOPY, DUODENOSCOPY (EGD), COMBINED N/A 9/8/2021    Procedure: ESOPHAGOGASTRODUODENOSCOPY (EGD);  Surgeon: Joseph Flores MD;  Location:  GI    LAPAROSCOPIC HYSTERECTOMY TOTAL, SALPINGECTOMY N/A 3/11/2024    Procedure: TOTAL LAPAROSCOPIC HYSTERECTOMY, WITH BILATERAL SALPINGECTOMY. PELVIC WASHINGS;  Surgeon: Elida Gutierres MD;  Location: [facility] OR    NO HISTORY OF SURGERY            Current Outpatient Medications   Medication Sig Dispense Refill    acetaminophen (TYLENOL) 325 MG tablet Take 2 tablets (650 mg) by mouth every 6 hours as needed for mild pain or fever 60 tablet 0    buPROPion (WELLBUTRIN XL) 150 MG 24 hr tablet Take 1 tablet (150 mg) by mouth every morning 90 tablet 1    ibuprofen (ADVIL/MOTRIN) 600 MG tablet Take 1 tablet (600 mg) by mouth every 6 hours as needed for other (mild and/or inflammatory pain.) 12 tablet 0    oxyCODONE (ROXICODONE) 5 MG tablet Take 1-2 tablets (5-10 mg) by mouth every 4 hours as needed for moderate to severe pain 6 tablet 0    senna-docusate (SENOKOT-S/PERICOLACE) 8.6-50 MG tablet Take 1-2 tablets by mouth 2 times daily 30 tablet 0    topiramate (TOPAMAX) 25 MG tablet 25mg at bedtime for week 1, 50mg at bedtime for 1 week, and 75mg at bedtime thereafter 90 tablet 1         No Known Allergies    Social History:  Social History     Tobacco Use    Smoking status: Former     Types: Cigarettes     Passive exposure: Past    Smokeless tobacco: Never    Tobacco comments:     half a pack a day   Substance Use Topics    Alcohol use: Yes     Comment: rarely         Family History:   The patient's family history is notable for   Family History   Problem Relation Age of Onset     Cancer - colorectal Mother         diagnosed at age 33 and  at age 34    Jean Syndrome Mother         MLH1    Colon Cancer Maternal Grandmother     Cancer - colorectal Maternal Grandmother 49         age 53    Diabetes Maternal Grandmother     Cancer Maternal Grandfather         pancreatic cancer    Colon Cancer Maternal Aunt 37    Jean Syndrome Maternal Aunt         MLH1    Colon Cancer Maternal Aunt 48    Jean Syndrome Maternal Aunt         MLH1         Physical Exam:     /84   Pulse 71   Temp 97.6  F (36.4  C) (Oral)   Resp 18   Wt 115.3 kg (254 lb 4.8 oz)   LMP 2024   SpO2 98%   BMI 39.83 kg/m    Body mass index is 39.83 kg/m .    General: Alert and oriented, no acute distress  Psych: Mood stable  GI: No distention. No masses. No hernia.   Incision: intact, no erythema      Assessment:  Katelyn Erickson is a 33 year old woman with a MLH1 mutation, Jean syndrome. She has completed childbearing.    She is now s/p total laparoscopic hysterectomy and bilateral salpingectomy. Pathology reviewed with no evidence of malignancy.     Post surgery recovery doing well without complications    Reported risk for ovarian cancer with MLH1 mutation is at 4-20% with recommended risk reduction bilateral oophorectomy at 40 - 45 yrs of age,  although this can vary based on patient's personal family history. Starting oral contraceptive is known to reduce risk of ovarian cancer.    There is currently no strong data that has shown regular pelvic US and  can prevent ovarian cancer, but can be associated with false positive test leading to surgical intervention. After discussion of options for ongoing follow up, Katelyn has decided to follow up with annual Pelvic US with our NP clinic. This will be in coordinate with ongoing follow up with Oncology Risk Management Clinic.     Plan:     1.)   Follow up in 1 year NP clinic with Pelvic US. Can return sooner with any new concerns of abdominal bloating,  pain or change in GI function.         Elida Gutierres M.D., MPH,  FNakulA.CNakulOMICHAEL  Professor  Department of Ob/Gyn and Women's Health  Division of Gynecologic Oncology  [facility]/[employer] [place]  [phone]

## 2024-04-04 ENCOUNTER — ONCOLOGY VISIT (OUTPATIENT)
Dept: ONCOLOGY | Facility: CLINIC | Age: 34
End: 2024-04-04
Attending: OBSTETRICS & GYNECOLOGY
Payer: COMMERCIAL

## 2024-04-04 VITALS
DIASTOLIC BLOOD PRESSURE: 84 MMHG | BODY MASS INDEX: 39.83 KG/M2 | TEMPERATURE: 97.6 F | SYSTOLIC BLOOD PRESSURE: 127 MMHG | RESPIRATION RATE: 18 BRPM | WEIGHT: 254.3 LBS | OXYGEN SATURATION: 98 % | HEART RATE: 71 BPM

## 2024-04-04 DIAGNOSIS — Z15.09 LYNCH SYNDROME: Primary | ICD-10-CM

## 2024-04-04 PROCEDURE — 99213 OFFICE O/P EST LOW 20 MIN: CPT | Performed by: OBSTETRICS & GYNECOLOGY

## 2024-04-04 PROCEDURE — 99024 POSTOP FOLLOW-UP VISIT: CPT | Performed by: OBSTETRICS & GYNECOLOGY

## 2024-04-04 ASSESSMENT — PAIN SCALES - GENERAL: PAINLEVEL: NO PAIN (0)

## 2024-04-04 NOTE — LETTER
2024         RE: Katelyn Erickson  [address] [facility] [address]        Dear Colleague,    Thank you for referring your patient, Katelyn Erickson, to the [facility]. Please see a copy of my visit note below.    GYNECOLOGIC  ONCOLOGY CLINIC NOTE    Referring provider:    WINDY Covington CNP  [address] [place] [address]   RE: Katelyn Erickson  : 1990  MO: 25     CC: Jean Syndrome, MLH 1 mutation    HPI: Ms Katelyn Erickson is a 33 year old female who presents  post risk reduction surgery with total abdominal hysterectomy and bilateral salpingectomy done in 3/11/2024.      Recovery from surgery is going well, not using any pain medications. Some discomfort along right lower quadrant. No vaginal bleeding or abnormal discharge.    Course to date  She presents along. She has carefully considered options and presents today to proceed with surgery, She would like to have a total hysterectomy and removal of both fallopian tubes now. At a later time she will return and plan to have her ovaries removed, for now she would like to keep her ovaries. She has completed child bearing and is happy with her family size. No active contraception. No abnormal vaginal spotting, regular cycles.    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  Of note, she tested negative for mutations in  30 other genes (APC, DEBORAH, BAP1, BARD1, BMPR1A, BRCA1, BRCA2, BRIP1, CDH1, CDKN2A, CDK4, CHEK2, EPCAM, GREM1, MITF, MLH1, MSH2, MSH6, MUTYH, NBN, PALB2, PMS2, POLE, POLD1, PTEN, RAD51C, RAD51D, SMAD4, STK11, and TP53) associated with inherited cancer risks.     She is closely followed with Upper GI endoscopy and colonoscopy which were negative.     3/11/24 Surgery: Total laparoscopic hysterectomy with bilateral salpingectomy, pelvic washings    Pathology: Benign Uterus, cervix and bilateral  fallopian tubes  Cytology - negative      OBGYN history and Health Maintenance:    Last Pap Smear: 2022 Negative, HPV negative    Past Medical History:   Diagnosis Date    MLH1-related Jean syndrome (HNPCC2) 2024    NO ACTIVE PROBLEMS        Past Surgical History:   Procedure Laterality Date    COLONOSCOPY  2012    Procedure:COLONOSCOPY; COLONOSCOPY; Surgeon:JAKOB PETERS; Location:[facility]    COLONOSCOPY      COLONOSCOPY N/A 2021    Procedure: COLONOSCOPY;  Surgeon: Joseph Flores MD;  Location: [facility]    ESOPHAGOSCOPY, GASTROSCOPY, DUODENOSCOPY (EGD), COMBINED N/A 2021    Procedure: ESOPHAGOGASTRODUODENOSCOPY (EGD);  Surgeon: Joseph Flores MD;  Location:  GI    LAPAROSCOPIC HYSTERECTOMY TOTAL, SALPINGECTOMY N/A 3/11/2024    Procedure: TOTAL LAPAROSCOPIC HYSTERECTOMY, WITH BILATERAL SALPINGECTOMY. PELVIC WASHINGS;  Surgeon: Elida Gutierres MD;  Location: [facility]    NO HISTORY OF SURGERY            Current Outpatient Medications   Medication Sig Dispense Refill    acetaminophen (TYLENOL) 325 MG tablet Take 2 tablets (650 mg) by mouth every 6 hours as needed for mild pain or fever 60 tablet 0    buPROPion (WELLBUTRIN XL) 150 MG 24 hr tablet Take 1 tablet (150 mg) by mouth every morning 90 tablet 1    ibuprofen (ADVIL/MOTRIN) 600 MG tablet Take 1 tablet (600 mg) by mouth every 6 hours as needed for other (mild and/or inflammatory pain.) 12 tablet 0    oxyCODONE (ROXICODONE) 5 MG tablet Take 1-2 tablets (5-10 mg) by mouth every 4 hours as needed for moderate to severe pain 6 tablet 0    senna-docusate (SENOKOT-S/PERICOLACE) 8.6-50 MG tablet Take 1-2 tablets by mouth 2 times daily 30 tablet 0    topiramate (TOPAMAX) 25 MG tablet 25mg at bedtime for week 1, 50mg at bedtime for 1 week, and 75mg at bedtime thereafter 90 tablet 1         No Known Allergies    Social History:  Social History     Tobacco Use    Smoking status: Former     Types: Cigarettes     Passive exposure: Past     Smokeless tobacco: Never    Tobacco comments:     half a pack a day   Substance Use Topics    Alcohol use: Yes     Comment: rarely         Family History:   The patient's family history is notable for   Family History   Problem Relation Age of Onset    Cancer - colorectal Mother         diagnosed at age 33 and  at age 34    Jean Syndrome Mother         MLH1    Colon Cancer Maternal Grandmother     Cancer - colorectal Maternal Grandmother 49         age 53    Diabetes Maternal Grandmother     Cancer Maternal Grandfather         pancreatic cancer    Colon Cancer Maternal Aunt 37    Jean Syndrome Maternal Aunt         MLH1    Colon Cancer Maternal Aunt 48    Jean Syndrome Maternal Aunt         MLH1         Physical Exam:     /84   Pulse 71   Temp 97.6  F (36.4  C) (Oral)   Resp 18   Wt 115.3 kg (254 lb 4.8 oz)   LMP 2024   SpO2 98%   BMI 39.83 kg/m    Body mass index is 39.83 kg/m .    General: Alert and oriented, no acute distress  Psych: Mood stable  GI: No distention. No masses. No hernia.   Incision: intact, no erythema      Assessment:  Katelyn Erickson is a 33 year old woman with a MLH1 mutation, Jean syndrome. She has completed childbearing.    She is now s/p total laparoscopic hysterectomy and bilateral salpingectomy. Pathology reviewed with no evidence of malignancy.     Post surgery recovery doing well without complications    Reported risk for ovarian cancer with MLH1 mutation is at 4-20% with recommended risk reduction bilateral oophorectomy at 40 - 45 yrs of age,  although this can vary based on patient's personal family history. Starting oral contraceptive is known to reduce risk of ovarian cancer.    There is currently no strong data that has shown regular pelvic US and  can prevent ovarian cancer, but can be associated with false positive test leading to surgical intervention. After discussion of options for ongoing follow up, Katelyn has decided to follow up with  annual Pelvic US with our NP clinic. This will be in coordinate with ongoing follow up with Oncology Risk Management Clinic.     Plan:     1.)   Follow up in 1 year NP clinic with Pelvic US. Can return sooner with any new concerns of abdominal bloating, pain or change in GI function.         Elida Gutierres M.D., MPH,  F.A.C.O.G.  Professor  Department of Ob/Gyn and Women's Health  Division of Gynecologic Oncology  [facility]/[employer] [place]  [phone]

## 2024-04-04 NOTE — NURSING NOTE
"Oncology Rooming Note    April 4, 2024 9:44 AM   Katelyn Erickson is a 33 year old female who presents for:    Chief Complaint   Patient presents with    Oncology Clinic Visit     Post op     Initial Vitals: /84   Pulse 71   Temp 97.6  F (36.4  C) (Oral)   Resp 18   Wt 115.3 kg (254 lb 4.8 oz)   LMP 02/22/2024   SpO2 98%   BMI 39.83 kg/m   Estimated body mass index is 39.83 kg/m  as calculated from the following:    Height as of 3/11/24: 1.702 m (5' 7\").    Weight as of this encounter: 115.3 kg (254 lb 4.8 oz). Body surface area is 2.33 meters squared.  No Pain (0) Comment: Data Unavailable   Patient's last menstrual period was 02/22/2024.  Allergies reviewed: Yes  Medications reviewed: Yes    Medications: Medication refills not needed today.  Pharmacy name entered into [facility]: [facility], [place] - [address]    Frailty Screening:   Is the patient here for a new oncology consult visit in cancer care? 2. No      Clinical concerns: None       Tatyana Dixon CMA            "

## 2024-04-04 NOTE — PATIENT INSTRUCTIONS
In one year  US and follow up with Gyn/Onc     Scheduling will reach out and assist with these appointments

## 2024-04-16 NOTE — PROGRESS NOTES
"Video-Visit Details    Type of service:  Video Visit    Video Start Time: 9:37 AM  Video End Time: 9:55 AM    Originating Location (pt. Location): Home    Distant Location (provider location): Offsite (providers home) [facility] [place]     [facility]    Return Bariatric Nutrition Consultation Note    Reason For Visit: Nutrition Assessment    Katelyn Erickson is a 33 year old presenting today for new bariatric nutrition consult.   Patient is interested in weight loss surgery with Dr. Hurley expected surgery in TBD.  Patient is accompanied by self.  This is patient's 3rd of 6 required nutrition visits prior to surgery. Patient attended the WLS nutrition class on 2/23/24.     Pt referred by Steffi Sánchez NP  on February 20, 2024.     CO-MORBIDITIES OF OBESITY INCLUDE:        2/15/2024     2:47 PM   --   I have the following health issues associated with obesity None of the above       SUPPORT:      2/15/2024     2:47 PM   Support System Reviewed With Patient   Who do you have in your support network that can be available to help you for the first 2 weeks after surgery?  and MIL   Who can you count on for support throughout your weight loss surgery journey?  and MIL     ANTHROPOMETRICS:  Initial consult weight: 254 lb on 2/20/24   Estimated body mass index is 38.3 kg/m  as calculated from the following:    Height as of this encounter: 1.721 m (5' 7.76\").    Weight as of this encounter: 113.4 kg (250 lb 1.6 oz).  Current Weight: 250 lb per pt report on [facility]     Required weight loss goal pre-op: -10 lbs from initial consult weight (goal weight 244 lbs or less before surgery)        2/15/2024     2:47 PM   --   I have tried the following methods to lose weight Watching portions or calories    Exercise    Weight Watchers           2/15/2024     2:47 PM   Weight Loss Questions Reviewed With Patient   How long have you been overweight? Since early " childhood     MEDICATION FOR WEIGHT LOSS: Topiramate     VITAMIN/MINERAL SUPPLEMENTS: None     NUTRITION HISTORY:  Food allergies: NKFA  Food intolerances: none   Previous experience with diet modification for weight loss: weight watchers and exercise   Barriers to lifestyle change: reports none     Per Steffi's note: Patient describes significant thoughts about food and cravings in the evenings with limited hunger during the day.     Patient herself is trying to eat less meat. Trying to increase her vegetables. No overnight eating    Recent Diet Recall:  Breakfast: coffee with Premier Protein   Lunch: 11 AM if at home, scrambled eggs with cottage and spinach with English muffin or toast. If goes into office will eat at 1 or 2 pm, Leftovers   Dinner: Tacos, spaghetti, chicken. Protein + vegetable + carb.   Snacks: After the kids go to bed - crackers and Nutella  Beverages: Water, soda seldomly.   Alcohol: None for the most part, seldomly   Dining Out: Not very often. Twice a month.     Patient recently had a total laparoscopic hysterectomy on 3/11/24.     April 2024: Downloaded the lolis lose it to track her nutrition.  Helps to monitor her protein amount. Plans to practice  liquids from meal times and chew her food really well to an applesauce consistency. Continues to work on tasklist items needed for surgery. Needs more clarification around if she needs the sleep medicine consult.         2/15/2024     2:47 PM   Recall Diet Questions Reviewed With Patient   Describe what you typically consume for breakfast (typical or most recent) Scrambled eggs english muffine   How many ounces of water, or other low calorie drinks, do you drink daily (8 oz=1 glass)? 64 oz or more   How many ounces of caffeine (coffee, tea, pop) do you drink daily (8 oz=1 glass)? 16 oz   How many ounces of carbonated (pop, beer, sparkling water) drinks do you drinky daily (8 oz=1 glass)? 0 oz   How many ounces of juice, pop, sweet tea,  sports drinks, protein drinks, other sweetened drinks, do you drink daily (8 oz=1 glass)? 0 oz   How many ounces of milk do you drink daily (8 oz=1 glass) 0 oz   How often do you drink alcohol? Monthly or less   If you do drink alcohol, how many drinks might you have in a day? (one drink = 5 oz. wine, 1 can/bottle of beer, 1 shot liquor) 1 or 2           2/15/2024     2:47 PM   Eating Habits   What foods do you crave? Sweets           2/15/2024     2:47 PM   Dining Out History Reviewed With Patient   How often do you dine out? Rarely.   Where do you dine out? (select all that apply) fast food chains   What types of food do you order when you dine out? Hamburger     EXERCISE: Walked everyday 1.5-2 miles prior to surgery. 5 week physical activity restriction. Can't lift more than 10 lbs.         2/15/2024     2:47 PM   --   How often do you exercise? Daily   What is the duration of your exercise (in minutes)? 30 Minutes   What types of exercise do you do? walking   What keeps you from being more active? 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   Patient has 3 kids ages 10, 7, 5.   Aunt had bariatric surgery.     NUTRITION DIAGNOSIS:  Obesity r/t long history of positive energy balance aeb BMI >30 kg/m2.    INTERVENTION:  Intervention Provided/Education Provided on post-op diet guidelines, vitamins/minerals essential post-operatively, GI anatomy of bariatric surgeries, ways to help prepare for post-op diet guidelines pre-operatively, portion/calorie-control, mindful eating and sources of protein.  Patient demonstrates understanding.     Personal barriers to making and continuing required life changes have been identified, and strategies to overcome those barriers have been recommended AND family and social supports have been  "assessed and strategies to strengthen those supports have been recommended.    Provided pt with list of goals, RD contact information and resources listed below via [employer].           2/15/2024     2:47 PM   Questions Reviewed With Patient   How ready are you to make changes regarding your weight? Number 1 = Not ready at all to make changes up to 10 = very ready. 7   How confident are you that you can change? 1 = Not confident that you will be successful making changes up to 10 = very confident. 7     Expected Engagement: good    GOALS:  Relating To Eating:  Eat slowly (20-30 minutes per meal), chewing foods well (25 chews per bite/applesauce consistency)  Eat 3 meals per day  Consume 60-90 g protein per day    Relating to beverages:  Reduce caffeine/carbonation/calorie containing beverages  Separate fluids from meals by 30 minutes before, during, and after eating  Drink 48-64 ounces of fluid per day    Relating to dietary supplements:  Start thinking about a post op multivitamin     Relating to activity:  Increase activity as able    The Plate Method  [email or web address]    Protein Sources for Weight Loss  [email or web address]     Quick/Easy Protein Sources:  Hard boiled eggs  Part-skim cheese sticks  Baby Bell cheese rounds  Low-fat/low-sugar Greek yogurt  Low-fat cottage cheese  Lean deli meat (chicken/turkey/ham)  Roasted chickpeas or lentils  Nuts   Turkey meat stick  Protein shake/bar  \"P3\" snack (cheese, nuts, deli meat)  Aldi's \"Protein Bread\"   \"Egglife\" egg white wrap    Tuna/salmon can/packet     Non-meat protein Ideas  Quinoa  Eggs  Dairy (Cottage cheese, low fat cheese, greek yogurt)   Nuts  Beans  Lentils  Protein pasta   Nutritional yeast  Garden of life raw meal powder  Liquid aminos  Homemade meats - a taco meat could be made with chopped cauliflower/mushroom as an example   Hummus (could do homemade if preferred)  Hemp hearts   Tofu  Seitan     Complex Carbohydrates high in " "protein  Quinoa  Brown Rice  Chick Pea  Buckwheat  Sweet Potatoes  Lentils  Legumes  Zhu Beans  Black Beans   Farro   Kidney Beans     Carbohydrates  [email or web address]     Mindful Eating  [email or web address]     Summary of Volumetrics Eating Plan  [email or web address]     Diet Guidelines after Weight Loss Surgery  [email or web address]     Seated Exercises for Arms and Legs (can be done before or after surgery)  [email or web address]    The 6 video series takes less than 30 minutes to view.   Go to the bottom of the page at this link for the \"[facility] Weight Loss Surgery Video Series: Your Clinical Weight Loss Journey\"       [email or web address]     Follow Up: May 21.     Time spent with patient: 18 minutes.  Tina Montenegro RD, LD    "

## 2024-04-17 ENCOUNTER — VIRTUAL VISIT (OUTPATIENT)
Dept: ENDOCRINOLOGY | Facility: CLINIC | Age: 34
End: 2024-04-17
Payer: COMMERCIAL

## 2024-04-17 VITALS — HEIGHT: 68 IN | WEIGHT: 250.1 LBS | BODY MASS INDEX: 37.9 KG/M2

## 2024-04-17 DIAGNOSIS — E66.812 CLASS 2 OBESITY DUE TO EXCESS CALORIES WITHOUT SERIOUS COMORBIDITY WITH BODY MASS INDEX (BMI) OF 39.0 TO 39.9 IN ADULT: ICD-10-CM

## 2024-04-17 DIAGNOSIS — Z71.3 NUTRITIONAL COUNSELING: Primary | ICD-10-CM

## 2024-04-17 DIAGNOSIS — E66.09 CLASS 2 OBESITY DUE TO EXCESS CALORIES WITHOUT SERIOUS COMORBIDITY WITH BODY MASS INDEX (BMI) OF 39.0 TO 39.9 IN ADULT: ICD-10-CM

## 2024-04-17 PROCEDURE — 97803 MED NUTRITION INDIV SUBSEQ: CPT | Mod: 95

## 2024-04-17 PROCEDURE — 99207 PR NO CHARGE LOS: CPT | Mod: 95

## 2024-04-17 ASSESSMENT — PAIN SCALES - GENERAL: PAINLEVEL: NO PAIN (0)

## 2024-04-17 NOTE — NURSING NOTE
Is the patient currently in the state of [place]? YES    Visit mode:VIDEO    If the visit is dropped, the patient can be reconnected by: VIDEO VISIT: Text to cell phone:   Telephone Information:   Mobile [phone]       Will anyone else be joining the visit? NO  (If patient encounters technical issues they should call [phone]    How would you like to obtain your AVS? MyChart    Are changes needed to the allergy or medication list? Pt stated no changes to allergies and Pt stated no med changes    Reason for visit: Follow Up    Camille CASTORENA

## 2024-04-17 NOTE — PATIENT INSTRUCTIONS
"Devon Zepeda,     Follow-up with RD on May 21.     Thank you,    Tina Montenegro, RD, LD  If you would like to schedule or reschedule an appointment with the RD, please call [phone]    Nutrition Goals  Relating To Eating:  Eat slowly (20-30 minutes per meal), chewing foods well (25 chews per bite/applesauce consistency)  Eat 3 meals per day  Consume 60-90 g protein per day    Relating to beverages:  Reduce caffeine/carbonation/calorie containing beverages  Separate fluids from meals by 30 minutes before, during, and after eating  Drink 48-64 ounces of fluid per day    Relating to dietary supplements:  Start thinking about a post op multivitamin     Relating to activity:  Increase activity as able    The Plate Method  [email or web address]    Protein Sources for Weight Loss  [email or web address]     Quick/Easy Protein Sources:  Hard boiled eggs  Part-skim cheese sticks  Baby Bell cheese rounds  Low-fat/low-sugar Greek yogurt  Low-fat cottage cheese  Lean deli meat (chicken/turkey/ham)  Roasted chickpeas or lentils  Nuts   Turkey meat stick  Protein shake/bar  \"P3\" snack (cheese, nuts, deli meat)  Aldi's \"Protein Bread\"   \"Egglife\" egg white wrap    Tuna/salmon can/packet     Non-meat protein Ideas  Quinoa  Eggs  Dairy (Cottage cheese, low fat cheese, greek yogurt)   Nuts  Beans  Lentils  Protein pasta   Nutritional yeast  Garden of life raw meal powder  Liquid aminos  Homemade meats - a taco meat could be made with chopped cauliflower/mushroom as an example   Hummus (could do homemade if preferred)  Hemp hearts   Tofu  Seitan     Complex Carbohydrates high in protein  Quinoa  Brown Rice  Chick Pea  Buckwheat  Sweet Potatoes  Lentils  Legumes  Zhu Beans  Black Beans   Farro   Kidney Beans     Carbohydrates  [email or web address]     Mindful Eating  [email or web address]     Summary of Volumetrics Eating Plan  [email or web address]     Diet Guidelines after Weight Loss " "Surgery  [email or web address]     Seated Exercises for Arms and Legs (can be done before or after surgery)  [email or web address]    The 6 video series takes less than 30 minutes to view.   Go to the bottom of the page at this link for the \"[facility] Weight Loss Surgery Video Series: Your Clinical Weight Loss Journey\"       [email or web address]     COMPREHENSIVE WEIGHT MANAGEMENT PROGRAM  VIRTUAL SUPPORT GROUPS    At [facility], our Comprehensive Weight Management program offers on-line support groups for patients who are working on weight loss and considering, preparing for, or have had weight loss surgery.     There is no cost for this opportunity.  You are invited to attend the?Virtual Support Groups?provided by any of the following locations:    [facility] via Microsoft Teams with Loree Armendariz RN  2.   [facility] via [employer] with Lj Waldron, PhD, LP  3.   [facility] via [employer] with Mary Jane Li RN  4.   [facility] via a Zoom Meeting with NATALIA Lennon    The following Support Group information can also be found on our website:  [email or web address]      [facility] Weight Loss Surgery Support Group  The support group is a patient-lead forum that meets monthly to share experiences, encouragement and education. It is open to those who have had weight loss surgery, are scheduled for surgery, or are considering surgery.   WHEN: This group meets on the 3rd Wednesday of each month from 5:00PM - 6:00PM virtually using Microsoft Teams.   FACILITATOR: Led by Loree Armendariz, RD, LD, RN, the program's Clinical Coordinator.   TO REGISTER: Please contact the clinic via [email or web address] or call the nurse line directly at [phone] to inform our staff that you would like an invite sent to you and to let us know the email you would like the invite sent to. " "Prior to the meeting, a link with directions on how to join the meeting will be sent to you.    2024 Meetings   January 17  February 21  March 20  April 17  May 15  Linda 19      [facility] Bariatric Care Support Group?  This is open to all pre- and post- operative bariatric surgery patients as well as their support system.   WHEN: This group meets the 3rd Tuesday of each month from 6:30 PM - 8:00 PM virtually using Microsoft Teams.   FACILITATOR: Led by Lj Waldron, Ph.D who is a Licensed Psychologist with the [facility] Comprehensive Weight Management Program.   TO REGISTER: Please send an email to Lj Waldron, Ph.D.,  at?[email or web address]?if you would like an invitation to the group. Prior to the meeting, a link with directions on how to join the meeting will be sent to you.    2024 Meetings January 16: \"Medication Management and Bariatric Surgery\", January Melton, PharmD, Pharmacy Resident at [facility]  February 20: \"A Bariatric Surgery Patient's Perspective\", ANGELA Olson, [facility], Behavioral Health Clinician at [facility]  March 19  April 16  May 21  Linda 18: \"Nutritional Labeling\", Dietitian speaker to be announced.  November 19: \"Holiday Eating\", Dietitian speaker to be announced.    [facility] Post-Operative Bariatric Surgery Support Group  This is a support group for [facility] bariatric patients (and those external to [facility]) who have had bariatric surgery and are at least 3 months post-surgery.  WHEN: This support group meets the 4th Wednesday of the month from 11:00 AM - 12:00 PM virtually using Microsoft Teams.   FACILITATOR: Led by Certified Bariatric Nurse, Mary Jane Li RN.   TO REGISTER: Please send an email to Mary Jane at [email or web address] if you would like an invitation to the group.  Prior to " "the meeting, a link with directions on how to join the meeting will be sent to you.    2024 Meetings  January 24  February 28  March 27  April 24  May 22  Linda 26    [facility] Healthy Lifestyle Group?  This is a 60 minute virtual coaching group for those who want to lead a healthier lifestyle. Come together to set goals and overcome barriers in a supportive group environment. We will address the four pillars of health: nutrition, exercise, sleep and emotional well-being.  This group is highly recommended for those who are participating in the 24 week Healthy Lifestyle Plan and our Health Coaching sessions.  WHEN: This group meets the 1st Friday of the month, 12:30 PM - 1:30 PM online, via a zoom meeting.    FACILITATOR: Led by National Board Certified Health and , Mary Jane Chambers [facility].   TO REGISTER: Please call the Call Center at [phone] to register. You will get an appointment to attend in [facility]. Fifteen minutes prior to the meeting, complete the e-check in and you will get the link to join the meeting.    There is no charge to attend this group and space is limited.     2024 Meetings  January 5: \"New Years Vision: Manifest your Best 2024!\" (guided imagery,  journaling and discussion)  February 2: \"Let's Talk\"  March 1: \"10 Percent Happier\" by Blu Agarwal (Book Bites - a guided discussion on the nuggets of wisdom from favorite wellness books, no need to read the book but highly encouraged)  April 5: \"Let's Talk\"  May 3: \"Essentialism: The Disciplined Pursuit of Less\" by Vivek Landers (Book Bites discussion)  June 7: \"Let's Talk\"  July 5: NO MEETING, off for the 4th of July Holiday  August 2: \"The Blue Zones, Secrets for Living a Longer Life\" by Blu Mccormick (Book Bites discussion)        "

## 2024-04-17 NOTE — LETTER
"4/17/2024       RE: Katelyn Erickson  [address]  [facility] [address]     Dear Colleague,    Thank you for referring your patient, Katelyn Erickson, to the [facility] [place] [facility]. Please see a copy of my visit note below.    Video-Visit Details    Type of service:  Video Visit    Video Start Time: 9:37 AM  Video End Time: 9:55 AM    Originating Location (pt. Location): Home    Distant Location (provider location): Offsite (providers home) [facility] [place]     Platform used for Video Visit: AmWell    Return Bariatric Nutrition Consultation Note    Reason For Visit: Nutrition Assessment    Katelyn Erickson is a 33 year old presenting today for new bariatric nutrition consult.   Patient is interested in weight loss surgery with Dr. Hurley expected surgery in TBD.  Patient is accompanied by self.  This is patient's 3rd of 6 required nutrition visits prior to surgery. Patient attended the [facility] nutrition class on 2/23/24.     Pt referred by Steffi Sánchez NP  on February 20, 2024.     CO-MORBIDITIES OF OBESITY INCLUDE:        2/15/2024     2:47 PM   --   I have the following health issues associated with obesity None of the above       SUPPORT:      2/15/2024     2:47 PM   Support System Reviewed With Patient   Who do you have in your support network that can be available to help you for the first 2 weeks after surgery?  and MIL   Who can you count on for support throughout your weight loss surgery journey?  and MIL     ANTHROPOMETRICS:  Initial consult weight: 254 lb on 2/20/24   Estimated body mass index is 38.3 kg/m  as calculated from the following:    Height as of this encounter: 1.721 m (5' 7.76\").    Weight as of this encounter: 113.4 kg (250 lb 1.6 oz).  Current Weight: 250 lb per pt report on [facility]     Required weight loss goal pre-op: -10 lbs from initial consult " weight (goal weight 244 lbs or less before surgery)        2/15/2024     2:47 PM   --   I have tried the following methods to lose weight Watching portions or calories    Exercise    Weight Watchers           2/15/2024     2:47 PM   Weight Loss Questions Reviewed With Patient   How long have you been overweight? Since early childhood     MEDICATION FOR WEIGHT LOSS: Topiramate     VITAMIN/MINERAL SUPPLEMENTS: None     NUTRITION HISTORY:  Food allergies: NKFA  Food intolerances: none   Previous experience with diet modification for weight loss: weight watchers and exercise   Barriers to lifestyle change: reports none     Per Steffi's note: Patient describes significant thoughts about food and cravings in the evenings with limited hunger during the day.     Patient herself is trying to eat less meat. Trying to increase her vegetables. No overnight eating    Recent Diet Recall:  Breakfast: coffee with Premier Protein   Lunch: 11 AM if at home, scrambled eggs with cottage and spinach with English muffin or toast. If goes into office will eat at 1 or 2 pm, Leftovers   Dinner: Tacos, spaghetti, chicken. Protein + vegetable + carb.   Snacks: After the kids go to bed - crackers and Nutella  Beverages: Water, soda seldomly.   Alcohol: None for the most part, seldomly   Dining Out: Not very often. Twice a month.     Patient recently had a total laparoscopic hysterectomy on 3/11/24.     April 2024: Downloaded the lolis lose it to track her nutrition.  Helps to monitor her protein amount. Plans to practice  liquids from meal times and chew her food really well to an applesauce consistency. Continues to work on tasklist items needed for surgery. Needs more clarification around if she needs the sleep medicine consult.         2/15/2024     2:47 PM   Recall Diet Questions Reviewed With Patient   Describe what you typically consume for breakfast (typical or most recent) Scrambled eggs english muffine   How many ounces of  water, or other low calorie drinks, do you drink daily (8 oz=1 glass)? 64 oz or more   How many ounces of caffeine (coffee, tea, pop) do you drink daily (8 oz=1 glass)? 16 oz   How many ounces of carbonated (pop, beer, sparkling water) drinks do you drinky daily (8 oz=1 glass)? 0 oz   How many ounces of juice, pop, sweet tea, sports drinks, protein drinks, other sweetened drinks, do you drink daily (8 oz=1 glass)? 0 oz   How many ounces of milk do you drink daily (8 oz=1 glass) 0 oz   How often do you drink alcohol? Monthly or less   If you do drink alcohol, how many drinks might you have in a day? (one drink = 5 oz. wine, 1 can/bottle of beer, 1 shot liquor) 1 or 2           2/15/2024     2:47 PM   Eating Habits   What foods do you crave? Sweets           2/15/2024     2:47 PM   Dining Out History Reviewed With Patient   How often do you dine out? Rarely.   Where do you dine out? (select all that apply) fast food chains   What types of food do you order when you dine out? Hamburger     EXERCISE: Walked everyday 1.5-2 miles prior to surgery. 5 week physical activity restriction. Can't lift more than 10 lbs.         2/15/2024     2:47 PM   --   How often do you exercise? Daily   What is the duration of your exercise (in minutes)? 30 Minutes   What types of exercise do you do? walking   What keeps you from being more active? 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   Patient has 3 kids ages 10, 7, 5.   Aunt had bariatric surgery.     NUTRITION DIAGNOSIS:  Obesity r/t long history of positive energy balance aeb BMI >30 kg/m2.    INTERVENTION:  Intervention Provided/Education Provided on post-op diet guidelines, vitamins/minerals essential post-operatively, GI anatomy of bariatric surgeries, ways to help prepare for post-op diet  "guidelines pre-operatively, portion/calorie-control, mindful eating and sources of protein.  Patient demonstrates understanding.     Personal barriers to making and continuing required life changes have been identified, and strategies to overcome those barriers have been recommended AND family and social supports have been assessed and strategies to strengthen those supports have been recommended.    Provided pt with list of goals, RD contact information and resources listed below via [employer].           2/15/2024     2:47 PM   Questions Reviewed With Patient   How ready are you to make changes regarding your weight? Number 1 = Not ready at all to make changes up to 10 = very ready. 7   How confident are you that you can change? 1 = Not confident that you will be successful making changes up to 10 = very confident. 7     Expected Engagement: good    GOALS:  Relating To Eating:  Eat slowly (20-30 minutes per meal), chewing foods well (25 chews per bite/applesauce consistency)  Eat 3 meals per day  Consume 60-90 g protein per day    Relating to beverages:  Reduce caffeine/carbonation/calorie containing beverages  Separate fluids from meals by 30 minutes before, during, and after eating  Drink 48-64 ounces of fluid per day    Relating to dietary supplements:  Start thinking about a post op multivitamin     Relating to activity:  Increase activity as able    The Plate Method  [email or web address]    Protein Sources for Weight Loss  [email or web address]     Quick/Easy Protein Sources:  Hard boiled eggs  Part-skim cheese sticks  Baby Bell cheese rounds  Low-fat/low-sugar Greek yogurt  Low-fat cottage cheese  Lean deli meat (chicken/turkey/ham)  Roasted chickpeas or lentils  Nuts   Turkey meat stick  Protein shake/bar  \"P3\" snack (cheese, nuts, deli meat)  Aldi's \"Protein Bread\"   \"Egglife\" egg white wrap    Tuna/salmon can/packet     Non-meat protein Ideas  Quinoa  Eggs  Dairy (Cottage cheese, low fat " "cheese, greek yogurt)   Nuts  Beans  Lentils  Protein pasta   Nutritional yeast  Garden of life raw meal powder  Liquid aminos  Homemade meats - a taco meat could be made with chopped cauliflower/mushroom as an example   Hummus (could do homemade if preferred)  Hemp hearts   Tofu  Seitan     Complex Carbohydrates high in protein  Quinoa  Brown Rice  Chick Pea  Buckwheat  Sweet Potatoes  Lentils  Legumes  Zhu Beans  Black Beans   Farro   Kidney Beans     Carbohydrates  [email or web address]     Mindful Eating  [email or web address]     Summary of Volumetrics Eating Plan  [email or web address]     Diet Guidelines after Weight Loss Surgery  [email or web address]     Seated Exercises for Arms and Legs (can be done before or after surgery)  [email or web address]    The 6 video series takes less than 30 minutes to view.   Go to the bottom of the page at this link for the \"[facility] Weight Loss Surgery Video Series: Your Clinical Weight Loss Journey\"       [email or web address]     Follow Up: May 21.     Time spent with patient: 18 minutes.  Tina Montenegro RD, OBI    "

## 2024-04-27 ENCOUNTER — HOSPITAL ENCOUNTER (EMERGENCY)
Facility: CLINIC | Age: 34
Discharge: HOME | End: 2024-04-27
Attending: EMERGENCY MEDICINE | Admitting: EMERGENCY MEDICINE
Payer: COMMERCIAL

## 2024-04-27 VITALS
HEIGHT: 68 IN | OXYGEN SATURATION: 98 % | RESPIRATION RATE: 16 BRPM | DIASTOLIC BLOOD PRESSURE: 84 MMHG | TEMPERATURE: 98 F | WEIGHT: 247.5 LBS | BODY MASS INDEX: 37.51 KG/M2 | HEART RATE: 75 BPM | SYSTOLIC BLOOD PRESSURE: 147 MMHG

## 2024-04-27 DIAGNOSIS — N81.6 RECTOCELE: ICD-10-CM

## 2024-04-27 LAB
HOLD SPECIMEN: NORMAL

## 2024-04-27 PROCEDURE — 99284 EMERGENCY DEPT VISIT MOD MDM: CPT | Performed by: EMERGENCY MEDICINE

## 2024-04-27 PROCEDURE — 99283 EMERGENCY DEPT VISIT LOW MDM: CPT | Performed by: EMERGENCY MEDICINE

## 2024-04-27 ASSESSMENT — COLUMBIA-SUICIDE SEVERITY RATING SCALE - C-SSRS
1. IN THE PAST MONTH, HAVE YOU WISHED YOU WERE DEAD OR WISHED YOU COULD GO TO SLEEP AND NOT WAKE UP?: NO
2. HAVE YOU ACTUALLY HAD ANY THOUGHTS OF KILLING YOURSELF IN THE PAST MONTH?: NO
6. HAVE YOU EVER DONE ANYTHING, STARTED TO DO ANYTHING, OR PREPARED TO DO ANYTHING TO END YOUR LIFE?: NO

## 2024-04-27 ASSESSMENT — ACTIVITIES OF DAILY LIVING (ADL): ADLS_ACUITY_SCORE: 35

## 2024-04-27 NOTE — DISCHARGE INSTRUCTIONS
Please make an appointment to follow up with OB/Gyn - [facility] (phone: [phone]) as scheduled.    Use a stool softener to minimize straining.

## 2024-04-27 NOTE — ED PROVIDER NOTES
[facility] EMERGENCY DEPARTMENT ([facility])    4/27/24       ED PROVIDER NOTE   ED 19  History     Chief Complaint   Patient presents with    Vaginal Problem     The history is provided by the patient and medical records.     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  She called the resident who told her to come in to have her vaginal cuff evaluated.      Patient states that since her surgery on 3/11, she has been experiencing a vaginal pressure/heaviness sensation.  In addition, she has been experiencing protrusion of vaginal tissue during her bowel movements.  The tissue is not yet prolapsing outside of the opening, but she states that she has been able to feel this internally and with her fingers.  States that this has been uncomfortable, but not painful.  Her symptoms have been progressively worsening for the past couple of days. No vaginal bleeding or discharge.    Patient states that she had a similar vaginal heaviness sensation after delivery of her child, but never to this extent.  Notes that this had improved after pelvic floor therapy.  She has never experienced similar tissue protrusion in the past.  In addition, patient states that her and her partner had attempted to resume sexual activity.  No pain or bleeding with this.  Patient denies recent fevers, pain with urination, vaginal discharge, or vaginal itching.     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 PELVIC WASHINGS;  Surgeon: Elida Gutierres MD;  Location: [facility] OR    NO HISTORY OF SURGERY       acetaminophen (TYLENOL) 325 MG tablet  buPROPion (WELLBUTRIN XL) 150 MG 24 hr tablet  ibuprofen (ADVIL/MOTRIN) 600 MG tablet  oxyCODONE (ROXICODONE) 5 MG tablet  senna-docusate (SENOKOT-S/PERICOLACE) 8.6-50 MG tablet  topiramate (TOPAMAX) 25 MG tablet      No Known Allergies  Family History  Family History   Problem Relation Age of Onset    Cancer - colorectal Mother         diagnosed at age 33 and  at age 34    Jean Syndrome Mother         MLH1    Colon Cancer Maternal Grandmother     Cancer - colorectal Maternal Grandmother 49         age 53    Diabetes Maternal Grandmother     Cancer Maternal Grandfather         pancreatic cancer    Colon Cancer Maternal Aunt 37    Jean Syndrome Maternal Aunt         MLH1    Colon Cancer Maternal Aunt 48    Jean Syndrome Maternal Aunt         MLH1     Social History   Social History     Tobacco Use    Smoking status: Former     Types: Cigarettes     Passive exposure: Past    Smokeless tobacco: Never    Tobacco comments:     half a pack a day   Vaping Use    Vaping status: Former   Substance Use Topics    Alcohol use: Yes     Comment: rarely    Drug use: Never      Past medical history, past surgical history, medications, allergies, family history, and social history were reviewed with the patient. No additional pertinent items.      A medically appropriate review of systems was performed with pertinent positives and negatives noted in the HPI, and all other systems negative.    Physical Exam   BP: (!)  "147/84  Pulse: 75  Temp: 98  F (36.7  C)  Resp: 16  Height: 172.1 cm (5' 7.75\")  Weight: 112.3 kg (247 lb 8 oz)  SpO2: 98 %  Physical Exam  Vitals and nursing note reviewed.   Constitutional:       General: She is not in acute distress.     Appearance: Normal appearance. She is well-developed. She is not ill-appearing or diaphoretic.   HENT:      Head: Normocephalic and atraumatic.      Nose: Nose normal.      Mouth/Throat:      Mouth: Mucous membranes are moist.   Eyes:      General: No scleral icterus.     Conjunctiva/sclera: Conjunctivae normal.   Cardiovascular:      Rate and Rhythm: Normal rate.   Pulmonary:      Effort: Pulmonary effort is normal. No respiratory distress.      Breath sounds: No stridor.   Abdominal:      General: There is no distension.   Musculoskeletal:         General: No deformity or signs of injury. Normal range of motion.      Cervical back: Normal range of motion and neck supple. No rigidity.   Skin:     General: Skin is warm and dry.      Coloration: Skin is not jaundiced or pale.   Neurological:      General: No focal deficit present.      Mental Status: She is alert and oriented to person, place, and time.   Psychiatric:         Mood and Affect: Mood normal.         Behavior: Behavior normal.           ED Course, Procedures, & Data    1:06 PM  The patient was seen and examined by Leah Medina   in Room ED19.     Procedures               Results for orders placed or performed during the hospital encounter of 04/27/24   [place] Draw     Status: None    Narrative    The following orders were created for panel order [facility] Draw.  Procedure                               Abnormality         Status                     ---------                               -----------         ------                     Extra Blue Top Tube[[id number]]                              Final result               Extra Red Top Tube[[id number]]                               Final result               Extra Green " Top (Lithium...[[id number]]                      Final result               Extra Purple Top Tube[[id number]]                            Final result                 Please view results for these tests on the individual orders.   Extra Blue Top Tube     Status: None   Result Value Ref Range    Hold Specimen JIC    Extra Red Top Tube     Status: None   Result Value Ref Range    Hold Specimen JIC    Extra Green Top (Lithium Heparin) Tube     Status: None   Result Value Ref Range    Hold Specimen JIC    Extra Purple Top Tube     Status: None   Result Value Ref Range    Hold Specimen JIC      Medications - No data to display  Labs Ordered and Resulted from Time of ED Arrival to Time of ED Departure - No data to display  No orders to display          Critical care was not performed.     Medical Decision Making  The patient's presentation was of moderate complexity (a chronic illness mild to moderate exacerbation, progression, or side effect of treatment).    The patient's evaluation involved:  review of external note(s) from 1 sources (see separate area of note for details)  discussion of management or test interpretation with another health professional (see separate area of note for details)    The patient's management necessitated only low risk treatment.    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    Ddx: rectocele, pelvic floor weakness, constipation    Gyn referred patient in for evaluation. They performed examination in ED and cleared her to discharge with outpatient follow up as scheduled. No further intervention today. They advised her to resume her bowel regiment with stool soften to avoid straining and diagnosed her with rectocele based on their examination findings.        I have reviewed the nursing notes. I have reviewed the findings, diagnosis, plan and need for follow up with the patient.    Discharge Medication List as of 4/27/2024  1:37 PM          Final diagnoses:   Rectocele   I, Adia Avendano, am serving as a trained medical scribe to document services personally performed by Leah Mednia MD, based on the provider's statements to me.     ILeah MD, was physically present and have reviewed and verified the accuracy of this note documented by Adia Avendano.      Leah Medina MD  [facility] EMERGENCY DEPARTMENT  4/27/2024     Leah Medina MD  04/27/24 [id number]

## 2024-04-29 NOTE — PROGRESS NOTES
[facility] History and Physical    Katelyn Erickson MRN# [id number]   Age: 33 year old YOB: 1990     Date of Admission:  4/27/2024    Primary care provider: Rose Phelps             Chief Complaint:   Vaginal pressure         History of Present Illness:   This patient is a 33 year old female with history of Jean syndrome status post risk-reducing surgery with a total abdominal hysterectomy and bilateral salpingectomy done on 3/11/2024.  The patient states that over the past week she has been feeling increasing vaginal pressure.  She notes that she particularly notices this when she is straining to use the restroom.  She states that sometimes she does not have any pressure at all and other times it feels quite significant.  She does endorse once again that this is most significant during Valsalva.  She states that she had this sensation once prior following the birth of her last child.  She denies any vaginal bleeding or abnormal discharge.  She states that she is never felt anything prolapsing from her vagina.  She does note that she feels increased pressure and she has tissue that bulges into her vagina during Valsalva. She palpated this on the posterior vaginal wall with her fingers. Not splinting to defecate. She notes that she and her partner were sexually active within the past week but did not have any penetrative intercourse.  She states that she has had nothing in the vagina since surgery.  She denies any fevers, chills, nausea, vomiting, abdominal pain.            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    NO ACTIVE PROBLEMS             Past Surgical History:      Past Surgical History:   Procedure Laterality Date    COLONOSCOPY  1/23/2012    Procedure:COLONOSCOPY; COLONOSCOPY; Surgeon:JAKOB PETERS; Location: [facility]    COLONOSCOPY      COLONOSCOPY N/A 9/8/2021    Procedure: COLONOSCOPY;  Surgeon: Joseph Flores  MD PALLAVI;  Location: [facility]    ESOPHAGOSCOPY, GASTROSCOPY, DUODENOSCOPY (EGD), COMBINED N/A 2021    Procedure: ESOPHAGOGASTRODUODENOSCOPY (EGD);  Surgeon: Joseph Flores MD;  Location: [facility]    LAPAROSCOPIC HYSTERECTOMY TOTAL, SALPINGECTOMY N/A 3/11/2024    Procedure: TOTAL LAPAROSCOPIC HYSTERECTOMY, WITH BILATERAL SALPINGECTOMY. PELVIC WASHINGS;  Surgeon: Elida Gutierres MD;  Location: [facility]    NO HISTORY OF SURGERY              Social History:     Social History     Tobacco Use    Smoking status: Former     Types: Cigarettes     Passive exposure: Past    Smokeless tobacco: Never    Tobacco comments:     half a pack a day   Substance Use Topics    Alcohol use: Yes     Comment: rarely            Family History:     Family History   Problem Relation Age of Onset    Cancer - colorectal Mother         diagnosed at age 33 and  at age 34    Jean Syndrome Mother         MLH1    Colon Cancer Maternal Grandmother     Cancer - colorectal Maternal Grandmother 49         age 53    Diabetes Maternal Grandmother     Cancer Maternal Grandfather         pancreatic cancer    Colon Cancer Maternal Aunt 37    Jean Syndrome Maternal Aunt         MLH1    Colon Cancer Maternal Aunt 48    Jean Syndrome Maternal Aunt         MLH1            Allergies:   No Known Allergies         Medications:     No current facility-administered medications for this encounter.     Current Outpatient Medications   Medication Sig Dispense Refill    acetaminophen (TYLENOL) 325 MG tablet Take 2 tablets (650 mg) by mouth every 6 hours as needed for mild pain or fever (Patient not taking: Reported on 2024) 60 tablet 0    buPROPion (WELLBUTRIN XL) 150 MG 24 hr tablet Take 1 tablet (150 mg) by mouth every morning 90 tablet 1    ibuprofen (ADVIL/MOTRIN) 600 MG tablet Take 1 tablet (600 mg) by mouth every 6 hours as needed for other (mild and/or inflammatory pain.) (Patient not taking: Reported on 2024) 12 tablet 0    oxyCODONE (ROXICODONE)  "5 MG tablet Take 1-2 tablets (5-10 mg) by mouth every 4 hours as needed for moderate to severe pain (Patient not taking: Reported on 4/4/2024) 6 tablet 0    senna-docusate (SENOKOT-S/PERICOLACE) 8.6-50 MG tablet Take 1-2 tablets by mouth 2 times daily (Patient not taking: Reported on 4/4/2024) 30 tablet 0    topiramate (TOPAMAX) 25 MG tablet 25mg at bedtime for week 1, 50mg at bedtime for 1 week, and 75mg at bedtime thereafter 90 tablet 1            Review of Systems:     ROS negative except per HPI         Physical Exam:     Vitals:    04/27/24 1237 04/27/24 1238 04/27/24 1309   BP: (!) 147/84     Pulse: 75     Resp:  16    Temp: 98  F (36.7  C)     TempSrc: Oral     SpO2: 98%     Weight:   112.3 kg (247 lb 8 oz)   Height:   1.721 m (5' 7.75\")     Constitutional: Healthy appearing female, obese, no acute distress  Cardiovascular: Regular rate and rhythm without murmurs  Respiratory: Clear to auscultation bilaterally without crackles or wheeze  Gastrointestinal: Abdomen soft, non-tender. No masses, organomegaly  Pelvic Exam: Normal external female genitalia.  On sterile speculum exam there is a scant amount of physiologic appearing discharge.  The sutures are visible at the vaginal cuff and the cuff appears to be intact.  There is no evidence of any atypical lesions or bleeding.  On Valsalva there is no prolapse of any material through the vaginal cuff.  On bimanual exam the vaginal cuff palpates intact as well with sutures present across the top of the cuff.  On Valsalva there is a rectocele that is present.            Assessment and Plan:   Assessment: 33 year old female s/p risk-reducing total abdominal hysterectomy bilateral salpingectomy on 3/11/2024 presenting with vaginal pressure and exam findings consistent with a rectocele.  I discussed with the patient that her exam today was consistent with some pelvic organ prolapse notably weakness at the posterior vaginal wall.  We discussed that there are treatment " options for rectocele which the patient can discuss with their benign OB/GYN who they plan to see this Friday.  We discussed that at this point in time the vaginal cuff appears intact though I would recommend that the patient continues with pelvic rest for the next 2 weeks until evaluated by their benign OB/GYN.  Recommend continuing with a bowel regimen at this time.    # S/p ADRIANA, BS  # Post op   # Rectocele  - Vaginal cuff intact, healing well  - Pelvic rest for an additional 2 weeks  - No lifting more than 15 pounds for a total of 8 weeks post op  - Continue bowel regimen   - Follow up with benign Ob/Gyn this Friday as schedule     Patient discussed with Dr. Belinda Jean MD  OBGYN PGY-4

## 2024-05-01 NOTE — PROGRESS NOTES
SUBJECTIVE:             I spent a total of 20 minutes on the care of Katelyn on the day of service including 15 minutes of face-to-face time with remainder in chart review, care coordination, dictation documentation on the day of service.                                          Katelyn Erickson is a 33 year old female who presents to clinic today for the following health issue(s):  Patient presents with:  Vaginal Problem: Pressure, ED visit on 4/27/24    Patient is a 33-year-old  who presented to the clinic for evaluation of potential pelvic relaxation.  She of note has had a hysterectomy at [facility] approximately 6 weeks ago.  She noticed some discomfort in the pelvis weeks after that but then also developed into a bulge that she is diagnosed by way of the emergency room with rectocele.    She has had history of other issues with pelvic relaxation in the past.  She has seen PT in the past for pelvic floor stability after the birth of one of her 3 children.  She has not used the pessary in the past.  She is requesting follow-up with physical therapy and also to consider the pessary usage.  She is also given a referral for urogynecology.    An ultrasound is pending as she follows of the typical parameters of when pelvic relaxation tends to affect people but she is postop from a recent surgery.  I suggest to her based on her symptoms that it be important to differentiate out whether this could possibly be a hematoma versus a rectocele.  She is agreeable to having the ultrasound.    Examination:  She is pleasant appropriate no apparent distress  External genitalia are normal  Vaginal mucosa is without lesion and normal discharge  Cervix is surgically excised  No signs of infection or inflammation or induration.  Was half speculum exam having patient strain does not promote a cystocele but there is a mild rectocele in the supine position.  There is no vaginal prolapse is noted.    Assessment probable  rectocele but will rule out hematoma with ultrasound.  Bimanual examination did not have the characteristics of what would be expected with a hematoma.  She is requesting PT as well as potentially a candidate for pessary.    Plan:  Referral to PT is pending suggest she hold off on that until she has had confirmation of normal ultrasound  Referral to urogyn in particular Dr. Maza  Pelvic ultrasound is ordered    Patient's last menstrual period was 2024..     Patient is not sexually active, .  Using hysterectomy for contraception.    reports that she has quit smoking. Her smoking use included cigarettes. She has been exposed to tobacco smoke. She has never used smokeless tobacco.    STD testing offered?  Declined    Health maintenance updated:  no    Today's PHQ-2 Score:       2024     9:07 AM   PHQ-2 (  [employer])   Q1: Little interest or pleasure in doing things 0   Q2: Feeling down, depressed or hopeless 0   PHQ-2 Score 0     Today's PHQ-9 Score:       2023     1:22 PM   PHQ-9 SCORE   PHQ-9 Total Score 2     Today's KEYLA-7 Score:       2023     1:24 PM   KEYLA-7 SCORE   Total Score 2       Problem list and histories reviewed & adjusted, as indicated.  Additional history: as documented.    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    FH: colon cancer in relative <50 years old    Gestational diabetes mellitus (GDM) affecting pregnancy, antepartum    Jean syndrome    Adenomatous polyp of colon, unspecified part of colon    Class 2 obesity due to excess calories without serious comorbidity with body mass index (BMI) of 39.0 to 39.9 in adult    Anxiety    Moderate episode of recurrent major depressive disorder (H)    Polyp of colon    NAFLD (nonalcoholic fatty liver disease)     Past Surgical History:   Procedure Laterality Date    COLONOSCOPY  2012    Procedure:COLONOSCOPY;  "COLONOSCOPY; Surgeon:JAKOB PETERS; Location:[facility]    COLONOSCOPY      COLONOSCOPY N/A 2021    Procedure: COLONOSCOPY;  Surgeon: Joseph Flores MD;  Location: [facility]    ESOPHAGOSCOPY, GASTROSCOPY, DUODENOSCOPY (EGD), COMBINED N/A 2021    Procedure: ESOPHAGOGASTRODUODENOSCOPY (EGD);  Surgeon: Joseph Flores MD;  Location: [facility]    LAPAROSCOPIC HYSTERECTOMY TOTAL, SALPINGECTOMY N/A 3/11/2024    Procedure: TOTAL LAPAROSCOPIC HYSTERECTOMY, WITH BILATERAL SALPINGECTOMY. PELVIC WASHINGS;  Surgeon: Elida Gutierres MD;  Location: [facility]    NO HISTORY OF SURGERY        Social History     Tobacco Use    Smoking status: Former     Types: Cigarettes     Passive exposure: Past    Smokeless tobacco: Never    Tobacco comments:     half a pack a day   Substance Use Topics    Alcohol use: Yes     Comment: rarely      Problem (# of Occurrences) Relation (Name,Age of Onset)    Cancer (1) Maternal Grandfather: pancreatic cancer    Diabetes (1) Maternal Grandmother (Mahsa)    Cancer - colorectal (2) Mother (Nilam): diagnosed at age 33 and  at age 34, Maternal Grandmother (Mahsa, 49):  age 53    Colon Cancer (3) Maternal Grandmother (Mahsa), Maternal Aunt (Aundrea, 37), Maternal Aunt (Josefina, 48)    Jean Syndrome (3) Mother (Nilam): MLH1, Maternal Aunt (Aundrea): MLH1, Maternal Aunt (Josefina): MLH1              Current Outpatient Medications   Medication Sig Dispense Refill    buPROPion (WELLBUTRIN XL) 150 MG 24 hr tablet Take 1 tablet (150 mg) by mouth every morning 90 tablet 1    senna-docusate (SENOKOT-S/PERICOLACE) 8.6-50 MG tablet Take 1-2 tablets by mouth 2 times daily 30 tablet 0    topiramate (TOPAMAX) 25 MG tablet 25mg at bedtime for week 1, 50mg at bedtime for 1 week, and 75mg at bedtime thereafter 90 tablet 1     No current facility-administered medications for this visit.     No Known Allergies        OBJECTIVE:     /74   Ht 1.721 m (5' 7.75\")   Wt 113.4 kg (250 lb)   LMP " 02/22/2024   BMI 38.29 kg/m    Body mass index is 38.29 kg/m .    Exam:  See above    In-Clinic Test Results:  No results found for this or any previous visit (from the past 24 hour(s)).    ASSESSMENT/PLAN:                                                      See above Caitlyn I am coming upstairs          Tho Roa MD  [facility]

## 2024-05-03 ENCOUNTER — OFFICE VISIT (OUTPATIENT)
Dept: OBGYN | Facility: CLINIC | Age: 34
End: 2024-05-03
Payer: COMMERCIAL

## 2024-05-03 VITALS
HEIGHT: 68 IN | WEIGHT: 250 LBS | BODY MASS INDEX: 37.89 KG/M2 | SYSTOLIC BLOOD PRESSURE: 120 MMHG | DIASTOLIC BLOOD PRESSURE: 74 MMHG

## 2024-05-03 DIAGNOSIS — G89.18 ACUTE POST-OPERATIVE PAIN: ICD-10-CM

## 2024-05-03 DIAGNOSIS — N81.89 PELVIC RELAXATION: Primary | ICD-10-CM

## 2024-05-03 PROCEDURE — 99213 OFFICE O/P EST LOW 20 MIN: CPT | Performed by: OBSTETRICS & GYNECOLOGY

## 2024-05-03 NOTE — NURSING NOTE
"Chief Complaint   Patient presents with    Vaginal Problem     Pressure, ED visit on 24       Initial /74   Ht 1.721 m (5' 7.75\")   Wt 113.4 kg (250 lb)   LMP 2024   BMI 38.29 kg/m   Estimated body mass index is 38.29 kg/m  as calculated from the following:    Height as of this encounter: 1.721 m (5' 7.75\").    Weight as of this encounter: 113.4 kg (250 lb).  BP completed using cuff size: large    Questioned patient about current smoking habits.  Pt. quit smoking some time ago.          The following HM Due: NONE    Mame Murray CMA on 5/3/2024 at 10:38 AM    "

## 2024-05-06 ENCOUNTER — TRANSFERRED RECORDS (OUTPATIENT)
Dept: HEALTH INFORMATION MANAGEMENT | Facility: CLINIC | Age: 34
End: 2024-05-06
Payer: COMMERCIAL

## 2024-05-06 DIAGNOSIS — E66.812 CLASS 2 OBESITY DUE TO EXCESS CALORIES WITHOUT SERIOUS COMORBIDITY WITH BODY MASS INDEX (BMI) OF 39.0 TO 39.9 IN ADULT: ICD-10-CM

## 2024-05-06 DIAGNOSIS — E66.09 CLASS 2 OBESITY DUE TO EXCESS CALORIES WITHOUT SERIOUS COMORBIDITY WITH BODY MASS INDEX (BMI) OF 39.0 TO 39.9 IN ADULT: ICD-10-CM

## 2024-05-06 RX ORDER — TOPIRAMATE 25 MG/1
TABLET, FILM COATED ORAL
Qty: 90 TABLET | Refills: 1 | Status: CANCELLED | OUTPATIENT
Start: 2024-05-06

## 2024-05-07 ENCOUNTER — ANCILLARY PROCEDURE (OUTPATIENT)
Dept: ULTRASOUND IMAGING | Facility: CLINIC | Age: 34
End: 2024-05-07
Attending: OBSTETRICS & GYNECOLOGY
Payer: COMMERCIAL

## 2024-05-07 DIAGNOSIS — G89.18 ACUTE POST-OPERATIVE PAIN: ICD-10-CM

## 2024-05-07 PROCEDURE — 76830 TRANSVAGINAL US NON-OB: CPT | Performed by: OBSTETRICS & GYNECOLOGY

## 2024-05-07 PROCEDURE — 76856 US EXAM PELVIC COMPLETE: CPT | Performed by: OBSTETRICS & GYNECOLOGY

## 2024-05-10 ENCOUNTER — MYC REFILL (OUTPATIENT)
Dept: ENDOCRINOLOGY | Facility: CLINIC | Age: 34
End: 2024-05-10
Payer: COMMERCIAL

## 2024-05-10 DIAGNOSIS — E66.812 CLASS 2 OBESITY DUE TO EXCESS CALORIES WITHOUT SERIOUS COMORBIDITY WITH BODY MASS INDEX (BMI) OF 39.0 TO 39.9 IN ADULT: ICD-10-CM

## 2024-05-10 DIAGNOSIS — E66.09 CLASS 2 OBESITY DUE TO EXCESS CALORIES WITHOUT SERIOUS COMORBIDITY WITH BODY MASS INDEX (BMI) OF 39.0 TO 39.9 IN ADULT: ICD-10-CM

## 2024-05-10 RX ORDER — TOPIRAMATE 25 MG/1
75 TABLET, FILM COATED ORAL AT BEDTIME
Qty: 90 TABLET | Refills: 0 | Status: SHIPPED | OUTPATIENT
Start: 2024-05-10 | End: 2024-06-04

## 2024-05-16 NOTE — TELEPHONE ENCOUNTER
topiramate (TOPAMAX) 25 MG tablet 90 tablet 0 5/10/2024     E-Prescribing Status: Receipt confirmed by pharmacy (5/10/2024  9:03 AM CDT)          [facility] - [id number]     Refilled in a different encounter    Gladys Albright RN  [facility] Red Flag Triage/MRT

## 2024-05-21 ENCOUNTER — VIRTUAL VISIT (OUTPATIENT)
Dept: ENDOCRINOLOGY | Facility: CLINIC | Age: 34
End: 2024-05-21
Payer: COMMERCIAL

## 2024-05-21 VITALS — HEIGHT: 68 IN | BODY MASS INDEX: 37.13 KG/M2 | WEIGHT: 245 LBS

## 2024-05-21 DIAGNOSIS — E66.812 CLASS 2 OBESITY DUE TO EXCESS CALORIES WITHOUT SERIOUS COMORBIDITY WITH BODY MASS INDEX (BMI) OF 39.0 TO 39.9 IN ADULT: ICD-10-CM

## 2024-05-21 DIAGNOSIS — Z71.3 NUTRITIONAL COUNSELING: Primary | ICD-10-CM

## 2024-05-21 DIAGNOSIS — E66.09 CLASS 2 OBESITY DUE TO EXCESS CALORIES WITHOUT SERIOUS COMORBIDITY WITH BODY MASS INDEX (BMI) OF 39.0 TO 39.9 IN ADULT: ICD-10-CM

## 2024-05-21 PROCEDURE — 99207 PR NO CHARGE LOS: CPT | Mod: 95

## 2024-05-21 PROCEDURE — 97803 MED NUTRITION INDIV SUBSEQ: CPT | Mod: 95

## 2024-05-21 ASSESSMENT — PAIN SCALES - GENERAL: PAINLEVEL: NO PAIN (0)

## 2024-05-21 NOTE — LETTER
"5/21/2024       RE: Katelyn Erickson  [address]  [place] [address]     Dear Colleague,    Thank you for referring your patient, Katelyn Erickson, to the [facility] [place] at [facility]. Please see a copy of my visit note below.    Video-Visit Details    Type of service:  Video Visit    Video Start Time: 12:56 PM  Video End Time: 1:11 PM    Originating Location (pt. Location): Home    Distant Location (provider location): Offsite (providers home) [facility] [place]     Platform used for Video Visit: AmWell    Return Bariatric Nutrition Consultation Note    Reason For Visit: Nutrition Assessment    Katelyn Erickson is a 33 year old presenting today for new bariatric nutrition consult.   Patient is interested in weight loss surgery with Dr. Hurley expected surgery in D.  Patient is accompanied by self.  This is patient's 4th of 6 required nutrition visits prior to surgery. Patient attended the [facility] nutrition class on 2/23/24.     Pt referred by Steffi Sánchez NP  on February 20, 2024.     CO-MORBIDITIES OF OBESITY INCLUDE:        2/15/2024     2:47 PM   --   I have the following health issues associated with obesity None of the above       SUPPORT:      2/15/2024     2:47 PM   Support System Reviewed With Patient   Who do you have in your support network that can be available to help you for the first 2 weeks after surgery?  and MIL   Who can you count on for support throughout your weight loss surgery journey?  and MIL     ANTHROPOMETRICS:  Initial consult weight: 254 lb on 2/20/24   Estimated body mass index is 37.53 kg/m  as calculated from the following:    Height as of this encounter: 1.721 m (5' 7.75\").    Weight as of this encounter: 111.1 kg (245 lb).  Current Weight: 245 lb per pt report on [facility]     Required weight loss goal pre-op: -10 lbs from initial consult weight " (goal weight 244 lbs or less before surgery)        2/15/2024     2:47 PM   --   I have tried the following methods to lose weight Watching portions or calories    Exercise    Weight Watchers           2/15/2024     2:47 PM   Weight Loss Questions Reviewed With Patient   How long have you been overweight? Since early childhood     MEDICATION FOR WEIGHT LOSS: Topiramate - no side effects.     VITAMIN/MINERAL SUPPLEMENTS: None     NUTRITION HISTORY:  Food allergies: NKFA  Food intolerances: none   Previous experience with diet modification for weight loss: weight watchers and exercise   Barriers to lifestyle change: reports none     Per Steffi's note: Patient describes significant thoughts about food and cravings in the evenings with limited hunger during the day.     Patient herself is trying to eat less meat. Trying to increase her vegetables. No overnight eating    Recent Diet Recall:  Breakfast: coffee with [employer]   Lunch: 11 AM if at home, scrambled eggs with cottage and spinach with English muffin or toast. If goes into office will eat at 1 or 2 pm, Leftovers   Dinner: Tacos, spaghetti, chicken. Protein + vegetable + carb.   Snacks: After the kids go to bed - crackers and Nutella  Beverages: Water, soda seldomly.   Alcohol: None for the most part, seldomly   Dining Out: Not very often. Twice a month.     Patient recently had a total laparoscopic hysterectomy on 3/11/24.     April 2024: Downloaded the [employer] it to track her nutrition.  Helps to monitor her protein amount. Plans to practice  liquids from meal times and chew her food really well to an applesauce consistency. Continues to work on tasklist items needed for surgery. Needs more clarification around if she needs the sleep medicine consult.     May 2024: Focusing more on fiber intake and is drinking a lot of water. For breakfast started incorporating overnight oats with protein and shaun seeds. Has been tracking nutrition in [employer]. Continues to practice  liquids from meal times. Has appointment with Steffi in June and psych visits in July.         2/15/2024     2:47 PM   Recall Diet Questions Reviewed With Patient   Describe what you typically consume for breakfast (typical or most recent) Scrambled eggs english muffine   How many ounces of water, or other low calorie drinks, do you drink daily (8 oz=1 glass)? 64 oz or more   How many ounces of caffeine (coffee, tea, pop) do you drink daily (8 oz=1 glass)? 16 oz   How many ounces of carbonated (pop, beer, sparkling water) drinks do you drinky daily (8 oz=1 glass)? 0 oz   How many ounces of juice, pop, sweet tea, sports drinks, protein drinks, other sweetened drinks, do you drink daily (8 oz=1 glass)? 0 oz   How many ounces of milk do you drink daily (8 oz=1 glass) 0 oz   How often do you drink alcohol? Monthly or less   If you do drink alcohol, how many drinks might you have in a day? (one drink = 5 oz. wine, 1 can/bottle of beer, 1 shot liquor) 1 or 2           2/15/2024     2:47 PM   Eating Habits   What foods do you crave? Sweets           2/15/2024     2:47 PM   Dining Out History Reviewed With Patient   How often do you dine out? Rarely.   Where do you dine out? (select all that apply) fast food chains   What types of food do you order when you dine out? Hamburger     EXERCISE: Walked everyday 1.5-2 miles prior to surgery. 5 week physical activity restriction. Can't lift more than 10 lbs.         2/15/2024     2:47 PM   --   How often do you exercise? Daily   What is the duration of your exercise (in minutes)? 30 Minutes   What types of exercise do you do? walking   What keeps you from being more active? 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   Patient has 3 kids ages 10, 7, 5.   Aunt  had bariatric surgery.     NUTRITION DIAGNOSIS:  Obesity r/t long history of positive energy balance aeb BMI >30 kg/m2.    INTERVENTION:  Intervention Provided/Education Provided on post-op diet guidelines, vitamins/minerals essential post-operatively, GI anatomy of bariatric surgeries, ways to help prepare for post-op diet guidelines pre-operatively, portion/calorie-control, mindful eating and sources of protein.  Patient demonstrates understanding.     Personal barriers to making and continuing required life changes have been identified, and strategies to overcome those barriers have been recommended AND family and social supports have been assessed and strategies to strengthen those supports have been recommended.    Provided pt with list of goals, RD contact information and resources listed below via [employer].           2/15/2024     2:47 PM   Questions Reviewed With Patient   How ready are you to make changes regarding your weight? Number 1 = Not ready at all to make changes up to 10 = very ready. 7   How confident are you that you can change? 1 = Not confident that you will be successful making changes up to 10 = very confident. 7     Expected Engagement: good    GOALS:  Relating To Eating:  Eat slowly (20-30 minutes per meal), chewing foods well (25 chews per bite/applesauce consistency)  Eat 3 meals per day  Consume 60-90 g protein per day    Relating to beverages:  Reduce caffeine/carbonation/calorie containing beverages  Separate fluids from meals by 30 minutes before, during, and after eating  Drink 48-64 ounces of fluid per day    Relating to dietary supplements:  Start thinking about a post op multivitamin     Relating to activity:  Increase activity as able    Follow Up: June 19.     Time spent with patient: 15 minutes.  Tina Montenegro RD, LD

## 2024-05-21 NOTE — NURSING NOTE
Is the patient currently in the state of [place]? YES    Visit mode:VIDEO    If the visit is dropped, the patient can be reconnected by: VIDEO VISIT: Text to cell phone:   Telephone Information:   Mobile [phone]       Will anyone else be joining the visit? NO  (If patient encounters technical issues they should call [phone]    How would you like to obtain your AVS? MyChart    Are changes needed to the allergy or medication list? N/A    Are refills needed on medications prescribed by this physician? NO     Reason for visit: RECHECK    Wt other than 24 hrs:    Pain more than one location:  no  Lara CASTORENA

## 2024-05-21 NOTE — PROGRESS NOTES
"Video-Visit Details    Type of service:  Video Visit    Video Start Time: 12:56 PM  Video End Time: 1:11 PM    Originating Location (pt. Location): Home    Distant Location (provider location): Offsite (providers home) [facility] [place]     Platform used for Video Visit: AmWell    Return Bariatric Nutrition Consultation Note    Reason For Visit: Nutrition Assessment    Katelyn Erickson is a 33 year old presenting today for new bariatric nutrition consult.   Patient is interested in weight loss surgery with Dr. Hurley expected surgery in TBD.  Patient is accompanied by self.  This is patient's 4th of 6 required nutrition visits prior to surgery. Patient attended the WLS nutrition class on 2/23/24.     Pt referred by Steffi Sánchez NP  on February 20, 2024.     CO-MORBIDITIES OF OBESITY INCLUDE:        2/15/2024     2:47 PM   --   I have the following health issues associated with obesity None of the above       SUPPORT:      2/15/2024     2:47 PM   Support System Reviewed With Patient   Who do you have in your support network that can be available to help you for the first 2 weeks after surgery?  and MIL   Who can you count on for support throughout your weight loss surgery journey?  and MIL     ANTHROPOMETRICS:  Initial consult weight: 254 lb on 2/20/24   Estimated body mass index is 37.53 kg/m  as calculated from the following:    Height as of this encounter: 1.721 m (5' 7.75\").    Weight as of this encounter: 111.1 kg (245 lb).  Current Weight: 245 lb per pt [facility] on [facility]     Required weight loss goal pre-op: -10 lbs from initial consult weight (goal weight 244 lbs or less before surgery)        2/15/2024     2:47 PM   --   I have tried the following methods to lose weight Watching portions or calories    Exercise    Weight Watchers           2/15/2024     2:47 PM   Weight Loss Questions Reviewed With Patient   How long have you been overweight? Since early " childhood     MEDICATION FOR WEIGHT LOSS: Topiramate - no side effects.     VITAMIN/MINERAL SUPPLEMENTS: None     NUTRITION HISTORY:  Food allergies: NKFA  Food intolerances: none   Previous experience with diet modification for weight loss: weight watchers and exercise   Barriers to lifestyle change: reports none     Per Steffi's note: Patient describes significant thoughts about food and cravings in the evenings with limited hunger during the day.     Patient herself is trying to eat less meat. Trying to increase her vegetables. No overnight eating    Recent Diet Recall:  Breakfast: coffee with Premier Protein   Lunch: 11 AM if at home, scrambled eggs with cottage and spinach with English muffin or toast. If goes into office will eat at 1 or 2 pm, Leftovers   Dinner: Tacos, spaghetti, chicken. Protein + vegetable + carb.   Snacks: After the kids go to bed - crackers and Nutella  Beverages: Water, soda seldomly.   Alcohol: None for the most part, seldomly   Dining Out: Not very often. Twice a month.     Patient recently had a total laparoscopic hysterectomy on 3/11/24.     April 2024: Downloaded the [employer] to track her nutrition.  Helps to monitor her protein amount. Plans to practice  liquids from meal times and chew her food really well to an applesauce consistency. Continues to work on tasklist items needed for surgery. Needs more clarification around if she needs the sleep medicine consult.     May 2024: Focusing more on fiber intake and is drinking a lot of water. For breakfast started incorporating overnight oats with protein and shaun seeds. Has been tracking nutrition in [employer]. Continues to practice  liquids from meal times. Has appointment with Steffi in June and psych visits in July.         2/15/2024     2:47 PM   Recall Diet Questions Reviewed With Patient   Describe what you typically consume for breakfast (typical or most recent) Scrambled eggs english muffine   How many  ounces of water, or other low calorie drinks, do you drink daily (8 oz=1 glass)? 64 oz or more   How many ounces of caffeine (coffee, tea, pop) do you drink daily (8 oz=1 glass)? 16 oz   How many ounces of carbonated (pop, beer, sparkling water) drinks do you drinky daily (8 oz=1 glass)? 0 oz   How many ounces of juice, pop, sweet tea, sports drinks, protein drinks, other sweetened drinks, do you drink daily (8 oz=1 glass)? 0 oz   How many ounces of milk do you drink daily (8 oz=1 glass) 0 oz   How often do you drink alcohol? Monthly or less   If you do drink alcohol, how many drinks might you have in a day? (one drink = 5 oz. wine, 1 can/bottle of beer, 1 shot liquor) 1 or 2           2/15/2024     2:47 PM   Eating Habits   What foods do you crave? Sweets           2/15/2024     2:47 PM   Dining Out History Reviewed With Patient   How often do you dine out? Rarely.   Where do you dine out? (select all that apply) fast food chains   What types of food do you order when you dine out? Hamburger     EXERCISE: Walked everyday 1.5-2 miles prior to surgery. 5 week physical activity restriction. Can't lift more than 10 lbs.         2/15/2024     2:47 PM   --   How often do you exercise? Daily   What is the duration of your exercise (in minutes)? 30 Minutes   What types of exercise do you do? walking   What keeps you from being more active? 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   Patient has 3 kids ages 10, 7, 5.   Aunt had bariatric surgery.     NUTRITION DIAGNOSIS:  Obesity r/t long history of positive energy balance aeb BMI >30 kg/m2.    INTERVENTION:  Intervention Provided/Education Provided on post-op diet guidelines, vitamins/minerals essential post-operatively, GI anatomy of bariatric surgeries, ways to help prepare for  post-op diet guidelines pre-operatively, portion/calorie-control, mindful eating and sources of protein.  Patient demonstrates understanding.     Personal barriers to making and continuing required life changes have been identified, and strategies to overcome those barriers have been recommended AND family and social supports have been assessed and strategies to strengthen those supports have been recommended.    Provided pt with list of goals, RD contact information and resources listed below via [employer].           2/15/2024     2:47 PM   Questions Reviewed With Patient   How ready are you to make changes regarding your weight? Number 1 = Not ready at all to make changes up to 10 = very ready. 7   How confident are you that you can change? 1 = Not confident that you will be successful making changes up to 10 = very confident. 7     Expected Engagement: good    GOALS:  Relating To Eating:  Eat slowly (20-30 minutes per meal), chewing foods well (25 chews per bite/applesauce consistency)  Eat 3 meals per day  Consume 60-90 g protein per day    Relating to beverages:  Reduce caffeine/carbonation/calorie containing beverages  Separate fluids from meals by 30 minutes before, during, and after eating  Drink 48-64 ounces of fluid per day    Relating to dietary supplements:  Start thinking about a post op multivitamin     Relating to activity:  Increase activity as able    Follow Up: June 19.     Time spent with patient: 15 minutes.  Tina Montenegro RD, LD

## 2024-05-22 NOTE — PATIENT INSTRUCTIONS
Devon Zepeda,     Follow-up with RD on June 19.     Thank you,    Tina Montenegro, PABLO, LD  If you would like to schedule or reschedule an appointment with the RD, please call [phone]    Nutrition Goals  Relating To Eating:  Eat slowly (20-30 minutes per meal), chewing foods well (25 chews per bite/applesauce consistency)  Eat 3 meals per day  Consume 60-90 g protein per day    Relating to beverages:  Reduce caffeine/carbonation/calorie containing beverages  Separate fluids from meals by 30 minutes before, during, and after eating  Drink 48-64 ounces of fluid per day    Relating to dietary supplements:  Start thinking about a post op multivitamin     Relating to activity:  Increase activity as able    COMPREHENSIVE WEIGHT MANAGEMENT PROGRAM  VIRTUAL SUPPORT GROUPS    At [facility], our Comprehensive Weight Management program offers on-line support groups for patients who are working on weight loss and considering, preparing for, or have had weight loss surgery.     There is no cost for this opportunity.  You are invited to attend the?Virtual Support Groups?provided by any of the following locations:    [facility] via [employer] Teams with Loree Armendariz RN  2.   [facility] via [employer] Teams with Lj Waldron, PhD, LP  3.   [place] via [employer] with Mary Jane Li RN  4.   [facility] via a Zoom Meeting with NATALIA Lnenon    The following Support Group information can also be found on our website:  [email or web address]      [facility] Weight Loss Surgery Support Group  The support group is a patient-lead forum that meets monthly to share experiences, encouragement and education. It is open to those who have had weight loss surgery, are scheduled for surgery, or are considering surgery.   WHEN: This group meets on the 3rd Wednesday of each month from 5:00PM - 6:00PM virtually using [employer]  "Teams.   FACILITATOR: Led by Loree Armendariz RD, LD, RN, the program's Clinical Coordinator.   TO REGISTER: Please contact the clinic via [employer] or call the nurse line directly at [phone] to inform our staff that you would like an invite sent to you and to let us know the email you would like the invite sent to. Prior to the meeting, a link with directions on how to join the meeting will be sent to you.    2024 Meetings   January 17  February 21  March 20  April 17  May 15  Linda 19      [facility] Bariatric Care Support Group?  This is open to all pre- and post- operative bariatric surgery patients as well as their support system.   WHEN: This group meets the 3rd Tuesday of each month from 6:30 PM - 8:00 PM virtually using Microsoft Teams.   FACILITATOR: Led by Lj Waldron, Ph.D who is a Licensed Psychologist with the [facility] Comprehensive Weight Management Program.   TO REGISTER: Please send an email to Lj Waldron, Ph.D., LP at?[email or web address]?if you would like an invitation to the group. Prior to the meeting, a link with directions on how to join the meeting will be sent to you.    2024 Meetings January 16: \"Medication Management and Bariatric Surgery\", January Melton, PharmD, Pharmacy Resident at [facility]  February 20: \"A Bariatric Surgery Patient's Perspective\", ANGELA Olson, [facility], Behavioral Health Clinician at [facility]  March 19  April 16  May 21  Linda 18: \"Nutritional Labeling\", Dietitian speaker to be announced.  November 19: \"Holiday Eating\", Dietitian speaker to be announced.    [facility] Post-Operative Bariatric Surgery Support Group  This is a support group for [facility] bariatric patients (and those external to [facility]) who have had bariatric surgery and are at least 3 months " "post-surgery.  WHEN: This support group meets the 4th Wednesday of the month from 11:00 AM - 12:00 PM virtually using Microsoft Teams.   FACILITATOR: Led by Certified Bariatric Nurse, Mary Jane Li RN.   TO REGISTER: Please send an email to Mary Jane at [email or web address] if you would like an invitation to the group.  Prior to the meeting, a link with directions on how to join the meeting will be sent to you.    2024 Meetings  January 24  February 28  March 27  April 24  May 22  Linda 26    [facility] Healthy Lifestyle Group?  This is a 60 minute virtual coaching group for those who want to lead a healthier lifestyle. Come together to set goals and overcome barriers in a supportive group environment. We will address the four pillars of health: nutrition, exercise, sleep and emotional well-being.  This group is highly recommended for those who are participating in the 24 week Healthy Lifestyle Plan and our Health Coaching sessions.  WHEN: This group meets the 1st Friday of the month, 12:30 PM - 1:30 PM online, via a zoom meeting.    FACILITATOR: Led by National Board Certified Health and , aMry Jane Chambers, [facility].   TO REGISTER: Please call the Call Center at [phone] to register. You will get an appointment to attend in [facility]. Fifteen minutes prior to the meeting, complete the e-check in and you will get the link to join the meeting.    There is no charge to attend this group and space is limited.     2024 Meetings January 5: \"New Years Vision: Manifest your Best 2024!\" (guided imagery,  journaling and discussion)  February 2: \"Let's Talk\"  March 1: \"10 Percent Happier\" by Blu Agarwal (Book Bites - a guided discussion on the nuggets of wisdom from favorite wellness books, no need to read the book but highly encouraged)  April 5: \"Let's Talk\"  May 3: \"Essentialism: The Disciplined Pursuit of Less\" by Vivek Landers (Book Bites discussion)  June 7: \"Let's " "Talk\"  July 5: NO MEETING, off for the 4th of July Holiday  August 2: \"The Blue Zones, Secrets for Living a Longer Life\" by Blu Mccormick (Book Bites discussion)        "

## 2024-05-29 ENCOUNTER — OFFICE VISIT (OUTPATIENT)
Dept: URGENT CARE | Facility: URGENT CARE | Age: 34
End: 2024-05-29
Payer: COMMERCIAL

## 2024-05-29 VITALS
OXYGEN SATURATION: 100 % | HEART RATE: 86 BPM | TEMPERATURE: 99.2 F | WEIGHT: 245 LBS | DIASTOLIC BLOOD PRESSURE: 80 MMHG | BODY MASS INDEX: 37.53 KG/M2 | SYSTOLIC BLOOD PRESSURE: 128 MMHG

## 2024-05-29 DIAGNOSIS — R30.0 DYSURIA: Primary | ICD-10-CM

## 2024-05-29 DIAGNOSIS — R31.0 GROSS HEMATURIA: ICD-10-CM

## 2024-05-29 DIAGNOSIS — Z11.3 ROUTINE SCREENING FOR STI (SEXUALLY TRANSMITTED INFECTION): ICD-10-CM

## 2024-05-29 LAB
ALBUMIN UR-MCNC: NEGATIVE MG/DL
APPEARANCE UR: CLEAR
BACTERIA #/AREA URNS HPF: ABNORMAL /HPF
BILIRUB UR QL STRIP: NEGATIVE
CLUE CELLS: NORMAL
COLOR UR AUTO: YELLOW
GLUCOSE UR STRIP-MCNC: NEGATIVE MG/DL
HGB UR QL STRIP: ABNORMAL
KETONES UR STRIP-MCNC: NEGATIVE MG/DL
LEUKOCYTE ESTERASE UR QL STRIP: ABNORMAL
NITRATE UR QL: NEGATIVE
PH UR STRIP: 7 [PH] (ref 5–7)
RBC #/AREA URNS AUTO: ABNORMAL /HPF
SP GR UR STRIP: <=1.005 (ref 1–1.03)
SQUAMOUS #/AREA URNS AUTO: ABNORMAL /LPF
T PALLIDUM AB SER QL: NONREACTIVE
TRICHOMONAS, WET PREP: NORMAL
UROBILINOGEN UR STRIP-ACNC: 0.2 E.U./DL
WBC #/AREA URNS AUTO: ABNORMAL /HPF
WBC'S/HIGH POWER FIELD, WET PREP: NORMAL
YEAST, WET PREP: NORMAL

## 2024-05-29 PROCEDURE — 87491 CHLMYD TRACH DNA AMP PROBE: CPT | Performed by: FAMILY MEDICINE

## 2024-05-29 PROCEDURE — 81001 URINALYSIS AUTO W/SCOPE: CPT

## 2024-05-29 PROCEDURE — 87389 HIV-1 AG W/HIV-1&-2 AB AG IA: CPT | Performed by: FAMILY MEDICINE

## 2024-05-29 PROCEDURE — 36415 COLL VENOUS BLD VENIPUNCTURE: CPT | Performed by: FAMILY MEDICINE

## 2024-05-29 PROCEDURE — 87210 SMEAR WET MOUNT SALINE/INK: CPT | Performed by: FAMILY MEDICINE

## 2024-05-29 PROCEDURE — 99214 OFFICE O/P EST MOD 30 MIN: CPT | Performed by: FAMILY MEDICINE

## 2024-05-29 PROCEDURE — 86780 TREPONEMA PALLIDUM: CPT | Performed by: FAMILY MEDICINE

## 2024-05-29 PROCEDURE — 87591 N.GONORRHOEAE DNA AMP PROB: CPT | Performed by: FAMILY MEDICINE

## 2024-05-29 PROCEDURE — 86803 HEPATITIS C AB TEST: CPT | Performed by: FAMILY MEDICINE

## 2024-05-29 RX ORDER — NITROFURANTOIN 25; 75 MG/1; MG/1
100 CAPSULE ORAL 2 TIMES DAILY
Qty: 10 CAPSULE | Refills: 0 | Status: SHIPPED | OUTPATIENT
Start: 2024-05-29 | End: 2024-06-03

## 2024-05-29 NOTE — PROGRESS NOTES
URGENT CARE VISIT:    ASSESSMENT AND PLAN:      ICD-10-CM    1. Dysuria  R30.0 UA Macroscopic with reflex to Microscopic and Culture - Lab Collect     Wet prep - lab collect     UA Macroscopic with reflex to Microscopic and Culture - Lab Collect     UA Microscopic with Reflex to Culture      2. Gross hematuria  R31.0       3. Routine screening for STI (sexually transmitted infection)  Z11.3 Treponema Abs w Reflex to RPR and Titer     Hepatitis C antibody     HIV Antigen Antibody Combo Cascade     Chlamydia trachomatis/Neisseria gonorrhoeae by PCR          Differential Diagnosis include but not limited to UTI, Dysuria, Pyelonephritis, Kidney Stone, Vaginitis, and STD.  Wet is negative.  UA showing mild WBC but with symptoms we will go ahead and treat with nitrofurantoin twice daily for 5 days; finishing full course and use of probiotics was discussed with patient.  Supportive and OTC measures outlined in AVS and discussed with patient.  STI testing completed and if positive results will be notified via [employer].     Red flag symptoms needing urgent evaluation discussed and printed off.    Follow up with primary care provider with any problems, questions or concerns or if symptoms worsen or fail to improve. Patient verbalized understanding and is agreeable to plan. The patient was discharged ambulatory and in stable condition.          SUBJECTIVE:   Katelyn Erickson is a 33 year old female who presents today for a possible UTI. Symptoms of dysuria, urgency, frequency, and suprapubic pain and pressure have been going on for 3 day(s). Symptoms were intermittent episodes and moderate.  Patient denies nausea, vomiting, fever, and chills or vaginal symptoms. This patient does not have a history of urinary tract infections.  Concern for STI and would like to be tested today.     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   Medications:   Current Outpatient Medications   Medication Sig Dispense Refill    buPROPion (WELLBUTRIN XL) 150 MG 24 hr tablet Take 1 tablet (150 mg) by mouth every morning 90 tablet 1    senna-docusate (SENOKOT-S/PERICOLACE) 8.6-50 MG tablet Take 1-2 tablets by mouth 2 times daily 30 tablet 0    topiramate (TOPAMAX) 25 MG tablet Take 3 tablets (75 mg) by mouth at bedtime 90 tablet 0     Social History:   Social History     Tobacco Use    Smoking status: Former     Types: Cigarettes     Passive exposure: Past    Smokeless tobacco: Never    Tobacco comments:     half a pack a day   Substance Use Topics    Alcohol use: Yes     Comment: rarely       ROS:  Review of systems negative except as stated above.    OBJECTIVE:  /80   Pulse 86   Temp 99.2  F (37.3  C) (Tympanic)   Wt 111.1 kg (245 lb)   LMP 02/22/2024   SpO2 100%   BMI 37.53 kg/m      GENERAL APPEARANCE: healthy, alert and no distress  EYES: EOMI,  PERRL, conjunctiva clear  HENT: ear canals and TM's normal.  Nose and mouth without ulcers, erythema or lesions  NECK: supple, nontender, no lymphadenopathy  RESP: lungs clear to auscultation - no rales, rhonchi or wheezes  CV: regular rates and rhythm, normal S1 S2, no murmur noted  ABDOMEN:  soft, nontender, no HSM or masses.     SKIN: no suspicious lesions or rashes  Back: negative CVA tenderness    Labs:        Results for orders placed or performed in visit on 05/29/24 (from the past 24 hour(s))   UA Macroscopic with reflex to Microscopic and Culture - Lab Collect    Specimen: Urine, Clean Catch   Result Value Ref Range    Color Urine Yellow Colorless, Straw, Light Yellow, Yellow    Appearance Urine Clear Clear    Glucose Urine Negative Negative mg/dL    Bilirubin Urine Negative Negative    Ketones Urine Negative Negative mg/dL    Specific Gravity Urine <=1.005 1.003 - 1.035    Blood Urine Large (A) Negative    pH Urine 7.0 5.0 - 7.0    Protein Albumin Urine  Negative Negative mg/dL    Urobilinogen Urine 0.2 0.2, 1.0 E.U./dL    Nitrite Urine Negative Negative    Leukocyte Esterase Urine Small (A) Negative   UA Microscopic with Reflex to Culture   Result Value Ref Range    Bacteria Urine Few (A) None Seen /HPF    RBC Urine 0-2 0-2 /HPF /HPF    WBC Urine 5-10 (A) 0-5 /HPF /HPF    Squamous Epithelials Urine Few (A) None Seen /LPF    Narrative    Urine Culture not indicated   Wet prep - lab collect    Specimen: Vagina; Swab   Result Value Ref Range    Trichomonas Absent Absent    Yeast Absent Absent    Clue Cells Absent Absent    WBCs/high power field None None

## 2024-05-29 NOTE — PATIENT INSTRUCTIONS
Patient Instructions     1. Increase fluid intake  2. Add probiotic to prevent GI side effects from antibiotic if prescribed.   3. Complete antibiotic regimen if prescribed. You will be notified if the treatment plan needs to be changed based on the urine culture results.   4. For the discomfort: you may take an over the counter medication called pyridium (AZO) up three times daily for up to 2 days.  5. Follow up if you have a fever of 100.4 F (38 C) or higher, no improvement after three days of treatment, trouble urinating because of pain, new or increased discharge from the vagina, rash or joint pain, and increased back or abdominal pain.

## 2024-05-30 LAB
C TRACH DNA SPEC QL PROBE+SIG AMP: NEGATIVE
HCV AB SERPL QL IA: NONREACTIVE
HIV 1+2 AB+HIV1 P24 AG SERPL QL IA: NONREACTIVE
N GONORRHOEA DNA SPEC QL NAA+PROBE: NEGATIVE

## 2024-06-04 ENCOUNTER — MYC REFILL (OUTPATIENT)
Dept: FAMILY MEDICINE | Facility: CLINIC | Age: 34
End: 2024-06-04

## 2024-06-04 ENCOUNTER — OFFICE VISIT (OUTPATIENT)
Dept: ENDOCRINOLOGY | Facility: CLINIC | Age: 34
End: 2024-06-04
Payer: COMMERCIAL

## 2024-06-04 VITALS
HEART RATE: 80 BPM | SYSTOLIC BLOOD PRESSURE: 126 MMHG | HEIGHT: 68 IN | WEIGHT: 244 LBS | TEMPERATURE: 98.9 F | OXYGEN SATURATION: 98 % | BODY MASS INDEX: 36.98 KG/M2 | DIASTOLIC BLOOD PRESSURE: 84 MMHG

## 2024-06-04 DIAGNOSIS — E66.812 CLASS 2 OBESITY DUE TO EXCESS CALORIES WITHOUT SERIOUS COMORBIDITY WITH BODY MASS INDEX (BMI) OF 39.0 TO 39.9 IN ADULT: Primary | ICD-10-CM

## 2024-06-04 DIAGNOSIS — F41.9 ANXIETY: ICD-10-CM

## 2024-06-04 DIAGNOSIS — E66.09 CLASS 2 OBESITY DUE TO EXCESS CALORIES WITHOUT SERIOUS COMORBIDITY WITH BODY MASS INDEX (BMI) OF 39.0 TO 39.9 IN ADULT: Primary | ICD-10-CM

## 2024-06-04 DIAGNOSIS — F33.1 MODERATE EPISODE OF RECURRENT MAJOR DEPRESSIVE DISORDER (H): ICD-10-CM

## 2024-06-04 DIAGNOSIS — K76.0 NAFLD (NONALCOHOLIC FATTY LIVER DISEASE): ICD-10-CM

## 2024-06-04 PROCEDURE — G2211 COMPLEX E/M VISIT ADD ON: HCPCS | Performed by: NURSE PRACTITIONER

## 2024-06-04 PROCEDURE — 99214 OFFICE O/P EST MOD 30 MIN: CPT | Performed by: NURSE PRACTITIONER

## 2024-06-04 RX ORDER — TOPIRAMATE 25 MG/1
75 TABLET, FILM COATED ORAL AT BEDTIME
Qty: 270 TABLET | Refills: 1 | Status: SHIPPED | OUTPATIENT
Start: 2024-06-04

## 2024-06-04 RX ORDER — METFORMIN HCL 500 MG
TABLET, EXTENDED RELEASE 24 HR ORAL
Qty: 60 TABLET | Refills: 2 | Status: SHIPPED | OUTPATIENT
Start: 2024-06-04 | End: 2024-09-11

## 2024-06-04 RX ORDER — BUPROPION HYDROCHLORIDE 150 MG/1
150 TABLET ORAL EVERY MORNING
Qty: 30 TABLET | Refills: 0 | Status: SHIPPED | OUTPATIENT
Start: 2024-06-04 | End: 2024-06-19

## 2024-06-04 ASSESSMENT — PAIN SCALES - GENERAL: PAINLEVEL: NO PAIN (0)

## 2024-06-04 NOTE — PATIENT INSTRUCTIONS
"Thank you for allowing us the privilege of caring for you. We hope we provided you with the excellent service you deserve.   Please let us know if there is anything else we can do for you so that we can be sure you are completely satisfied with your care experience.    To ensure the quality of our services you may be receiving a patient satisfaction survey from an independent patient satisfaction monitoring company.    The greatest compliment you can give is a \"Likely to Recommend\"    Your visit was with Steffi Sánchez NP today.    Instructions per today's visit:     Devon Erickson, it was great to visit with you today.  Here is a review of our visit.  If our clinic scheduler is not able to reach you please call [phone] to schedule your next appointments.    Great work prioritizing fiber   Great work tracking protein   Great work with hydration   Continue topiramate 75mg   Add metformin- take with a meal   Follow up 3 months       Information about Video Visits with [facility]: video visit information  _________________________________________________________________________________________________________________________________________________________  If you are asked by your clinic team to have your blood pressure checked:  [facility] do offer several locations for blood pressure checks. Please follow the below link to schedule an appointment. Scheduling an appointment at the pharmacy for a blood pressure check is now preferred.    Appointment Plus (appointment-plus.[employer])  _________________________________________________________________________________________________________________________________________________________  Important contact and scheduling information:  Please call our contact center at [phone] to schedule your next appointments.  To find a lab location near you, please call [phone].  For any nursing questions or concerns call Sarina Hurt LPN at [phone] or " Huyen Godwin RN at [phone]  Please call during clinic hours Monday through Friday 8:00a - 4:00p if you have questions or you can contact us via [employer] at anytime and we will reply during clinic hours.    Lab results will be communicated through My Chart or letter (if My Chart not used). Please call the clinic if you have not received communication after 1 week or if you have any questions.?  Clinic Fax: [phone]    _________________________________________________________________________________________________________________________________________________________  Meal Replacement Products:    Here is the link to our new e-store where you can purchase our meal replacement products     [facility] E-Store  [employer]/store    The one week starter kit is a great way to sample a variety of products and see what works for you.    If you want more information about the product go to: "[employer]    If you are an employee or [facility] or [facility] please contact your care team for a 10% estore discount    Free Shipping for orders over $75     Benefits of meal replacements products:    Portion and calorie control  Improved nutrition  Structured eating  Simplified food choices  Avoid contact with trigger foods  _________________________________________________________________________________________________________________________________________________________  Interested in working with a health ?  Health coaches work with you to improve your overall health and wellbeing.  They look at the whole person, and may involve discussion of different areas of life, including, but not limited to the four pillars of health (sleep, exercise, nutrition, and stress management). Discuss with your care team if you would like to start working a health .  Health Coaching-3 Pack: Schedule by calling [phone]    $99 for three health coaching  visits    Visits may be done in person or via phone    Coaching is a partnership between the  and the client; Coaches do not prescribe or diagnose    Coaching helps inspire the client to reach his/her personal goals   _________________________________________________________________________________________________________________________________________________________  24 Week Healthy Lifestyle Plan:    Our mission in the 24-week Healthy Lifestyle Plan is to provide you with individualized care by giving you the tools, education and support you need to lose weight and maintain a healthy lifestyle. In your 24-week journey, you ll be supported by a dedicated weight loss team that includes registered dietitians, medical weight management providers, health coaches, and nurses -- all with special expertise in weight loss -- to help you every step of the way.     Monthly meetings with your registered dietician or medical weight management provider help to review your progress, update your care plan, and make any adjustments needed to ensure success. Between these visits, weekly and bi-weekly health  visits will help you focus on the four pillars of weight loss -- stress, sleep, nutrition, and exercise -- and how you can best adapt each to achieve sustainable weight loss results.    In addition, you will be given exclusive access to online wellbeing classes through [employer].  Your initial visit will be with a medical weight management provider who will help to understand your weight loss goals and ensure this program is the right fit for you. Please let our team know if you are interested in the 24 week plan by sending a message to your care team or calling [phone] to schedule.  _________________________________________________________________________________________________________________________________________________________  __________  [facility] Get Moving Program  Our team of  physical therapists is trained to help you understand and take control of your condition. They will perform a thorough evaluation to determine your ability for activity and develop a customized plan to fit your goals and physical ability.  Scheduling: Unsure if the Get Moving program is right for you? Discuss the program with your medical provider or diabetes educator. You can also call us at [phone] to ask questions or schedule an appointment.   [facility] Get Moving Program  ____________________________________________________________________________________________________________________________________________________________________________  [facility] Diabetes Prevention Program (DPP)  If you have prediabetes and Medicare please contact us via [employer] to learn more about the Diabetes Prevention Program (DPP)  Program Details:  [facility] offers the year-long Diabetes Prevention Program (DPP). The program helps you to make lifestyle changes that prevent or delay type 2 diabetes by supporting healthy eating, increased physical activity, stress reduction and use of coping skills.   On average, previous [facility] DPP cohorts have lost and maintained at least 5% of their starting weight throughout the program and averaged more than 150 minutes of physical activity per week.  Participants meet weekly for one-hour group sessions over sixteen weeks, every other week for the next 8 weeks, and monthly for the last six months.   A year-long maintenance program is also available for participants who complete the first year.   Location & Cost:   During the COVID-19 Public Health Emergency, the program is offered virtually. When in-person classes can resume, they will be held at [facility].  For people with Medicare, the program is covered in full. A self-pay option will also be available for those with non-Medicare insurance plans.    ______________________________________________________________________________________________________________________________________________________________________________________________________________________________    To work with a Behavioral Health Psychologist:    Call to schedule:    Junior Newberry - [phone]  Darian Pratt - [phone]  Kenzie Tyson - [phone]  Carlee Curtis - [phone]   India Smith PhD (cannot accept Medicare) [phone]        Thank you,   [facility] Comprehensive Weight Management Team                            MEDICATION STARTED AT THIS APPOINTMENT    We are starting metformin.  Starting instructions:    Immediate release tablet: Take 1 tablet by mouth daily with a meal for 1 week, then increase to 1 tablet twice daily with meals.   Extended release tablet: Take 1 tablet by mouth daily with a meal for 1 week, then increase to 2 tablets daily with a meal or 1 tablet twice daily with meals.      Contact the nurse via [employer] or call [phone] if you have any questions or concerns    Metformin is a medication that is used to assist with lowering blood sugars in patients that have Pre-Diabetes or Diabetes. It has also been found to help with weight loss.    Metformin helps to lower blood sugars by lowering the amount of sugar (glucose) your liver produces that would otherwise cause your blood sugar to increase. It also makes your body respond better to insulin (the product that lowers your blood sugar). It is unclear how metformin assists with weight loss.     Side-effects. Metformin is generally well tolerated. The main side-effects we see are diarrhea, nausea and stomach upset - this tends to subside over time as your body gets used to the medication. Taking this medications with food will lower these side effects.    Low blood sugar (hypoglycemia) is rare to occur with metformin, but can occur if you do not eat enough or are taking other medications  that assist to lower blood sugar. The signs of low blood sugar are:  Weakness  Shaky   Hungry  Sweating  Confusion    See below for ways to treat low blood sugar without adding in lots of extra calories.    Treating Low Blood Sugar    If you have symptoms of low blood sugar (sweating, shaking, dizzy, confused) eat 15 grams of carbs and wait 15 minutes:    Glucose Tabs are best for sugars under 70 -  Dex4 or BD Glucose tablets are good, you will need to take 3-4 of these to equal 15 grams.     One small box of raisins  4 oz fruit juice box or   cup fruit juice  1 small apple  1 small banana    cup canned fruit in water    English muffin or a slice of bread with jelly   1 low fat frozen waffle with sugar-free syrup    cup cottage cheese with   cup frozen or fresh blueberries  1 cup skim or low-fat milk    cup whole grain cereal  4-6 crackers such as Triscuits    Please refer to the pharmacy insert for more information on side-effects. Please call the clinic should you have more questions regarding this medication.      In order to get refills of this or any medication we prescribe you must be seen in the medical weight mgmt clinic every 2-3 months.

## 2024-06-04 NOTE — LETTER
2024       RE: Katelyn Erickson  [address]  [facility] [address]     Dear Colleague,    Thank you for referring your patient, Katelyn Erickson, to the [facility]NAG[facility]. Please see a copy of my visit note below.      Return Medical Weight Management Note     Katelyn Erickson  MRN:  [id number]  :  1990  MO:  2024    Dear Rose Phelps, WINDY CNP,    I had the pleasure of seeing your patient Katelyn Erickson. She is a 33 year old female who I am continuing to see for treatment of obesity related to:        2/15/2024     2:47 PM   --   I have the following health issues associated with obesity None of the above       Assessment & Plan  Problem List Items Addressed This Visit          Digestive    Class 2 obesity due to excess calories without serious comorbidity with body mass index (BMI) of 39.0 to 39.9 in adult - Primary     Significantly improved thoughts about food since starting topiramate. No adverse side effects. Getting adequate fluids.     Recovering from hysterectomy. Dealing with rectocele so now really mindful of constipation. Has pelvic floor therapy scheduled.     Seeing weight loss but slower than anticipated. Down 4% since consult. Would like to consider adding metformin.     Great work prioritizing fiber   Great work tracking protein   Great work with hydration   Continue topiramate 75mg   Add metformin- take with a meal   Follow up 3 months            Relevant Medications    metFORMIN (GLUCOPHAGE XR) 500 MG 24 hr tablet    topiramate (TOPAMAX) 25 MG tablet    NAFLD (nonalcoholic fatty liver disease)    Relevant Medications    metFORMIN (GLUCOPHAGE XR) 500 MG 24 hr tablet          INTERVAL HISTORY:  NBS 2024 BMI 38.9 with MAFLD and with family hx of STERN syndrome. Wanting to better manage weight to reduce risks. Lots of thoughts about food.       Anti-obesity medication  "history    Current:   Topiramate-  Less interested in food   No adverse side effects    Bupropion     Recent diet changes:   Intentionally prioritizing fiber   Doing well with protein-     Recent exercise/activity changes:   Rectocele after hysterectomy - waiting for pelvic floor therapy to start   Life is very active at baseline but not intentionally going out for walk   Wants to start yoga     Recent stressors:   Life is really busy   3 kids in lacrosse  and end of school stuff   Closed on house this week     Vitamins/Labs: 2/2024    CURRENT WEIGHT:   244 lbs 0 oz    Initial Weight (lbs): 254.2 lbs  Last Visits Weight: 111.1 kg (245 lb)  Cumulative weight loss (lbs): 10.2  Weight Loss Percentage: 4.01%      MEDICATIONS:   Current Outpatient Medications   Medication Sig Dispense Refill    buPROPion (WELLBUTRIN XL) 150 MG 24 hr tablet Take 1 tablet (150 mg) by mouth every morning 30 tablet 0    metFORMIN (GLUCOPHAGE XR) 500 MG 24 hr tablet 1 tablet by mouth daily with meal for 1 week, then 2 tablets daily with meal. 60 tablet 2    senna-docusate (SENOKOT-S/PERICOLACE) 8.6-50 MG tablet Take 1-2 tablets by mouth 2 times daily 30 tablet 0    topiramate (TOPAMAX) 25 MG tablet Take 3 tablets (75 mg) by mouth at bedtime 270 tablet 1           9/8/2023     9:06 AM   ALISON Score (Last Two)   ALISON Raw Score 37   Activation Score 79.2   ALISON Level 4         PHYSICAL EXAM:  Objective   /84 (BP Location: Left arm, Patient Position: Sitting, Cuff Size: Adult Large)   Pulse 80   Temp 98.9  F (37.2  C) (Oral)   Ht 1.721 m (5' 7.75\")   Wt 110.7 kg (244 lb)   LMP 02/22/2024   SpO2 98%   BMI 37.37 kg/m      /84 (BP Location: Left arm, Patient Position: Sitting, Cuff Size: Adult Large)   Pulse 80   Temp 98.9  F (37.2  C) (Oral)   Ht 1.721 m (5' 7.75\")   Wt 110.7 kg (244 lb)   LMP 02/22/2024   SpO2 98%   BMI 37.37 kg/m    Body mass index is 37.37 kg/m .  GENERAL: alert and no distress  EYES: Eyes grossly normal to " inspection.  No discharge or erythema, or obvious scleral/conjunctival abnormalities.  RESP: No audible wheeze, cough, or visible cyanosis.    SKIN: Visible skin clear. No significant rash, abnormal pigmentation or lesions.  NEURO: Cranial nerves grossly intact.  Mentation and speech appropriate for age.  PSYCH: Appropriate affect, tone, and pace of words        Sincerely,    Steffi Sánchez NP      30 minutes spent by me on the date of the encounter doing chart review, history and exam, documentation and further activities per the note    The longitudinal plan of care for the diagnosis(es)/condition(s) as documented were addressed during this visit. Due to the added complexity in care, I will continue to support Katelyn in the subsequent management and with ongoing continuity of care.

## 2024-06-04 NOTE — NURSING NOTE
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    FH: colon cancer in relative <50 years old    Gestational diabetes mellitus (GDM) affecting pregnancy, antepartum    Jean syndrome    Adenomatous polyp of colon, unspecified part of colon    Class 2 obesity due to excess calories without serious comorbidity with body mass index (BMI) of 39.0 to 39.9 in adult    Anxiety    Moderate episode of recurrent major depressive disorder (H)    Polyp of colon    NAFLD (nonalcoholic fatty liver disease)    )  (   Current Outpatient Medications   Medication Sig Dispense Refill    buPROPion (WELLBUTRIN XL) 150 MG 24 hr tablet Take 1 tablet (150 mg) by mouth every morning 30 tablet 0    senna-docusate (SENOKOT-S/PERICOLACE) 8.6-50 MG tablet Take 1-2 tablets by mouth 2 times daily 30 tablet 0    topiramate (TOPAMAX) 25 MG tablet Take 3 tablets (75 mg) by mouth at bedtime 90 tablet 0    )  ( Diabetes Eval:    )    ( Pain Eval:  No Pain (0) )    ( Wound Eval:       )    (   History   Smoking Status    Former    Types: Cigarettes   Smokeless Tobacco    Never    )    ( Signed By:  Marycruz Pathak; June 4, 2024; 3:46 PM )    "

## 2024-06-04 NOTE — ASSESSMENT & PLAN NOTE
Significantly improved thoughts about food since starting topiramate. No adverse side effects. Getting adequate fluids.     Recovering from hysterectomy. Dealing with rectocele so now really mindful of constipation. Has pelvic floor therapy scheduled.     Seeing weight loss but slower than anticipated. Down 4% since consult. Would like to consider adding metformin.     Great work prioritizing fiber   Great work tracking protein   Great work with hydration   Continue topiramate 75mg   Add metformin- take with a meal   Follow up 3 months

## 2024-06-04 NOTE — PROGRESS NOTES
Return Medical Weight Management Note     Katelyn Erickson  MRN:  [id number]  :  1990  MO:  2024    Dear Rose Phelps, WINDY CNP,    I had the pleasure of seeing your patient Katelyn Erickson. She is a 33 year old female who I am continuing to see for treatment of obesity related to:        2/15/2024     2:47 PM   --   I have the following health issues associated with obesity None of the above       Assessment & Plan   Problem List Items Addressed This Visit          Digestive    Class 2 obesity due to excess calories without serious comorbidity with body mass index (BMI) of 39.0 to 39.9 in adult - Primary     Significantly improved thoughts about food since starting topiramate. No adverse side effects. Getting adequate fluids.     Recovering from hysterectomy. Dealing with rectocele so now really mindful of constipation. Has pelvic floor therapy scheduled.     Seeing weight loss but slower than anticipated. Down 4% since consult. Would like to consider adding metformin.     Great work prioritizing fiber   Great work tracking protein   Great work with hydration   Continue topiramate 75mg   Add metformin- take with a meal   Follow up 3 months            Relevant Medications    metFORMIN (GLUCOPHAGE XR) 500 MG 24 hr tablet    topiramate (TOPAMAX) 25 MG tablet    NAFLD (nonalcoholic fatty liver disease)    Relevant Medications    metFORMIN (GLUCOPHAGE XR) 500 MG 24 hr tablet          INTERVAL HISTORY:  NBS 2024 BMI 38.9 with MAFLD and with family hx of STERN syndrome. Wanting to better manage weight to reduce risks. Lots of thoughts about food.       Anti-obesity medication history    Current:   Topiramate-  Less interested in food   No adverse side effects    Bupropion     Recent diet changes:   Intentionally prioritizing fiber   Doing well with protein-     Recent exercise/activity changes:   Rectocele after hysterectomy - waiting for pelvic floor therapy to start   Life is very active at  "baseline but not intentionally going out for walk   Wants to start yoga     Recent stressors:   Life is really busy   3 kids in lacrosse  and end of school stuff   Closed on house this week     Vitamins/Labs: 2/2024    CURRENT WEIGHT:   244 lbs 0 oz    Initial Weight (lbs): 254.2 lbs  Last Visits Weight: 111.1 kg (245 lb)  Cumulative weight loss (lbs): 10.2  Weight Loss Percentage: 4.01%      MEDICATIONS:   Current Outpatient Medications   Medication Sig Dispense Refill    buPROPion (WELLBUTRIN XL) 150 MG 24 hr tablet Take 1 tablet (150 mg) by mouth every morning 30 tablet 0    metFORMIN (GLUCOPHAGE XR) 500 MG 24 hr tablet 1 tablet by mouth daily with meal for 1 week, then 2 tablets daily with meal. 60 tablet 2    senna-docusate (SENOKOT-S/PERICOLACE) 8.6-50 MG tablet Take 1-2 tablets by mouth 2 times daily 30 tablet 0    topiramate (TOPAMAX) 25 MG tablet Take 3 tablets (75 mg) by mouth at bedtime 270 tablet 1           9/8/2023     9:06 AM   ALISON Score (Last Two)   ALISON Raw Score 37   Activation Score 79.2   ALISON Level 4         PHYSICAL EXAM:  Objective    /84 (BP Location: Left arm, Patient Position: Sitting, Cuff Size: Adult Large)   Pulse 80   Temp 98.9  F (37.2  C) (Oral)   Ht 1.721 m (5' 7.75\")   Wt 110.7 kg (244 lb)   LMP 02/22/2024   SpO2 98%   BMI 37.37 kg/m      /84 (BP Location: Left arm, Patient Position: Sitting, Cuff Size: Adult Large)   Pulse 80   Temp 98.9  F (37.2  C) (Oral)   Ht 1.721 m (5' 7.75\")   Wt 110.7 kg (244 lb)   LMP 02/22/2024   SpO2 98%   BMI 37.37 kg/m    Body mass index is 37.37 kg/m .  GENERAL: alert and no distress  EYES: Eyes grossly normal to inspection.  No discharge or erythema, or obvious scleral/conjunctival abnormalities.  RESP: No audible wheeze, cough, or visible cyanosis.    SKIN: Visible skin clear. No significant rash, abnormal pigmentation or lesions.  NEURO: Cranial nerves grossly intact.  Mentation and speech appropriate for age.  PSYCH: " Appropriate affect, tone, and pace of words        Sincerely,    Steffi Sánchez NP      30 minutes spent by me on the date of the encounter doing chart review, history and exam, documentation and further activities per the note    The longitudinal plan of care for the diagnosis(es)/condition(s) as documented were addressed during this visit. Due to the added complexity in care, I will continue to support Katelyn in the subsequent management and with ongoing continuity of care.

## 2024-06-19 ENCOUNTER — VIRTUAL VISIT (OUTPATIENT)
Dept: FAMILY MEDICINE | Facility: CLINIC | Age: 34
End: 2024-06-19
Payer: COMMERCIAL

## 2024-06-19 DIAGNOSIS — F33.1 MODERATE EPISODE OF RECURRENT MAJOR DEPRESSIVE DISORDER (H): ICD-10-CM

## 2024-06-19 DIAGNOSIS — F41.9 ANXIETY: ICD-10-CM

## 2024-06-19 PROCEDURE — 99214 OFFICE O/P EST MOD 30 MIN: CPT | Mod: 95 | Performed by: NURSE PRACTITIONER

## 2024-06-19 RX ORDER — BUPROPION HYDROCHLORIDE 150 MG/1
150 TABLET ORAL EVERY MORNING
Qty: 90 TABLET | Refills: 1 | Status: SHIPPED | OUTPATIENT
Start: 2024-06-19

## 2024-06-19 ASSESSMENT — ANXIETY QUESTIONNAIRES
6. BECOMING EASILY ANNOYED OR IRRITABLE: SEVERAL DAYS
GAD7 TOTAL SCORE: 2
GAD7 TOTAL SCORE: 2
IF YOU CHECKED OFF ANY PROBLEMS ON THIS QUESTIONNAIRE, HOW DIFFICULT HAVE THESE PROBLEMS MADE IT FOR YOU TO DO YOUR WORK, TAKE CARE OF THINGS AT HOME, OR GET ALONG WITH OTHER PEOPLE: NOT DIFFICULT AT ALL
1. FEELING NERVOUS, ANXIOUS, OR ON EDGE: NOT AT ALL
2. NOT BEING ABLE TO STOP OR CONTROL WORRYING: NOT AT ALL
7. FEELING AFRAID AS IF SOMETHING AWFUL MIGHT HAPPEN: NOT AT ALL
8. IF YOU CHECKED OFF ANY PROBLEMS, HOW DIFFICULT HAVE THESE MADE IT FOR YOU TO DO YOUR WORK, TAKE CARE OF THINGS AT HOME, OR GET ALONG WITH OTHER PEOPLE?: NOT DIFFICULT AT ALL
5. BEING SO RESTLESS THAT IT IS HARD TO SIT STILL: NOT AT ALL
GAD7 TOTAL SCORE: 2
3. WORRYING TOO MUCH ABOUT DIFFERENT THINGS: SEVERAL DAYS
4. TROUBLE RELAXING: NOT AT ALL
7. FEELING AFRAID AS IF SOMETHING AWFUL MIGHT HAPPEN: NOT AT ALL

## 2024-06-19 ASSESSMENT — PATIENT HEALTH QUESTIONNAIRE - PHQ9
SUM OF ALL RESPONSES TO PHQ QUESTIONS 1-9: 2
SUM OF ALL RESPONSES TO PHQ QUESTIONS 1-9: 2
10. IF YOU CHECKED OFF ANY PROBLEMS, HOW DIFFICULT HAVE THESE PROBLEMS MADE IT FOR YOU TO DO YOUR WORK, TAKE CARE OF THINGS AT HOME, OR GET ALONG WITH OTHER PEOPLE: NOT DIFFICULT AT ALL

## 2024-06-19 NOTE — PROGRESS NOTES
"Katelyn is a 33 year old who is being evaluated via a billable video visit.    How would you like to obtain your AVS? MyChart  If the video visit is dropped, the invitation should be resent by: Text to cell phone: [phone]  Will anyone else be joining your video visit? No      Assessment & Plan     Anxiety  Chronic, stable/controlled. Continue on current medication.   - buPROPion (WELLBUTRIN XL) 150 MG 24 hr tablet; Take 1 tablet (150 mg) by mouth every morning    Moderate episode of recurrent major depressive disorder (H)  Chronic, stable/controlled.  Continue on current medication.   - buPROPion (WELLBUTRIN XL) 150 MG 24 hr tablet; Take 1 tablet (150 mg) by mouth every morning          BMI  Estimated body mass index is 37.37 kg/m  as calculated from the following:    Height as of 6/4/24: 1.721 m (5' 7.75\").    Weight as of 6/4/24: 110.7 kg (244 lb).   Weight management plan: currently working with the weight loss clinic      Follow-up 6 mos physical/mental health follow-up     Subjective   Katelyn is a 33 year old, presenting for the following health issues:  Anxiety and Depression        6/19/2024     1:03 PM   Additional Questions   Roomed by Latasha MOTLEY CMA   Accompanied by HARJIT         6/19/2024     1:03 PM   Patient Reported Additional Medications   Patient reports taking the following new medications None     History of Present Illness       Mental Health Follow-up:  Patient presents to follow-up on Depression & Anxiety.Patient's depression since last visit has been:  Better  The patient is not having other symptoms associated with depression.  Patient's anxiety since last visit has been:  Medium  The patient is not having other symptoms associated with anxiety.  Any significant life events: health concerns  Patient is not feeling anxious or having panic attacks.  Patient has no concerns about alcohol or drug use.    She eats 2-3 servings of fruits and vegetables daily.She consumes 0 sweetened beverage(s) " daily.She exercises with enough effort to increase her heart rate 30 to 60 minutes per day.  She exercises with enough effort to increase her heart rate 3 or less days per week.   She is taking medications regularly.           10/11/2023    10:06 AM 11/13/2023     1:24 PM 6/19/2024    12:54 PM   KEYLA-7 SCORE   Total Score   2 (minimal anxiety)   Total Score 15 2 2           10/11/2023    10:06 AM 11/13/2023     1:22 PM 6/19/2024    12:45 PM   PHQ   PHQ-9 Total Score 13 2 2   Q9: Thoughts of better off dead/self-harm past 2 weeks Not at all Not at all Not at all   Most of the time feeling good.   Hysterectomy in March--recovery process was more than she anticipated.   Considering weight loss surgery in the future; working with the weight loss clinic.      Social History     Tobacco Use    Smoking status: Former     Types: Cigarettes     Passive exposure: Past    Smokeless tobacco: Never    Tobacco comments:     half a pack a day   Vaping Use    Vaping status: Former   Substance Use Topics    Alcohol use: Yes     Comment: rarely    Drug use: Never         10/11/2023    10:06 AM 11/13/2023     1:22 PM 6/19/2024    12:45 PM   PHQ   PHQ-9 Total Score 13 2 2   Q9: Thoughts of better off dead/self-harm past 2 weeks Not at all Not at all Not at all         10/11/2023    10:06 AM 11/13/2023     1:24 PM 6/19/2024    12:54 PM   KEYLA-7 SCORE   Total Score   2 (minimal anxiety)   Total Score 15 2 2         6/19/2024    12:45 PM   Last PHQ-9   1.  Little interest or pleasure in doing things 0   2.  Feeling down, depressed, or hopeless 1   3.  Trouble falling or staying asleep, or sleeping too much 0   4.  Feeling tired or having little energy 0   5.  Poor appetite or overeating 0   6.  Feeling bad about yourself 1   7.  Trouble concentrating 0   8.  Moving slowly or restless 0   Q9: Thoughts of better off dead/self-harm past 2 weeks 0   PHQ-9 Total Score 2         6/19/2024    12:54 PM   KEYLA-7    1. Feeling nervous, anxious, or on  edge 0   2. Not being able to stop or control worrying 0   3. Worrying too much about different things 1   4. Trouble relaxing 0   5. Being so restless that it is hard to sit still 0   6. Becoming easily annoyed or irritable 1   7. Feeling afraid, as if something awful might happen 0   KEYLA-7 Total Score 2   If you checked any problems, how difficult have they made it for you to do your work, take care of things at home, or get along with other people? Not difficult at all       Suicide Assessment Five-step Evaluation and Treatment (SAFE-T)              Objective           Vitals:  No vitals were obtained today due to virtual visit.    Physical Exam   GENERAL: alert and no distress  EYES: Eyes grossly normal to inspection.  No discharge or erythema, or obvious scleral/conjunctival abnormalities.  RESP: No audible wheeze, cough, or visible cyanosis.    SKIN: Visible skin clear. No significant rash, abnormal pigmentation or lesions.  NEURO: Cranial nerves grossly intact.  Mentation and speech appropriate for age.  PSYCH: Appropriate affect, tone, and pace of words    No results found for this or any previous visit (from the past 24 hour(s)).      Video-Visit Details    Type of service:  Video Visit   Originating Location (pt. Location): Other parked car    Distant Location (provider location):  Off-site  Platform used for Video Visit: Jeny  Signed Electronically by: WINDY Schuster CNP

## 2024-06-27 NOTE — PROGRESS NOTES
"Video-Visit Details    Type of service:  Video Visit    Video Start Time: 10:29 AM  Video End Time: 10:45 AM    Originating Location (pt. Location): Home    Distant Location (provider location): Offsite (providers home) [facility] [place]     [facility]    Return Bariatric Nutrition Consultation Note    Reason For Visit: Nutrition Assessment    Katelyn Erickson is a 34 year old presenting today for return bariatric nutrition consult.   Patient is interested in weight loss surgery with Dr. Hurley expected surgery in TBD.  Patient is accompanied by self.  This is patient's 5th of 6 required nutrition visits prior to surgery. Patient attended the WLS nutrition class on 2/23/24.     Pt referred by Steffi Sánchez NP  on February 20, 2024.     CO-MORBIDITIES OF OBESITY INCLUDE:        2/15/2024     2:47 PM   --   I have the following health issues associated with obesity None of the above       SUPPORT:      2/15/2024     2:47 PM   Support System Reviewed With Patient   Who do you have in your support network that can be available to help you for the first 2 weeks after surgery?  and MIL   Who can you count on for support throughout your weight loss surgery journey?  and MIL     ANTHROPOMETRICS:  Initial consult weight: 254 lb on 2/20/24   Estimated body mass index is 37.37 kg/m  as calculated from the following:    Height as of 6/4/24: 1.721 m (5' 7.75\").    Weight as of 6/4/24: 110.7 kg (244 lb).  Current Weight: 243 lb per pt report on [facility]     Required weight loss goal pre-op: -10 lbs from initial consult weight (goal weight 244 lbs or less before surgery) - met        2/15/2024     2:47 PM   --   I have tried the following methods to lose weight Watching portions or calories    Exercise    Weight Watchers           2/15/2024     2:47 PM   Weight Loss Questions Reviewed With Patient   How long have you been overweight? Since early childhood "     MEDICATION FOR WEIGHT LOSS: Topiramate - no side effects. Has added in Metformin     VITAMIN/MINERAL SUPPLEMENTS: None     NUTRITION HISTORY:  Food allergies: NKFA  Food intolerances: none   Previous experience with diet modification for weight loss: weight watchers and exercise   Barriers to lifestyle change: reports none     Per Steffi's note: Patient describes significant thoughts about food and cravings in the evenings with limited hunger during the day.     Patient herself is trying to eat less meat. Trying to increase her vegetables. No overnight eating    Recent Diet Recall:  Breakfast: coffee with Premier Protein   Lunch: 11 AM if at home, scrambled eggs with cottage and spinach with English muffin or toast. If goes into office will eat at 1 or 2 pm, Leftovers   Dinner: Tacos, spaghetti, chicken. Protein + vegetable + carb.   Snacks: After the kids go to bed - crackers and Nutella  Beverages: Water, soda seldomly.   Alcohol: None for the most part, seldomly   Dining Out: Not very often. Twice a month.     Patient recently had a total laparoscopic hysterectomy on 3/11/24.     April 2024: Downloaded the mark [employer] to track her nutrition.  Helps to monitor her protein amount. Plans to practice  liquids from meal times and chew her food really well to an applesauce consistency. Continues to work on tasklist items needed for surgery. Needs more clarification around if she needs the sleep medicine consult.     May 2024: Focusing more on fiber intake and is drinking a lot of water. For breakfast started incorporating overnight oats with protein and shaun seeds. Has been tracking nutrition in [employer]. Continues to practice  liquids from meal times. Has appointment with Steffi in June and psych visits in July.     June 2024: Met with Steffi earlier this month. Started taking Metformin since the beginning of June. Hasn't noticed a change in her weight this past month. Stopped taking Senna so  often. Lives a busy lifestyle. Recently started pelvic floor physical activity. Struggles with  liquids from meal times, especially before a meal. Has slowed down with eating and trying to take her time. Averaging around 80 grams of protein with most meals. Has psych visits this next month.         2/15/2024     2:47 PM   Recall Diet Questions Reviewed With Patient   Describe what you typically consume for breakfast (typical or most recent) Scrambled eggs english muffine   How many ounces of water, or other low calorie drinks, do you drink daily (8 oz=1 glass)? 64 oz or more   How many ounces of caffeine (coffee, tea, pop) do you drink daily (8 oz=1 glass)? 16 oz   How many ounces of carbonated (pop, beer, sparkling water) drinks do you drinky daily (8 oz=1 glass)? 0 oz   How many ounces of juice, pop, sweet tea, sports drinks, protein drinks, other sweetened drinks, do you drink daily (8 oz=1 glass)? 0 oz   How many ounces of milk do you drink daily (8 oz=1 glass) 0 oz   How often do you drink alcohol? Monthly or less   If you do drink alcohol, how many drinks might you have in a day? (one drink = 5 oz. wine, 1 can/bottle of beer, 1 shot liquor) 1 or 2           2/15/2024     2:47 PM   Eating Habits   What foods do you crave? Sweets           2/15/2024     2:47 PM   Dining Out History Reviewed With Patient   How often do you dine out? Rarely.   Where do you dine out? (select all that apply) fast food chains   What types of food do you order when you dine out? Hamburger     EXERCISE: Walked everyday 1.5-2 miles prior to surgery. 5 week physical activity restriction. Can't lift more than 10 lbs.         6/4/2024     3:39 PM   --   How often do you exercise? Never   What keeps you from being more active? 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   Patient has 3  kids ages 10, 7, 5.   Aunt had bariatric surgery.     NUTRITION DIAGNOSIS:  Obesity r/t long history of positive energy balance aeb BMI >30 kg/m2.    INTERVENTION:  Intervention Provided/Education Provided on post-op diet guidelines, vitamins/minerals essential post-operatively, GI anatomy of bariatric surgeries, ways to help prepare for post-op diet guidelines pre-operatively, portion/calorie-control, mindful eating and sources of protein.  Patient demonstrates understanding.     Personal barriers to making and continuing required life changes have been identified, and strategies to overcome those barriers have been recommended AND family and social supports have been assessed and strategies to strengthen those supports have been recommended.    Provided pt with list of goals, RD contact information and resources listed below via [employer].           2/15/2024     2:47 PM   Questions Reviewed With Patient   How ready are you to make changes regarding your weight? Number 1 = Not ready at all to make changes up to 10 = very ready. 7   How confident are you that you can change? 1 = Not confident that you will be successful making changes up to 10 = very confident. 7     Expected Engagement: good    GOALS:  Relating To Eating:  Eat slowly (20-30 minutes per meal), chewing foods well (25 chews per bite/applesauce consistency)  Eat 3 meals per day  Consume 60-90 g protein per day    Relating to beverages:  Reduce caffeine/carbonation/calorie containing beverages  Separate fluids from meals by 30 minutes before, during, and after eating  Drink 48-64 ounces of fluid per day    Relating to dietary supplements:  Start thinking about a post op multivitamin     Relating to activity:  Increase activity as able     Post-op Diet Handouts:  Diet Guidelines after Weight-loss Surgery  [email or web address]     Your Stage 1 Diet: Clear Liquids  [email or web address]     Your Stage 2 Diet: Low-fat Full  Liquids  [email or web address]     Your Stage 3 Diet: Pureed Foods  [email or web address]     Pureed Recipes  [email or web address]    Your Stage 4 Diet: Soft Foods  [email or web address]    Your Stage 5 Diet: Regular Foods  [email or web address]    Supplements after Sleeve Gastrectomy, Gastric Bypass or Single Anastomosis Duodenal Switch  [email or web address]    Keeping Track of Fluids  [email or web address]      Follow Up: July 23.     Time spent with patient: 16 minutes.  Tina Montenegro RD, LD

## 2024-06-28 ENCOUNTER — VIRTUAL VISIT (OUTPATIENT)
Dept: ENDOCRINOLOGY | Facility: CLINIC | Age: 34
End: 2024-06-28
Payer: COMMERCIAL

## 2024-06-28 VITALS — BODY MASS INDEX: 36.83 KG/M2 | WEIGHT: 243 LBS | HEIGHT: 68 IN

## 2024-06-28 DIAGNOSIS — Z71.3 NUTRITIONAL COUNSELING: Primary | ICD-10-CM

## 2024-06-28 DIAGNOSIS — E66.9 OBESITY: ICD-10-CM

## 2024-06-28 PROCEDURE — 99207 PR NO CHARGE LOS: CPT | Mod: 95

## 2024-06-28 PROCEDURE — 97803 MED NUTRITION INDIV SUBSEQ: CPT | Mod: 95

## 2024-06-28 ASSESSMENT — PAIN SCALES - GENERAL: PAINLEVEL: NO PAIN (0)

## 2024-06-28 NOTE — LETTER
"6/28/2024       RE: Katelyn Erickson  [address]  [place] [address]     Dear Colleague,    Thank you for referring your patient, Katelyn Erickson, to the [facility] [place] at [facility]. Please see a copy of my visit note below.    Video-Visit Details    Type of service:  Video Visit    Video Start Time: 10:29 AM  Video End Time: 10:45 AM    Originating Location (pt. Location): Home    Distant Location (provider location): Offsite (providers home) [facility] [place]     Platform used for Video Visit: [employer]    Return Bariatric Nutrition Consultation Note    Reason For Visit: Nutrition Assessment    Katelyn Erickson is a 34 year old presenting today for return bariatric nutrition consult.   Patient is interested in weight loss surgery with Dr. Hurley expected surgery in TBD.  Patient is accompanied by self.  This is patient's 5th of 6 required nutrition visits prior to surgery. Patient attended the WLS nutrition class on 2/23/24.     Pt referred by Steffi Sánchez NP  on February 20, 2024.     CO-MORBIDITIES OF OBESITY INCLUDE:        2/15/2024     2:47 PM   --   I have the following health issues associated with obesity None of the above       SUPPORT:      2/15/2024     2:47 PM   Support System Reviewed With Patient   Who do you have in your support network that can be available to help you for the first 2 weeks after surgery?  and MIL   Who can you count on for support throughout your weight loss surgery journey?  and MIL     ANTHROPOMETRICS:  Initial consult weight: 254 lb on 2/20/24   Estimated body mass index is 37.37 kg/m  as calculated from the following:    Height as of 6/4/24: 1.721 m (5' 7.75\").    Weight as of 6/4/24: 110.7 kg (244 lb).  Current Weight: 243 lb [facility] pt report on hospitals     Required weight loss goal pre-op: -10 lbs from initial consult weight (goal " weight 244 lbs or less before surgery) - met        2/15/2024     2:47 PM   --   I have tried the following methods to lose weight Watching portions or calories    Exercise    Weight Watchers           2/15/2024     2:47 PM   Weight Loss Questions Reviewed With Patient   How long have you been overweight? Since early childhood     MEDICATION FOR WEIGHT LOSS: Topiramate - no side effects. Has added in Metformin     VITAMIN/MINERAL SUPPLEMENTS: None     NUTRITION HISTORY:  Food allergies: NKFA  Food intolerances: none   Previous experience with diet modification for weight loss: weight watchers and exercise   Barriers to lifestyle change: reports none     Per Steffi's note: Patient describes significant thoughts about food and cravings in the evenings with limited hunger during the day.     Patient herself is trying to eat less meat. Trying to increase her vegetables. No overnight eating    Recent Diet Recall:  Breakfast: coffee with Premier Protein   Lunch: 11 AM if at home, scrambled eggs with cottage and spinach with English muffin or toast. If goes into office will eat at 1 or 2 pm, Leftovers   Dinner: Tacos, spaghetti, chicken. Protein + vegetable + carb.   Snacks: After the kids go to bed - crackers and Nutella  Beverages: Water, soda seldomly.   Alcohol: None for the most part, seldomly   Dining Out: Not very often. Twice a month.     Patient recently had a total laparoscopic hysterectomy on 3/11/24.     April 2024: Downloaded the [employer] it to track her nutrition.  Helps to monitor her protein amount. Plans to practice  liquids from meal times and chew her food really well to an applesauce consistency. Continues to work on tasklist items needed for surgery. Needs more clarification around if she needs the sleep medicine consult.     May 2024: Focusing more on fiber intake and is drinking a lot of water. For breakfast started incorporating overnight oats with protein and shaun seeds. Has been tracking  nutrition in [employer]. Continues to practice  liquids from meal times. Has appointment with Steffi in June and psych visits in July.     June 2024: Met with Steffi earlier this month. Started taking Metformin since the beginning of June. Hasn't noticed a change in her weight this past month. Stopped taking Senna so often. Lives a busy lifestyle. Recently started pelvic floor physical activity. Struggles with  liquids from meal times, especially before a meal. Has slowed down with eating and trying to take her time. Averaging around 80 grams of protein with most meals. Has psych visits this next month.         2/15/2024     2:47 PM   Recall Diet Questions Reviewed With Patient   Describe what you typically consume for breakfast (typical or most recent) Scrambled eggs english muffine   How many ounces of water, or other low calorie drinks, do you drink daily (8 oz=1 glass)? 64 oz or more   How many ounces of caffeine (coffee, tea, pop) do you drink daily (8 oz=1 glass)? 16 oz   How many ounces of carbonated (pop, beer, sparkling water) drinks do you drinky daily (8 oz=1 glass)? 0 oz   How many ounces of juice, pop, sweet tea, sports drinks, protein drinks, other sweetened drinks, do you drink daily (8 oz=1 glass)? 0 oz   How many ounces of milk do you drink daily (8 oz=1 glass) 0 oz   How often do you drink alcohol? Monthly or less   If you do drink alcohol, how many drinks might you have in a day? (one drink = 5 oz. wine, 1 can/bottle of beer, 1 shot liquor) 1 or 2           2/15/2024     2:47 PM   Eating Habits   What foods do you crave? Sweets           2/15/2024     2:47 PM   Dining Out History Reviewed With Patient   How often do you dine out? Rarely.   Where do you dine out? (select all that apply) fast food chains   What types of food do you order when you dine out? Hamburger     EXERCISE: Walked everyday 1.5-2 miles prior to surgery. 5 week physical activity restriction. Can't lift more  than 10 lbs.         6/4/2024     3:39 PM   --   How often do you exercise? Never   What keeps you from being more active? 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   Patient has 3 kids ages 10, 7, 5.   Aunt had bariatric surgery.     NUTRITION DIAGNOSIS:  Obesity r/t long history of positive energy balance aeb BMI >30 kg/m2.    INTERVENTION:  Intervention Provided/Education Provided on post-op diet guidelines, vitamins/minerals essential post-operatively, GI anatomy of bariatric surgeries, ways to help prepare for post-op diet guidelines pre-operatively, portion/calorie-control, mindful eating and sources of protein.  Patient demonstrates understanding.     Personal barriers to making and continuing required life changes have been identified, and strategies to overcome those barriers have been recommended AND family and social supports have been assessed and strategies to strengthen those supports have been recommended.    Provided pt with list of goals, RD contact information and resources listed below via [employer].           2/15/2024     2:47 PM   Questions Reviewed With Patient   How ready are you to make changes regarding your weight? Number 1 = Not ready at all to make changes up to 10 = very ready. 7   How confident are you that you can change? 1 = Not confident that you will be successful making changes up to 10 = very confident. 7     Expected Engagement: good    GOALS:  Relating To Eating:  Eat slowly (20-30 minutes per meal), chewing foods well (25 chews per bite/applesauce consistency)  Eat 3 meals per day  Consume 60-90 g protein per day    Relating to beverages:  Reduce caffeine/carbonation/calorie containing beverages  Separate fluids from meals by 30 minutes before, during, and after eating  Drink 48-64 ounces of fluid per day    Relating to dietary  supplements:  Start thinking about a post op multivitamin     Relating to activity:  Increase activity as able     Post-op Diet Handouts:  Diet Guidelines after Weight-loss Surgery  [email or web address]     Your Stage 1 Diet: Clear Liquids  [email or web address]     Your Stage 2 Diet: Low-fat Full Liquids  [email or web address]     Your Stage 3 Diet: Pureed Foods  [email or web address]     Pureed Recipes  [email or web address]    Your Stage 4 Diet: Soft Foods  [email or web address]    Your Stage 5 Diet: Regular Foods  [email or web address]    Supplements after Sleeve Gastrectomy, Gastric Bypass or Single Anastomosis Duodenal Switch  [email or web address]    Keeping Track of Fluids  [email or web address]      Follow Up: July 23.     Time spent with patient: 16 minutes.  Tina Montenegro RD, LD

## 2024-06-28 NOTE — PATIENT INSTRUCTIONS
Devon Zepeda,     Follow-up with RD on July 23.    Thank you,    Tina Montenegro, PABLO, LD  If you would like to schedule or reschedule an appointment with the RD, please call [phone]    Nutrition Goals  Relating To Eating:  Eat slowly (20-30 minutes per meal), chewing foods well (25 chews per bite/applesauce consistency)  Eat 3 meals per day  Consume 60-90 g protein per day    Relating to beverages:  Reduce caffeine/carbonation/calorie containing beverages  Separate fluids from meals by 30 minutes before, during, and after eating  Drink 48-64 ounces of fluid per day    Relating to dietary supplements:  Start thinking about a post op multivitamin     Relating to activity:  Increase activity as able     Post-op Diet Handouts:  Diet Guidelines after Weight-loss Surgery  [email or web address]     Your Stage 1 Diet: Clear Liquids  [email or web address]     Your Stage 2 Diet: Low-fat Full Liquids  [email or web address]     Your Stage 3 Diet: Pureed Foods  [email or web address]     Pureed Recipes  [email or web address]    Your Stage 4 Diet: Soft Foods  [email or web address]    Your Stage 5 Diet: Regular Foods  [email or web address]    Supplements after Sleeve Gastrectomy, Gastric Bypass or Single Anastomosis Duodenal Switch  [email or web address]    Keeping Track of Fluids  [email or web address]      COMPREHENSIVE WEIGHT MANAGEMENT PROGRAM  VIRTUAL SUPPORT GROUPS    At [facility], our Comprehensive Weight Management program offers on-line support groups for patients who are working on weight loss and considering, preparing for, or have had weight loss surgery.     There is no cost for this opportunity.  You are invited to attend the?Virtual Support Groups?provided by any of the following locations:    [facility] via [employer] Teams with Loree Armendariz RN  2.   [place] via [employer] Teams with Lj Waldron, PhD, LP  3.   [place] via  "[employer] Teams with Mary Jane Li RN  4.   [facility] via a Zoom Meeting with Mary Jane Chambers [facility]-    The following Support Group information can also be found on our website:  [email or web address]      [facility] Weight Loss Surgery Support Group  The support group is a patient-lead forum that meets monthly to share experiences, encouragement and education. It is open to those who have had weight loss surgery, are scheduled for surgery, or are considering surgery.   WHEN: This group meets on the 3rd Wednesday of each month from 5:00PM - 6:00PM virtually using Microsoft Teams.   FACILITATOR: Led by Loree Armendariz RD, OBI, RN, the program's Clinical Coordinator.   TO REGISTER: Please contact the clinic via [employer] or call the nurse line directly at [phone] to inform our staff that you would like an invite sent to you and to let us know the email you would like the invite sent to. Prior to the meeting, a link with directions on how to join the meeting will be sent to you.    2024 Meetings   January 17  February 21  March 20  April 17  May 15  Linda 19      [facility] Bariatric Care Support Group?  This is open to all pre- and post- operative bariatric surgery patients as well as their support system.   WHEN: This group meets the 3rd Tuesday of each month from 6:30 PM - 8:00 PM virtually using Microsoft Teams.   FACILITATOR: Led by Lj Waldron, Ph.D who is a Licensed Psychologist with the [facility] Comprehensive Weight Management Program.   TO REGISTER: Please send an email to Lj Waldron, Ph.D., LP at?[email or web address]?if you would like an invitation to the group. Prior to the meeting, a link with directions on how to join the meeting will be sent to you.    2024 Meetings January 16: \"Medication Management and Bariatric Surgery\", January " "[place], PharmD, Pharmacy Resident at [facility], [employer]  February 20: \"A Bariatric Surgery Patient's Perspective\", ANGELA Olson, [facility], Behavioral Health Clinician at [facility]  March 19  April 16  May 21  Linda 18: \"Nutritional Labeling\", Dietitian speaker to be announced.  November 19: \"Holiday Eating\", Dietitian speaker to be announced.    [facility] Post-Operative Bariatric Surgery Support Group  This is a support group for [facility] bariatric patients (and those external to [facility]) who have had bariatric surgery and are at least 3 months post-surgery.  WHEN: This support group meets the 4th Wednesday of the month from 11:00 AM - 12:00 PM virtually using Microsoft Teams.   FACILITATOR: Led by Certified Bariatric Nurse, Mary Jane Li RN.   TO REGISTER: Please send an email to Mary Jane at [email or web address] if you would like an invitation to the group.  Prior to the meeting, a link with directions on how to join the meeting will be sent to you.    2024 Meetings  January 24  February 28  March 27  April 24  May 22  Linda 26    [facility] Healthy Lifestyle Group?  This is a 60 minute virtual coaching group for those who want to lead a healthier lifestyle. Come together to set goals and overcome barriers in a supportive group environment. We will address the four pillars of health: nutrition, exercise, sleep and emotional well-being.  This group is highly recommended for those who are participating in the 24 week Healthy Lifestyle Plan and our Health Coaching sessions.  WHEN: This group meets the 1st Friday of the month, 12:30 PM - 1:30 PM online, via a zoom meeting.    FACILITATOR: Led by National Board Certified Health and , Mary Jane Chambers [facility].   TO REGISTER: Please call the Call Center at [phone] to register. You will get an " "appointment to attend in [facility]. Fifteen minutes prior to the meeting, complete the e-check in and you will get the link to join the meeting.    There is no charge to attend this group and space is limited.     2024 Meetings  January 5: \"New Years Vision: Manifest your Best 2024!\" (guided imagery,  journaling and discussion)  February 2: \"Let's Talk\"  March 1: \"10 Percent Happier\" by Blu Agarwal (Book Bites - a guided discussion on the nuggets of wisdom from favorite wellness books, no need to read the book but highly encouraged)  April 5: \"Let's Talk\"  May 3: \"Essentialism: The Disciplined Pursuit of Less\" by Vivek Landers (Book Bites discussion)  June 7: \"Let's Talk\"  July 5: NO MEETING, off for the 4th of July Holiday  August 2: \"The Blue Zones, Secrets for Living a Longer Life\" by Blu Mccormick (Book Bites discussion)        "

## 2024-06-28 NOTE — NURSING NOTE
Is the patient currently in the state of [place]? YES    Visit mode:VIDEO    If the visit is dropped, the patient can be reconnected by: VIDEO VISIT: Send to e-mail at: [email or web address]    Will anyone else be joining the visit? NO  (If patient encounters technical issues they should call [phone]    How would you like to obtain your AVS? MyChart    Are changes needed to the allergy or medication list? N/A    Are refills needed on medications prescribed by this physician? NO    Reason for visit: RECHECK ([facility])    Jenny CASTORENA

## 2024-07-01 ENCOUNTER — HOSPITAL ENCOUNTER (OUTPATIENT)
Facility: AMBULATORY SURGERY CENTER | Age: 34
End: 2024-07-01
Attending: INTERNAL MEDICINE
Payer: COMMERCIAL

## 2024-07-01 ENCOUNTER — TELEPHONE (OUTPATIENT)
Dept: GASTROENTEROLOGY | Facility: CLINIC | Age: 34
End: 2024-07-01
Payer: COMMERCIAL

## 2024-07-01 NOTE — TELEPHONE ENCOUNTER
"Endoscopy Scheduling Screen    Have you had a positive Covid test in the last 14 days?  No    What is your communication preference for Instructions and/or Bowel Prep?   MyChart    What insurance is in the chart?  Other:  BCBS    Ordering/Referring Provider:     DIRK FERRARO      (If ordering provider performs procedure, schedule with ordering provider unless otherwise instructed. )    BMI: Estimated body mass index is 37.21 kg/m  as calculated from the following:    Height as of 6/28/24: 1.721 m (5' 7.76\").    Weight as of 6/28/24: 110.2 kg (243 lb).     Sedation Ordered  moderate sedation.   If patient BMI > 50 do not schedule in [facility].    If patient BMI > 45 do not schedule at [facility].    Are you taking methadone or Suboxone?  No    Have you had difficulties, pain, or discomfort during past endoscopy procedures?  No    Are you taking any prescription medications for pain 3 or more times per week?   NO, No RN review required.    Do you have a history of malignant hyperthermia?  No    (Females) Are you currently pregnant?   No     Have you been diagnosed or told you have pulmonary hypertension?   No    Do you have an LVAD?  No    Have you been told you have moderate to severe sleep apnea?  No    Have you been told you have COPD, asthma, or any other lung disease?  No    Do you have any heart conditions?  No     Have you ever had or are you waiting for an organ transplant?  No. Continue scheduling, no site restrictions.    Have you had a stroke or transient ischemic attack (TIA aka \"mini stroke\" in the last 6 months?   No    Have you been diagnosed with or been told you have cirrhosis of the liver?   No    Are you currently on dialysis?   No    Do you need assistance transferring?   No    BMI: Estimated body mass index is 37.21 kg/m  as calculated from the following:    Height as of 6/28/24: 1.721 m (5' 7.76\").    Weight as of 6/28/24: 110.2 kg (243 lb).     Is patients BMI > 40 and scheduling location " [facility]?  No    Do you take an injectable medication for weight loss or diabetes (excluding insulin)?  No    Do you take the medication Naltrexone?  No    Do you take blood thinners?  No       Prep   Are you currently on dialysis or do you have chronic kidney disease?  No    Do you have a diagnosis of diabetes?  No    Do you have a diagnosis of cystic fibrosis (CF)?  No    On a regular basis do you go 3 -5 days between bowel movements?  No    BMI > 40?  No    Preferred Pharmacy:    [facility] [address] [facility]  [id number] [facility] [address]  Phone: [phone] Fax: [phone]      Final Scheduling Details     Procedure scheduled  Colonoscopy    Surgeon:       Date of procedure:  10/28     Pre-OP / PAC:   No - Not required for this site.    Location  [facility] - [facility] - Patient preference.    Sedation   Moderate Sedation - Per order.      Patient Reminders:   You will receive a call from a Nurse to review instructions and health history.  This assessment must be completed prior to your procedure.  Failure to complete the Nurse assessment may result in the procedure being cancelled.      On the day of your procedure, please designate an adult(s) who can drive you home stay with you for the next 24 hours. The medicines used in the exam will make you sleepy. You will not be able to drive.      You cannot take public transportation, ride share services, or non-medical taxi service without a responsible caregiver.  Medical transport services are allowed with the requirement that a responsible caregiver will receive you at your destination.  We require that drivers and caregivers are confirmed prior to your procedure.

## 2024-07-15 ASSESSMENT — PATIENT HEALTH QUESTIONNAIRE - PHQ9
10. IF YOU CHECKED OFF ANY PROBLEMS, HOW DIFFICULT HAVE THESE PROBLEMS MADE IT FOR YOU TO DO YOUR WORK, TAKE CARE OF THINGS AT HOME, OR GET ALONG WITH OTHER PEOPLE: NOT DIFFICULT AT ALL
SUM OF ALL RESPONSES TO PHQ QUESTIONS 1-9: 0
SUM OF ALL RESPONSES TO PHQ QUESTIONS 1-9: 0

## 2024-07-16 ENCOUNTER — VIRTUAL VISIT (OUTPATIENT)
Dept: PSYCHOLOGY | Facility: CLINIC | Age: 34
End: 2024-07-16
Payer: COMMERCIAL

## 2024-07-16 DIAGNOSIS — F40.10 SOCIAL ANXIETY DISORDER: Primary | ICD-10-CM

## 2024-07-16 DIAGNOSIS — Z71.89 PRE-BARIATRIC SURGERY PSYCHOLOGICAL EVALUATION: ICD-10-CM

## 2024-07-16 DIAGNOSIS — F33.42 MAJOR DEPRESSIVE DISORDER, RECURRENT, IN FULL REMISSION WITH ANXIOUS DISTRESS (H): ICD-10-CM

## 2024-07-16 PROCEDURE — 90791 PSYCH DIAGNOSTIC EVALUATION: CPT | Mod: 95 | Performed by: PSYCHOLOGIST

## 2024-07-16 ASSESSMENT — COLUMBIA-SUICIDE SEVERITY RATING SCALE - C-SSRS
1. HAVE YOU WISHED YOU WERE DEAD OR WISHED YOU COULD GO TO SLEEP AND NOT WAKE UP?: NO
2. HAVE YOU ACTUALLY HAD ANY THOUGHTS OF KILLING YOURSELF?: NO
6. HAVE YOU EVER DONE ANYTHING, STARTED TO DO ANYTHING, OR PREPARED TO DO ANYTHING TO END YOUR LIFE?: NO
TOTAL  NUMBER OF INTERRUPTED ATTEMPTS LIFETIME: NO
TOTAL  NUMBER OF ABORTED OR SELF INTERRUPTED ATTEMPTS LIFETIME: NO
ATTEMPT LIFETIME: NO

## 2024-07-16 NOTE — PROGRESS NOTES
[facility]         PATIENT'S NAME: Katelyn Erickson  PREFERRED NAME: Katelyn  PRONOUNS:      She/Her/Hers  MRN: [id number]  : 1990  ADDRESS: [address] [place] [address]  ACCT. NUMBER:  [id number]  DATE OF SERVICE: 24   START TIME: 8:00AM  END TIME: 8:50AM  PREFERRED PHONE: [phone]  May we leave a program related message: Yes  EMERGENCY CONTACT: was obtained:    Venkat Erickson (Spouse)  [phone] (Mobile)     SERVICE MODALITY:  Video Visit:      Provider verified identity through the following two step process.  Patient provided:  Patient     Telemedicine Visit: The patient's condition can be safely assessed and treated via synchronous audio and visual telemedicine encounter.      Reason for Telemedicine Visit: Services only offered telehealth    Originating Site (Patient Location): Patient's home    Distant Site (Provider Location): Provider Remote Setting- Home Office    Consent:  The patient/guardian has verbally consented to: the potential risks and benefits of telemedicine (video visit) versus in person care; bill my insurance or make self-payment for services provided; and responsibility for payment of non-covered services.     Patient would like the video invitation sent by:  My Chart    Mode of Communication:  Video Conference via Amwell    Distant Location (Provider):  Off-site    As the provider I attest to compliance with applicable laws and regulations related to telemedicine.    Reviewed [facility] Attendance Agreement. Patient expressed understanding that if patient no shows or cancels with less than 24 hours for an appointment, twice within 6 months, then the patient will not be allowed to schedule for six months.     Explained that patient must be located in the [facility] for virtual appointments.     Explained that this writer is obligated to report allegations of minors or vulnerable adults being abused,  "neglected or exploited.     Encouraged patient to contact their insurance provider to learn more about their personal cost for the assessment process.     Explained that this writer's clinic hours are Monday-Wednesday and messages will be returned during the next business day.       [facility] ADULT [facility] DIAGNOSTIC ASSESSMENT    Identifying Information:  Patient is a 34 year old,   individual.  Patient was referred for an assessment by [facility].  Patient attended the session alone.    Chief Complaint:   The reason for seeking services at this time is: \"Approval for bariatric surgery\".  The problem(s) began when the patient was in early childhood. Patient reported that her weight management struggles worsened after each of her three pregnancies. .    Patient has attempted to resolve these concerns in the past through medication for depression and anxiety .      Patient was referred for an evaluation by the [facility]. Patient is presenting for a psychological evaluation as a routine part of the process for pursuing weight loss surgery.  Patient reports they have attempted to lose weight in the past through Weight Watchers, an exercise program, prescription weight loss medications, and watching portion sizes.  The Patient reports they believe the surgery will benefit them by being healthier and prolonging her life. Patient reported that her mother  of colon cancer at age 34. 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          Patient reported that she has a history of depression and anxiety, which is well managed with Wellbutrin. Patient reported that her depression began in  when she moved to [place]. Patient reported that she was \"stuck in the house with three children\" in the winter. Patient reported that she did not know " "anyone in the area. Patient reported that her family moved in with her 's mother and grandmother and they live with them currently. Patient reported that her anxiety began in 2018 after the birth of her third child.    Social/Family History:  Patient reported they grew up in other [facility].  They were raised by biological parents; stepmother  .  Parents  /  when patient was 6 years of age. Patient reported that her father has been remarried three times. Patient reported that she did not get along with her first step-mom. Patient reported that she lived with her father full time after her mother passed away when the patient was 14 years age. Patient reported that she got along well with her second step-mother. Patient reported that she still communicates with this step-mother. Patient reported that she does not know her father's current wife well. Patient reported that she has two half-siblings on her father's side. Patient reported that she gets along with them \"fine.\"  Patient reported that their childhood was \"normal.\"  Patient described their current relationships with family of origin as close with father. Patient reported that she is closest with her half brother.      The patient describes their cultural background as .  Cultural influences and impact on patient's life structure, values, norms, and healthcare: None.  Contextual influences on patient's health include: Contextual Factors: Family Factors father was not supportive of therapy .    These factors will be addressed in the Preliminary Treatment plan. Patient identified their preferred language to be English. Patient reported they does not need the assistance of an  or other support involved in therapy.     Patient reported had no significant delays in developmental tasks.   Patient's highest education level was some college  . Patient reported that she studied dental assisting and then left when she " "became pregnant with her oldest child. Patient reported that she was unable to transfer the [employer] credits from [place] to [place]. Patient reported that she then briefly studied nursing, which was difficult with trying to focus her roles as a mother. Patient identified the following learning problems: none reported.  Modifications will not be used to assist communication in therapy.  Patient reports they are  able to understand written materials.    Patient reported the following relationship history includes on marriage.  Patient's current relationship status is  for 10 years. Patient identified their sexual orientation as heterosexual.  Patient reported having 3 child(tom). Patient identified spouse as part of their support system.  Patient identified the quality of these relationships as good  .      Patient's current living/housing situation involves living with her mother-in-law and grandmother-in-law.  The immediate members of family and household include Venkat, Marlon,, mother-in-law, grandmother-in-law, and her three children. Patient reported that she and her  bought the house from his grandmother. Patient reported that housing is stable.     Patient is currently self- employed part time. 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 Patient reported that she has \"only closed on one half in the last year and a half.\" Patient reported that she also cares for her three children. Patient reported that she is also on the Lacrosse board, is the hockey , and volunteers at the local schools, and is involved in her children's sports teams.  Patient reports their finances are obtained through spouse. Patient does identify finances as a current stressor.      Patient reported that they have not been involved with the legal system. Patient does not report being under probation/ parole/ jurisdiction. They are not under any current court jurisdiction. .    Patient's Strengths and " Limitations:  Patient identified the following strengths or resources that will help them succeed in treatment: commitment to health and well being, community involvement, family support, and motivation. Things that may interfere with the patient's success in treatment include: none identified.     Assessments:  The following assessments were completed by patient for this visit:  PHQ9:       10/11/2023    10:06 AM 11/13/2023     1:22 PM 6/19/2024    12:45 PM 6/19/2024    12:46 PM 7/15/2024     9:31 AM   PHQ-9 SCORE   PHQ-9 Total Score MyChart    2 (Minimal depression) 0   PHQ-9 Total Score 13 2 2  0    0     GAD7:       10/11/2023    10:06 AM 11/13/2023     1:24 PM 6/19/2024    12:54 PM 7/15/2024     9:31 AM   KEYLA-7 SCORE   Total Score   2 (minimal anxiety) 3 (minimal anxiety)   Total Score 15 2 2 3     CAGE-AID:       7/15/2024     9:30 AM   CAGE-AID Total Score   Total Score 0    0   Total Score MyChart 0 (A total score of 2 or greater is considered clinically significant)     PROMIS 10-Global Health (all questions and answers displayed):       6/4/2024     3:41 PM 7/16/2024     7:55 AM   PROMIS 10   In general, would you say your health is: Good Good   In general, would you say your quality of life is: Good Good   In general, how would you rate your physical health? Good Fair   In general, how would you rate your mental health, including your mood and your ability to think? Good Good   In general, how would you rate your satisfaction with your social activities and relationships? Good Good   In general, please rate how well you carry out your usual social activities and roles Good Good   To what extent are you able to carry out your everyday physical activities such as walking, climbing stairs, carrying groceries, or moving a chair? Completely Completely   In the past 7 days, how often have you been bothered by emotional problems such as feeling anxious, depressed, or irritable?  Rarely   In the past 7 days, how  would you rate your fatigue on average?  Mild   In the past 7 days, how would you rate your pain on average, where 0 means no pain, and 10 means worst imaginable pain?  2   In general, would you say your health is:  3   In general, would you say your quality of life is:  3   In general, how would you rate your physical health?  2   In general, how would you rate your mental health, including your mood and your ability to think?  3   In general, how would you rate your satisfaction with your social activities and relationships?  3   In general, please rate how well you carry out your usual social activities and roles. (This includes activities at home, at work and in your community, and responsibilities as a parent, child, spouse, employee, friend, etc.)  3   To what extent are you able to carry out your everyday physical activities such as walking, climbing stairs, carrying groceries, or moving a chair?  5   In the past 7 days, how often have you been bothered by emotional problems such as feeling anxious, depressed, or irritable?  2   In the past 7 days, how would you rate your fatigue on average?  2   In the past 7 days, how would you rate your pain on average, where 0 means no pain, and 10 means worst imaginable pain?  2   Global Mental Health Score  13   Global Physical Health Score  15   PROMIS TOTAL - SUBSCORES  28     Falls Suicide Severity Rating Scale (Lifetime/Recent)      3/11/2024     6:16 AM 4/27/2024    12:37 PM 7/16/2024     8:11 AM   Falls Suicide Severity Rating (Lifetime/Recent)   Q1 Wished to be Dead (Past Month) 0-->no 0-->no    Q2 Suicidal Thoughts (Past Month) 0-->no 0-->no    Q6 Suicide Behavior (Lifetime) 0-->no 0-->no    Level of Risk per Screen no risks indicated no risks indicated    Q1 Wish to be Dead (Lifetime)   N   Q2 Non-Specific Active Suicidal Thoughts (Lifetime)   N   Actual Attempt (Lifetime)   N   Has subject engaged in non-suicidal self-injurious behavior? (Lifetime)   N    Interrupted Attempts (Lifetime)   N   Aborted or Self-Interrupted Attempt (Lifetime)   N   Preparatory Acts or Behavior (Lifetime)   N   Calculated C-SSRS Risk Score (Lifetime/Recent)   No Risk Indicated       Personal and Family Medical History:  Patient does report a family history of mental health concerns.  Patient reports family history includes Cancer in her maternal grandfather; Cancer - colorectal in her mother; Cancer - colorectal (age of onset: 49) in her maternal grandmother; Colon Cancer in her maternal grandmother; Colon Cancer (age of onset: 37) in her maternal aunt; Colon Cancer (age of onset: 48) in her maternal aunt; Diabetes in her maternal grandmother; Jean Syndrome in her maternal aunt, maternal aunt, and mother.. Patient reported that her mother had depression when she was diagnosed with terminal cancer. Patient reported that she thinks that her maternal aunt has anxiety. Patient reported that her father abuses alcohol.     Patient does report Mental Health Diagnosis and/or Treatment.  Patient reported the following previous diagnoses which include(s): an anxiety disorder; depression .  Patient reported symptoms began in 2020.  Patient has received mental health services in the past: medications from PCP.  Psychiatric Hospitalizations: none. Patient denies a history of civil commitment.      Currently, patient  is receiving other mental health services.  These include [facility] care provider at [facility] .  For follow-up on TBD .       Patient has had a physical exam to rule out medical causes for current symptoms.  Date of last physical exam was within the past year. Client was encouraged to follow up with PCP if symptoms were to develop. The patient has a [facility] Primary Care Provider, who is named Rose Phelps..  Patient reports the following current medical concerns: obesity and Jean Syndrome .  Patient reports pain concerns including lower back pain.  Patient does not  want help addressing pain concerns.. Patient reported that she is going through PT. Patient reported that she thinks that the back pain is due to her hips and tight hip flexors since having a hysterectomy. There are significant appetite / nutritional concerns / weight changes. Patient has obesity.  Patient does not report a history of head injury / trauma / cognitive impairment.      Current Medications:    buPROPion (WELLBUTRIN XL) 150 MG 24 hr tablet    metFORMIN (GLUCOPHAGE XR) 500 MG 24 hr tablet  senna-docusate (SENOKOT-S/PERICOLACE) 8.6-50 MG tablet    topiramate (TOPAMAX) 25 MG tablet         Medication Adherence:  Patient reports taking.      Patient Allergies:  No Known Allergies  Patient denied allergies to medications.       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    NO ACTIVE PROBLEMS          Current Mental Status Exam:   Appearance:  Appropriate    Eye Contact:  Good   Psychomotor:  Normal       Gait / station:  no problem  Attitude / Demeanor: Cooperative  Interested  Speech      Rate / Production: Normal/ Responsive      Volume:  Normal  volume      Language:  intact  Mood:   Normal  Affect:   Appropriate    Thought Content: Clear   Thought Process: Coherent  Logical       Associations: No loosening of associations  Insight:   Good   Judgment:  Intact   Orientation:  All  Attention/concentration: Good    Substance Use:   Patient did report a family history of substance use concerns; see medical history section for details.  Patient has not received chemical dependency treatment in the past.  Patient has not ever been to detox.      Patient is not currently receiving any chemical dependency treatment.           Substance History of use Age of first use Date of last use     Pattern and duration of use (include amounts and frequency)   Alcohol Occasional     16 April 2024 REPORTS SUBSTANCE USE: reports using substance 2 times per  month and has 2 beers at a time.   Patient reports heaviest use is current use.   Cannabis   Tried in past  18  20 REPORTS SUBSTANCE USE: N/A     Amphetamines   never used     REPORTS SUBSTANCE USE: N/A   Cocaine/crack    never used       REPORTS SUBSTANCE USE: N/A   Hallucinogens never used         REPORTS SUBSTANCE USE: N/A   Inhalants never used         REPORTS SUBSTANCE USE: N/A   Heroin never used         REPORTS SUBSTANCE USE: N/A   Other Opiates never used     REPORTS SUBSTANCE USE: N/A   Benzodiazepine   never used     REPORTS SUBSTANCE USE: N/A   Barbiturates never used     REPORTS SUBSTANCE USE: N/A   Over the counter meds never used     REPORTS SUBSTANCE USE: N/A   Caffeine currently use N/a  7/16/2024  REPORTS SUBSTANCE USE: reports using substance 1 times per day and has 1 coffee at a time.   Patient reports heaviest use was 29 years of age when working over night shifts.   Nicotine  used in the past 14 04/27/16 REPORTS SUBSTANCE USE: N/A   Other substances not listed above:  Identify:  never used     REPORTS SUBSTANCE USE: N/A     Patient reported the following problems as a result of their substance use: no problems, not applicable.    Substance Use: No symptoms    Based on the negative CAGE score and clinical interview there  are not indications of drug or alcohol abuse.    Significant Losses / Trauma / Abuse / Neglect Issues:   Patient did not serve in the .  There are indications or report of significant loss, trauma, abuse or neglect issues related to: death of mother when the patient was 14 years of age . Patient reported that she was close with a great-paternal aunt who also passed away from cancer.   Concerns for possible neglect are not present.     Safety Assessment:   Patient denies current homicidal ideation and behaviors.  Patient denies current self-injurious ideation and behaviors.    Patient denied risk behaviors associated with substance use.   Patient denies any high risk  "behaviors associated with mental health symptoms.  Patient reports the following current concerns for their personal safety: None.  Patient reports there are not firearms in the house.        History of Safety Concerns:  Patient denied a history of homicidal ideation.     Patient denied a history of personal safety concerns.    Patient denied a history of assaultive behaviors.    Patient denied a history of sexual assault behaviors.     Patient denied a history of risk behaviors associated with substance use.  Patient denies any history of high risk behaviors associated with mental health symptoms.  Patient reports the following protective factors: my children, my pets, my , goals for the future, and \"I want to a be an old lady.\"    Risk Plan:  See Recommendations for Safety and Risk Management Plan    Review of Symptoms per patient report:   Depression: Irritability  Sammi:  No Symptoms  Psychosis: No Symptoms  Anxiety: Excessive worry, Nervousness, Social anxiety, and Irritability  Panic:  No symptoms  Post Traumatic Stress Disorder:  No Symptoms   Eating Disorder: No Symptoms  ADD / ADHD:  No symptoms  Conduct Disorder: No symptoms  Autism Spectrum Disorder: No symptoms  Obsessive Compulsive Disorder: No Symptoms    Patient reports the following compulsive behaviors and treatment history:  NA .      Diagnostic Criteria:   Major Depressive Disorder  CRITERIA (A-C) REPRESENT A MAJOR DEPRESSIVE EPISODE - SELECT THESE CRITERIA    Patient reported a history of depression and generalized anxiety, which appear to be well managed at this time with Wellbutrin. Hence, she is being diagnosed with Major Depressive Disorder, Recurrent, In Full Remission, with Anxious Distress     Social Anxiety Diagnostic Criteria:    A. Marked fear or anxiety about one or more social situations in which the individual is exposed to possible scrutiny by others. Examples include social interactions (e.g., having a conversation, meeting " unfamiliar people), being observed (e.g., eating or drinking), and performing in front of others (e.g., giving a speech). YES  Note: In children, the anxiety must occur in peer settings and not just during interactions with adults.    B. The individual fears that he or she will act in a way or show anxiety symptoms that will be negatively evaluated (i.e., will be humiliating or embarrassing; will lead to rejection or offend others). YES    C. The social situations almost always provoke fear or anxiety. YES  Note: In children, the fear or anxiety may be expressed by crying, tantrums, freezing, clinging, shrinking, or failing to speak in social situations.    D.The social situations are avoided or endured with intense fear or anxiety. YES    E. The fear or anxiety is out of proportion to the actual threat posed by the social situation and to the sociocultural context. YES    F. The fear, anxiety, or avoidance is persistent, typically lasting for 6 months or more. YES    G. The fear, anxiety, or avoidance causes clinically significant distress or impairment in social, occupational, or other important areas of functioning. YES    H. The fear, anxiety, or avoidance is not attributable to the physiological effects of a substance (e.g., a drug of abuse, a medication) or another medical condition. YES    I. The fear, anxiety, or avoidance is not better explained by the symptoms of another mental disorder, such as panic disorder, body dysmorphic disorder, or autism spectrum disorder. YES    J. If another medical condition (e.g., Parkinson s disease, obesity, disfigurement from burns or injury) is present, the fear, anxiety, or avoidance is clearly unrelated or is excessive. YES      Patient meets diagnostic criteria for Social Anxiety Disorder.       Functional Status:  Patient reports the following functional impairments:  chronic disease management, health maintenance, self-care, and social interactions.      Nonprogrammatic care:  Patient is requesting basic services to address current mental health concerns.    Clinical Summary:  1. 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  2. Principal DSM5 Diagnoses  (Sustained by DSM5 Criteria Listed Above):   296.36 (F33.42) Major Depressive Disorder, Recurrent Episode, In full remission With anxious distress  300.02 (F41.1) Generalized Anxiety Disorder.  3. Other Diagnoses that is relevant to services:   NA  4. Provisional Diagnosis:  NA.  5. Prognosis: Expect Improvement.  6. Likely consequences of symptoms if not treated: Symptoms likely to persist and may worsen if not treated.  7. Client strengths include:  insightful, motivated, open to learning, responsible parent, and support of family, friends and providers .     Recommendations:     1. Plan for Safety and Risk Management:   Safety and Risk: Recommended that patient call 911 or go to the local ED should there be a change in any of these risk factors..          Report to child / adult protection services was NA.     2. Patient's identified no concerns relevant to the testing process.     3. Initial Treatment will focus on: attend a bariatric support group meeting and complete the MMPI.      4. Resources/Service Plan:    services are not indicated.   Modifications to assist communication are not indicated.   Additional disability accommodations are not indicated.      5. Collaboration:   Collaboration / coordination of treatment will be initiated with the following  support professionals: [facility].     6.  Referrals:   The following referral(s) will be initiated: NA.     A Release of Information has been obtained for the following: NA     Clinical Substantiation/medical necessity for the above recommendations:  Psychological testing is required to adequately assess if the patient is stable enough  to proceed with the bariatric surgery process.     7. JOCELIN:    JOCELIN:  Discussed the general effects of drugs and alcohol on health and well-being. Provider gave patient printed information about the  effects of chemical use on their health and well being. Recommendations:  continue to decrease caffeine and alcohol before bariatric surgery .     8. Records:   These were reviewed at time of assessment.   Information in this assessment was obtained from the medical record and  provided by patient who is a good historian.    Patient will have open access to their mental health medical record.    9.   Interactive Complexity: No        Provider Name/ Credentials:  Yandy V[facility] PsyD [facility]   July 16, 2024

## 2024-07-17 ENCOUNTER — ANCILLARY PROCEDURE (OUTPATIENT)
Dept: ULTRASOUND IMAGING | Facility: CLINIC | Age: 34
End: 2024-07-17
Attending: OBSTETRICS & GYNECOLOGY
Payer: COMMERCIAL

## 2024-07-17 DIAGNOSIS — G89.18 ACUTE POST-OPERATIVE PAIN: ICD-10-CM

## 2024-07-17 PROCEDURE — 76830 TRANSVAGINAL US NON-OB: CPT | Performed by: OBSTETRICS & GYNECOLOGY

## 2024-07-17 PROCEDURE — 76856 US EXAM PELVIC COMPLETE: CPT | Performed by: OBSTETRICS & GYNECOLOGY

## 2024-07-22 NOTE — PROGRESS NOTES
"Video-Visit Details    Type of service:  Video Visit    Video Start Time: 10:31 AM  Video End Time: 10:49 AM     Originating Location (pt. Location): Home    Distant Location (provider location): Offsite (providers home) [facility] [place]     Platform [facility]    Return Bariatric Nutrition Consultation Note    Reason For Visit: Nutrition Assessment    Katelyn Erickson is a 34 year old presenting today for return bariatric nutrition consult.   Patient is interested in weight loss surgery with Dr. Hurley expected surgery in TBD.  Patient is accompanied by self.  This is patient's 6th of 6 required nutrition visits prior to surgery. Patient attended the WLS nutrition class on 2/23/24.     Pt referred by Steffi Sánchez NP  on February 20, 2024.     CO-MORBIDITIES OF OBESITY INCLUDE:        2/15/2024     2:47 PM   --   I have the following health issues associated with obesity None of the above     SUPPORT:      2/15/2024     2:47 PM   Support System Reviewed With Patient   Who do you have in your support network that can be available to help you for the first 2 weeks after surgery?  and MIL   Who can you count on for support throughout your weight loss surgery journey?  and MIL     ANTHROPOMETRICS:  Initial consult weight: 254 lb on 2/20/24   Estimated body mass index is 37.07 kg/m  as calculated from the following:    Height as of this encounter: 1.721 m (5' 7.75\").    Weight as of this encounter: 109.8 kg (242 lb).  Current Weight: 242 lb per pt report on [facility]     Required weight loss goal pre-op: -10 lbs from initial consult weight (goal weight 244 lbs or less before surgery) - met        2/15/2024     2:47 PM   --   I have tried the following methods to lose weight Watching portions or calories    Exercise    Weight Watchers           2/15/2024     2:47 PM   Weight Loss Questions Reviewed With Patient   How long have you been overweight? Since " early childhood     MEDICATION FOR WEIGHT LOSS: Topiramate 75 mg - no side effects.   Metformin     VITAMIN/MINERAL SUPPLEMENTS: None     NUTRITION HISTORY:  Food allergies: NKFA  Food intolerances: none   Previous experience with diet modification for weight loss: weight watchers and exercise   Barriers to lifestyle change: reports none     Per Steffi's note: Patient describes significant thoughts about food and cravings in the evenings with limited hunger during the day.     Patient herself is trying to eat less meat. Trying to increase her vegetables. No overnight eating    Recent Diet Recall:  Breakfast: coffee with Premier Protein   Lunch: 11 AM if at home, scrambled eggs with cottage and spinach with English muffin or toast. If goes into office will eat at 1 or 2 pm, Leftovers   Dinner: Tacos, spaghetti, chicken. Protein + vegetable + carb.   Snacks: After the kids go to bed - crackers and Nutella  Beverages: Water, soda seldomly.   Alcohol: None for the most part, seldomly   Dining Out: Not very often. Twice a month.     Patient recently had a total laparoscopic hysterectomy on 3/11/24.     April 2024: Downloaded the mark [email or web address] to track her nutrition.  Helps to monitor her protein amount. Plans to practice  liquids from meal times and chew her food really well to an applesauce consistency. Continues to work on tasklist items needed for surgery. Needs more clarification around if she needs the sleep medicine consult.     May 2024: Focusing more on fiber intake and is drinking a lot of water. For breakfast started incorporating overnight oats with protein and shaun seeds. Has been tracking nutrition in [place]. Continues to practice  liquids from meal times. Has appointment with Steffi in June and psych visits in July.     June 2024: Met with Steffi earlier this month. Started taking Metformin since the beginning of June. Hasn't noticed a change in her weight this past month. Stopped taking  Senna so often. Lives a busy lifestyle. Recently started pelvic floor physical activity. Struggles with  liquids from meal times, especially before a meal. Has slowed down with eating and trying to take her time. Averaging around 80 grams of protein with most meals. Has psych visits this next month.     July 2024: Had her first psych visit out of a total of 3.  Has been focusing on protein and fiber in her meals. Doing Pelvic floor physical therapy and also doing good with other physical activity by staying busy with her kids. Still struggles with  liquids prior to her meal but is aware and working on it.         2/15/2024     2:47 PM   Recall Diet Questions Reviewed With Patient   Describe what you typically consume for breakfast (typical or most recent) Scrambled eggs english muffine   How many ounces of water, or other low calorie drinks, do you drink daily (8 oz=1 glass)? 64 oz or more   How many ounces of caffeine (coffee, tea, pop) do you drink daily (8 oz=1 glass)? 16 oz   How many ounces of carbonated (pop, beer, sparkling water) drinks do you drinky daily (8 oz=1 glass)? 0 oz   How many ounces of juice, pop, sweet tea, sports drinks, protein drinks, other sweetened drinks, do you drink daily (8 oz=1 glass)? 0 oz   How many ounces of milk do you drink daily (8 oz=1 glass) 0 oz   How often do you drink alcohol? Monthly or less   If you do drink alcohol, how many drinks might you have in a day? (one drink = 5 oz. wine, 1 can/bottle of beer, 1 shot liquor) 1 or 2           2/15/2024     2:47 PM   Eating Habits   What foods do you crave? Sweets           2/15/2024     2:47 PM   Dining Out History Reviewed With Patient   How often do you dine out? Rarely.   Where do you dine out? (select all that apply) fast food chains   What types of food do you order when you dine out? Hamburger     EXERCISE: Busy with kids activities.         6/4/2024     3:39 PM   --   How often do you exercise? Never    What keeps you from being more active? 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   Patient has 3 kids ages 10, 7, 5.   Aunt had bariatric surgery.     NUTRITION DIAGNOSIS:  Obesity r/t long history of positive energy balance aeb BMI >30 kg/m2.    INTERVENTION:  Intervention Provided/Education Provided on post-op diet guidelines, vitamins/minerals essential post-operatively, GI anatomy of bariatric surgeries, ways to help prepare for post-op diet guidelines pre-operatively, portion/calorie-control, mindful eating and sources of protein.  Patient demonstrates understanding.     Personal barriers to making and continuing required life changes have been identified, and strategies to overcome those barriers have been recommended AND family and social supports have been assessed and strategies to strengthen those supports have been recommended.    Provided pt with list of goals, RD contact information and resources listed below via [employer].           2/15/2024     2:47 PM   Questions Reviewed With Patient   How ready are you to make changes regarding your weight? Number 1 = Not ready at all to make changes up to 10 = very ready. 7   How confident are you that you can change? 1 = Not confident that you will be successful making changes up to 10 = very confident. 7     Expected Engagement: good    GOALS:  Relating To Eating:  Eat slowly (20-30 minutes per meal), chewing foods well (25 chews per bite/applesauce consistency)  Eat 3 meals per day  Consume 60-90 g protein per day    Relating to beverages:  Reduce caffeine/carbonation/calorie containing beverages  Separate fluids from meals by 30 minutes before, during, and after eating  Drink 48-64 ounces of fluid per day    Relating to dietary supplements:  Start thinking about a post op multivitamin     Relating to  activity:  Increase activity as able    Post-op Diet Handouts:  Diet Guidelines after Weight-loss Surgery  [email or web address]     Your Stage 1 Diet: Clear Liquids  [email or web address]     Your Stage 2 Diet: Low-fat Full Liquids  [email or web address]     Your Stage 3 Diet: Pureed Foods  [email or web address]     Pureed Recipes  [email or web address]    Your Stage 4 Diet: Soft Foods  [email or web address]    Your Stage 5 Diet: Regular Foods  [email or web address]    Supplements after Sleeve Gastrectomy, Gastric Bypass or Single Anastomosis Duodenal Switch  [email or web address]    Keeping Track of Fluids  [email or web address]    Follow Up: August 29.     Time spent with patient: 18 minutes.  Tina Montenegro RD, LD

## 2024-07-23 ENCOUNTER — MYC MEDICAL ADVICE (OUTPATIENT)
Dept: FAMILY MEDICINE | Facility: CLINIC | Age: 34
End: 2024-07-23

## 2024-07-23 ENCOUNTER — VIRTUAL VISIT (OUTPATIENT)
Dept: ENDOCRINOLOGY | Facility: CLINIC | Age: 34
End: 2024-07-23
Payer: COMMERCIAL

## 2024-07-23 VITALS — WEIGHT: 242 LBS | BODY MASS INDEX: 36.68 KG/M2 | HEIGHT: 68 IN

## 2024-07-23 DIAGNOSIS — Z71.3 NUTRITIONAL COUNSELING: Primary | ICD-10-CM

## 2024-07-23 DIAGNOSIS — E66.9 OBESITY: ICD-10-CM

## 2024-07-23 PROCEDURE — 97803 MED NUTRITION INDIV SUBSEQ: CPT | Mod: 95

## 2024-07-23 PROCEDURE — 99207 PR NO CHARGE LOS: CPT | Mod: 95

## 2024-07-23 ASSESSMENT — PAIN SCALES - GENERAL: PAINLEVEL: NO PAIN (0)

## 2024-07-23 NOTE — LETTER
"7/23/2024       RE: Katelyn Erickson  [address]  [facility] [address]     Dear Colleague,    Thank you for referring your patient, Katelyn Erickson, to the [facility] [place] at [facility]. Please see a copy of my visit note below.    Video-Visit Details    Type of service:  Video Visit    Video Start Time: 10:31 AM  Video End Time: 10:49 AM     Originating Location (pt. Location): Home    Distant Location (provider location): Offsite (providers home) [facility] [place]     Platform used for Video Visit: [employer]    Return Bariatric Nutrition Consultation Note    Reason For Visit: Nutrition Assessment    Katelyn Erickson is a 34 year old presenting today for return bariatric nutrition consult.   Patient is interested in weight loss surgery with Dr. Hurley expected surgery in TBD.  Patient is accompanied by self.  This is patient's 6th of 6 required nutrition visits prior to surgery. Patient attended the WLS nutrition class on 2/23/24.     Pt referred by Steffi Sánchez NP  on February 20, 2024.     CO-MORBIDITIES OF OBESITY INCLUDE:        2/15/2024     2:47 PM   --   I have the following health issues associated with obesity None of the above     SUPPORT:      2/15/2024     2:47 PM   Support System Reviewed With Patient   Who do you have in your support network that can be available to help you for the first 2 weeks after surgery?  and MIL   Who can you count on for support throughout your weight loss surgery journey?  and MIL     ANTHROPOMETRICS:  Initial consult weight: 254 lb on 2/20/24   Estimated body mass index is 37.07 kg/m  as calculated from the following:    Height as of this encounter: 1.721 m (5' 7.75\").    Weight as of this encounter: 109.8 kg (242 lb).  Current Weight: 242 lb per pt report on [facility]     Required weight loss goal pre-op: -10 lbs from initial consult " weight (goal weight 244 lbs or less before surgery) - met        2/15/2024     2:47 PM   --   I have tried the following methods to lose weight Watching portions or calories    Exercise    Weight Watchers           2/15/2024     2:47 PM   Weight Loss Questions Reviewed With Patient   How long have you been overweight? Since early childhood     MEDICATION FOR WEIGHT LOSS: Topiramate 75 mg - no side effects.   Metformin     VITAMIN/MINERAL SUPPLEMENTS: None     NUTRITION HISTORY:  Food allergies: NKFA  Food intolerances: none   Previous experience with diet modification for weight loss: weight watchers and exercise   Barriers to lifestyle change: reports none     Per Steffi's note: Patient describes significant thoughts about food and cravings in the evenings with limited hunger during the day.     Patient herself is trying to eat less meat. Trying to increase her vegetables. No overnight eating    Recent Diet Recall:  Breakfast: coffee with Premier Protein   Lunch: 11 AM if at home, scrambled eggs with cottage and spinach with English muffin or toast. If goes into office will eat at 1 or 2 pm, Leftovers   Dinner: Tacos, spaghetti, chicken. Protein + vegetable + carb.   Snacks: After the kids go to bed - crackers and Nutella  Beverages: Water, soda seldomly.   Alcohol: None for the most part, seldomly   Dining Out: Not very often. Twice a month.     Patient recently had a total laparoscopic hysterectomy on 3/11/24.     April 2024: Downloaded the [employer] it to track her nutrition.  Helps to monitor her protein amount. Plans to practice  liquids from meal times and chew her food really well to an applesauce consistency. Continues to work on tasklist items needed for surgery. Needs more clarification around if she needs the sleep medicine consult.     May 2024: Focusing more on fiber intake and is drinking a lot of water. For breakfast started incorporating overnight oats with protein and shaun seeds. Has been  tracking nutrition in [employer]. Continues to practice  liquids from meal times. Has appointment with Steffi in June and psych visits in July.     June 2024: Met with Steffi earlier this month. Started taking Metformin since the beginning of June. Hasn't noticed a change in her weight this past month. Stopped taking Senna so often. Lives a busy lifestyle. Recently started pelvic floor physical activity. Struggles with  liquids from meal times, especially before a meal. Has slowed down with eating and trying to take her time. Averaging around 80 grams of protein with most meals. Has psych visits this next month.     July 2024: Had her first psych visit out of a total of 3.  Has been focusing on protein and fiber in her meals. Doing Pelvic floor physical therapy and also doing good with other physical activity by staying busy with her kids. Still struggles with  liquids prior to her meal but is aware and working on it.         2/15/2024     2:47 PM   Recall Diet Questions Reviewed With Patient   Describe what you typically consume for breakfast (typical or most recent) Scrambled eggs english muffine   How many ounces of water, or other low calorie drinks, do you drink daily (8 oz=1 glass)? 64 oz or more   How many ounces of caffeine (coffee, tea, pop) do you drink daily (8 oz=1 glass)? 16 oz   How many ounces of carbonated (pop, beer, sparkling water) drinks do you drinky daily (8 oz=1 glass)? 0 oz   How many ounces of juice, pop, sweet tea, sports drinks, protein drinks, other sweetened drinks, do you drink daily (8 oz=1 glass)? 0 oz   How many ounces of milk do you drink daily (8 oz=1 glass) 0 oz   How often do you drink alcohol? Monthly or less   If you do drink alcohol, how many drinks might you have in a day? (one drink = 5 oz. wine, 1 can/bottle of beer, 1 shot liquor) 1 or 2           2/15/2024     2:47 PM   Eating Habits   What foods do you crave? Sweets           2/15/2024      2:47 PM   Dining Out History Reviewed With Patient   How often do you dine out? Rarely.   Where do you dine out? (select all that apply) fast food chains   What types of food do you order when you dine out? Hamburger     EXERCISE: Busy with kids activities.         6/4/2024     3:39 PM   --   How often do you exercise? Never   What keeps you from being more active? 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   Patient has 3 kids ages 10, 7, 5.   Aunt had bariatric surgery.     NUTRITION DIAGNOSIS:  Obesity r/t long history of positive energy balance aeb BMI >30 kg/m2.    INTERVENTION:  Intervention Provided/Education Provided on post-op diet guidelines, vitamins/minerals essential post-operatively, GI anatomy of bariatric surgeries, ways to help prepare for post-op diet guidelines pre-operatively, portion/calorie-control, mindful eating and sources of protein.  Patient demonstrates understanding.     Personal barriers to making and continuing required life changes have been identified, and strategies to overcome those barriers have been recommended AND family and social supports have been assessed and strategies to strengthen those supports have been recommended.    Provided pt with list of goals, RD contact information and resources listed below via [employer].           2/15/2024     2:47 PM   Questions Reviewed With Patient   How ready are you to make changes regarding your weight? Number 1 = Not ready at all to make changes up to 10 = very ready. 7   How confident are you that you can change? 1 = Not confident that you will be successful making changes up to 10 = very confident. 7     Expected Engagement: good    GOALS:  Relating To Eating:  Eat slowly (20-30 minutes per meal), chewing foods well (25 chews per bite/applesauce consistency)  Eat 3 meals per day  Consume  60-90 g protein per day    Relating to beverages:  Reduce caffeine/carbonation/calorie containing beverages  Separate fluids from meals by 30 minutes before, during, and after eating  Drink 48-64 ounces of fluid per day    Relating to dietary supplements:  Start thinking about a post op multivitamin     Relating to activity:  Increase activity as able    Post-op Diet Handouts:  Diet Guidelines after Weight-loss Surgery  [email or web address]     Your Stage 1 Diet: Clear Liquids  [email or web address]     Your Stage 2 Diet: Low-fat Full Liquids  [email or web address]     Your Stage 3 Diet: Pureed Foods  [email or web address]     Pureed Recipes  [email or web address]    Your Stage 4 Diet: Soft Foods  [email or web address]    Your Stage 5 Diet: Regular Foods  [email or web address]    Supplements after Sleeve Gastrectomy, Gastric Bypass or Single Anastomosis Duodenal Switch  [email or web address]    Keeping Track of Fluids  [email or web address]    Follow Up: August 29.     Time spent with patient: 18 minutes.  Tina Montenegro RD, LD

## 2024-07-23 NOTE — PATIENT INSTRUCTIONS
Devon Zepeda,     Follow-up with RD on August 29.     Thank you,    Tina Montenegro, PABLO, LD  If you would like to schedule or reschedule an appointment with the RD, please call [phone]    Nutrition Goals  Relating To Eating:  Eat slowly (20-30 minutes per meal), chewing foods well (25 chews per bite/applesauce consistency)  Eat 3 meals per day  Consume 60-90 g protein per day    Relating to beverages:  Reduce caffeine/carbonation/calorie containing beverages  Separate fluids from meals by 30 minutes before, during, and after eating  Drink 48-64 ounces of fluid per day    Relating to dietary supplements:  Start thinking about a post op multivitamin     Relating to activity:  Increase activity as able    Post-op Diet Handouts:  Diet Guidelines after Weight-loss Surgery  [email or web address]     Your Stage 1 Diet: Clear Liquids  [email or web address]     Your Stage 2 Diet: Low-fat Full Liquids  [email or web address]     Your Stage 3 Diet: Pureed Foods  [email or web address]     Pureed Recipes  [email or web address]    Your Stage 4 Diet: Soft Foods  [email or web address]    Your Stage 5 Diet: Regular Foods  [email or web address]    Supplements after Sleeve Gastrectomy, Gastric Bypass or Single Anastomosis Duodenal Switch  [email or web address]    Keeping Track of Fluids  [email or web address]    COMPREHENSIVE WEIGHT MANAGEMENT PROGRAM  VIRTUAL SUPPORT GROUPS    At [facility], our Comprehensive Weight Management program offers on-line support groups for patients who are working on weight loss and considering, preparing for, or have had weight loss surgery.     There is no cost for this opportunity.  You are invited to attend the?Virtual Support Groups?provided by any of the following locations:    [facility] via Triparazzi Teams with Loree Armendariz RN  2.   [place] via Triparazzi Teams with Lj Waldron, PhD, LP  3.   [place] via  "iKONVERSE Teams with Mary Jane Li RN  4.   [facility] via a Zoom Meeting with Mary Jane Chambers [facility]-    The following Support Group information can also be found on our website:  [email or web address]      [facility] Weight Loss Surgery Support Group  The support group is a patient-lead forum that meets monthly to share experiences, encouragement and education. It is open to those who have had weight loss surgery, are scheduled for surgery, or are considering surgery.   WHEN: This group meets on the 3rd Wednesday of each month from 5:00PM - 6:00PM virtually using Microsoft Teams.   FACILITATOR: Led by Loree Armendariz RD, OBI, RN, the program's Clinical Coordinator.   TO REGISTER: Please contact the clinic via [employer] or call the nurse line directly at [phone] to inform our staff that you would like an invite sent to you and to let us know the email you would like the invite sent to. Prior to the meeting, a link with directions on how to join the meeting will be sent to you.    2024 Meetings   January 17  February 21  March 20  April 17  May 15  Linda 19      [facility] Bariatric Care Support Group?  This is open to all pre- and post- operative bariatric surgery patients as well as their support system.   WHEN: This group meets the 3rd Tuesday of each month from 6:30 PM - 8:00 PM virtually using Microsoft Teams.   FACILITATOR: Led by Lj Waldron, Ph.D who is a Licensed Psychologist with the [facility] Comprehensive Weight Management Program.   TO REGISTER: Please send an email to Lj Waldron, Ph.D., LP at?[email or web address]?if you would like an invitation to the group. Prior to the meeting, a link with directions on how to join the meeting will be sent to you.    2024 Meetings January 16: \"Medication Management and Bariatric Surgery\", January " "[place], PharmD, Pharmacy Resident at [facility], [facility]  February 20: \"A Bariatric Surgery Patient's Perspective\", ANGELA Olson, [facility], Behavioral Health Clinician at [facility]  March 19  April 16  May 21  Linda 18: \"Nutritional Labeling\", Dietitian speaker to be announced.  November 19: \"Holiday Eating\", Dietitian speaker to be announced.    [facility] Post-Operative Bariatric Surgery Support Group  This is a support group for [facility] bariatric patients (and those external to [facility]) who have had bariatric surgery and are at least 3 months post-surgery.  WHEN: This support group meets the 4th Wednesday of the month from 11:00 AM - 12:00 PM virtually using Microsoft Teams.   FACILITATOR: Led by Certified Bariatric Nurse, Mary Jane Li RN.   TO REGISTER: Please send an email to Mary Jane at [email or web address] if you would like an invitation to the group.  Prior to the meeting, a link with directions on how to join the meeting will be sent to you.    2024 Meetings  January 24  February 28  March 27  April 24  May 22  Linda 26    [facility] Healthy Lifestyle Group?  This is a 60 minute virtual coaching group for those who want to lead a healthier lifestyle. Come together to set goals and overcome barriers in a supportive group environment. We will address the four pillars of health: nutrition, exercise, sleep and emotional well-being.  This group is highly recommended for those who are participating in the 24 week Healthy Lifestyle Plan and our Health Coaching sessions.  WHEN: This group meets the 1st Friday of the month, 12:30 PM - 1:30 PM online, via a zoom meeting.    FACILITATOR: Led by National Board Certified Health and , Mary Jane Chambers [facility].   TO REGISTER: Please call the Call Center at [phone] to register. You will get an " "appointment to attend in [facility]. Fifteen minutes prior to the meeting, complete the e-check in and you will get the link to join the meeting.    There is no charge to attend this group and space is limited.     2024 Meetings  January 5: \"New Years Vision: Manifest your Best 2024!\" (guided imagery,  journaling and discussion)  February 2: \"Let's Talk\"  March 1: \"10 Percent Happier\" by Blu Agarwal (Book Bites - a guided discussion on the nuggets of wisdom from favorite wellness books, no need to read the book but highly encouraged)  April 5: \"Let's Talk\"  May 3: \"Essentialism: The Disciplined Pursuit of Less\" by Vivek Landers (Book Bites discussion)  June 7: \"Let's Talk\"  July 5: NO MEETING, off for the 4th of July Holiday  August 2: \"The Blue Zones, Secrets for Living a Longer Life\" by Blu Mccormick (Book Bites discussion)        "

## 2024-07-23 NOTE — NURSING NOTE
Current patient location: home    Is the patient currently in the state of [place]? YES    Visit mode:VIDEO    If the visit is dropped, the patient can be reconnected by: VIDEO VISIT: Text to cell phone:   Telephone Information:   Mobile [phone]       Will anyone else be joining the visit? NO  (If patient encounters technical issues they should call [phone] :[phone])    How would you like to obtain your AVS? MyChart    Are changes needed to the allergy or medication list? N/A    Are refills needed on medications prescribed by this physician? NO    Reason for visit: RECHECK    Wt other than 24 hrs:    Pain more than one location:  no  Lara CASTORENA

## 2024-07-24 ENCOUNTER — VIRTUAL VISIT (OUTPATIENT)
Dept: PSYCHOLOGY | Facility: CLINIC | Age: 34
End: 2024-07-24
Payer: COMMERCIAL

## 2024-07-24 DIAGNOSIS — Z71.89 PRE-BARIATRIC SURGERY PSYCHOLOGICAL EVALUATION: ICD-10-CM

## 2024-07-24 DIAGNOSIS — F33.42 MAJOR DEPRESSIVE DISORDER, RECURRENT, IN FULL REMISSION WITH ANXIOUS DISTRESS (H): ICD-10-CM

## 2024-07-24 DIAGNOSIS — F40.10 SOCIAL ANXIETY DISORDER: Primary | ICD-10-CM

## 2024-07-24 PROCEDURE — 90834 PSYTX W PT 45 MINUTES: CPT | Mod: 95 | Performed by: PSYCHOLOGIST

## 2024-07-24 NOTE — PROGRESS NOTES
[facility]           Progress Note    Patient Name: Katelyn Erickson   Date: 2024          Service Type: Individual for Pre-Bariatric Surgical Psychological Evaluation      Session Start Time: 9:00AM  Session End Time: 9:52AM     Session Length: 52 minutes    Session #: Pre-Bariatric Surgical Psychological Evaluation      Attendees: Client attended alone    Service Modality:  Video Visit:      Provider verified identity through the following two step process.  Patient provided:  Patient     Telemedicine Visit: The patient's condition can be safely assessed and treated via synchronous audio and visual telemedicine encounter.      Reason for Telemedicine Visit: Services only offered telehealth    Originating Site (Patient Location): Patient's home    Distant Site (Provider Location): Provider Remote Setting- Home Office    Consent:  The patient/guardian has verbally consented to: the potential risks and benefits of telemedicine (video visit) versus in person care; bill my insurance or make self-payment for services provided; and responsibility for payment of non-covered services.     Patient would like the video invitation sent by:  My Chart    Mode of Communication:  Video Conference via [employer]    Distant Location (Provider):  Off-site    As the provider I attest to compliance with applicable laws and regulations related to telemedicine.     Provider: Off site      DATA  Interactive Complexity: No  Crisis: No       Progress Since Last Session (Related to Symptoms / Goals / Homework):   Symptoms: Improving for chewing food to applesauce consistency    Homework: Partially completed      Episode of Care Goals: Satisfactory progress - ACTION (Actively working towards change); Intervened by reinforcing change plan / affirming steps taken         Current / Ongoing Stressors and Concerns:   Health Concerns   Busy Schedule with little free time        Treatment Objective(s) Addressed in  This Session:   Completed questions about weight management history and concerns and benefits of bariatric surgery.     Client was referred for an evaluation by the [facility] Comprehensive Weight Management Program.       Client's Statement of Presenting Concern:  Client is presenting for a psychological assessment as a routine part of the process for pursuing weight loss surgery.        History of Presenting Concern:  Client reports that these problem(s) began in early childhood.  Patient reported that she began gaining more weight while working over nights and after having children.       Patient reports they have attempted to lose weight in the past through Weight Watchers, an exercise program, prescription weight loss medications, and watching portion sizes.  The Patient reports they believe the surgery will benefit them by being healthier and prolonging her life. Patient reported that her mother  of colon cancer at age 34. 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     Patient reported weighing 254 pounds when they began working with the [facility] Comprehensive Weight Management Program. Patient reported that their presurgical weight loss requirement is to weigh 244 pounds. Patient reported that they current weight is 242 pounds.     Patient reported that she was focusing on eating protein, but then she became constipated. Patient reported that she is now focusing on eating enough protein throughout the day.           Struggles include: grazing during the day and night when she was working over nights and would feel tired during the day. Patient reported that she also often ate ice cream throughout the week.     Diets and exercise: The client reported that she has had the most success with Weight Watchers. Patient reported that at that time she was working full time and had a structured schedule. Patient reported that  she was working out 5 days a week. Patient reported that she breast fed her youngest child for two years and she thinks that the breast feeding made it difficult for her to lose weight. Patient reported that she lost 18 pounds with the Weight Watchers and exercising and maintained the weight loss for a few months.      Patient reported that she typically eats over night oats for breakfast, granola bar or string for lunch, and meat, grain, and vegetable for dinner. Patient reported that she uses a small plate to help her decrease her portion size for evening meals.      Patient reported that she is not currently adhering to a formal exercise schedule. Patient reported that she is active with her children, walks her dogs 2-3 times weekly, and is doing PT for pelvic floor 4 times weekly. Encouraged patient to plan an exercise schedule starting in September when her kids return to school.     Type of Surgery: The client reported that she would like to have the bariatric sleeve surgery.  Client has talked with her team about her plans.     Risks of Surgery: The client reported that she understands the risks of surgery to be not coming out of anesthesia, complications after surgery, malabsorption of vitamins, bleeding out, and blood clots.     Lifestyle Changes: The client reported that lifestyle changes that she needs to make include decreasing portion sizes, discontinuing liquids with meals discontinuing alcohol and caffeine, exercising, continuing with follow-up visits, and attending support groups. She reported that she is most concerned about constipation due to her diagnosis of rectocele.         Support Post-Surgery: The client stated that her  and mother-in-law live with the patient and will help her with the surgery recovery. Patient reported that her  will help care for their children and prepare meals. Patient reported that she told her , mother-in-law, and some  friends about the bariatric  "surgery. Patient reported that her  and one friend were supportive of the surgery; however, her mother-in-law and other friend that she recommended that she focus more on her diet.     Patient reported that she plans to wait to schedule the bariatric surgery to allow her body \"more time to heal\" from the hysterectomy that was in March of 2024.     The client reported that current stressors include her health concerns and children's schedules. She reported that her coping skills include going for walks, taking time outs, talking with , and using distractions.     Current Goals with Nutritionist: The client's current goals with her nutritionist are chewing food to applesauce consistency and eliminating liquids with meals.             Assessment of eating disorder symptoms indicates that patient has not met criteria for anorexia nervosa or bulimia nervosa.  Patient reported they have not used laxatives,have not purged, and have not used excessive exercise as a means of weight control.  Patient does not meet criteria for binge eating disorder.  Patient reported she does not eat until uncomfortably full.  Patient reported they do not feel disgusted, depressed, or guilty after eating.       Interventions:  CBT: socratic questioning, normalizing  MI: open ended questions      Assessments completed prior to visit:  The following assessments were completed by patient for this visit:  PHQ9:       10/11/2023    10:06 AM 11/13/2023     1:22 PM 6/19/2024    12:45 PM 6/19/2024    12:46 PM 7/15/2024     9:31 AM 7/24/2024     8:39 AM   PHQ-9 SCORE   PHQ-9 Total Score MyChart    2 (Minimal depression) 0 0   PHQ-9 Total Score 13 2 2  0    0 0     GAD7:       10/11/2023    10:06 AM 11/13/2023     1:24 PM 6/19/2024    12:54 PM 7/15/2024     9:31 AM   KEYLA-7 SCORE   Total Score   2 (minimal anxiety) 3 (minimal anxiety)   Total Score 15 2 2 3         ASSESSMENT: Current Emotional / Mental Status (status of significant " symptoms):   Risk status (Self / Other harm or suicidal ideation)   Patient denies current fears or concerns for personal safety.   Patient denies current or recent suicidal ideation or behaviors.   Patient denies current or recent homicidal ideation or behaviors.   Patient denies current or recent self injurious behavior or ideation.   Patient denies other safety concerns.   Patient reports there has been no change in risk factors since their last session.     Patient reports there has been no change in protective factors since their last session.     Recommended that patient call 911 or go to the local ED should there be a change in any of these risk factors.     Appearance:   Appropriate    Eye Contact:   Good    Psychomotor Behavior: Normal    Attitude:   Cooperative  Interested   Orientation:   All   Speech    Rate / Production: Normal/ Responsive Normal     Volume:  Normal    Mood:    Sad  when talking about health concerns    Affect:    Tearful  when taking about needing surgery to fix the rectocele.   Thought Content:  Clear    Thought Form:  Coherent  Logical    Insight:    Good      Medication Review:   No changes to current psychiatric medication(s)     Medication Compliance:   Yes     Changes in Health Issues:   Yes: rectocele, Associated Psychological Distress     Chemical Use Review:   Substance Use: Chemical use reviewed, no active concerns identified      Tobacco Use: No current tobacco use.      Diagnoses:  296.36 (F33.42) Major Depressive Disorder, Recurrent Episode, In full remission With anxious distress   300.23 (F40.10) Social Anxiety Disorder.        Collateral Reports Completed:   Not Applicable        Preliminary Treatment Plan:    Client will return for follow-up session next month.  Client will continue with scheduled weight loss surgery clinic appointments.  She has writer's contact information and is aware that she may contact writer in the meantime as needed.      Client will work on  the following homework assignment: Attend weight loss support group at least once before follow-up session, plans to walk for 20 minutes 3 times weekly, and plans to set a reminder at 4:30 to remind her to stop drinking liquids before dinner.     Patient plans to complete the MMPI by August 15th.     I plan to see the client for her scheduled follow-up appointment to discuss her progress and any struggles with her goals.       Yandy Viera PsyD LP  7/24/2024

## 2024-08-10 ENCOUNTER — DOCUMENTATION ONLY (OUTPATIENT)
Dept: PSYCHOLOGY | Facility: CLINIC | Age: 34
End: 2024-08-10
Payer: COMMERCIAL

## 2024-08-10 PROCEDURE — 99207 PR NO CHARGE LOS: CPT | Performed by: PSYCHOLOGIST

## 2024-08-10 NOTE — PROGRESS NOTES
Summary of MMPI-3 Results     Patient completed the [place] Multiphasic Personality Inventory-3 (MMPI-3), a self-report personality inventory, as a routine part of the psychological assessment for pursuing weight loss surgery.  Validity scales indicate that the patient responded in an open and consistent manner, resulting in a valid profile. The patient's responses yielded a profile that is indicative of people who report social avoidance, a higher than average level of behavioral constraint, and a sense of physical well-being.      Patient responded similarly to people who report a lack of positive emotional experiences. They tend to avoid social situations. People like this tend to experience significant problems with anhedonia. They lack interests, are pessimistic, and are socially introverted.    Patient responded similarly to people who report a higher than average level of behavioral constraint. They are unlikely to engage in externalizing and acting-out behavior. Patient responded similarly to people who report a below-average level of past antisocial.  behavior. Patient responded similarly to people who report a below-average level of impulsive behavior. Patient responded similarly to people who report overly constrained behavior.    Patient responded similarly to people who report a sense of physical well-being.      The MMPI test results were consistent with report upon direct interview.      Assessment of eating disorder symptoms indicates that patient has not met criteria for anorexia nervosa or bulimia nervosa.  Patient reported they have not used laxatives,have not purged, and have not used excessive exercise as a means of weight control.  Patient does not meet criteria for binge eating disorder.  Patient reported she does not eat until uncomfortably full.  Patient reported they do not feel disgusted, depressed, or guilty after eating.     Patient's score on the PHQ-9, a brief self-report measure of  depressive symptoms, was 0, indicating no depressive symptomatology.  Patient's score on the KEYLA-7, a brief self-report measure of anxiety symptoms, was 3, indicating no anxiety symptomatology.        Next Step: Complete one month follow up appointment and inquire about progress with homework assignments.     Yandy Viera PsyD LP   8/10/2024

## 2024-08-14 ENCOUNTER — OFFICE VISIT (OUTPATIENT)
Dept: DERMATOLOGY | Facility: CLINIC | Age: 34
End: 2024-08-14
Payer: COMMERCIAL

## 2024-08-14 DIAGNOSIS — Z15.09 LYNCH SYNDROME: ICD-10-CM

## 2024-08-14 DIAGNOSIS — D22.9 MULTIPLE BENIGN NEVI: Primary | ICD-10-CM

## 2024-08-14 DIAGNOSIS — D18.01 CHERRY ANGIOMA: ICD-10-CM

## 2024-08-14 DIAGNOSIS — Z12.83 SKIN CANCER SCREENING: ICD-10-CM

## 2024-08-14 DIAGNOSIS — L81.4 SOLAR LENTIGO: ICD-10-CM

## 2024-08-14 PROCEDURE — 99203 OFFICE O/P NEW LOW 30 MIN: CPT | Mod: GC | Performed by: DERMATOLOGY

## 2024-08-14 ASSESSMENT — PAIN SCALES - GENERAL: PAINLEVEL: NO PAIN (0)

## 2024-08-14 NOTE — PATIENT INSTRUCTIONS

## 2024-08-14 NOTE — PROGRESS NOTES
[facility] Dermatology Note  Encounter Date: Aug 14, 2024  Office Visit     Dermatology Problem List:  FBSE 8/14/24  # Hx MLH1 Jean syndrome mutation  # Strong maternal history of colon cancer  ____________________________________________    Assessment & Plan:     # Hx Jean syndrome  No evidence of skin cancer on exam today. Recommend yearly skin checks given history.     # Benign skin findings - benign nevi, seborrheic keratoses, solar lentigines, cherry angiomas.  Reassured patient of benign skin findings. There is no need for further treatment. Discussed signs/symptoms concerning for NMSC and melanoma.  - Sun protection: Counseled SPF30+ sunscreen, UPF clothing, sun avoidance, tanning bed avoidance.     Procedures Performed:   None    Follow-up: 1 year(s) in-person, or earlier for new or changing lesions    Staff and Resident:     Jeannette Glover MD  PGY4 Dermatology Resident    I, Viviana Suazo MD, saw this patient with the resident and agree with the resident s findings and plan of care as documented in the resident s note.   ____________________________________________    CC: Derm Problem (FBSE- No areas of concern. )    HPI:  Ms. Katelyn Erickson is a(n) 34 year old female who presents to clinic today as a new patient for a FBSE.    Katelyn states that she has a longstanding mole (present since she was younger) that has since raised up some when she was pregnant. Otherwise, she denies having any spots of concern on her skin - nothing new, growing, changing, tender, crusting, or bleeding.    Patient denies having a personal or familial history of skin cancer. Has a strong maternal history of colon cancer - states that her mom passed away from colon cancer when she was in her 30's. Has a known MLH1 Jean syndrome mutation, therefore was advised to have a skin check given this. Undergoing yearly colonoscopies. Has been using sunscreen routinely when outside, previously more  inconsistently.     Patient is otherwise feeling well, without additional skin concerns.    Labs Reviewed:  N/A    Physical Exam:  Vitals: LMP 02/22/2024   SKIN: Full skin, which includes the head/face, both arms, chest, back, abdomen,both legs, genitalia and/or groin buttocks, digits and/or nails, was examined.  - There are dome shaped bright red papules on the trunk and extremities.   - Multiple regular brown pigmented macules and papules are identified on the face, trunk, and extremities.   - Scattered brown macules on sun exposed areas.  - No other lesions of concern on areas examined.     Medications:  Current Outpatient Medications   Medication Sig Dispense Refill    buPROPion (WELLBUTRIN XL) 150 MG 24 hr tablet Take 1 tablet (150 mg) by mouth every morning 90 tablet 1    metFORMIN (GLUCOPHAGE XR) 500 MG 24 hr tablet 1 tablet by mouth daily with meal for 1 week, then 2 tablets daily with meal. 60 tablet 2    topiramate (TOPAMAX) 25 MG tablet Take 3 tablets (75 mg) by mouth at bedtime 270 tablet 1    senna-docusate (SENOKOT-S/PERICOLACE) 8.6-50 MG tablet Take 1-2 tablets by mouth 2 times daily 30 tablet 0     No current facility-administered medications for this visit.      Past Medical History:   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    FH: colon cancer in relative <50 years old    Gestational diabetes mellitus (GDM) affecting pregnancy, antepartum    Jean syndrome    Adenomatous polyp of colon, unspecified part of colon    Class 2 obesity due to excess calories without serious comorbidity with body mass index (BMI) of 39.0 to 39.9 in adult    Anxiety    Moderate episode of recurrent major depressive disorder (H)    Polyp of colon    NAFLD (nonalcoholic fatty liver disease)     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

## 2024-08-14 NOTE — LETTER
8/14/2024       RE: Katelyn Erickson  [address]  [facility] [address]     Dear Colleague,    Thank you for referring your patient, Katelyn Erickson, to the [facility] [place] at [facility]. Please see a copy of my visit note below.    [facility] Dermatology Note  Encounter Date: Aug 14, 2024  Office Visit     Dermatology Problem List:  FBSE 8/14/24  # Hx MLH1 Jean syndrome mutation  # Strong maternal history of colon cancer  ____________________________________________    Assessment & Plan:     # Hx Jean syndrome  No evidence of skin cancer on exam today. Recommend yearly skin checks given history.     # Benign skin findings - benign nevi, seborrheic keratoses, solar lentigines, cherry angiomas.  Reassured patient of benign skin findings. There is no need for further treatment. Discussed signs/symptoms concerning for NMSC and melanoma.  - Sun protection: Counseled SPF30+ sunscreen, UPF clothing, sun avoidance, tanning bed avoidance.     Procedures Performed:   None    Follow-up: 1 year(s) in-person, or earlier for new or changing lesions    Staff and Resident:     Jeannette Glover MD  PGY4 Dermatology Resident    I, Viviana Suazo MD, saw this patient with the resident and agree with the resident s findings and plan of care as documented in the resident s note.   ____________________________________________    CC: Derm Problem (FBSE- No areas of concern. )    HPI:  Ms. Katelyn Erickson is a(n) 34 year old female who presents to clinic today as a new patient for a FBSE.    Katelyn states that she has a longstanding mole (present since she was younger) that has since raised up some when she was pregnant. Otherwise, she denies having any spots of concern on her skin - nothing new, growing, changing, tender, crusting, or bleeding.    Patient denies having a personal or familial history of skin cancer. Has a strong  maternal history of colon cancer - states that her mom passed away from colon cancer when she was in her 30's. Has a known MLH1 Jean syndrome mutation, therefore was advised to have a skin check given this. Undergoing yearly colonoscopies. Has been using sunscreen routinely when outside, previously more inconsistently.     Patient is otherwise feeling well, without additional skin concerns.    Labs Reviewed:  N/A    Physical Exam:  Vitals: LMP 02/22/2024   SKIN: Full skin, which includes the head/face, both arms, chest, back, abdomen,both legs, genitalia and/or groin buttocks, digits and/or nails, was examined.  - There are dome shaped bright red papules on the trunk and extremities.   - Multiple regular brown pigmented macules and papules are identified on the face, trunk, and extremities.   - Scattered brown macules on sun exposed areas.  - No other lesions of concern on areas examined.     Medications:  Current Outpatient Medications   Medication Sig Dispense Refill     buPROPion (WELLBUTRIN XL) 150 MG 24 hr tablet Take 1 tablet (150 mg) by mouth every morning 90 tablet 1     metFORMIN (GLUCOPHAGE XR) 500 MG 24 hr tablet 1 tablet by mouth daily with meal for 1 week, then 2 tablets daily with meal. 60 tablet 2     topiramate (TOPAMAX) 25 MG tablet Take 3 tablets (75 mg) by mouth at bedtime 270 tablet 1     senna-docusate (SENOKOT-S/PERICOLACE) 8.6-50 MG tablet Take 1-2 tablets by mouth 2 times daily 30 tablet 0     No current facility-administered medications for this visit.      Past Medical History:   Patient Active Problem List   Diagnosis     Family history of colon cancer     Anemia of pregnancy in third trimester     Family history of Jean syndrome     FH: colon cancer in relative <50 years old     Gestational diabetes mellitus (GDM) affecting pregnancy, antepartum     Jean syndrome     Adenomatous polyp of colon, unspecified part of colon     Class 2 obesity due to excess calories without serious  comorbidity with body mass index (BMI) of 39.0 to 39.9 in adult     Anxiety     Moderate episode of recurrent major depressive disorder (H)     Polyp of colon     NAFLD (nonalcoholic fatty liver disease)     Past Medical History:   Diagnosis Date     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       Again, thank you for allowing me to participate in the care of your patient.      Sincerely,    Viviana Suazo MD

## 2024-08-14 NOTE — NURSING NOTE
Dermatology Rooming Note    Katelyn Erickson's goals for this visit include:   Chief Complaint   Patient presents with    Derm Problem     FBSE- No areas of concern.      Luis Londono, EMT  Clinic Support  [facility]     [phone]    Employed by [facility]

## 2024-08-28 NOTE — PROGRESS NOTES
"Video-Visit Details    Type of service:  Video Visit    Video Start Time: 10:32 AM  Video End Time: 10:53 AM     Originating Location (pt. Location): Home    Distant Location (provider location): Offsite (providers home) [facility] [place]     [facility] for [facility] Visit[facility]    Return Bariatric Nutrition Consultation Note    Reason For Visit: Nutrition Assessment    Katelyn Erickson is a 34 year old presenting today for return bariatric nutrition consult.   Patient is interested in weight loss surgery with Dr. Hurley expected surgery in TBD.  Patient is accompanied by self.  This is patient's 7th of 6 required nutrition visits prior to surgery. Patient attended the [facility] nutrition class on 2/23/24.     Pt referred by Steffi Sánchez NP  on February 20, 2024.     CO-MORBIDITIES OF OBESITY INCLUDE:        2/15/2024     2:47 PM   --   I have the following health issues associated with obesity None of the above     SUPPORT:      2/15/2024     2:47 PM   Support System Reviewed With Patient   Who do you have in your support network that can be available to help you for the first 2 weeks after surgery?  and MIL   Who can you count on for support throughout your weight loss surgery journey?  and MIL     ANTHROPOMETRICS:  Initial consult weight: 254 lb on 2/20/24   Estimated body mass index is 36.56 kg/m  as calculated from the following:    Height as of this encounter: 1.721 m (5' 7.75\").    Weight as of this encounter: 108.3 kg (238 lb 11.2 oz).  Current Weight: 238 lb per pt report on [facility]     Required weight loss goal pre-op: -10 lbs from initial consult weight (goal weight 244 lbs or less before surgery) - met        2/15/2024     2:47 PM   --   I have tried the following methods to lose weight Watching portions or calories    Exercise    Weight Watchers           2/15/2024     2:47 PM   Weight Loss Questions Reviewed With Patient   How long have you been overweight? " Since early childhood     MEDICATION FOR WEIGHT LOSS: Topiramate 75 mg - no side effects.   Metformin     VITAMIN/MINERAL SUPPLEMENTS: None     NUTRITION HISTORY:  Food allergies: NKFA  Food intolerances: none   Previous experience with diet modification for weight loss: weight watchers and exercise   Barriers to lifestyle change: reports none     Per Steffi's note: Patient describes significant thoughts about food and cravings in the evenings with limited hunger during the day.     Patient herself is trying to eat less meat. Trying to increase her vegetables. No overnight eating    Recent Diet Recall:  Breakfast: coffee with Premier Protein   Lunch: 11 AM if at home, scrambled eggs with cottage and spinach with English muffin or toast. If goes into office will eat at 1 or 2 pm, Leftovers   Dinner: Tacos, spaghetti, chicken. Protein + vegetable + carb.   Snacks: After the kids go to bed - crackers and Nutella  Beverages: Water, soda seldomly.   Alcohol: None for the most part, seldomly   Dining Out: Not very often. Twice a month.     Patient recently had a total laparoscopic hysterectomy on 3/11/24.     April 2024: Downloaded the mark [employer] to track her nutrition.  Helps to monitor her protein amount. Plans to practice  liquids from meal times and chew her food really well to an applesauce consistency. Continues to work on tasklist items needed for surgery. Needs more clarification around if she needs the sleep medicine consult.     May 2024: Focusing more on fiber intake and is drinking a lot of water. For breakfast started incorporating overnight oats with protein and shaun seeds. Has been tracking nutrition in [employer]. Continues to practice  liquids from meal times. Has appointment with Steffi in June and psych visits in July.     June 2024: Met with Steffi earlier this month. Started taking Metformin since the beginning of June. Hasn't noticed a change in her weight this past month. Stopped  taking Senna so often. Lives a busy lifestyle. Recently started pelvic floor physical activity. Struggles with  liquids from meal times, especially before a meal. Has slowed down with eating and trying to take her time. Averaging around 80 grams of protein with most meals. Has psych visits this next month.     July 2024: Had her first psych visit out of a total of 3.  Has been focusing on protein and fiber in her meals. Doing Pelvic floor physical therapy and also doing good with other physical activity by staying busy with her kids. Still struggles with  liquids prior to her meal but is aware and working on it.     August 2024: Has her last psych visit for the middle of September.  Busy August, still was able to stay focused on her nutrition thought. Hasn't thought anymore about the surgery process. Focusing on her fiber and protein intake throughout the day.     Breakfast will be overnight oats if she is prepared (18 g of fiber and 40-45 g protein). Coffee with premier protein as the creamer. Lunch is normally later in the day around 1 or 2 pm 0 cucumber/chickpea/feta/salad may add some chicken to it. Dinner protein + greens.     Walking more often, about 4x a week for 30 minutes and goes about 2 miles.     Patient mentioned that her psych provider was questioning if she was eating enough. Provided patient with a calorie goal to ensure she is eating enough while still working on her nutrition goals.         2/15/2024     2:47 PM   Recall Diet Questions Reviewed With Patient   Describe what you typically consume for breakfast (typical or most recent) Scrambled eggs english muffine   How many ounces of water, or other low calorie drinks, do you drink daily (8 oz=1 glass)? 64 oz or more   How many ounces of caffeine (coffee, tea, pop) do you drink daily (8 oz=1 glass)? 16 oz   How many ounces of carbonated (pop, beer, sparkling water) drinks do you drinky daily (8 oz=1 glass)? 0 oz   How many  ounces of juice, pop, sweet tea, sports drinks, protein drinks, other sweetened drinks, do you drink daily (8 oz=1 glass)? 0 oz   How many ounces of milk do you drink daily (8 oz=1 glass) 0 oz   How often do you drink alcohol? Monthly or less   If you do drink alcohol, how many drinks might you have in a day? (one drink = 5 oz. wine, 1 can/bottle of beer, 1 shot liquor) 1 or 2           2/15/2024     2:47 PM   Eating Habits   What foods do you crave? Sweets           2/15/2024     2:47 PM   Dining Out History Reviewed With Patient   How often do you dine out? Rarely.   Where do you dine out? (select all that apply) fast food chains   What types of food do you order when you dine out? Hamburger     EXERCISE: Busy with kids activities.         6/4/2024     3:39 PM   --   How often do you exercise? Never   What keeps you from being more active? 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   Patient has 3 kids ages 10, 7, 5.   Aunt had bariatric surgery.     NUTRITION DIAGNOSIS:  Obesity r/t long history of positive energy balance aeb BMI >30 kg/m2.    INTERVENTION:  Intervention Provided/Education Provided on post-op diet guidelines, vitamins/minerals essential post-operatively, GI anatomy of bariatric surgeries, ways to help prepare for post-op diet guidelines pre-operatively, portion/calorie-control, mindful eating and sources of protein.  Patient demonstrates understanding.     Personal barriers to making and continuing required life changes have been identified, and strategies to overcome those barriers have been recommended AND family and social supports have been assessed and strategies to strengthen those supports have been recommended.    Provided pt with list of goals, RD contact information and resources listed below via [employer].           2/15/2024     2:47 PM    Questions Reviewed With Patient   How ready are you to make changes regarding your weight? Number 1 = Not ready at all to make changes up to 10 = very ready. 7   How confident are you that you can change? 1 = Not confident that you will be successful making changes up to 10 = very confident. 7     Expected Engagement: good    GOALS:  Relating To Eating:  Eat slowly (20-30 minutes per meal), chewing foods well (25 chews per bite/applesauce consistency)  Eat 3 meals per day  Consume 60-90 g protein per day    Relating to beverages:  Reduce caffeine/carbonation/calorie containing beverages  Separate fluids from meals by 30 minutes before, during, and after eating  Drink 48-64 ounces of fluid per day    Relating to dietary supplements:  Start thinking about a post op multivitamin     Relating to activity:  Increase activity as able    Post-op Diet Handouts:  Diet Guidelines after Weight-loss Surgery  [email or web address]     Your Stage 1 Diet: Clear Liquids  [email or web address]     Your Stage 2 Diet: Low-fat Full Liquids  [email or web address]     Your Stage 3 Diet: Pureed Foods  [email or web address]     Pureed Recipes  [email or web address]    Your Stage 4 Diet: Soft Foods  [email or web address]    Your Stage 5 Diet: Regular Foods  [email or web address]    Supplements after Sleeve Gastrectomy, Gastric Bypass or Single Anastomosis Duodenal Switch  [email or web address]    Keeping Track of Fluids  [email or web address]    Follow Up: September 30.     Time spent with patient: 21 minutes.  Tina Montenegro RD, LD

## 2024-08-29 ENCOUNTER — VIRTUAL VISIT (OUTPATIENT)
Dept: ENDOCRINOLOGY | Facility: CLINIC | Age: 34
End: 2024-08-29
Payer: COMMERCIAL

## 2024-08-29 VITALS — HEIGHT: 68 IN | WEIGHT: 238.7 LBS | BODY MASS INDEX: 36.18 KG/M2

## 2024-08-29 DIAGNOSIS — Z71.3 NUTRITIONAL COUNSELING: Primary | ICD-10-CM

## 2024-08-29 DIAGNOSIS — E66.9 OBESITY: ICD-10-CM

## 2024-08-29 PROCEDURE — 97803 MED NUTRITION INDIV SUBSEQ: CPT | Mod: 95

## 2024-08-29 PROCEDURE — 99207 PR NO CHARGE LOS: CPT | Mod: 95

## 2024-08-29 ASSESSMENT — PAIN SCALES - GENERAL: PAINLEVEL: NO PAIN (0)

## 2024-08-29 NOTE — PATIENT INSTRUCTIONS
Devon Zepeda,     Follow-up with RD on September 30.     Thank you,    Tina Montenegro, RD, LD  If you would like to schedule or reschedule an appointment with the RD, please call [phone]    Nutrition Goals  Relating To Eating:  Eat slowly (20-30 minutes per meal), chewing foods well (25 chews per bite/applesauce consistency)  Eat 3 meals per day  Consume 60-90 g protein per day    Relating to beverages:  Reduce caffeine/carbonation/calorie containing beverages  Separate fluids from meals by 30 minutes before, during, and after eating  Drink 48-64 ounces of fluid per day    Relating to dietary supplements:  Start thinking about a post op multivitamin     Relating to activity:  Increase activity as able    Post-op Diet Handouts:  Diet Guidelines after Weight-loss Surgery  [email or web address]     Your Stage 1 Diet: Clear Liquids  [email or web address]     Your Stage 2 Diet: Low-fat Full Liquids  [email or web address]     Your Stage 3 Diet: Pureed Foods  [email or web address]     Pureed Recipes  [email or web address]    Your Stage 4 Diet: Soft Foods  [email or web address]    Your Stage 5 Diet: Regular Foods  [email or web address]    Supplements after Sleeve Gastrectomy, Gastric Bypass or Single Anastomosis Duodenal Switch  [email or web address]    Keeping Track of Fluids  [email or web address]    COMPREHENSIVE WEIGHT MANAGEMENT PROGRAM  VIRTUAL SUPPORT GROUPS    At [facility], our Comprehensive Weight Management program offers on-line support groups for patients who are working on weight loss and considering, preparing for, or have had weight loss surgery.     There is no cost for this opportunity.  You are invited to attend the?Virtual Support Groups?provided by any of the following locations:    [facility] via [employer] Teams with Loree Armendariz RN  2.   [place] via [employer] Teams with Lj Waldron, PhD, LP  3.   [place]  "via Microsoft Teams with Mary Jane Li RN  4.   [facility] via a Zoom Meeting with Mary Jane Chambers [facility]-    The following Support Group information can also be found on our website:  [email or web address]      [facility] Weight Loss Surgery Support Group  The support group is a patient-lead forum that meets monthly to share experiences, encouragement and education. It is open to those who have had weight loss surgery, are scheduled for surgery, or are considering surgery.   WHEN: This group meets on the 3rd Wednesday of each month from 5:00PM - 6:00PM virtually using Microsoft Teams.   FACILITATOR: Led by Loree Armendariz RD, OBI, RN, the program's Clinical Coordinator.   TO REGISTER: Please contact the clinic via "[employer] or call the nurse line directly at [phone] to inform our staff that you would like an invite sent to you and to let us know the email you would like the invite sent to. Prior to the meeting, a link with directions on how to join the meeting will be sent to you.    2024 Meetings   January 17  February 21  March 20  April 17  May 15  Linda 19      [facility] Bariatric Care Support Group?  This is open to all pre- and post- operative bariatric surgery patients as well as their support system.   WHEN: This group meets the 3rd Tuesday of each month from 6:30 PM - 8:00 PM virtually using Microsoft Teams.   FACILITATOR: Led by Lj Waldron, Ph.D who is a Licensed Psychologist with the [facility] Comprehensive Weight Management Program.   TO REGISTER: Please send an email to Lj Waldron, Ph.D., LP at?[email or web address]?if you would like an invitation to the group. Prior to the meeting, a link with directions on how to join the meeting will be sent to you.    2024 Meetings January 16: \"Medication Management and Bariatric Surgery\", " "January Melton, PharmD, Pharmacy Resident at [facility], [place]  February 20: \"A Bariatric Surgery Patient's Perspective\", ANGELA Olson, [facility], Behavioral Health Clinician at [facility]  March 19  April 16  May 21  Linda 18: \"Nutritional Labeling\", Dietitian speaker to be announced.  November 19: \"Holiday Eating\", Dietitian speaker to be announced.    [facility] Post-Operative Bariatric Surgery Support Group  This is a support group for [facility] bariatric patients (and those external to [facility]) who have had bariatric surgery and are at least 3 months post-surgery.  WHEN: This support group meets the 4th Wednesday of the month from 11:00 AM - 12:00 PM virtually using Microsoft Teams.   FACILITATOR: Led by Certified Bariatric Nurse, Mary Jane Li RN.   TO REGISTER: Please send an email to Mary Jane at [email or web address] if you would like an invitation to the group.  Prior to the meeting, a link with directions on how to join the meeting will be sent to you.    2024 Meetings  January 24  February 28  March 27  April 24  May 22  Linda 26    [facility] Healthy Lifestyle Group?  This is a 60 minute virtual coaching group for those who want to lead a healthier lifestyle. Come together to set goals and overcome barriers in a supportive group environment. We will address the four pillars of health: nutrition, exercise, sleep and emotional well-being.  This group is highly recommended for those who are participating in the 24 week Healthy Lifestyle Plan and our Health Coaching sessions.  WHEN: This group meets the 1st Friday of the month, 12:30 PM - 1:30 PM online, via a zoom meeting.    FACILITATOR: Led by National Board Certified Health and , CAR[facility].   TO REGISTER: Please call the Call Center at [phone] to register. You will get " "an appointment to attend in [facility]. Fifteen minutes prior to the meeting, complete the e-check in and you will get the link to join the meeting.    There is no charge to attend this group and space is limited.     2024 Meetings  January 5: \"New Years Vision: Manifest your Best 2024!\" (guided imagery,  journaling and discussion)  February 2: \"Let's Talk\"  March 1: \"10 Percent Happier\" by Blu Agarwal (Book Bites - a guided discussion on the nuggets of wisdom from favorite wellness books, no need to read the book but highly encouraged)  April 5: \"Let's Talk\"  May 3: \"Essentialism: The Disciplined Pursuit of Less\" by Vivek Landers (Book Bites discussion)  June 7: \"Let's Talk\"  July 5: NO MEETING, off for the 4th of July Holiday  August 2: \"The Blue Zones, Secrets for Living a Longer Life\" by Blu Mccormick (Book Bites discussion)        "

## 2024-08-29 NOTE — LETTER
"8/29/2024       RE: Katelyn Erickson  [address]  [place] [facility] [address]     Dear Colleague,    Thank you for referring your patient, Katelyn Erickson, to the [facility] [place] at [facility]. Please see a copy of my visit note below.    Video-Visit Details    Type of service:  Video Visit    Video Start Time: 10:32 AM  Video End Time: 10:53 AM     Originating Location (pt. Location): Home    Distant Location (provider location): Offsite (providers home) [facility] [place]     Platform used for Video Visit: [employer]    Return Bariatric Nutrition Consultation Note    Reason For Visit: Nutrition Assessment    Katelyn Erickson is a 34 year old presenting today for return bariatric nutrition consult.   Patient is interested in weight loss surgery with Dr. Hurley expected surgery in TBD.  Patient is accompanied by self.  This is patient's 7th of 6 required nutrition visits prior to surgery. Patient attended the [facility] nutrition class on 2/23/24.     Pt referred by Steffi Sánchez NP  on February 20, 2024.     CO-MORBIDITIES OF OBESITY INCLUDE:        2/15/2024     2:47 PM   --   I have the following health issues associated with obesity None of the above     SUPPORT:      2/15/2024     2:47 PM   Support System Reviewed With Patient   Who do you have in your support network that can be available to help you for the first 2 weeks after surgery?  and MIL   Who can you count on for support throughout your weight loss surgery journey?  and MIL     ANTHROPOMETRICS:  Initial consult weight: 254 lb on 2/20/24   Estimated body mass index is 36.56 kg/m  as calculated from the following:    Height as of this encounter: 1.721 m (5' 7.75\").    Weight as of this encounter: 108.3 kg (238 lb 11.2 oz).  Current Weight: 238 lb per pt report on [facility]     Required weight loss goal pre-op: -10 lbs from initial " consult weight (goal weight 244 lbs or less before surgery) - met        2/15/2024     2:47 PM   --   I have tried the following methods to lose weight Watching portions or calories    Exercise    Weight Watchers           2/15/2024     2:47 PM   Weight Loss Questions Reviewed With Patient   How long have you been overweight? Since early childhood     MEDICATION FOR WEIGHT LOSS: Topiramate 75 mg - no side effects.   Metformin     VITAMIN/MINERAL SUPPLEMENTS: None     NUTRITION HISTORY:  Food allergies: NKFA  Food intolerances: none   Previous experience with diet modification for weight loss: weight watchers and exercise   Barriers to lifestyle change: reports none     Per Steffi's note: Patient describes significant thoughts about food and cravings in the evenings with limited hunger during the day.     Patient herself is trying to eat less meat. Trying to increase her vegetables. No overnight eating    Recent Diet Recall:  Breakfast: coffee with Premier Protein   Lunch: 11 AM if at home, scrambled eggs with cottage and spinach with English muffin or toast. If goes into office will eat at 1 or 2 pm, Leftovers   Dinner: Tacos, spaghetti, chicken. Protein + vegetable + carb.   Snacks: After the kids go to bed - crackers and Nutella  Beverages: Water, soda seldomly.   Alcohol: None for the most part, seldomly   Dining Out: Not very often. Twice a month.     Patient recently had a total laparoscopic hysterectomy on 3/11/24.     April 2024: Downloaded the lolis lose it to track her nutrition.  Helps to monitor her protein amount. Plans to practice  liquids from meal times and chew her food really well to an applesauce consistency. Continues to work on tasklist items needed for surgery. Needs more clarification around if she needs the sleep medicine consult.     May 2024: Focusing more on fiber intake and is drinking a lot of water. For breakfast started incorporating overnight oats with protein and shaun seeds.  Has been tracking nutrition in [employer] [place]. Continues to practice  liquids from meal times. Has appointment with Steffi in June and psych visits in July.     June 2024: Met with Steffi earlier this month. Started taking Metformin since the beginning of June. Hasn't noticed a change in her weight this past month. Stopped taking Senna so often. Lives a busy lifestyle. Recently started pelvic floor physical activity. Struggles with  liquids from meal times, especially before a meal. Has slowed down with eating and trying to take her time. Averaging around 80 grams of protein with most meals. Has psych visits this next month.     July 2024: Had her first psych visit out of a total of 3.  Has been focusing on protein and fiber in her meals. Doing Pelvic floor physical therapy and also doing good with other physical activity by staying busy with her kids. Still struggles with  liquids prior to her meal but is aware and working on it.     August 2024: Has her last psych visit for the middle of September.  Busy August, still was able to stay focused on her nutrition thought. Hasn't thought anymore about the surgery process. Focusing on her fiber and protein intake throughout the day.     Breakfast will be overnight oats if she is prepared (18 g of fiber and 40-45 g protein). Coffee with premier protein as the creamer. Lunch is normally later in the day around 1 or 2 pm 0 cucumber/chickpea/feta/salad may add some chicken to it. Dinner protein + greens.     Walking more often, about 4x a week for 30 minutes and goes about 2 miles.     Patient mentioned that her psych provider was questioning if she was eating enough. Provided patient with a calorie goal to ensure she is eating enough while still working on her nutrition goals.         2/15/2024     2:47 PM   Recall Diet Questions Reviewed With Patient   Describe what you typically consume for breakfast (typical or most recent) Scrambled eggs  english muffine   How many ounces of water, or other low calorie drinks, do you drink daily (8 oz=1 glass)? 64 oz or more   How many ounces of caffeine (coffee, tea, pop) do you drink daily (8 oz=1 glass)? 16 oz   How many ounces of carbonated (pop, beer, sparkling water) drinks do you drinky daily (8 oz=1 glass)? 0 oz   How many ounces of juice, pop, sweet tea, sports drinks, protein drinks, other sweetened drinks, do you drink daily (8 oz=1 glass)? 0 oz   How many ounces of milk do you drink daily (8 oz=1 glass) 0 oz   How often do you drink alcohol? Monthly or less   If you do drink alcohol, how many drinks might you have in a day? (one drink = 5 oz. wine, 1 can/bottle of beer, 1 shot liquor) 1 or 2           2/15/2024     2:47 PM   Eating Habits   What foods do you crave? Sweets           2/15/2024     2:47 PM   Dining Out History Reviewed With Patient   How often do you dine out? Rarely.   Where do you dine out? (select all that apply) fast food chains   What types of food do you order when you dine out? Hamburger     EXERCISE: Busy with kids activities.         6/4/2024     3:39 PM   --   How often do you exercise? Never   What keeps you from being more active? 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   Patient has 3 kids ages 10, 7, 5.   Aunt had bariatric surgery.     NUTRITION DIAGNOSIS:  Obesity r/t long history of positive energy balance aeb BMI >30 kg/m2.    INTERVENTION:  Intervention Provided/Education Provided on post-op diet guidelines, vitamins/minerals essential post-operatively, GI anatomy of bariatric surgeries, ways to help prepare for post-op diet guidelines pre-operatively, portion/calorie-control, mindful eating and sources of protein.  Patient demonstrates understanding.     Personal barriers to making and continuing required life changes have  been identified, and strategies to overcome those barriers have been recommended AND family and social supports have been assessed and strategies to strengthen those supports have been recommended.    Provided pt with list of goals, RD contact information and resources listed below via [employer].           2/15/2024     2:47 PM   Questions Reviewed With Patient   How ready are you to make changes regarding your weight? Number 1 = Not ready at all to make changes up to 10 = very ready. 7   How confident are you that you can change? 1 = Not confident that you will be successful making changes up to 10 = very confident. 7     Expected Engagement: good    GOALS:  Relating To Eating:  Eat slowly (20-30 minutes per meal), chewing foods well (25 chews per bite/applesauce consistency)  Eat 3 meals per day  Consume 60-90 g protein per day    Relating to beverages:  Reduce caffeine/carbonation/calorie containing beverages  Separate fluids from meals by 30 minutes before, during, and after eating  Drink 48-64 ounces of fluid per day    Relating to dietary supplements:  Start thinking about a post op multivitamin     Relating to activity:  Increase activity as able    Post-op Diet Handouts:  Diet Guidelines after Weight-loss Surgery  [email or web address]     Your Stage 1 Diet: Clear Liquids  [email or web address]     Your Stage 2 Diet: Low-fat Full Liquids  [email or web address]     Your Stage 3 Diet: Pureed Foods  [email or web address]     Pureed Recipes  [email or web address]    Your Stage 4 Diet: Soft Foods  [email or web address]    Your Stage 5 Diet: Regular Foods  [email or web address]    Supplements after Sleeve Gastrectomy, Gastric Bypass or Single Anastomosis Duodenal Switch  [email or web address]    Keeping Track of Fluids  [email or web address]    Follow Up: September 30.     Time spent with patient: 21 minutes.  Tina Montenegro RD,  LD      Again, thank you for allowing me to participate in the care of your patient.      Sincerely,    Tina Montenegro RD

## 2024-08-29 NOTE — NURSING NOTE
Current patient location: [address] [place] [id number]    Is the patient currently in the state of [place]? YES    Visit mode:VIDEO    If the visit is dropped, the patient can be reconnected by: VIDEO VISIT: Send to e-mail at: [email or web address])    Will anyone else be joining the visit? NO  (If patient encounters technical issues they should call [phone]    How would you like to obtain your AVS? MyChart    Are changes needed to the allergy or medication list? N/A    Are refills needed on medications prescribed by this physician? NO    Rooming Documentation:  Not applicable      Reason for visit: JESUSITA CASTORENA

## 2024-09-10 DIAGNOSIS — E66.09 CLASS 2 OBESITY DUE TO EXCESS CALORIES WITHOUT SERIOUS COMORBIDITY WITH BODY MASS INDEX (BMI) OF 39.0 TO 39.9 IN ADULT: ICD-10-CM

## 2024-09-10 DIAGNOSIS — E66.812 CLASS 2 OBESITY DUE TO EXCESS CALORIES WITHOUT SERIOUS COMORBIDITY WITH BODY MASS INDEX (BMI) OF 39.0 TO 39.9 IN ADULT: ICD-10-CM

## 2024-09-10 DIAGNOSIS — K76.0 NAFLD (NONALCOHOLIC FATTY LIVER DISEASE): ICD-10-CM

## 2024-09-10 NOTE — TELEPHONE ENCOUNTER
Pt. Made an appointment with Steffi Sánchez on January 16th at 8:30 am and asked via chat if she could get her Metformin refilled.

## 2024-09-11 RX ORDER — METFORMIN HCL 500 MG
1000 TABLET, EXTENDED RELEASE 24 HR ORAL
Qty: 60 TABLET | Refills: 4 | Status: SHIPPED | OUTPATIENT
Start: 2024-09-11

## 2024-09-11 NOTE — TELEPHONE ENCOUNTER
Appointment with Steffi Sánchez CNP in January 2025. Routed to [place] for sign off.     Great work prioritizing fiber   Great work tracking protein   Great work with hydration   Continue topiramate 75mg   Add metformin- take with a meal   Follow up 3 months

## 2024-09-11 NOTE — TELEPHONE ENCOUNTER
metFORMIN (GLUCOPHAGE XR) 500 MG 24 hr tablet        1 tablet by mouth daily with meal for 1 week, then 2 tablets daily with meal.    Last Written Prescription Date:  6-4-24  Last Fill Quantity: 60,   # refills: 2  Last Office Visit : 6-4-24 ( RTC 3 M)  Future Office visit:  1-16-25    Routing refill request to provider for review/approval because:  Last rx adjusting dose  NCV: outside of RTC kahlil frame

## 2024-09-18 ENCOUNTER — VIRTUAL VISIT (OUTPATIENT)
Dept: PSYCHOLOGY | Facility: CLINIC | Age: 34
End: 2024-09-18
Payer: COMMERCIAL

## 2024-09-18 ENCOUNTER — DOCUMENTATION ONLY (OUTPATIENT)
Dept: PSYCHOLOGY | Facility: CLINIC | Age: 34
End: 2024-09-18
Payer: COMMERCIAL

## 2024-09-18 DIAGNOSIS — Z71.89 PRE-BARIATRIC SURGERY PSYCHOLOGICAL EVALUATION: ICD-10-CM

## 2024-09-18 DIAGNOSIS — F40.10 SOCIAL ANXIETY DISORDER: Primary | ICD-10-CM

## 2024-09-18 DIAGNOSIS — F33.42 MAJOR DEPRESSIVE DISORDER, RECURRENT, IN FULL REMISSION WITH ANXIOUS DISTRESS (H): ICD-10-CM

## 2024-09-18 PROCEDURE — 96130 PSYCL TST EVAL PHYS/QHP 1ST: CPT | Mod: 95 | Performed by: PSYCHOLOGIST

## 2024-09-18 PROCEDURE — 90837 PSYTX W PT 60 MINUTES: CPT | Mod: 95 | Performed by: PSYCHOLOGIST

## 2024-09-18 PROCEDURE — 99207 PR NO CHARGE LOS: CPT | Performed by: PSYCHOLOGIST

## 2024-09-18 ASSESSMENT — ANXIETY QUESTIONNAIRES
GAD7 TOTAL SCORE: 1
7. FEELING AFRAID AS IF SOMETHING AWFUL MIGHT HAPPEN: NOT AT ALL
1. FEELING NERVOUS, ANXIOUS, OR ON EDGE: NOT AT ALL
GAD7 TOTAL SCORE: 1
5. BEING SO RESTLESS THAT IT IS HARD TO SIT STILL: NOT AT ALL
2. NOT BEING ABLE TO STOP OR CONTROL WORRYING: NOT AT ALL
IF YOU CHECKED OFF ANY PROBLEMS ON THIS QUESTIONNAIRE, HOW DIFFICULT HAVE THESE PROBLEMS MADE IT FOR YOU TO DO YOUR WORK, TAKE CARE OF THINGS AT HOME, OR GET ALONG WITH OTHER PEOPLE: NOT DIFFICULT AT ALL
3. WORRYING TOO MUCH ABOUT DIFFERENT THINGS: SEVERAL DAYS
6. BECOMING EASILY ANNOYED OR IRRITABLE: NOT AT ALL

## 2024-09-18 ASSESSMENT — PATIENT HEALTH QUESTIONNAIRE - PHQ9
10. IF YOU CHECKED OFF ANY PROBLEMS, HOW DIFFICULT HAVE THESE PROBLEMS MADE IT FOR YOU TO DO YOUR WORK, TAKE CARE OF THINGS AT HOME, OR GET ALONG WITH OTHER PEOPLE: NOT DIFFICULT AT ALL
SUM OF ALL RESPONSES TO PHQ QUESTIONS 1-9: 0
5. POOR APPETITE OR OVEREATING: NOT AT ALL
SUM OF ALL RESPONSES TO PHQ QUESTIONS 1-9: 0

## 2024-09-18 NOTE — PROGRESS NOTES
[facility]               Progress Note     Patient Name: Katelyn Erickson Date: 2024          Service Type: Individual      Session Start Time: 7:00AM  Session End Time: 7:57AM      Session Length: 57 minutes     Session #: Pre-Bariatric Surgical Psychological Evaluation 3     Attendees: Client attended alone      Service Modality:  Video Visit:      Provider verified identity through the following two step process.  Patient provided:  Patient     Telemedicine Visit: The patient's condition can be safely assessed and treated via synchronous audio and visual telemedicine encounter.      Reason for Telemedicine Visit: Services only offered telehealth    Originating Site (Patient Location): Patient's home    Distant Site (Provider Location): Provider Remote Setting- Home Office    Consent:  The patient/guardian has verbally consented to: the potential risks and benefits of telemedicine (video visit) versus in person care; bill my insurance or make self-payment for services provided; and responsibility for payment of non-covered services.     Patient would like the video invitation sent by:  My Chart    Mode of Communication:  Video Conference via Amwell    Distant Location (Provider):  Off-site    As the provider I attest to compliance with applicable laws and regulations related to telemedicine.       DATA  Interactive Complexity: No  Crisis: No  Extended Session (53+ minutes):   - Patient's presenting concerns require more intensive intervention than could be completed within the usual service        Progress Since Last Session (Related to Symptoms / Goals / Homework):   Symptoms: Improved for weight loss and movement throughout the day    Homework: Achieved      Current / Ongoing Stressors and Concerns:   Health Concerns   Started a full time job as a  in 2024        Treatment Objective(s) Addressed in This Session:     identify at least two triggers for  anxiety       Intervention:   Completed one-month follow up for pre-bariatric psychological assessment.  Reviewed Patient's progress with homework over the past month.           Patient reported that she started a new job and is enjoying her job. Patient reported that she is working on managing her new work role while also continuing to cook and clean for her family.     Patient reported that she has lost approximately 3 pounds since she started working. Patient reported weighing 254 pounds when they began working with the [facility]. Patient reported that their presurgical weight loss requirement is to weigh 10 pounds. Patient reported that they current weight is 234 pounds.     Patient reported that she eats oatmeal with extra protein and fiber and [employer] in her coffee for breakfast , protein and vegetable for lunch, and meat, vegetable, a single portion of whole grain carbohydrates for dinner, and an occasional evening snack of a cheese stick or granola bar.     Patient reported that she started walking for exercise and then stopped when she spent time with her 's grandmother in the hospital, took a vacation to [place], and then started a full time job.      Patient reported that she walks 10,000 steps each day Monday-Friday at her job. Patient reported that she walks her dogs on Saturdays and Sundays for exercise. Patient reported that she is unsure if she can lift weights due to her recent rectocele surgery.     Patient reported that she is doing better with not drinking before, during, and after her meals.     Patient reported that since she started working with the [facility], she has made meals last 20-30 minutes, she chews her food to applesauce consistency, increased her protein and fiber, tracks macro nutrients, eliminated alcohol, decreased carbonation intake, and decreased to one cup of coffee daily. Patient  reported that she has increased her daily steps to 10,000.     Discussed changes that may occur in relationships following weight loss surgery and how to manage these changes. Talked specifically about potential reactions from loved ones who are having their own struggles with weight loss or body acceptance. Emphasized that Patient's job is to stay focused on her health and well-being and making decisions that are good for them; she can allow others to struggle with their own reactions without feeling compelled to make changes in order to make them more comfortable.       Reviewed results of the MMPI-3 evaluation.   Patient's questions were  answered.      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 Patient reported that when she is in a leadership role, she tends to fixate on what she said and how people perceive her. Patient reported that she feels anxious wondering if her children's teachers commented on her helping them read ore or brush their hair more.     Interventions:  CBT: socratic questioning, normalizing  MI: open ended questions          ASSESSMENT: Current Emotional / Mental Status (status of significant symptoms):   Risk status (Self / Other harm or suicidal ideation)   Patient denies current fears or concerns for personal safety.   Patient denies current or recent suicidal ideation or behaviors.   Patient denies current or recent homicidal ideation or behaviors.   Patient denies current or recent self injurious behavior or ideation.   Patient denies other safety concerns.   Patient reports there has been no change in risk factors since their last session.     Patient reports there has been no change in protective factors since their last session.     Recommended that patient call 911 or go to the local ED should there be a change in any of these risk factors.     Appearance:   Appropriate    Eye Contact:   Good     Psychomotor Behavior: Normal    Attitude:   Cooperative  Interested   Orientation:   All   Speech    Rate / Production: Talkative Normal     Volume:  Normal    Mood:    Anxious  when talking about the logisitcs of planning the bariatric surgery considering her rectocele issues   Affect:    Appropriate    Thought Content:  Clear    Thought Form:  Coherent  Logical    Insight:    Good      Medication Review:   No changes to current psychiatric medication(s)     Medication Compliance:   Yes     Changes in Health Issues:   None reported     Chemical Use Review:   Substance Use: Chemical use reviewed, no active concerns identified      Tobacco Use: No current tobacco use.      Diagnoses:  1. Social anxiety disorder    2. Major depressive disorder, recurrent, in full remission with anxious distress (H24)    3. Pre-bariatric surgery psychological evaluation           Collateral Reports Completed:   Routed note to Care Team Member(s)      Summary and Final Recommendation:    Diagnostic Criteria:        Major Depressive Disorder  CRITERIA (A-C) REPRESENT A MAJOR DEPRESSIVE EPISODE - SELECT THESE CRITERIA     Patient reported a history of depression and generalized anxiety, which appear to be well managed at this time with Wellbutrin. Hence, she is being diagnosed with Major Depressive Disorder, Recurrent, In Full Remission, with Anxious Distress      Social Anxiety Diagnostic Criteria:     A. Marked fear or anxiety about one or more social situations in which the individual is exposed to possible scrutiny by others. Examples include social interactions (e.g., having a conversation, meeting unfamiliar people), being observed (e.g., eating or drinking), and performing in front of others (e.g., giving a speech). YES  Note: In children, the anxiety must occur in peer settings and not just during interactions with adults.     B. The individual fears that he or she will act in a way or show anxiety symptoms that will be  negatively evaluated (i.e., will be humiliating or embarrassing; will lead to rejection or offend others). YES     C. The social situations almost always provoke fear or anxiety. YES  Note: In children, the fear or anxiety may be expressed by crying, tantrums, freezing, clinging, shrinking, or failing to speak in social situations.     D.The social situations are avoided or endured with intense fear or anxiety. YES     E. The fear or anxiety is out of proportion to the actual threat posed by the social situation and to the sociocultural context. YES     F. The fear, anxiety, or avoidance is persistent, typically lasting for 6 months or more. YES     G. The fear, anxiety, or avoidance causes clinically significant distress or impairment in social, occupational, or other important areas of functioning. YES     H. The fear, anxiety, or avoidance is not attributable to the physiological effects of a substance (e.g., a drug of abuse, a medication) or another medical condition. YES     I. The fear, anxiety, or avoidance is not better explained by the symptoms of another mental disorder, such as panic disorder, body dysmorphic disorder, or autism spectrum disorder. YES     J. If another medical condition (e.g., Parkinson s disease, obesity, disfigurement from burns or injury) is present, the fear, anxiety, or avoidance is clearly unrelated or is excessive. YES        Patient meets diagnostic criteria for Social Anxiety Disorder.     From a psychological standpoint, Patient is cleared to continue in the process for pursuing bariatric surgery.     To summarize the 3 primary conditions being evaluated in the psychological assessment:     1. Patient is prepared to comply with ongoing aftercare and lifestyle  changes after surgery--condition satisfied     Patient has made some meaningful initial changes to  eating and activity her habits. she reported an understanding of  the need for ongoing changes to these lifestyle habits.  she  expressed their willingness to maintain changes to eating and activity  habits after surgery.        Patient reported that since she started working with the [facility], she has made meals last 20-30 minutes, she chews her food to applesauce consistency, increased her protein and fiber, tracks macro nutrients, eliminated alcohol, decreased carbonation intake, and decreased to one cup of coffee daily. Patient reported that she has increased her daily steps to 10,000.        2. Patient is emotionally stable to proceed with surgery--condition satisfied     Patient's mental health history is notable for depression and patient currently experiences social anxiety. Patient's social anxiety symptoms should not interfere with her success with bariatric surgery.      3. Patient is cognitively capable of understanding the risks of the  procedure--condition satisfied     Patient has been able to demonstrate that she understands the risks of surgery compared to the risks of not  having surgery.  Patient reported that they understand that potential risks of bariatric surgery include death, blood clots, pulmonary embolisms, dumping syndrome, transfer addictions, gaining weight, and bleeding issues. With respect to the risk factors, patient would like to continue with the bariatric surgery process.       PLAN: (Client Tasks / Therapist Tasks / Other)    Patient has completed psychological assessment required for bariatric surgery  Complete report will be forwarded to the weight loss surgery clinic for review.      Recommend standard post-surgical follow up, with sessions 1 and 3 months postsurgery, to assess client's functioning and adjustment post-surgical lifestyle.      Patient was encouraged to attend a weight loss surgery support group, starting before surgery and continuing afterwards, for additional accountability and support.    Patient was provided with writer's contact  information and is aware that she may contact writer sooner as needed.      Patient plans to re-start her pelvic floor therapy exercises for 20 minutes three times each week in an effort to carve out exercise time into her routine.     Patient plans to talk with her surgeon about complications of the bariatric surgery including constipation. Patient reported that she is worried about constipation given her rectocele. Encouraged patient to ask for the surgeon's recommendation for having bariatric surgery before or after rectocele surgery.     Recommend therapy to treat the social anxiety symptoms through therapy. Patient can contact [facility] to start therapy by calling [phone].     Social Anxiety Therapy Referral:  [facility]  [email or web address]          Yandy Viera Psy.D, LP  9/18/2024        Psychological Testing   Sample Billing/Services Summary       Testing Evaluation Services Base: 59832  (1st 60 mins) Add-on: 05445  (each addtl 60 mins)   Record Review and Clarify Referral Question   7-10-24 1:10-1:15PM 5 minutes   Integration/Report Generation   MMPI 12:05-12:35PM (30)  9/18/2024 1:25-1:50PM Integrated Report (25) 55 minutes   Post-Service Work   9/18/2024 1:00-1:25PM 25 minutes   Total Time: 85 minutes (1 hours, 25 minutes)   Total Units: 1 0           Diagnosis(es): (ICD-10) 296.36 (F33.42) Major Depressive Disorder, Recurrent Episode, In full remission With anxious distress 300.23 (F40.10) Social Anxiety Disorder

## 2024-09-18 NOTE — Clinical Note
Greetings,  The patient has met all three criteria.   Their full report is available for your review.    Please let me know if you have any questions.  Yandy Viera PsyD LP

## 2024-09-18 NOTE — PROGRESS NOTES
"    [facility]        PATIENT'S NAME:    Katelyn Erickson   PREFERRED NAME: Katelyn  PRONOUNS:       MRN:   [id number]  :   1990  ADDRESS: [address] [facility] [address]  DATE OF SERVICE:  2024      Pre-Bariatric Surgical Psychological Evaluation        Identifying Information:  Patient is a 34 year old,   individual.  Patient was referred for an assessment by [facility].  Patient attended the session alone.     Chief Complaint:   The reason for seeking services at this time is: \"Approval for bariatric surgery\".  The problem(s) began when the patient was in early childhood. Patient reported that her weight management struggles worsened after each of her three pregnancies. .     Patient has attempted to resolve these concerns in the past through medication for depression and anxiety .        Patient was referred for an evaluation by the [facility]. Patient is presenting for a psychological evaluation as a routine part of the process for pursuing weight loss surgery.  Patient reports they have attempted to lose weight in the past through Weight Watchers, an exercise program, prescription weight loss medications, and watching portion sizes.  The Patient reports they believe the surgery will benefit them by being healthier and prolonging her life. Patient reported that her mother  of colon cancer at age 34. 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           Patient reported that she has a history of depression and anxiety, which is well managed with Wellbutrin. Patient reported that her depression began in  when she moved to [place]. Patient reported that she was \"stuck in the house with three children\" in the winter. Patient reported that she did not know anyone in the area. Patient reported that her " family moved in with her 's mother and grandmother and they live with them currently. Patient reported that her anxiety began in 2018 after the birth of her third child.       Weight Management Related Information:   Client reports that these problem(s) began in early childhood.  Patient reported that she began gaining more weight while working over nights and after having children.         Patient reports they have attempted to lose weight in the past through Weight Watchers, an exercise program, prescription weight loss medications, and watching portion sizes.  The Patient reports they believe the surgery will benefit them by being healthier and prolonging her life. Patient reported that her mother  of colon cancer at age 34. 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      Patient reported weighing 254 pounds when they began working with the [facility]. Patient reported that their presurgical weight loss requirement is to weigh 244 pounds. Patient reported that they current weight is 242 pounds.      Patient reported that she was focusing on eating protein, but then she became constipated. Patient reported that she is now focusing on eating enough protein throughout the day.               Struggles include: grazing during the day and night when she was working over nights and would feel tired during the day. Patient reported that she also often ate ice cream throughout the week.      Diets and exercise: The client reported that she has had the most success with Weight Watchers. Patient reported that at that time she was working full time and had a structured schedule. Patient reported that she was working out 5 days a week. Patient reported that she breast fed her youngest child for two years and she thinks that the breast feeding made it difficult for her to lose weight.  Patient reported that she lost 18 pounds with the Weight Watchers and exercising and maintained the weight loss for a few months.       Patient reported that she typically eats over night oats for breakfast, granola bar or string for lunch, and meat, grain, and vegetable for dinner. Patient reported that she uses a small plate to help her decrease her portion size for evening meals.       Patient reported that she is not currently adhering to a formal exercise schedule. Patient reported that she is active with her children, walks her dogs 2-3 times weekly, and is doing PT for pelvic floor 4 times weekly. Encouraged patient to plan an exercise schedule starting in September when her kids return to school.      Type of Surgery: The client reported that she would like to have the bariatric sleeve surgery.  Client has talked with her team about her plans.      Risks of Surgery: The client reported that she understands the risks of surgery to be not coming out of anesthesia, complications after surgery, malabsorption of vitamins, bleeding out, and blood clots.      Lifestyle Changes: The client reported that lifestyle changes that she needs to make include decreasing portion sizes, discontinuing liquids with meals discontinuing alcohol and caffeine, exercising, continuing with follow-up visits, and attending support groups. She reported that she is most concerned about constipation due to her diagnosis of rectocele.            Support Post-Surgery: The client stated that her  and mother-in-law live with the patient and will help her with the surgery recovery. Patient reported that her  will help care for their children and prepare meals. Patient reported that she told her , mother-in-law, and some  friends about the bariatric surgery. Patient reported that her  and one friend were supportive of the surgery; however, her mother-in-law and other friend that she recommended that she focus more on  "her diet.      Patient reported that she plans to wait to schedule the bariatric surgery to allow her body \"more time to heal\" from the hysterectomy that was in March of 2024.      The client reported that current stressors include her health concerns and children's schedules. She reported that her coping skills include going for walks, taking time outs, talking with , and using distractions.      Current Goals with Nutritionist: The client's current goals with her nutritionist are chewing food to applesauce consistency and eliminating liquids with meals.           Mental health history is notable for depression and social anxiety. The client's mental health symptoms appear to be well managed with medication.     Social/Family History:  Patient reported they grew up in other [facility].  They were raised by biological parents; stepmother  .  Parents  /  when patient was 6 years of age. Patient reported that her father has been remarried three times. Patient reported that she did not get along with her first step-mom. Patient reported that she lived with her father full time after her mother passed away when the patient was 14 years age. Patient reported that she got along well with her second step-mother. Patient reported that she still communicates with this step-mother. Patient reported that she does not know her father's current wife well. Patient reported that she has two half-siblings on her father's side. Patient reported that she gets along with them \"fine.\"  Patient reported that their childhood was \"normal.\"  Patient described their current relationships with family of origin as close with father. Patient reported that she is closest with her half brother.       The patient describes their cultural background as .  Cultural influences and impact on patient's life structure, values, norms, and healthcare: None.  Contextual influences on patient's health include: " "Contextual Factors: Family Factors father was not supportive of therapy .    These factors will be addressed in the Preliminary Treatment plan. Patient identified their preferred language to be English. Patient reported they does not need the assistance of an  or other support involved in therapy.      Patient reported had no significant delays in developmental tasks.   Patient's highest education level was some college  . Patient reported that she studied dental assisting and then left when she became pregnant with her oldest child. Patient reported that she was unable to transfer the [employer] credits from [place] to [place]. Patient reported that she then briefly studied nursing, which was difficult with trying to focus her roles as a mother. Patient identified the following learning problems: none reported.  Modifications will not be used to assist communication in therapy.  Patient reports they are  able to understand written materials.     Patient reported the following relationship history includes on marriage.  Patient's current relationship status is  for 10 years. Patient identified their sexual orientation as heterosexual.  Patient reported having 3 child(tom). Patient identified spouse as part of their support system.  Patient identified the quality of these relationships as good  .       Patient's current living/housing situation involves living with her mother-in-law and grandmother-in-law.  The immediate members of family and household include Venkat, Marlon,, mother-in-law, grandmother-in-law, and her three children. Patient reported that she and her  bought the house from his grandmother. Patient reported that housing is stable.      Patient is currently self- employed part time. 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 Patient reported that she has \"only closed on one half in the last year and a half.\" Patient reported that she also cares for her three children. " Patient reported that she is also on the Lacrosse board, is the hockey , and volunteers at the local schools, and is involved in her children's sports teams.  Patient reports their finances are obtained through spouse. Patient does identify finances as a current stressor.       Patient reported that they have not been involved with the legal system. Patient does not report being under probation/ parole/ jurisdiction. They are not under any current court jurisdiction. .     Patient's Strengths and Limitations:  Patient identified the following strengths or resources that will help them succeed in treatment: commitment to health and well being, community involvement, family support, and motivation. Things that may interfere with the patient's success in treatment include: none identified.      Assessments:  Assessments completed prior to visit:  The following assessments were completed by patient for this visit:  PHQ9:       10/11/2023    10:06 AM 11/13/2023     1:22 PM 6/19/2024    12:45 PM 6/19/2024    12:46 PM 7/15/2024     9:31 AM 7/24/2024     8:39 AM 9/18/2024     6:46 AM   PHQ-9 SCORE   PHQ-9 Total Score MyChart    2 (Minimal depression) 0 0 0   PHQ-9 Total Score 13 2 2  0    0 0 0     GAD7:       10/11/2023    10:06 AM 11/13/2023     1:24 PM 6/19/2024    12:54 PM 7/15/2024     9:31 AM 9/18/2024     7:34 AM   KEYLA-7 SCORE   Total Score   2 (minimal anxiety) 3 (minimal anxiety)    Total Score 15 2 2 3 1     CAGE-AID:       7/15/2024     9:30 AM   CAGE-AID Total Score   Total Score 0    0   Total Score MyChart 0 (A total score of 2 or greater is considered clinically significant)     PROMIS 10-Global Health (all questions and answers displayed):       6/4/2024     3:41 PM 7/16/2024     7:55 AM 7/24/2024     8:41 AM   PROMIS 10   In general, would you say your health is: Good Good Good   In general, would you say your quality of life is: Good Good Good   In general, how would you rate your physical  health? Good Fair Good   In general, how would you rate your mental health, including your mood and your ability to think? Good Good Good   In general, how would you rate your satisfaction with your social activities and relationships? Good Good Good   In general, please rate how well you carry out your usual social activities and roles Good Good Good   To what extent are you able to carry out your everyday physical activities such as walking, climbing stairs, carrying groceries, or moving a chair? Completely Completely Completely   In the past 7 days, how often have you been bothered by emotional problems such as feeling anxious, depressed, or irritable?  Rarely Rarely   In the past 7 days, how would you rate your fatigue on average?  Mild None   In the past 7 days, how would you rate your pain on average, where 0 means no pain, and 10 means worst imaginable pain?  2 0   In general, would you say your health is:  3 3   In general, would you say your quality of life is:  3 3   In general, how would you rate your physical health?  2 3   In general, how would you rate your mental health, including your mood and your ability to think?  3 3   In general, how would you rate your satisfaction with your social activities and relationships?  3 3   In general, please rate how well you carry out your usual social activities and roles. (This includes activities at home, at work and in your community, and responsibilities as a parent, child, spouse, employee, friend, etc.)  3 3   To what extent are you able to carry out your everyday physical activities such as walking, climbing stairs, carrying groceries, or moving a chair?  5 5   In the past 7 days, how often have you been bothered by emotional problems such as feeling anxious, depressed, or irritable?  2 2   In the past 7 days, how would you rate your fatigue on average?  2 1   In the past 7 days, how would you rate your pain on average, where 0 means no pain, and 10 means  worst imaginable pain?  2 0   Global Mental Health Score  13 13   Global Physical Health Score  15 18   PROMIS TOTAL - SUBSCORES  28 31     [place] Suicide Severity Rating Scale (Lifetime/Recent)      3/11/2024     6:16 AM 4/27/2024    12:37 PM 7/16/2024     8:11 AM   [place] Suicide Severity Rating (Lifetime/Recent)   Q1 Wished to be Dead (Past Month) 0-->no 0-->no    Q2 Suicidal Thoughts (Past Month) 0-->no 0-->no    Q6 Suicide Behavior (Lifetime) 0-->no 0-->no    Level of Risk per Screen no risks indicated no risks indicated    Q1 Wish to be Dead (Lifetime)   N   Q2 Non-Specific Active Suicidal Thoughts (Lifetime)   N   Actual Attempt (Lifetime)   N   Has subject engaged in non-suicidal self-injurious behavior? (Lifetime)   N   Interrupted Attempts (Lifetime)   N   Aborted or Self-Interrupted Attempt (Lifetime)   N   Preparatory Acts or Behavior (Lifetime)   N   Calculated C-SSRS Risk Score (Lifetime/Recent)   No Risk Indicated              Personal and Family Medical History:  Patient does report a family history of mental health concerns.  Patient reports family history includes Cancer in her maternal grandfather; Cancer - colorectal in her mother; Cancer - colorectal (age of onset: 49) in her maternal grandmother; Colon Cancer in her maternal grandmother; Colon Cancer (age of onset: 37) in her maternal aunt; Colon Cancer (age of onset: 48) in her maternal aunt; Diabetes in her maternal grandmother; Jean Syndrome in her maternal aunt, maternal aunt, and mother.. Patient reported that her mother had depression when she was diagnosed with terminal cancer. Patient reported that she thinks that her maternal aunt has anxiety. Patient reported that her father abuses alcohol.      Patient does report Mental Health Diagnosis and/or Treatment.  Patient reported the following previous diagnoses which include(s): an anxiety disorder; depression .  Patient reported symptoms began in 2020.  Patient has received mental  health services in the past: medications from PCP.  Psychiatric Hospitalizations: none. Patient denies a history of civil commitment.       Currently, patient  is receiving other mental health services.  These include primary care provider at [facility] .  For follow-up on TBD .        Patient has had a physical exam to rule out medical causes for current symptoms.  Date of last physical exam was within the past year. Client was encouraged to follow up with PCP if symptoms were to develop. The patient has a [facility] Provider, who is named Rose Phelps..  Patient reports the following current medical concerns: obesity and Jean Syndrome .      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  Patient does not want help addressing pain concerns.. Patient reported that she is going through PT. Patient reported that she thinks that the back pain is due to her hips and tight hip flexors since having a hysterectomy. There are significant appetite / nutritional concerns / weight changes. Patient has obesity.  Patient does not report a history of head injury / trauma / cognitive impairment.       Current Medications:     buPROPion (WELLBUTRIN XL) 150 MG 24 hr tablet    metFORMIN (GLUCOPHAGE XR) 500 MG 24 hr tablet  senna-docusate (SENOKOT-S/PERICOLACE) 8.6-50 MG  tablet    topiramate (TOPAMAX) 25 MG tablet           Medication Adherence:  Patient reports taking.        Patient Allergies:    Allergies   No Known Allergies     Patient denied allergies to medications.         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    NO ACTIVE PROBLEMS                 Current Mental Status Exam:      Appearance    Appropriate   Eye Contact   Good   Psychomotor Behavior  Normal   Attitude:  Cooperative  Interested  Orientation:  All  Speech  Rate / Production: Talkative Normal  Volume: Normal   Mood: Anxious  when talking about the logistics of planning the bariatric surgery considering her rectocele issues  Affect: Appropriate   Thought Content:  Clear   Thought Form: Coherent  Logical   Insight:  Good             Substance Use:   Patient did report a family history of substance use concerns; see medical history section for details.  Patient has not received chemical dependency treatment in the past.  Patient has not ever been to detox.       Patient is not currently receiving any chemical dependency treatment.               Substance History of use Age of first use Date of last use       Pattern and duration of use (include amounts and frequency)   Alcohol Occasional      16 April 2024 REPORTS SUBSTANCE USE: reports using substance 2 times per month and has 2 beers at a time.   Patient reports heaviest use is current use.   Cannabis    Tried in past  18  20 REPORTS SUBSTANCE USE: N/A      Amphetamines    never used   REPORTS SUBSTANCE USE: N/A   Cocaine/crack     never used       REPORTS SUBSTANCE USE: N/A   Hallucinogens never used         REPORTS SUBSTANCE USE: N/A   Inhalants never used         REPORTS SUBSTANCE USE: N/A   Heroin never used         REPORTS SUBSTANCE USE: N/A   Other Opiates never used   REPORTS SUBSTANCE USE: N/A   Benzodiazepine    never used   REPORTS SUBSTANCE USE: N/A    Barbiturates never used   REPORTS SUBSTANCE USE: N/A   Over the counter meds never used   REPORTS SUBSTANCE USE: N/A   Caffeine currently use N/a  7/16/2024  REPORTS SUBSTANCE USE: reports using substance 1 times per day and has 1 coffee at a time.   Patient reports heaviest use was 29 years of age when working over night shifts.   Nicotine  used in the past 14 04/27/16 REPORTS SUBSTANCE USE: N/A   Other substances not listed above:  Identify:  never used   REPORTS SUBSTANCE USE: N/A      Patient reported the following problems as a result of their substance use: no problems, not applicable.     Substance Use: No symptoms     Based on the negative CAGE score and clinical interview there  are not indications of drug or alcohol abuse.     Significant Losses / Trauma / Abuse / Neglect Issues:   Patient did not serve in the .  There are indications or report of significant loss, trauma, abuse or neglect issues related to: death of mother when the patient was 14 years of age . Patient reported that she was close with a great-paternal aunt who also passed away from cancer.   Concerns for possible neglect are not present.      Safety Assessment:   Patient denies current homicidal ideation and behaviors.  Patient denies current self-injurious ideation and behaviors.    Patient denied risk behaviors associated with substance use.   Patient denies any high risk behaviors associated with mental health symptoms.  Patient reports the following current concerns for their personal safety: None.  Patient reports there are not firearms in the house.         History of Safety Concerns:  Patient denied a history of homicidal ideation.     Patient denied a history of personal safety concerns.    Patient denied a history of assaultive behaviors.    Patient denied a history of sexual assault behaviors.     Patient denied a history of risk behaviors associated with substance use.  Patient denies any history of high risk  "behaviors associated with mental health symptoms.  Patient reports the following protective factors: my children, my pets, my , goals for the future, and \"I want to a be an old lady.\"     Risk Plan:  See Recommendations for Safety and Risk Management Plan       Psychological Testing:     Summary of MMPI-3 Results      Patient completed the [place] Multiphasic Personality Inventory-3 (MMPI-3), a self-report personality inventory, as a routine part of the psychological assessment for pursuing weight loss surgery.  Validity scales indicate that the patient responded in an open and consistent manner, resulting in a valid profile. The patient's responses yielded a profile that is indicative of people who report social avoidance, a higher than average level of behavioral constraint, and a sense of physical well-being.        Patient responded similarly to people who report a lack of positive emotional experiences. They tend to avoid social situations. People like this tend to experience significant problems with anhedonia. They lack interests, are pessimistic, and are socially introverted.     Patient responded similarly to people who report a higher than average level of behavioral constraint. They are unlikely to engage in externalizing and acting-out behavior. Patient responded similarly to people who report a below-average level of past antisocial.  behavior. Patient responded similarly to people who report a below-average level of impulsive behavior. Patient responded similarly to people who report overly constrained behavior.     Patient responded similarly to people who report a sense of physical well-being.        The MMPI test results were consistent with report upon direct interview.       Assessment of eating disorder symptoms indicates that patient has not met criteria for anorexia nervosa or bulimia nervosa.  Patient reported they have not used laxatives,have not purged, and have not used excessive " exercise as a means of weight control.  Patient does not meet criteria for binge eating disorder.  Patient reported she does not eat until uncomfortably full.  Patient reported they do not feel disgusted, depressed, or guilty after eating.      Patient's score on the PHQ-9, a brief self-report measure of depressive symptoms, was 0, indicating no depressive symptomatology.  Patient's score on the KEYLA-7, a brief self-report measure of anxiety symptoms, was 3, indicating no anxiety symptomatology.          Review of Symptoms per patient report:   Depression:     Irritability  Sammi:             No Symptoms  Psychosis:       No Symptoms  Anxiety:           Excessive worry, Nervousness, Social anxiety, and Irritability  Panic:              No symptoms  Post Traumatic Stress Disorder:  No Symptoms   Eating Disorder:          No Symptoms  ADD / ADHD:              No symptoms  Conduct Disorder:       No symptoms  Autism Spectrum Disorder:     No symptoms  Obsessive Compulsive Disorder:       No Symptoms     Patient reports the following compulsive behaviors and treatment history:  NA .       Diagnostic Criteria:   Major Depressive Disorder  CRITERIA (A-C) REPRESENT A MAJOR DEPRESSIVE EPISODE - SELECT THESE CRITERIA     Patient reported a history of depression and generalized anxiety, which appear to be well managed at this time with Wellbutrin. Hence, she is being diagnosed with Major Depressive Disorder, Recurrent, In Full Remission, with Anxious Distress      Social Anxiety Diagnostic Criteria:     A. Marked fear or anxiety about one or more social situations in which the individual is exposed to possible scrutiny by others. Examples include social interactions (e.g., having a conversation, meeting unfamiliar people), being observed (e.g., eating or drinking), and performing in front of others (e.g., giving a speech). YES  Note: In children, the anxiety must occur in peer settings and not just during interactions with  adults.     B. The individual fears that he or she will act in a way or show anxiety symptoms that will be negatively evaluated (i.e., will be humiliating or embarrassing; will lead to rejection or offend others). YES     C. The social situations almost always provoke fear or anxiety. YES  Note: In children, the fear or anxiety may be expressed by crying, tantrums, freezing, clinging, shrinking, or failing to speak in social situations.     D.The social situations are avoided or endured with intense fear or anxiety. YES     E. The fear or anxiety is out of proportion to the actual threat posed by the social situation and to the sociocultural context. YES     F. The fear, anxiety, or avoidance is persistent, typically lasting for 6 months or more. YES     G. The fear, anxiety, or avoidance causes clinically significant distress or impairment in social, occupational, or other important areas of functioning. YES     H. The fear, anxiety, or avoidance is not attributable to the physiological effects of a substance (e.g., a drug of abuse, a medication) or another medical condition. YES     I. The fear, anxiety, or avoidance is not better explained by the symptoms of another mental disorder, such as panic disorder, body dysmorphic disorder, or autism spectrum disorder. YES     J. If another medical condition (e.g., Parkinson s disease, obesity, disfigurement from burns or injury) is present, the fear, anxiety, or avoidance is clearly unrelated or is excessive. YES        Patient meets diagnostic criteria for Social Anxiety Disorder.         Functional Status:  Patient reports the following functional impairments:  chronic disease management, health maintenance, self-care, and social interactions.          One Month Follow Up:    Completed one-month follow up for pre-bariatric psychological assessment.  Reviewed Patient's progress with homework over the past month.           Patient reported that she started a new job and  is enjoying her job. Patient reported that she is working on managing her new work role while also continuing to cook and clean for her family.     Patient reported that she has lost approximately 3 pounds since she started working. Patient reported weighing 254 pounds when they began working with the [facility]. Patient reported that their presurgical weight loss requirement is to weigh 10 pounds. Patient reported that they current weight is 234 pounds.     Patient reported that she eats oatmeal with extra protein and fiber and Premiere Protein in her coffee for breakfast , protein and vegetable for lunch, and meat, vegetable, a single portion of whole grain carbohydrates for dinner, and an occasional evening snack of a cheese stick or granola bar.     Patient reported that she started walking for exercise and then stopped when she spent time with her 's grandmother in the hospital, took a vacation to [place], and then started a full time job.      Patient reported that she walks 10,000 steps each day Monday-Friday at her job. Patient reported that she walks her dogs on Saturdays and Sundays for exercise. Patient reported that she is unsure if she can lift weights due to her recent rectocele surgery.     Patient reported that she is doing better with not drinking before, during, and after her meals.     Patient reported that since she started working with the [facility], she has made meals last 20-30 minutes, she chews her food to applesauce consistency, increased her protein and fiber, tracks macro nutrients, eliminated alcohol, decreased carbonation intake, and decreased to one cup of coffee daily. Patient reported that she has increased her daily steps to 10,000.     Discussed changes that may occur in relationships following weight loss surgery and how to manage these changes. Talked specifically about potential reactions from  loved ones who are having their own struggles with weight loss or body acceptance. Emphasized that Patient's job is to stay focused on her health and well-being and making decisions that are good for them; she can allow others to struggle with their own reactions without feeling compelled to make changes in order to make them more comfortable.      Reviewed results of the MMPI-3 evaluation.   Patient's questions were  answered.      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 Patient reported that when she is in a leadership role, she tends to fixate on what she said and how people perceive her. Patient reported that she feels anxious wondering if her children's teachers commented on her helping them read ore or brush their hair more.           Summary and Final Recommendation:    Diagnostic Criteria:        Major Depressive Disorder  CRITERIA (A-C) REPRESENT A MAJOR DEPRESSIVE EPISODE - SELECT THESE CRITERIA     Patient reported a history of depression and generalized anxiety, which appear to be well managed at this time with Wellbutrin. Hence, she is being diagnosed with Major Depressive Disorder, Recurrent, In Full Remission, with Anxious Distress      Social Anxiety Diagnostic Criteria:     A. Marked fear or anxiety about one or more social situations in which the individual is exposed to possible scrutiny by others. Examples include social interactions (e.g., having a conversation, meeting unfamiliar people), being observed (e.g., eating or drinking), and performing in front of others (e.g., giving a speech). YES  Note: In children, the anxiety must occur in peer settings and not just during interactions with adults.     B. The individual fears that he or she will act in a way or show anxiety symptoms that will be negatively evaluated (i.e., will be humiliating or embarrassing; will lead to rejection or offend others). YES     C.  The social situations almost always provoke fear or anxiety. YES  Note: In children, the fear or anxiety may be expressed by crying, tantrums, freezing, clinging, shrinking, or failing to speak in social situations.     D.The social situations are avoided or endured with intense fear or anxiety. YES     E. The fear or anxiety is out of proportion to the actual threat posed by the social situation and to the sociocultural context. YES     F. The fear, anxiety, or avoidance is persistent, typically lasting for 6 months or more. YES     G. The fear, anxiety, or avoidance causes clinically significant distress or impairment in social, occupational, or other important areas of functioning. YES     H. The fear, anxiety, or avoidance is not attributable to the physiological effects of a substance (e.g., a drug of abuse, a medication) or another medical condition. YES     I. The fear, anxiety, or avoidance is not better explained by the symptoms of another mental disorder, such as panic disorder, body dysmorphic disorder, or autism spectrum disorder. YES     J. If another medical condition (e.g., Parkinson s disease, obesity, disfigurement from burns or injury) is present, the fear, anxiety, or avoidance is clearly unrelated or is excessive. YES        Patient meets diagnostic criteria for Social Anxiety Disorder.     From a psychological standpoint, Patient is cleared to continue in the process for pursuing bariatric surgery.     To summarize the 3 primary conditions being evaluated in the psychological assessment:    1. Patient is prepared to comply with ongoing aftercare and lifestyle  changes after surgery--condition satisfied    Patient has made some meaningful initial changes to  eating and activity her habits. she reported an understanding of the need for ongoing changes to these lifestyle habits. she expressed their willingness to maintain changes to eating and activity habits after surgery.        Patient reported  that since she started working with the [facility], she has made meals last 20-30 minutes, she chews her food to applesauce consistency, increased her protein and fiber, tracks macro nutrients, eliminated alcohol, decreased carbonation intake, and decreased to one cup of coffee daily. Patient reported that she has increased her daily steps to 10,000.       2. Patient is emotionally stable to proceed with surgery--condition satisfied    Patient's mental health history is notable for depression and patient currently experiences social anxiety. Patient's social anxiety symptoms should not interfere with her success with bariatric surgery.     3. Patient is cognitively capable of understanding the risks of the  procedure--condition satisfied    Patient has been able to demonstrate that she understands the risks of surgery compared to the risks of not having surgery.  Patient reported that they understand that potential risks of bariatric surgery include death, blood clots, pulmonary embolisms, dumping syndrome, transfer addictions, gaining weight, and bleeding issues. With respect to the risk factors, patient would like to continue with the bariatric surgery process.       PLAN: (Client Tasks / Therapist Tasks / Other)    Patient has completed psychological assessment required for bariatric surgery  Complete report will be forwarded to the weight loss surgery clinic for review.      Recommend standard post-surgical follow up, with sessions 1 and 3 months postsurgery, to assess client's functioning and adjustment post-surgical lifestyle.      Patient was encouraged to attend a weight loss surgery support group, starting before surgery and continuing afterwards, for additional accountability and support.    Patient was provided with writer's contact information and is aware that she may contact writer sooner as needed.      Patient plans to re-start her pelvic floor therapy exercises  for 20 minutes three times each week in an effort to carve out exercise time into her routine.     Patient plans to talk with her surgeon about complications of the bariatric surgery including constipation. Patient reported that she is worried about constipation given her rectocele. Encouraged patient to ask for the surgeon's recommendation for having bariatric surgery before or after rectocele surgery.     Recommend therapy to treat the social anxiety symptoms through therapy. Patient can contact [facility] to start therapy by calling [phone].     Social Anxiety Therapy Referral:  [facility]  [email or web address]      DSM 5 Diagnoses:   296.36 (F33.42) Major Depressive Disorder, Recurrent Episode, In full remission With anxious distress 300.23 (F40.10) Social Anxiety Disorder.        Provider Name/ Credentials:  Yandy Viera PsyD LP     9/18/2024             Psychological Testing   Sample Billing/Services Summary       Testing Evaluation Services Base: 69504  (1st 60 mins) Add-on: 89527  (each addtl 60 mins)   Record Review and Clarify Referral Question   7-10-24 1:10-1:15PM 5 minutes   Integration/Report Generation   MMPI 12:05-12:35PM (30)  9/18/2024 1:25-1:50PM Integrated Report (25) 55 minutes   Post-Service Work   9/18/2024 1:00-1:25PM 25 minutes   Total Time: 85 minutes (1 hours, 25 minutes)   Total Units: 1 0           Diagnosis(es): (ICD-10) 296.36 (F33.42) Major Depressive Disorder, Recurrent Episode, In full remission With anxious distress 300.23 (F40.10) Social Anxiety Disorder

## 2024-09-27 NOTE — PROGRESS NOTES
"Video-Visit Details    Type of service:  Video Visit    Video Start Time: 11:36 AM  Video End Time: 11:47 AM     Originating Location (pt. Location): Home    Distant Location (provider location): Offsite (providers home) [facility] [place]     [facility]    Return Bariatric Nutrition Consultation Note    Reason For Visit: Nutrition Assessment    Katelyn Erickson is a 34 year old presenting today for return bariatric nutrition consult.   Patient is interested in weight loss surgery with Dr. Hurley expected surgery in TBD.  Patient is accompanied by self.  This is patient's 8th of 6 required nutrition visits prior to surgery. Patient attended the [facility] nutrition class on 2/23/24.     Pt referred by Steffi Sánchez NP  on February 20, 2024.     CO-MORBIDITIES OF OBESITY INCLUDE:        2/15/2024     2:47 PM   --   I have the following health issues associated with obesity None of the above     SUPPORT:      2/15/2024     2:47 PM   Support System Reviewed With Patient   Who do you have in your support network that can be available to help you for the first 2 weeks after surgery?  and MIL   Who can you count on for support throughout your weight loss surgery journey?  and MIL     ANTHROPOMETRICS:  Initial consult weight: 254 lb on 2/20/24   Estimated body mass index is 36.56 kg/m  as calculated from the following:    Height as of 8/29/24: 1.721 m (5' 7.75\").    Weight as of 8/29/24: 108.3 kg (238 lb 11.2 oz).  Current Weight: 232 lb per pt report     Required weight loss goal pre-op: -10 lbs from initial consult weight (goal weight 244 lbs or less before surgery) - met        2/15/2024     2:47 PM   --   I have tried the following methods to lose weight Watching portions or calories    Exercise    Weight Watchers           2/15/2024     2:47 PM   Weight Loss Questions Reviewed With Patient   How long have you been overweight? Since early childhood "     MEDICATION FOR WEIGHT LOSS: Topiramate 75 mg - no side effects.   Metformin     VITAMIN/MINERAL SUPPLEMENTS: None     NUTRITION HISTORY:  Food allergies: NKFA  Food intolerances: none   Previous experience with diet modification for weight loss: weight watchers and exercise   Barriers to lifestyle change: reports none     Per Setffi's note: Patient describes significant thoughts about food and cravings in the evenings with limited hunger during the day.     Patient herself is trying to eat less meat. Trying to increase her vegetables. No overnight eating    Recent Diet Recall:  Breakfast: coffee with Premier Protein   Lunch: 11 AM if at home, scrambled eggs with cottage and spinach with English muffin or toast. If goes into office will eat at 1 or 2 pm, Leftovers   Dinner: Tacos, spaghetti, chicken. Protein + vegetable + carb.   Snacks: After the kids go to bed - crackers and Nutella  Beverages: Water, soda seldomly.   Alcohol: None for the most part, seldomly   Dining Out: Not very often. Twice a month.     Patient recently had a total laparoscopic hysterectomy on 3/11/24.     April 2024: Downloaded the mark shopkick to track her nutrition.  Helps to monitor her protein amount. Plans to practice  liquids from meal times and chew her food really well to an applesauce consistency. Continues to work on tasklist items needed for surgery. Needs more clarification around if she needs the sleep medicine consult.     May 2024: Focusing more on fiber intake and is drinking a lot of water. For breakfast started incorporating overnight oats with protein and shaun seeds. Has been tracking nutrition in [employer]. Continues to practice  liquids from meal times. Has appointment with Steffi in June and psych visits in July.     June 2024: Met with Steffi earlier this month. Started taking Metformin since the beginning of June. Hasn't noticed a change in her weight this past month. Stopped taking Senna so often.  Lives a busy lifestyle. Recently started pelvic floor physical activity. Struggles with  liquids from meal times, especially before a meal. Has slowed down with eating and trying to take her time. Averaging around 80 grams of protein with most meals. Has psych visits this next month.     2024: Had her first psych visit out of a total of 3.  Has been focusing on protein and fiber in her meals. Doing Pelvic floor physical therapy and also doing good with other physical activity by staying busy with her kids. Still struggles with  liquids prior to her meal but is aware and working on it.     2024: Has her last psych visit for the middle of September.  Busy August, still was able to stay focused on her nutrition though. Hasn't thought anymore about the surgery process. Focusing on her fiber and protein intake throughout the day.     Breakfast will be overnight oats if she is prepared (18 g of fiber and 40-45 g protein). Coffee with premier protein as the creamer. Lunch is normally later in the day around 1 or 2 pm 0 cucumber/chickpea/feta/salad may add some chicken to it. Dinner protein + greens.     Walking more often, about 4x a week for 30 minutes and goes about 2 miles.     Patient mentioned that her psych provider was questioning if she was eating enough. Provided patient with a calorie goal to ensure she is eating enough while still working on her nutrition goals.     2024: On , patient got her psych letter of support. The month of September was very busy for the patient with starting a new job and other things she had going on. She was not able to track her nutrition. However still focusing on good amounts of protein and fiber in her diet.      Patient continues to report she plans to wait to schedule bariatric surgery. She is wanting to know more about when some of things she has been working on would  before she would have to re-do some things.          2/15/2024     2:47 PM   Recall Diet Questions Reviewed With Patient   Describe what you typically consume for breakfast (typical or most recent) Scrambled eggs english muffine   How many ounces of water, or other low calorie drinks, do you drink daily (8 oz=1 glass)? 64 oz or more   How many ounces of caffeine (coffee, tea, pop) do you drink daily (8 oz=1 glass)? 16 oz   How many ounces of carbonated (pop, beer, sparkling water) drinks do you drinky daily (8 oz=1 glass)? 0 oz   How many ounces of juice, pop, sweet tea, sports drinks, protein drinks, other sweetened drinks, do you drink daily (8 oz=1 glass)? 0 oz   How many ounces of milk do you drink daily (8 oz=1 glass) 0 oz   How often do you drink alcohol? Monthly or less   If you do drink alcohol, how many drinks might you have in a day? (one drink = 5 oz. wine, 1 can/bottle of beer, 1 shot liquor) 1 or 2           2/15/2024     2:47 PM   Eating Habits   What foods do you crave? Sweets           2/15/2024     2:47 PM   Dining Out History Reviewed With Patient   How often do you dine out? Rarely.   Where do you dine out? (select all that apply) fast food chains   What types of food do you order when you dine out? Hamburger     EXERCISE: Busy with kids activities.         6/4/2024     3:39 PM   --   How often do you exercise? Never   What keeps you from being more active? 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   Patient has 3 kids ages 10, 7, 5.   Aunt had bariatric surgery.     NUTRITION DIAGNOSIS:  Obesity r/t long history of positive energy balance aeb BMI >30 kg/m2.    INTERVENTION:  Intervention Provided/Education Provided on post-op diet guidelines, vitamins/minerals essential post-operatively, GI anatomy of bariatric surgeries, ways to help prepare for post-op diet guidelines pre-operatively,  portion/calorie-control, mindful eating and sources of protein.  Patient demonstrates understanding.     Personal barriers to making and continuing required life changes have been identified, and strategies to overcome those barriers have been recommended AND family and social supports have been assessed and strategies to strengthen those supports have been recommended.    Provided pt with list of goals, RD contact information and resources listed below via [employer].           2/15/2024     2:47 PM   Questions Reviewed With Patient   How ready are you to make changes regarding your weight? Number 1 = Not ready at all to make changes up to 10 = very ready. 7   How confident are you that you can change? 1 = Not confident that you will be successful making changes up to 10 = very confident. 7     Expected Engagement: good    GOALS:  Relating To Eating:  Eat slowly (20-30 minutes per meal), chewing foods well (25 chews per bite/applesauce consistency)  Eat 3 meals per day  Consume 60-90 g protein per day    Relating to beverages:  Reduce caffeine/carbonation/calorie containing beverages  Separate fluids from meals by 30 minutes before, during, and after eating  Drink 48-64 ounces of fluid per day    Relating to dietary supplements:  Start thinking about a post op multivitamin     Relating to activity:  Increase activity as able    Post-op Diet Handouts:  Diet Guidelines after Weight-loss Surgery  [email or web address]     Your Stage 1 Diet: Clear Liquids  [email or web address]     Your Stage 2 Diet: Low-fat Full Liquids  [email or web address]     Your Stage 3 Diet: Pureed Foods  [email or web address]     Pureed Recipes  [email or web address]    Your Stage 4 Diet: Soft Foods  [email or web address]    Your Stage 5 Diet: Regular Foods  [email or web address]    Supplements after Sleeve Gastrectomy, Gastric Bypass or Single Anastomosis Duodenal  Switch  [email or web address]    Keeping Track of Fluids  [email or web address]    Follow Up: October 22     Time spent with patient: 11 minutes.  Tina Montenegro RD, LD

## 2024-09-30 ENCOUNTER — VIRTUAL VISIT (OUTPATIENT)
Dept: ENDOCRINOLOGY | Facility: CLINIC | Age: 34
End: 2024-09-30
Payer: COMMERCIAL

## 2024-09-30 VITALS — BODY MASS INDEX: 36.56 KG/M2 | HEIGHT: 68 IN

## 2024-09-30 DIAGNOSIS — E66.9 OBESITY: ICD-10-CM

## 2024-09-30 DIAGNOSIS — Z71.3 NUTRITIONAL COUNSELING: Primary | ICD-10-CM

## 2024-09-30 PROCEDURE — 97803 MED NUTRITION INDIV SUBSEQ: CPT | Mod: 95

## 2024-09-30 PROCEDURE — 99207 PR NO CHARGE LOS: CPT | Mod: 95

## 2024-09-30 ASSESSMENT — PAIN SCALES - GENERAL: PAINLEVEL: NO PAIN (0)

## 2024-09-30 NOTE — NURSING NOTE
Current patient location: [address] [place] [id number]    Is the patient currently in the state of [place]? YES    Visit mode:VIDEO    If the visit is dropped, the patient can be reconnected by: VIDEO VISIT: Text to cell phone:   Telephone Information:   Mobile [phone]       Will anyone else be joining the visit? NO  (If patient encounters technical issues they should call [phone]    How would you like to obtain your AVS? MyChart    Are changes needed to the allergy or medication list? N/A    Are refills needed on medications prescribed by this physician? NO    Rooming Documentation:  Questionnaire(s) not pre-assigned    Reason for visit: JESUSITA STERLINGF

## 2024-09-30 NOTE — PATIENT INSTRUCTIONS
Devon Zepeda,     I passed along your questions and concerns that you had brought up today in regards to timeline of surgery, when things would  and your time till Steffi's visit. I requested that someone reach out to you.     Follow-up with PABLO on .     Thank you,    Tina Montenegro, PABLO, LD  If you would like to schedule or reschedule an appointment with the RD, please call [phone]    Nutrition Goals  Relating To Eating:  Eat slowly (20-30 minutes per meal), chewing foods well (25 chews per bite/applesauce consistency)  Eat 3 meals per day  Consume 60-90 g protein per day    Relating to beverages:  Reduce caffeine/carbonation/calorie containing beverages  Separate fluids from meals by 30 minutes before, during, and after eating  Drink 48-64 ounces of fluid per day    Relating to dietary supplements:  Start thinking about a post op multivitamin     Relating to activity:  Increase activity as able    Post-op Diet Handouts:  Diet Guidelines after Weight-loss Surgery  [email or web address]     Your Stage 1 Diet: Clear Liquids  [email or web address]     Your Stage 2 Diet: Low-fat Full Liquids  [email or web address]     Your Stage 3 Diet: Pureed Foods  [email or web address]     Pureed Recipes  [email or web address]    Your Stage 4 Diet: Soft Foods  [email or web address]    Your Stage 5 Diet: Regular Foods  [email or web address]    Supplements after Sleeve Gastrectomy, Gastric Bypass or Single Anastomosis Duodenal Switch  [email or web address]    Keeping Track of Fluids  [email or web address]    COMPREHENSIVE WEIGHT MANAGEMENT PROGRAM  VIRTUAL SUPPORT GROUPS    At [facility], our Comprehensive Weight Management program offers on-line support groups for patients who are working on weight loss and considering, preparing for, or have had weight loss surgery.     There is no cost for this opportunity.  You are invited to attend  "the?Virtual Support Groups?provided by any of the following locations:    [facility] via Microsoft Teams with Loree Armendariz RD, RN  2.   [place] via [employer] with Lj Waldron, PhD, LP  3.   [place] via [employer] with Mary Jane Li RN  4.   [facility] via a Zoom Meeting with CARLOS Lennon-    The following Support Group information can also be found on our website:  [email or web address]      [facility]   WEIGHT LOSS SURGERY SUPPORT GROUP  The support group is a patient-lead forum that meets monthly to share experiences, encouragement and education. It is open to those who have had weight loss surgery, are scheduled for surgery, or are considering surgery.   WHEN: 3rd Wednesday of each month from 5:00PM - 6:00PM using Microsoft Teams.   FACILITATOR: Led by Loree Armendariz RD, LD, RN, the program's Clinical Coordinator.   TO REGISTER: Please contact the clinic via VIAP or call the nurse line directly at [phone] to inform our staff that you would like an invite sent to you and to let us know the email you would like the invite sent to. Prior to the meeting, a link with directions on how to join the meeting will be sent to you.    2024 Meetings    September 18: Sandy Davis, PhD, Outpatient Clinical Therapist, \"Pro Tips for Habit Change\".  October 16:  Let's Talk  This will be a time of group support.??   November 20:  Let's Talk  about How to Navigate the Upcoming Holiday Season.  December 18:  Let's Talk  and Celebrate the past year.       [facility] BARIATRIC CARE SUPPORT GROUP  This is open to all pre- and post- operative bariatric surgery patients as well as their support system.   WHEN: 3rd Tuesday of each month from 6:30 PM - 8:00 PM using Microsoft Teams.   FACILITATOR: Led by Lj Waldron, Ph.D who is a Licensed " "Psychologist with the [facility] Comprehensive Weight Management Program.   TO REGISTER: Please send an email to Lj Waldron, Ph.D., LP at?[email or web address]?if you would like an invitation to the group. Prior to the meeting, a link with directions on how to join the meeting will be sent to you.    2024 Meetings September 17: IN PERSON MEETING. [facility], [facility] [place], [address], 1st floor Conference Room.  October 15: Date changed to OCTOBER 8th (2nd Tuesday).   November 19: \"Holiday Eating\", India Kebede RD, LD.  December 17: \"Hospital Stay and Compliance\", Mary Jane Li RN, CBN.      [facility] POST-OPERATIVE BARIATRIC SURGERY SUPPORT GROUP  This is a support group for [facility] bariatric patients (and those external to [facility]) who have had bariatric surgery and are at least 3 months post-surgery.  WHEN: 4th Thursday of the month from 11:00 AM - 12:00 PM using Microsoft Teams.   FACILITATOR: Led by Certified Bariatric Nurse, Mary Jane Li RN.   TO REGISTER: Please send an email to Mary Jane at [email or web address] if you would like an invitation to the group.  Prior to the meeting, a link with directions on how to join the meeting will be sent to you.    2024 Meetings September 26 October 24 November 28: No meeting  December 26    [facility]   HEALTHY LIFESTYLE COACHING GROUP  This is a 60 minute virtual coaching group for those who want to lead a healthier lifestyle. Come together to set goals and overcome barriers in a supportive group environment. We will address the four pillars of health: nutrition, exercise, sleep, and emotional well-being. This group is highly recommended for those who are participating in the 24 week Healthy Lifestyle Plan and our Health Coaching sessions.   WHEN: 1st Friday of the month, 12:30 PM - 1:30 PM   using " a Zoom meeting.     FACILITATOR: Led by National Board-Certified Health and , Mary Jane Chambers, [facility].  TO REGISTER: Please call the [employer] at [phone] to register. You will get an appointment to attend in [employer]. Fifteen minutes prior to the meeting, complete the e-check in and you will get the link to join the meeting.  There is no charge to attend this group and space is limited.  Please register for each month you wish to attend    2024 Meetings  September 5 No meeting.  October 4 November 1 December 6

## 2024-09-30 NOTE — LETTER
"9/30/2024       RE: Katelyn Erickson  [address] [facility] [address]     Dear Colleague,    Thank you for referring your patient, Katelyn Erickson, to the [facility]NA[facility] [place] [facility]. Please see a copy of my visit note below.    Video-Visit Details    Type of service:  Video Visit    Video Start Time: 11:36 AM  Video End Time: 11:47 AM     Originating Location (pt. Location): Home    Distant Location (provider location): Offsite (providers home) [facility] WE[facility]NAGEMENT [facility] [place]     Platform used for Video Visit: Amadjust    Return Bariatric Nutrition Consultation Note    Reason For Visit: Nutrition Assessment    Katelyn Erickson is a 34 year old presenting today for return bariatric nutrition consult.   Patient is interested in weight loss surgery with Dr. Hurley expected surgery in TBD.  Patient is accompanied by self.  This is patient's 8th of 6 required nutrition visits prior to surgery. Patient attended the [facility] nutrition class on 2/23/24.     Pt referred by Steffi Sánchez NP  on February 20, 2024.     CO-MORBIDITIES OF OBESITY INCLUDE:        2/15/2024     2:47 PM   --   I have the following health issues associated with obesity None of the above     SUPPORT:      2/15/2024     2:47 PM   Support System Reviewed With Patient   Who do you have in your support network that can be available to help you for the first 2 weeks after surgery?  and MIL   Who can you count on for support throughout your weight loss surgery journey?  and MIL     ANTHROPOMETRICS:  Initial consult weight: 254 lb on 2/20/24   Estimated body mass index is 36.56 kg/m  as calculated from the following:    Height as of 8/29/24: 1.721 m (5' 7.75\").    Weight as of 8/29/24: 108.3 kg (238 lb 11.2 oz).  Current Weight: 232 lb per pt report     Required weight loss goal pre-op: -10 lbs from initial consult weight (goal weight 244 " lbs or less before surgery) - met        2/15/2024     2:47 PM   --   I have tried the following methods to lose weight Watching portions or calories    Exercise    Weight Watchers           2/15/2024     2:47 PM   Weight Loss Questions Reviewed With Patient   How long have you been overweight? Since early childhood     MEDICATION FOR WEIGHT LOSS: Topiramate 75 mg - no side effects.   Metformin     VITAMIN/MINERAL SUPPLEMENTS: None     NUTRITION HISTORY:  Food allergies: NKFA  Food intolerances: none   Previous experience with diet modification for weight loss: weight watchers and exercise   Barriers to lifestyle change: reports none     Per Steffi's note: Patient describes significant thoughts about food and cravings in the evenings with limited hunger during the day.     Patient herself is trying to eat less meat. Trying to increase her vegetables. No overnight eating    Recent Diet Recall:  Breakfast: coffee with Premier Protein   Lunch: 11 AM if at home, scrambled eggs with cottage and spinach with English muffin or toast. If goes into office will eat at 1 or 2 pm, Leftovers   Dinner: Tacos, spaghetti, chicken. Protein + vegetable + carb.   Snacks: After the kids go to bed - crackers and Nutella  Beverages: Water, soda seldomly.   Alcohol: None for the most part, seldomly   Dining Out: Not very often. Twice a month.     Patient recently had a total laparoscopic hysterectomy on 3/11/24.     April 2024: Downloaded the lolis [employer] it to track her nutrition.  Helps to monitor her protein amount. Plans to practice  liquids from meal times and chew her food really well to an applesauce consistency. Continues to work on tasklist items needed for surgery. Needs more clarification around if she needs the sleep medicine consult.     May 2024: Focusing more on fiber intake and is drinking a lot of water. For breakfast started incorporating overnight oats with protein and shaun seeds. Has been tracking nutrition in  [employer]. Continues to practice  liquids from meal times. Has appointment with Steffi in June and psych visits in July.     June 2024: Met with Steffi earlier this month. Started taking Metformin since the beginning of June. Hasn't noticed a change in her weight this past month. Stopped taking Senna so often. Lives a busy lifestyle. Recently started pelvic floor physical activity. Struggles with  liquids from meal times, especially before a meal. Has slowed down with eating and trying to take her time. Averaging around 80 grams of protein with most meals. Has psych visits this next month.     July 2024: Had her first psych visit out of a total of 3.  Has been focusing on protein and fiber in her meals. Doing Pelvic floor physical therapy and also doing good with other physical activity by staying busy with her kids. Still struggles with  liquids prior to her meal but is aware and working on it.     August 2024: Has her last psych visit for the middle of September.  Busy August, still was able to stay focused on her nutrition though. Hasn't thought anymore about the surgery process. Focusing on her fiber and protein intake throughout the day.     Breakfast will be overnight oats if she is prepared (18 g of fiber and 40-45 g protein). Coffee with premier protein as the creamer. Lunch is normally later in the day around 1 or 2 pm 0 cucumber/chickpea/feta/salad may add some chicken to it. Dinner protein + greens.     Walking more often, about 4x a week for 30 minutes and goes about 2 miles.     Patient mentioned that her psych provider was questioning if she was eating enough. Provided patient with a calorie goal to ensure she is eating enough while still working on her nutrition goals.     September 2024: On September 18, patient got her psych letter of support. The month of September was very busy for the patient with starting a new job and other things she had going on. She was not able  to track her nutrition. However still focusing on good amounts of protein and fiber in her diet.      Patient continues to report she plans to wait to schedule bariatric surgery. She is wanting to know more about when some of things she has been working on would  before she would have to re-do some things.         2/15/2024     2:47 PM   Recall Diet Questions Reviewed With Patient   Describe what you typically consume for breakfast (typical or most recent) Scrambled eggs english muffine   How many ounces of water, or other low calorie drinks, do you drink daily (8 oz=1 glass)? 64 oz or more   How many ounces of caffeine (coffee, tea, pop) do you drink daily (8 oz=1 glass)? 16 oz   How many ounces of carbonated (pop, beer, sparkling water) drinks do you drinky daily (8 oz=1 glass)? 0 oz   How many ounces of juice, pop, sweet tea, sports drinks, protein drinks, other sweetened drinks, do you drink daily (8 oz=1 glass)? 0 oz   How many ounces of milk do you drink daily (8 oz=1 glass) 0 oz   How often do you drink alcohol? Monthly or less   If you do drink alcohol, how many drinks might you have in a day? (one drink = 5 oz. wine, 1 can/bottle of beer, 1 shot liquor) 1 or 2           2/15/2024     2:47 PM   Eating Habits   What foods do you crave? Sweets           2/15/2024     2:47 PM   Dining Out History Reviewed With Patient   How often do you dine out? Rarely.   Where do you dine out? (select all that apply) fast food chains   What types of food do you order when you dine out? Hamburger     EXERCISE: Busy with kids activities.         2024     3:39 PM   --   How often do you exercise? Never   What keeps you from being more active? 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   Patient has 3 kids ages 10, 7, 5.   Aunt had bariatric surgery.     NUTRITION  DIAGNOSIS:  Obesity r/t long history of positive energy balance aeb BMI >30 kg/m2.    INTERVENTION:  Intervention Provided/Education Provided on post-op diet guidelines, vitamins/minerals essential post-operatively, GI anatomy of bariatric surgeries, ways to help prepare for post-op diet guidelines pre-operatively, portion/calorie-control, mindful eating and sources of protein.  Patient demonstrates understanding.     Personal barriers to making and continuing required life changes have been identified, and strategies to overcome those barriers have been recommended AND family and social supports have been assessed and strategies to strengthen those supports have been recommended.    Provided pt with list of goals, RD contact information and resources listed below via ditlo.           2/15/2024     2:47 PM   Questions Reviewed With Patient   How ready are you to make changes regarding your weight? Number 1 = Not ready at all to make changes up to 10 = very ready. 7   How confident are you that you can change? 1 = Not confident that you will be successful making changes up to 10 = very confident. 7     Expected Engagement: good    GOALS:  Relating To Eating:  Eat slowly (20-30 minutes per meal), chewing foods well (25 chews per bite/applesauce consistency)  Eat 3 meals per day  Consume 60-90 g protein per day    Relating to beverages:  Reduce caffeine/carbonation/calorie containing beverages  Separate fluids from meals by 30 minutes before, during, and after eating  Drink 48-64 ounces of fluid per day    Relating to dietary supplements:  Start thinking about a post op multivitamin     Relating to activity:  Increase activity as able    Post-op Diet Handouts:  Diet Guidelines after Weight-loss Surgery  [email or web address]     Your Stage 1 Diet: Clear Liquids  [email or web address]     Your Stage 2 Diet: Low-fat Full Liquids  [email or web address]     Your Stage 3 Diet: Pureed  Foods  [email or web address]     Pureed Recipes  [email or web address]    Your Stage 4 Diet: Soft Foods  [email or web address]    Your Stage 5 Diet: Regular Foods  [email or web address]    Supplements after Sleeve Gastrectomy, Gastric Bypass or Single Anastomosis Duodenal Switch  [email or web address]    Keeping Track of Fluids  [email or web address]    Follow Up: October 22     Time spent with patient: 11 minutes.  Tina Montenegro RD, LD      Again, thank you for allowing me to participate in the care of your patient.      Sincerely,    Tina Montenegro RD

## 2024-10-02 ENCOUNTER — CARE COORDINATION (OUTPATIENT)
Dept: ENDOCRINOLOGY | Facility: CLINIC | Age: 34
End: 2024-10-02
Payer: COMMERCIAL

## 2024-10-02 NOTE — PROGRESS NOTES
Tasklist updated and sent to patient via [email or web address]    Bariatric Task List  Fax:  Please fax all paperwork to: [phone] -     Status:  Is patient a candidate for bariatric surgery?:  patient is a candidate for bariatric surgery -     Cleared to schedule surgeon consult?:  cleared to schedule surgeon consult - Call [phone] to meet with Dr Hurley. To see surgeon within 6 months of surgery date. bks   Status:  surgery evaluation in process -     Surgeon: Xavi -     Tentative surgery month/year: To be determined. -        Insurance: Insurance:  [facility] -      Contact insurance to discuss coverage: Needed -          Patient Info: Initial Weight:  254 -     Date of Initial Weight/Height:  2/20/2024 -     Goal Weight (lbs):  244 -     Required Weight Loss:  10 -     Surgery Type:  sleeve gastrectomy -        Dietician Visits: Structured weight loss required by insurance?:  structured weight loss required - At least 6 consecutive months within a year of the surgery date. [facility]   Dietician Visit 1:  Completed - 2/23/24-    Dietician Visit 2:  Completed - 3/18/24 [facility]   Dietician Visit 3:  Completed - 4/17/24-    Dietician Visit 4:  Completed - 5/22/24 bks   Dietician Visit 5:  Completed - 6/28/24-    Dietician Visit 6:  Completed - 7/23/24-    Dietician Visit additional:  Needed - monthly until surgery   Clearance from dietician to see surgeon?:    -     Dietician Notes:  RD cont: 8/29/24, 9/30/24, 10/22/24 appt. -        Psychological Evaluation: Psych eval:  Completed - letter sent to pt; 9/18/24Dr Viera clears for surgery. Need to follow recommendations. Establish care with a therapist, etc. [facility]   Therapist letter of support:  Needed - Need letter of support from therapist. s   Establish care with therapist:  Needed - Dr Viera placed referral to [facility], ph: [phone]. [facility]      Lab Work: Complete Blood Count:  Completed - 2/20/24   Comprehensive Metabolic Panel:  Completed -  "2/20/24   Vitamin D:  Completed - 2/20/24   PTH:  Completed - 2/20/24   Hgb A1c:  Completed - 2/20/24    Lipids: Completed - 2/20/24    TSH ([facility], [facility], [place] MA): Completed - 2/20/24   Vitamin A: Completed - 2/20/24   Vitamin B12: Completed - 2/20/24   Other:    - Labs need to be done within 1 year of the surgery date. Can re-order if needed. bks      PCP: Establish care with PCP:  Completed -     Follow up with PCP:    -  12/2/24 Rose daniellet   PCP letter of support:  Needed - letter sent to pt RR      Patient Education:  Information Session:  Completed -     Attended New Consult Class?: Completed - 2/27/24 - AS   Given \"Making your decision\" handout?:  Yes -     Given \"A Roadmap to you Weight Loss Surgery\" handout?: Yes -     Given \"Epic Care Companion\" information?: Yes -     Attended support group?:  Needed -     Support plan in place?:  Completed -  and MIL. bks      Final Tasks to be done after surgery date is determined:  Before surgery online preop class:  Needed -     Nurse visit for information:  Needed -     Weight Check: Needed -     History and Physical: Pre-Assessment Clinic (PAC) -   Needed -     Final labs per clinic: Needed -     Schedule postop appointments: Needed -     Other:   -   -        Notes: Please register for the Weight Loss Surgery Care Companion Pathway through the [employer] mobile lolis or via web browser. You register by answering \"yes\" to the following question, \"Do you agree to take part in this free, online program?\"  Information from this Pathway can help you be more successful before surgery and for up to one year after surgery.  This Pathway through [employer] can also answer questions you may have about your surgery.   The Pathway will start when you get your Tasklist after your initial consultation or when your surgery is scheduled.   -            "

## 2024-10-02 NOTE — LETTER
"2024    Re: Katelyn Erickson  Address: [address] [place], [place] [id number]  : 1990    Dear Primary Care Provider, WINDY Shook, CNP,  Next appt is 2024 with Katelyn.    Thank you for coordinating with us to evaluate your patient for possible bariatric surgery. It is important to us that the primary care provider is aware of their patients' desire to have weight loss surgery and is supportive of the patient having surgery. If the patient meets criteria and clearances for surgery, we will submit to the insurance company for insurance approval and coordinate the needed appointments with our department.  The patient will also meet with our anesthesia team prior to surgery for a pre-op history and physical.  If you have any questions, feel free to contact us via our [employer] at [phone].  The letter of support can be faxed to the number below or routed via [facility] [employer] to our team.      Sample letter:    \"Dear Weight Loss Surgery Clinic,    I am the primary care provider for (patient name) and I support (him/her/they) having weight loss surgery.    Sincerely,    (provider name)\"      Sincerely,    Comprehensive Weight Management Team  [facility]   [facility]  [address], [address], [place], [place], [address]                                        Ph: [phone]   Fax: [phone], Tuyet Mckenzie RN       "

## 2024-10-02 NOTE — LETTER
2024    Re: Katelyn Erickson  Address: [address] [place] [address]  : 1990    Dear Psychiatrist/Psychologist/Therapist,    We have a mutual patient with morbid obesity who is considering undergoing bariatric surgery.  A psychological evaluation is being done to see if this patient is cleared from a psychological perspective to undergo the elective surgery.  However, we need your assistance with a letter of support as outlined below. Your input is valuable and will affect our decision to hold or proceed with bariatric surgery.    This letter of support should outline:  Summary of treatment to date.  Treatment goals for before and after surgery that indicate mental health support and continuation of care.  Any concerns regarding this patient's ability to follow through with treatment recommendations.    If known, documentation of cessation of illicit drug use (our policy requires patients to be drug free of illicit drugs for at least one year prior to surgery).   A statement that indicates if you support or do not support this patient for weight loss surgery.    If you have any questions, feel free to contact us via our [employer] [facility] at [phone].  Please fax the evaluation to the number below.     Sincerely,    Comprehensive Weight Management Team  [facility] Management [facility]   [facility]  [address], [address], [place], [place], [address]                                        Ph: [phone]   Fax: [phone], attn: Dr Hurley/ Tuyet Mckenzie RN

## 2024-10-08 DIAGNOSIS — Z15.09 MLH1-RELATED LYNCH SYNDROME (HNPCC2): Primary | ICD-10-CM

## 2024-10-08 DIAGNOSIS — Z12.11 SPECIAL SCREENING FOR MALIGNANT NEOPLASMS, COLON: ICD-10-CM

## 2024-10-08 RX ORDER — SOD SULF/POT CHLORIDE/MAG SULF 1.479 G
1 TABLET ORAL ONCE
Qty: 24 TABLET | Refills: 0 | Status: SHIPPED | OUTPATIENT
Start: 2024-10-08 | End: 2024-10-08

## 2024-10-11 ENCOUNTER — TELEPHONE (OUTPATIENT)
Dept: ENDOCRINOLOGY | Facility: CLINIC | Age: 34
End: 2024-10-11

## 2024-10-11 ENCOUNTER — VIRTUAL VISIT (OUTPATIENT)
Dept: ENDOCRINOLOGY | Facility: CLINIC | Age: 34
End: 2024-10-11
Payer: COMMERCIAL

## 2024-10-11 VITALS — HEIGHT: 68 IN | BODY MASS INDEX: 35.16 KG/M2 | WEIGHT: 232 LBS

## 2024-10-11 DIAGNOSIS — E66.09 CLASS 2 OBESITY DUE TO EXCESS CALORIES WITHOUT SERIOUS COMORBIDITY WITH BODY MASS INDEX (BMI) OF 39.0 TO 39.9 IN ADULT: Primary | ICD-10-CM

## 2024-10-11 DIAGNOSIS — K76.0 NAFLD (NONALCOHOLIC FATTY LIVER DISEASE): ICD-10-CM

## 2024-10-11 DIAGNOSIS — E66.812 CLASS 2 OBESITY DUE TO EXCESS CALORIES WITHOUT SERIOUS COMORBIDITY WITH BODY MASS INDEX (BMI) OF 39.0 TO 39.9 IN ADULT: Primary | ICD-10-CM

## 2024-10-11 PROCEDURE — 99214 OFFICE O/P EST MOD 30 MIN: CPT | Mod: 95 | Performed by: NURSE PRACTITIONER

## 2024-10-11 PROCEDURE — G2211 COMPLEX E/M VISIT ADD ON: HCPCS | Mod: 95 | Performed by: NURSE PRACTITIONER

## 2024-10-11 ASSESSMENT — PAIN SCALES - GENERAL: PAINLEVEL: NO PAIN (0)

## 2024-10-11 NOTE — NURSING NOTE
Current patient location: [address] [place] [id number]    Is the patient currently in the state of [place]? YES    Visit mode:VIDEO    If the visit is dropped, the patient can be reconnected by: VIDEO VISIT: Text to cell phone:   Telephone Information:   Mobile [phone]       Will anyone else be joining the visit? NO  (If patient encounters technical issues they should call [phone]    Are changes needed to the allergy or medication list? No, Pt stated no changes to allergies, and Pt stated no med changes    Are refills needed on medications prescribed by this physician? Discuss with provider    Rooming Documentation:  Questionnaire(s) completed    Reason for visit: RECHECK (Srinath)    Jenny Egan VVF    Pt stated she has not had bariatric surgery yet. Switched RBS qnr to MWM return.

## 2024-10-11 NOTE — Clinical Note
Hey everyone. 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 She had gotten a letter from [employer] that said she was approved through March 2025 so she is concerned that she will need to do surgery sooner than May/ June. She is going to try to find that letter and upload to [employer] for us to review. Anyone have thoughts on that?  Her other concern is that she is now working 8-5 mon-fri so she is concerned about getting the monthly RD visits in her breaks but we talked about how she can make them shorter if needed to accommodate.

## 2024-10-11 NOTE — Clinical Note
Please schedule follow up with Irena Tavares at 130 on Jan 20th- pre- bariatric surgery/ return [facility]

## 2024-10-11 NOTE — ASSESSMENT & PLAN NOTE
Appetite manageable. Tolerating both topiramate and metformin. No adverse side effects. Continues to lose slowly.     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 Has concern about insurance with this delay so will upload document she had gotten from them so we can review.     Needing to create new routines since starting working. Being mindful about meal prepping and getting activity and stres management.     Establish care with therapist- [phone]  Continue topiramate and metformin   Upload blue cross document for review  Continue with dietitian monthly   Great work planning meals ahead of time!   Great work increasing activity   Continue to be mindful of stress management   Follow up with Irena Wilson PA-C  in 3 months

## 2024-10-11 NOTE — LETTER
10/11/2024       RE: Katelyn Erickson  [address] [facility] [address]     Dear Colleague,    Thank you for referring your patient, Katelyn Erickson, to the [facility]. Please see a copy of my visit note below.      Return Medical Weight Management Note     Ktaelyn Erickson  MRN:  [id number]  :  1990  MO:  10/11/2024    Dear Rose Phelps, WINDY CNP,    I had the pleasure of seeing your patient Katelyn Erickson. She is a 34 year old female who I am continuing to see for treatment of obesity related to:        2/15/2024     2:47 PM   --   I have the following health issues associated with obesity None of the above       Assessment & Plan  Problem List Items Addressed This Visit          Digestive    Class 2 obesity due to excess calories without serious comorbidity with body mass index (BMI) of 39.0 to 39.9 in adult - Primary     Appetite manageable. Tolerating both topiramate and metformin. No adverse side effects. Continues to lose slowly.     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 Has concern about insurance with this delay so will upload document she had gotten from them so we can review.     Needing to create new routines since starting working. Being mindful about meal prepping and getting activity and stres management.     Establish care with therapist- [phone]  Continue topiramate and metformin   Upload blue cross document for review  Continue with dietitian monthly   Great work planning meals ahead of time!   Great work increasing activity   Continue to be mindful of stress management   Follow up with Irena Wilson PA-C  in 3 months          NAFLD (nonalcoholic fatty liver disease)          INTERVAL HISTORY:  NBS 2024 BMI 38.9 with MAFLD and with family hx of STERN syndrome. Wanting to better manage  weight to reduce risks. Lots of thoughts about food. Last seen 6/2024 - tolerating topiramate addition, added metformin     Psych eval completed 9/18/2204- Dr. 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     Recent sleep changes:   Good     Vitamins/Labs: 2/2024     CURRENT WEIGHT:   232 lbs 0 oz    Initial Weight (lbs): 254.2 lbs  Last Visits Weight: 108.3 kg (238 lb 11.2 oz)  Cumulative weight loss (lbs): 22.2  Weight Loss Percentage: 8.73%        10/11/2024     7:40 AM   Changes and Difficulties   I have made the following changes to my diet since my last visit: None   With regards to my diet, I am still struggling with: None   I have made the following changes to my activity/exercise since my last visit: More active   With regards to my activity/exercise, I am still struggling with: None         MEDICATIONS:   Current Outpatient Medications   Medication Sig Dispense Refill     buPROPion (WELLBUTRIN XL) 150 MG 24 hr tablet Take 1 tablet (150 mg) by mouth every morning 90 tablet 1     metFORMIN (GLUCOPHAGE XR) 500 MG 24 hr tablet Take 2 tablets (1,000 mg) by mouth daily with food. 60 tablet 4     topiramate (TOPAMAX) 25 MG tablet Take 3 tablets (75 mg) by mouth at bedtime 270 tablet 1           10/11/2024     7:40 AM   Weight Loss Medication History Reviewed With Patient   Which weight loss medications are you currently taking on a  regular basis? Topamax (topiramate)    Metformin   Are you having any side effects from the weight loss medication that we have prescribed you? No       Office Visit on 05/29/2024   Component Date Value Ref Range Status     Color Urine 05/29/2024 Yellow  Colorless, Straw, Light Yellow, Yellow Final     Appearance Urine 05/29/2024 Clear  Clear Final     Glucose Urine 05/29/2024 Negative  Negative mg/dL Final     Bilirubin Urine 05/29/2024 Negative  Negative Final     Ketones Urine 05/29/2024 Negative  Negative mg/dL Final     Specific Gravity Urine 05/29/2024 <=1.005  1.003 - 1.035 Final     Blood Urine 05/29/2024 Large (A)  Negative Final     pH Urine 05/29/2024 7.0  5.0 - 7.0 Final     Protein Albumin Urine 05/29/2024 Negative  Negative mg/dL Final     Urobilinogen Urine 05/29/2024 0.2  0.2, 1.0 E.U./dL Final     Nitrite Urine 05/29/2024 Negative  Negative Final     Leukocyte Esterase Urine 05/29/2024 Small (A)  Negative Final     Bacteria Urine 05/29/2024 Few (A)  None Seen /HPF Final     RBC Urine 05/29/2024 0-2  0-2 /HPF /HPF Final     WBC Urine 05/29/2024 5-10 (A)  0-5 /HPF /HPF Final     Squamous Epithelials Urine 05/29/2024 Few (A)  None Seen /LPF Final     Treponema Antibody Total 05/29/2024 Nonreactive  Nonreactive Final     Hepatitis C Antibody 05/29/2024 Nonreactive  Nonreactive Final    A nonreactive screening test result does not exclude the possibility of exposure to or infection with HCV. Nonreactive screening test results in individuals with prior exposure to HCV may be due to antibody levels below the limit of detection of this assay or lack of reactivity to the HCV antigens used in this assay. Patients with recent HCV infections (<3 months from time of exposure) may have false-negative HCV antibody results due to the time needed for seroconversion (average of 8 to 9 weeks).     HIV Antigen Antibody Combo 05/29/2024 Nonreactive  Nonreactive Final    Negative HIV-1 p24 antigen and HIV-1/2 antibody  "screening test results usually indicate the absence of HIV-1 and HIV-2 infection. However, such negative results do not rule-out acute HIV infection.  If acute HIV-1 or HIV-2 infection is suspected, detection of HIV-1 or HIV-2 RNA  is recommended.      Trichomonas 05/29/2024 Absent  Absent Final     Yeast 05/29/2024 Absent  Absent Final     Clue Cells 05/29/2024 Absent  Absent Final     WBCs/high power field 05/29/2024 None  None Final     Chlamydia Trachomatis 05/29/2024 Negative  Negative Final    Negative for C. trachomatis rRNA by transcription mediated amplification.   A negative result by transcription mediated amplification does not preclude the presence of infection because results are dependent on proper and adequate collection, absence of inhibitors and sufficient rRNA to be detected.     Neisseria gonorrhoeae 05/29/2024 Negative  Negative Final    Negative for N. gonorrhoeae rRNA by transcription mediated amplification. A negative result by transcription mediated amplification does not preclude the presence of C. trachomatis infection because results are dependent on proper and adequate collection, absence of inhibitors and sufficient rRNA to be detected.           9/8/2023     9:06 AM   ALISON Score (Last Two)   ALISON Raw Score 37   Activation Score 79.2   ALISON Level 4         PHYSICAL EXAM:  Objective   Ht 1.721 m (5' 7.76\")   Wt 105.2 kg (232 lb)   LMP 02/22/2024   BMI 35.53 kg/m      Vitals - Patient Reported  Pain Score: No Pain (0)        GENERAL: alert and no distress  EYES: Eyes grossly normal to inspection.  No discharge or erythema, or obvious scleral/conjunctival abnormalities.  RESP: No audible wheeze, cough, or visible cyanosis.    SKIN: Visible skin clear. No significant rash, abnormal pigmentation or lesions.  NEURO: Cranial nerves grossly intact.  Mentation and speech appropriate for age.  PSYCH: Appropriate affect, tone, and pace of words        Sincerely,    Steffi Sánchez NP      30 minutes " spent by me on the date of the encounter doing chart review, history and exam, documentation and further activities per the note    The longitudinal plan of care for the diagnosis(es)/condition(s) as documented were addressed during this visit. Due to the added complexity in care, I will continue to support Katelyn in the subsequent management and with ongoing continuity of care.    Virtual Visit Details    Type of service:  Video Visit     Originating Location (pt. Location): Home    Distant Location (provider location):  Off-site  Platform used for Video Visit: [employer]      Again, thank you for allowing me to participate in the care of your patient.      Sincerely,    Steffi Sánchez NP

## 2024-10-11 NOTE — PATIENT INSTRUCTIONS
Establish care with therapist- [phone]  Continue topiramate and metformin   Upload blue cross document for review  Continue with dietitian monthly   Great work planning meals ahead of time!   Great work increasing activity   Continue to be mindful of stress management   Follow up with Irena Wilson PA-C  in 3 months

## 2024-10-11 NOTE — TELEPHONE ENCOUNTER
Patient confirmed scheduled appointment:  Date: 1/20/25  Time: 1:30 pm  Visit type: PRE-SURG  Provider: Irena Wilson  [place] [facility] [place] [facility] [place]  Testing/imaging: n/a  Additional notes: n/a

## 2024-10-11 NOTE — PROGRESS NOTES
Return Medical Weight Management Note     Katelyn Erickson  MRN:  [id number]  :  1990  MO:  10/11/2024    Dear Rose Phelps, WINDY BARNES,    I had the pleasure of seeing your patient Katelyn Erickson. She is a 34 year old female who I am continuing to see for treatment of obesity related to:        2/15/2024     2:47 PM   --   I have the following health issues associated with obesity None of the above       Assessment & Plan   Problem List Items Addressed This Visit          Digestive    Class 2 obesity due to excess calories without serious comorbidity with body mass index (BMI) of 39.0 to 39.9 in adult - Primary     Appetite manageable. Tolerating both topiramate and metformin. No adverse side effects. Continues to lose slowly.     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 Has concern about insurance with this delay so will upload document she had gotten from them so we can review.     Needing to create new routines since starting working. Being mindful about meal prepping and getting activity and stres management.     Establish care with therapist- [phone]  Continue topiramate and metformin   Upload blue cross document for review  Continue with dietitian monthly   Great work planning meals ahead of time!   Great work increasing activity   Continue to be mindful of stress management   Follow up with Irena Wilson PA-C  in 3 months          NAFLD (nonalcoholic fatty liver disease)          INTERVAL HISTORY:  NBS 2024 BMI 38.9 with MAFLD and with family hx of STERN syndrome. Wanting to better manage weight to reduce risks. Lots of thoughts about food. Last seen 2024 - tolerating topiramate addition, added metformin     Psych eval completed 2204- Dr. 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     Recent sleep changes:   Good     Vitamins/Labs: 2/2024     CURRENT WEIGHT:   232 lbs 0 oz    Initial Weight (lbs): 254.2 lbs  Last Visits Weight: 108.3 kg (238 lb 11.2 oz)  Cumulative weight loss (lbs): 22.2  Weight Loss Percentage: 8.73%        10/11/2024     7:40 AM   Changes and Difficulties   I have made the following changes to my diet since my last visit: None   With regards to my diet, I am still struggling with: None   I have made the following changes to my activity/exercise since my last visit: More active   With regards to my activity/exercise, I am still struggling with: None         MEDICATIONS:   Current Outpatient Medications   Medication Sig Dispense Refill    buPROPion (WELLBUTRIN XL) 150 MG 24 hr tablet Take 1 tablet (150 mg) by mouth every morning 90 tablet 1    metFORMIN (GLUCOPHAGE XR) 500 MG 24 hr tablet Take 2 tablets (1,000 mg) by mouth daily with food. 60 tablet 4    topiramate (TOPAMAX) 25 MG tablet Take 3 tablets (75 mg) by mouth at bedtime 270 tablet 1           10/11/2024     7:40 AM   Weight Loss Medication History Reviewed With Patient   Which weight loss medications are you currently taking on a regular basis? Topamax (topiramate)    Metformin   Are you having any side effects from the weight loss medication that we have prescribed you? No       Office Visit on 05/29/2024   Component Date Value Ref Range Status    Color Urine 05/29/2024 Yellow  Colorless, Straw, Light Yellow, Yellow Final    Appearance Urine  05/29/2024 Clear  Clear Final    Glucose Urine 05/29/2024 Negative  Negative mg/dL Final    Bilirubin Urine 05/29/2024 Negative  Negative Final    Ketones Urine 05/29/2024 Negative  Negative mg/dL Final    Specific Gravity Urine 05/29/2024 <=1.005  1.003 - 1.035 Final    Blood Urine 05/29/2024 Large (A)  Negative Final    pH Urine 05/29/2024 7.0  5.0 - 7.0 Final    Protein Albumin Urine 05/29/2024 Negative  Negative mg/dL Final    Urobilinogen Urine 05/29/2024 0.2  0.2, 1.0 E.U./dL Final    Nitrite Urine 05/29/2024 Negative  Negative Final    Leukocyte Esterase Urine 05/29/2024 Small (A)  Negative Final    Bacteria Urine 05/29/2024 Few (A)  None Seen /HPF Final    RBC Urine 05/29/2024 0-2  0-2 /HPF /HPF Final    WBC Urine 05/29/2024 5-10 (A)  0-5 /HPF /HPF Final    Squamous Epithelials Urine 05/29/2024 Few (A)  None Seen /LPF Final    Treponema Antibody Total 05/29/2024 Nonreactive  Nonreactive Final    Hepatitis C Antibody 05/29/2024 Nonreactive  Nonreactive Final    A nonreactive screening test result does not exclude the possibility of exposure to or infection with HCV. Nonreactive screening test results in individuals with prior exposure to HCV may be due to antibody levels below the limit of detection of this assay or lack of reactivity to the HCV antigens used in this assay. Patients with recent HCV infections (<3 months from time of exposure) may have false-negative HCV antibody results due to the time needed for seroconversion (average of 8 to 9 weeks).    HIV Antigen Antibody Combo 05/29/2024 Nonreactive  Nonreactive Final    Negative HIV-1 p24 antigen and HIV-1/2 antibody screening test results usually indicate the absence of HIV-1 and HIV-2 infection. However, such negative results do not rule-out acute HIV infection.  If acute HIV-1 or HIV-2 infection is suspected, detection of HIV-1 or HIV-2 RNA  is recommended.     Trichomonas 05/29/2024 Absent  Absent Final    Yeast 05/29/2024 Absent  Absent Final     "Clue Cells 05/29/2024 Absent  Absent Final    WBCs/high power field 05/29/2024 None  None Final    Chlamydia Trachomatis 05/29/2024 Negative  Negative Final    Negative for C. trachomatis rRNA by transcription mediated amplification.   A negative result by transcription mediated amplification does not preclude the presence of infection because results are dependent on proper and adequate collection, absence of inhibitors and sufficient rRNA to be detected.    Neisseria gonorrhoeae 05/29/2024 Negative  Negative Final    Negative for N. gonorrhoeae rRNA by transcription mediated amplification. A negative result by transcription mediated amplification does not preclude the presence of C. trachomatis infection because results are dependent on proper and adequate collection, absence of inhibitors and sufficient rRNA to be detected.           9/8/2023     9:06 AM   ALISON Score (Last Two)   ALISON Raw Score 37   Activation Score 79.2   ALISON Level 4         PHYSICAL EXAM:  Objective    Ht 1.721 m (5' 7.76\")   Wt 105.2 kg (232 lb)   LMP 02/22/2024   BMI 35.53 kg/m      Vitals - Patient Reported  Pain Score: No Pain (0)        GENERAL: alert and no distress  EYES: Eyes grossly normal to inspection.  No discharge or erythema, or obvious scleral/conjunctival abnormalities.  RESP: No audible wheeze, cough, or visible cyanosis.    SKIN: Visible skin clear. No significant rash, abnormal pigmentation or lesions.  NEURO: Cranial nerves grossly intact.  Mentation and speech appropriate for age.  PSYCH: Appropriate affect, tone, and pace of words        Sincerely,    Steffi Sánchez NP      30 minutes spent by me on the date of the encounter doing chart review, history and exam, documentation and further activities per the note    The longitudinal plan of care for the diagnosis(es)/condition(s) as documented were addressed during this visit. Due to the added complexity in care, I will continue to support Katelyn in the subsequent management " and with ongoing continuity of care.    Virtual Visit Details    Type of service:  Video Visit     Originating Location (pt. Location): Home    Distant Location (provider location):  Off-site  Platform used for Video Visit: [employer]

## 2024-10-11 NOTE — Clinical Note
I used an NBS spot for her to see you in Jan for PBS. 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 Super sweet. Motivated.

## 2024-10-15 ENCOUNTER — TELEPHONE (OUTPATIENT)
Dept: GASTROENTEROLOGY | Facility: CLINIC | Age: 34
End: 2024-10-15
Payer: COMMERCIAL

## 2024-10-15 NOTE — TELEPHONE ENCOUNTER
Attempted to contact patient in order to complete pre assessment questions.     No answer. Left message to return call to [phone] option 2.    Callback required communication sent via Noteleaf.      Procedure details:    Patient scheduled for Colonoscopy on 10.28.24.     Arrival time: 0600. Procedure time 0700    [facility] [address] [place] [address], 5th Floor, [place] [address]. Check in location: 5th Floor.    Sedation type: Conscious sedation     Pre op exam needed? No.    Indication for procedure: Jean/screening      Chart review:     Electronic implanted devices? No    Recent diagnosis of diverticulitis within the last 6 weeks? No      Medication review:    Diabetic? No    Anticoagulants? No    Weight loss medication/injectable? Yes. Metformin: HOLD day of procedure.    Other medication HOLDING recommendations:  N/A      Prep for procedure:     Bowel prep recommendation: Standard Miralax  Due to: standard bowel prep.    Prep instructions sent via Noteleaf.       Sarina Hopson RN  Endoscopy Procedure Pre Assessment

## 2024-10-21 NOTE — PROGRESS NOTES
"Phone-Visit Details    Type of service:  Phone Visit    Phone call duration: 13 minutes    Distant Location (provider location):  Offsite (providers home) [facility] [place]       Return Bariatric Nutrition Consultation Note    Reason For Visit: Nutrition Assessment    Katelyn Erickson is a 34 year old presenting today for return bariatric nutrition consult.   Patient is interested in weight loss surgery with Dr. Hurley expected surgery in TBD.  Patient is accompanied by self.  This is patient's 9th of 6 required nutrition visits prior to surgery. Patient attended the W[facility] nutrition class on 2/23/24.     Pt referred by Steffi Sánchez NP  on February 20, 2024.     CO-MORBIDITIES OF OBESITY INCLUDE:        2/15/2024     2:47 PM   --   I have the following health issues associated with obesity None of the above     SUPPORT:      2/15/2024     2:47 PM   Support System Reviewed With Patient   Who do you have in your support network that can be available to help you for the first 2 weeks after surgery?  and MIL   Who can you count on for support throughout your weight loss surgery journey?  and MIL     ANTHROPOMETRICS:  Initial consult weight: 254 lb on 2/20/24   Estimated body mass index is 35.23 kg/m  as calculated from the following:    Height as of this encounter: 1.721 m (5' 7.75\").    Weight as of this encounter: 104.3 kg (230 lb).  Current Weight: 230 lb per pt report     Required weight loss goal pre-op: -10 lbs from initial consult weight (goal weight 244 lbs or less before surgery) - met        2/15/2024     2:47 PM   --   I have tried the following methods to lose weight Watching portions or calories    Exercise    Weight Watchers           2/15/2024     2:47 PM   Weight Loss Questions Reviewed With Patient   How long have you been overweight? Since early childhood     MEDICATION FOR WEIGHT LOSS: Topiramate 75 mg - no side effects.   Metformin     VITAMIN/MINERAL " SUPPLEMENTS: None     NUTRITION HISTORY:  Food allergies: NKFA  Food intolerances: none   Previous experience with diet modification for weight loss: weight watchers and exercise   Barriers to lifestyle change: reports none     Per Steffi's note: Patient describes significant thoughts about food and cravings in the evenings with limited hunger during the day.     Patient herself is trying to eat less meat. Trying to increase her vegetables. No overnight eating    Recent Diet Recall:  Breakfast: coffee with Premier Protein   Lunch: 11 AM if at home, scrambled eggs with cottage and spinach with English muffin or toast. If goes into office will eat at 1 or 2 pm, Leftovers   Dinner: Tacos, spaghetti, chicken. Protein + vegetable + carb.   Snacks: After the kids go to bed - crackers and Nutella  Beverages: Water, soda seldomly.   Alcohol: None for the most part, seldomly   Dining Out: Not very often. Twice a month.     Patient recently had a total laparoscopic hysterectomy on 3/11/24.     April 2024: Downloaded the mark [employer] to track her nutrition.  Helps to monitor her protein amount. Plans to practice  liquids from meal times and chew her food really well to an applesauce consistency. Continues to work on tasklist items needed for surgery. Needs more clarification around if she needs the sleep medicine consult.     May 2024: Focusing more on fiber intake and is drinking a lot of water. For breakfast started incorporating overnight oats with protein and shaun seeds. Has been tracking nutrition in [employer]. Continues to practice  liquids from meal times. Has appointment with Steffi in June and psych visits in July.     June 2024: Met with Steffi earlier this month. Started taking Metformin since the beginning of June. Hasn't noticed a change in her weight this past month. Stopped taking Senna so often. Lives a busy lifestyle. Recently started pelvic floor physical activity. Struggles with   liquids from meal times, especially before a meal. Has slowed down with eating and trying to take her time. Averaging around 80 grams of protein with most meals. Has psych visits this next month.     2024: Had her first psych visit out of a total of 3.  Has been focusing on protein and fiber in her meals. Doing Pelvic floor physical therapy and also doing good with other physical activity by staying busy with her kids. Still struggles with  liquids prior to her meal but is aware and working on it.     2024: Has her last psych visit for the middle of September.  Busy August, still was able to stay focused on her nutrition though. Hasn't thought anymore about the surgery process. Focusing on her fiber and protein intake throughout the day.     Breakfast will be overnight oats if she is prepared (18 g of fiber and 40-45 g protein). Coffee with premier protein as the creamer. Lunch is normally later in the day around 1 or 2 pm 0 cucumber/chickpea/feta/salad may add some chicken to it. Dinner protein + greens.     Walking more often, about 4x a week for 30 minutes and goes about 2 miles.     Patient mentioned that her psych provider was questioning if she was eating enough. Provided patient with a calorie goal to ensure she is eating enough while still working on her nutrition goals.     2024: On , patient got her psych letter of support. The month of September was very busy for the patient with starting a new job and other things she had going on. She was not able to track her nutrition. However still focusing on good amounts of protein and fiber in her diet.      Patient continues to report she plans to wait to schedule bariatric surgery. She is wanting to know more about when some of things she has been working on would  before she would have to re-do some things.     2024: Has a colonoscopy next week.  Plans on doing surgery this coming summer when she has  more time off from work. Patient's mother in law and grandmother in law have moved out of their house recently and that has been a big adjustment as it has kept her and family very busy. Prepping meals ahead of time. Eating a smaller lunch to avoid getting too tired after lunch. Getting in a lot of steps at work.         2/15/2024     2:47 PM   Recall Diet Questions Reviewed With Patient   Describe what you typically consume for breakfast (typical or most recent) Scrambled eggs english muffine   How many ounces of water, or other low calorie drinks, do you drink daily (8 oz=1 glass)? 64 oz or more   How many ounces of caffeine (coffee, tea, pop) do you drink daily (8 oz=1 glass)? 16 oz   How many ounces of carbonated (pop, beer, sparkling water) drinks do you drinky daily (8 oz=1 glass)? 0 oz   How many ounces of juice, pop, sweet tea, sports drinks, protein drinks, other sweetened drinks, do you drink daily (8 oz=1 glass)? 0 oz   How many ounces of milk do you drink daily (8 oz=1 glass) 0 oz   How often do you drink alcohol? Monthly or less   If you do drink alcohol, how many drinks might you have in a day? (one drink = 5 oz. wine, 1 can/bottle of beer, 1 shot liquor) 1 or 2           2/15/2024     2:47 PM   Eating Habits   What foods do you crave? Sweets           2/15/2024     2:47 PM   Dining Out History Reviewed With Patient   How often do you dine out? Rarely.   Where do you dine out? (select all that apply) fast food chains   What types of food do you order when you dine out? Hamburger     EXERCISE: Busy with kids activities.         6/4/2024     3:39 PM   --   How often do you exercise? Never   What keeps you from being more active? 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   Patient has 3 kids ages 10, 7, 5.   Aunt had bariatric surgery.     NUTRITION  DIAGNOSIS:  Obesity r/t long history of positive energy balance aeb BMI >30 kg/m2.    INTERVENTION:  Intervention Provided/Education Provided on post-op diet guidelines, vitamins/minerals essential post-operatively, GI anatomy of bariatric surgeries, ways to help prepare for post-op diet guidelines pre-operatively, portion/calorie-control, mindful eating and sources of protein.  Patient demonstrates understanding.     Personal barriers to making and continuing required life changes have been identified, and strategies to overcome those barriers have been recommended AND family and social supports have been assessed and strategies to strengthen those supports have been recommended.    Provided pt with list of goals, RD contact information and resources listed below via [employer]           2/15/2024     2:47 PM   Questions Reviewed With Patient   How ready are you to make changes regarding your weight? Number 1 = Not ready at all to make changes up to 10 = very ready. 7   How confident are you that you can change? 1 = Not confident that you will be successful making changes up to 10 = very confident. 7     Expected Engagement: good    GOALS:  Relating To Eating:  Eat slowly (20-30 minutes per meal), chewing foods well (25 chews per bite/applesauce consistency)  Eat 3 meals per day  Consume 60-90 g protein per day    Relating to beverages:  Reduce caffeine/carbonation/calorie containing beverages  Separate fluids from meals by 30 minutes before, during, and after eating  Drink 48-64 ounces of fluid per day    Relating to dietary supplements:  Start thinking about a post op multivitamin     Relating to activity:  Increase activity as able    Post-op Diet Handouts:  Diet Guidelines after Weight-loss Surgery  [email or web address]     Your Stage 1 Diet: Clear Liquids  [email or web address]     Your Stage 2 Diet: Low-fat Full Liquids  [email or web address]     Your Stage 3 Diet: Pureed  Foods  [email or web address]     Pureed Recipes  [email or web address]    Your Stage 4 Diet: Soft Foods  [email or web address]    Your Stage 5 Diet: Regular Foods  [email or web address]    Supplements after Sleeve Gastrectomy, Gastric Bypass or Single Anastomosis Duodenal Switch  [email or web address]    Keeping Track of Fluids  [email or web address]    Follow Up: November 22     Time spent with patient: 13 minutes.  Tina Montenegro RD, LD

## 2024-10-22 ENCOUNTER — VIRTUAL VISIT (OUTPATIENT)
Dept: ENDOCRINOLOGY | Facility: CLINIC | Age: 34
End: 2024-10-22
Payer: COMMERCIAL

## 2024-10-22 VITALS — WEIGHT: 230 LBS | BODY MASS INDEX: 34.86 KG/M2 | HEIGHT: 68 IN

## 2024-10-22 DIAGNOSIS — Z71.3 NUTRITIONAL COUNSELING: Primary | ICD-10-CM

## 2024-10-22 DIAGNOSIS — E66.9 OBESITY: ICD-10-CM

## 2024-10-22 PROCEDURE — 97803 MED NUTRITION INDIV SUBSEQ: CPT | Mod: 93

## 2024-10-22 PROCEDURE — 99207 PR NO CHARGE LOS: CPT | Mod: 93

## 2024-10-22 ASSESSMENT — PAIN SCALES - GENERAL: PAINLEVEL: NO PAIN (0)

## 2024-10-22 NOTE — NURSING NOTE
Current patient location:  At work at [place] [employer].    Is the patient currently in the state of [place]? YES    Visit mode:TELEPHONE    If the visit is dropped, the patient can be reconnected by: TELEPHONE VISIT: Phone number:   Telephone Information:   Mobile [phone]       Will anyone else be joining the visit? NO  (If patient encounters technical issues they should call [phone] :[phone])    Are changes needed to the allergy or medication list? N/A    Are refills needed on medications prescribed by this physician? NO    Rooming Documentation:  Questionnaire(s) not pre-assigned    Reason for visit: JESUSITA CASTORENA

## 2024-10-22 NOTE — PATIENT INSTRUCTIONS
Devon Zepeda,     Follow-up with RD on November 22.     Thank you,    Tina Montenegro, PABLO, LD  If you would like to schedule or reschedule an appointment with the RD, please call [phone]    Nutrition Goals  Relating To Eating:  Eat slowly (20-30 minutes per meal), chewing foods well (25 chews per bite/applesauce consistency)  Eat 3 meals per day  Consume 60-90 g protein per day    Relating to beverages:  Reduce caffeine/carbonation/calorie containing beverages  Separate fluids from meals by 30 minutes before, during, and after eating  Drink 48-64 ounces of fluid per day    Relating to dietary supplements:  Start thinking about a post op multivitamin     Relating to activity:  Increase activity as able    Post-op Diet Handouts:  Diet Guidelines after Weight-loss Surgery  [email or web address]     Your Stage 1 Diet: Clear Liquids  [email or web address]     Your Stage 2 Diet: Low-fat Full Liquids  [email or web address]     Your Stage 3 Diet: Pureed Foods  [email or web address]     Pureed Recipes  [email or web address]    Your Stage 4 Diet: Soft Foods  [email or web address]    Your Stage 5 Diet: Regular Foods  [email or web address]    Supplements after Sleeve Gastrectomy, Gastric Bypass or Single Anastomosis Duodenal Switch  [email or web address]    Keeping Track of Fluids  [email or web address]    COMPREHENSIVE WEIGHT MANAGEMENT PROGRAM  VIRTUAL SUPPORT GROUPS    At [facility], our Comprehensive Weight Management program offers on-line support groups for patients who are working on weight loss and considering, preparing for, or have had weight loss surgery.     There is no cost for this opportunity.  You are invited to attend the?Virtual Support Groups?provided by any of the following locations:    [facility] via [employer] Teams with Loree Armendariz RD, RN  2.   [place] via Proactive Comfort Teams with Lj Waldron, PhD, LP  3.   [place]  "via Microsoft Teams with Mary Jane Li RN  4.   [facility] via a Zoom Meeting with Mary Jane Chambers formerly [facility]-    The following Support Group information can also be found on our website:  [email or web address]      [facility]   WEIGHT LOSS SURGERY SUPPORT GROUP  The support group is a patient-lead forum that meets monthly to share experiences, encouragement and education. It is open to those who have had weight loss surgery, are scheduled for surgery, or are considering surgery.   WHEN: 3rd Wednesday of each month from 5:00PM - 6:00PM using Microsoft Teams.   FACILITATOR: Led by Loree Armendariz RD, LD, RN, the program's Clinical Coordinator.   TO REGISTER: Please contact the clinic via "[employer]" or call the nurse line directly at [phone] to inform our staff that you would like an invite sent to you and to let us know the email you would like the invite sent to. Prior to the meeting, a link with directions on how to join the meeting will be sent to you.    2024 Meetings    September 18: Sandy Davis, PhD, Outpatient Clinical Therapist, \"Pro Tips for Habit Change\".  October 16:  Let's Talk  This will be a time of group support.??   November 20:  Let's Talk  about How to Navigate the Upcoming Holiday Season.  December 18:  Let's Talk  and Celebrate the past year.       [facility] BARIATRIC CARE SUPPORT GROUP  This is open to all pre- and post- operative bariatric surgery patients as well as their support system.   WHEN: 3rd Tuesday of each month from 6:30 PM - 8:00 PM using Microsoft Teams.   FACILITATOR: Led by Lj Waldron, Ph.D who is a Licensed Psychologist with the [facility] Comprehensive Weight Management Program.   TO REGISTER: Please send an email to Lj Waldron, Ph.D., LP at?[email or web address]?if you would like an invitation to the group. " "Prior to the meeting, a link with directions on how to join the meeting will be sent to you.    2024 Meetings September 17: IN PERSON MEETING. [facility], [address], [place], [place], [address], 1st floor Conference Room.  October 15: Date changed to OCTOBER 8th (2nd Tuesday).   November 19: \"Holiday Eating\", India Kebede RD, LD.  December 17: \"Hospital Stay and Compliance\", Mary Jane Li RN, CBN.      [facility] POST-OPERATIVE BARIATRIC SURGERY SUPPORT GROUP  This is a support group for [facility] bariatric patients (and those external to [facility]) who have had bariatric surgery and are at least 3 months post-surgery.  WHEN: 4th Thursday of the month from 11:00 AM - 12:00 PM using Microsoft Teams.   FACILITATOR: Led by Certified Bariatric Nurse, Mary Jane Li RN.   TO REGISTER: Please send an email to Mary Jane at [email or web address] if you would like an invitation to the group.  Prior to the meeting, a link with directions on how to join the meeting will be sent to you.    2024 Meetings September 26 October 24 November 28: No meeting  December 26    [facility]   HEALTHY LIFESTYLE COACHING GROUP  This is a 60 minute virtual coaching group for those who want to lead a healthier lifestyle. Come together to set goals and overcome barriers in a supportive group environment. We will address the four pillars of health: nutrition, exercise, sleep, and emotional well-being. This group is highly recommended for those who are participating in the 24 week Healthy Lifestyle Plan and our Health Coaching sessions.   WHEN: 1st Friday of the month, 12:30 PM - 1:30 PM   using a Zoom meeting.     FACILITATOR: Led by National Board-Certified Health and , Mary Jane Chambers [facility].  TO REGISTER: Please call the [employer] at [phone] to register. You will get an " appointment to attend in [employer]. Fifteen minutes prior to the meeting, complete the e-check in and you will get the link to join the meeting.  There is no charge to attend this group and space is limited.  Please register for each month you wish to attend    2024 Meetings  September 5 No meeting.  October 4 November 1 December 6

## 2024-10-22 NOTE — LETTER
"10/22/2024       RE: Katelyn Erickson  [address] [facility] [address]     Dear Colleague,    Thank you for referring your patient, Katelyn Erickson, to the [facility] [place] at [facility]. Please see a copy of my visit note below.    Phone-Visit Details    Type of service:  Phone Visit    Phone call duration: 13 minutes    Distant Location (provider location):  Offsite (providers home) [facility] [place]       Return Bariatric Nutrition Consultation Note    Reason For Visit: Nutrition Assessment    Katelyn Erickson is a 34 year old presenting today for return bariatric nutrition consult.   Patient is interested in weight loss surgery with Dr. Hurley expected surgery in TBD.  Patient is accompanied by self.  This is patient's 9th of 6 required nutrition visits prior to surgery. Patient attended the WLS nutrition class on 2/23/24.     Pt referred by Steffi Sánchez NP  on February 20, 2024.     CO-MORBIDITIES OF OBESITY INCLUDE:        2/15/2024     2:47 PM   --   I have the following health issues associated with obesity None of the above     SUPPORT:      2/15/2024     2:47 PM   Support System Reviewed With Patient   Who do you have in your support network that can be available to help you for the first 2 weeks after surgery?  and MIL   Who can you count on for support throughout your weight loss surgery journey?  and MIL     ANTHROPOMETRICS:  Initial consult weight: 254 lb on 2/20/24   Estimated body mass index is 35.23 kg/m  as calculated from the following:    Height as of this encounter: 1.721 m (5' 7.75\").    Weight as of this encounter: 104.3 kg (230 lb).  Current Weight: 230 lb per pt report     Required weight loss goal pre-op: -10 lbs from initial consult weight (goal weight 244 lbs or less before surgery) - met        2/15/2024     2:47 PM   --   I have tried the following " methods to lose weight Watching portions or calories    Exercise    Weight Watchers           2/15/2024     2:47 PM   Weight Loss Questions Reviewed With Patient   How long have you been overweight? Since early childhood     MEDICATION FOR WEIGHT LOSS: Topiramate 75 mg - no side effects.   Metformin     VITAMIN/MINERAL SUPPLEMENTS: None     NUTRITION HISTORY:  Food allergies: NKFA  Food intolerances: none   Previous experience with diet modification for weight loss: weight watchers and exercise   Barriers to lifestyle change: reports none     Per Steffi's note: Patient describes significant thoughts about food and cravings in the evenings with limited hunger during the day.     Patient herself is trying to eat less meat. Trying to increase her vegetables. No overnight eating    Recent Diet Recall:  Breakfast: coffee with Premier Protein   Lunch: 11 AM if at home, scrambled eggs with cottage and spinach with English muffin or toast. If goes into office will eat at 1 or 2 pm, Leftovers   Dinner: Tacos, spaghetti, chicken. Protein + vegetable + carb.   Snacks: After the kids go to bed - crackers and Nutella  Beverages: Water, soda seldomly.   Alcohol: None for the most part, seldomly   Dining Out: Not very often. Twice a month.     Patient recently had a total laparoscopic hysterectomy on 3/11/24.     April 2024: Downloaded the [employer] to track her nutrition.  Helps to monitor her protein amount. Plans to practice  liquids from meal times and chew her food really well to an applesauce consistency. Continues to work on tasklist items needed for surgery. Needs more clarification around if she needs the sleep medicine consult.     May 2024: Focusing more on fiber intake and is drinking a lot of water. For breakfast started incorporating overnight oats with protein and shaun seeds. Has been tracking nutrition in [employer]. Continues to practice  liquids from meal times. Has appointment with Steffi in  June and psych visits in July.     June 2024: Met with Steffi earlier this month. Started taking Metformin since the beginning of June. Hasn't noticed a change in her weight this past month. Stopped taking Senna so often. Lives a busy lifestyle. Recently started pelvic floor physical activity. Struggles with  liquids from meal times, especially before a meal. Has slowed down with eating and trying to take her time. Averaging around 80 grams of protein with most meals. Has psych visits this next month.     July 2024: Had her first psych visit out of a total of 3.  Has been focusing on protein and fiber in her meals. Doing Pelvic floor physical therapy and also doing good with other physical activity by staying busy with her kids. Still struggles with  liquids prior to her meal but is aware and working on it.     August 2024: Has her last psych visit for the middle of September.  Busy August, still was able to stay focused on her nutrition though. Hasn't thought anymore about the surgery process. Focusing on her fiber and protein intake throughout the day.     Breakfast will be overnight oats if she is prepared (18 g of fiber and 40-45 g protein). Coffee with premier protein as the creamer. Lunch is normally later in the day around 1 or 2 pm 0 cucumber/chickpea/feta/salad may add some chicken to it. Dinner protein + greens.     Walking more often, about 4x a week for 30 minutes and goes about 2 miles.     Patient mentioned that her psych provider was questioning if she was eating enough. Provided patient with a calorie goal to ensure she is eating enough while still working on her nutrition goals.     September 2024: On September 18, patient got her psych letter of support. The month of September was very busy for the patient with starting a new job and other things she had going on. She was not able to track her nutrition. However still focusing on good amounts of protein and fiber in her diet.       Patient continues to report she plans to wait to schedule bariatric surgery. She is wanting to know more about when some of things she has been working on would  before she would have to re-do some things.     2024: Has a colonoscopy next week.  Plans on doing surgery this coming summer when she has more time off from work. Patient's mother in law and grandmother in law have moved out of their house recently and that has been a big adjustment as it has kept her and family very busy. Prepping meals ahead of time. Eating a smaller lunch to avoid getting too tired after lunch. Getting in a lot of steps at work.         2/15/2024     2:47 PM   Recall Diet Questions Reviewed With Patient   Describe what you typically consume for breakfast (typical or most recent) Scrambled eggs english muffine   How many ounces of water, or other low calorie drinks, do you drink daily (8 oz=1 glass)? 64 oz or more   How many ounces of caffeine (coffee, tea, pop) do you drink daily (8 oz=1 glass)? 16 oz   How many ounces of carbonated (pop, beer, sparkling water) drinks do you drinky daily (8 oz=1 glass)? 0 oz   How many ounces of juice, pop, sweet tea, sports drinks, protein drinks, other sweetened drinks, do you drink daily (8 oz=1 glass)? 0 oz   How many ounces of milk do you drink daily (8 oz=1 glass) 0 oz   How often do you drink alcohol? Monthly or less   If you do drink alcohol, how many drinks might you have in a day? (one drink = 5 oz. wine, 1 can/bottle of beer, 1 shot liquor) 1 or 2           2/15/2024     2:47 PM   Eating Habits   What foods do you crave? Sweets           2/15/2024     2:47 PM   Dining Out History Reviewed With Patient   How often do you dine out? Rarely.   Where do you dine out? (select all that apply) fast food chains   What types of food do you order when you dine out? Hamburger     EXERCISE: Busy with kids activities.         2024     3:39 PM   --   How often do you exercise? Never    What keeps you from being more active? 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   Patient has 3 kids ages 10, 7, 5.   Aunt had bariatric surgery.     NUTRITION DIAGNOSIS:  Obesity r/t long history of positive energy balance aeb BMI >30 kg/m2.    INTERVENTION:  Intervention Provided/Education Provided on post-op diet guidelines, vitamins/minerals essential post-operatively, GI anatomy of bariatric surgeries, ways to help prepare for post-op diet guidelines pre-operatively, portion/calorie-control, mindful eating and sources of protein.  Patient demonstrates understanding.     Personal barriers to making and continuing required life changes have been identified, and strategies to overcome those barriers have been recommended AND family and social supports have been assessed and strategies to strengthen those supports have been recommended.    Provided pt with list of goals, RD contact information and resources listed below via [employer].           2/15/2024     2:47 PM   Questions Reviewed With Patient   How ready are you to make changes regarding your weight? Number 1 = Not ready at all to make changes up to 10 = very ready. 7   How confident are you that you can change? 1 = Not confident that you will be successful making changes up to 10 = very confident. 7     Expected Engagement: good    GOALS:  Relating To Eating:  Eat slowly (20-30 minutes per meal), chewing foods well (25 chews per bite/applesauce consistency)  Eat 3 meals per day  Consume 60-90 g protein per day    Relating to beverages:  Reduce caffeine/carbonation/calorie containing beverages  Separate fluids from meals by 30 minutes before, during, and after eating  Drink 48-64 ounces of fluid per day    Relating to dietary supplements:  Start thinking about a post op multivitamin     Relating to  activity:  Increase activity as able    Post-op Diet Handouts:  Diet Guidelines after Weight-loss Surgery  [email or web address]     Your Stage 1 Diet: Clear Liquids  [email or web address]     Your Stage 2 Diet: Low-fat Full Liquids  [email or web address]     Your Stage 3 Diet: Pureed Foods  [email or web address]     Pureed Recipes  [email or web address]    Your Stage 4 Diet: Soft Foods  [email or web address]    Your Stage 5 Diet: Regular Foods  [email or web address]    Supplements after Sleeve Gastrectomy, Gastric Bypass or Single Anastomosis Duodenal Switch  [email or web address]    Keeping Track of Fluids  [email or web address]    Follow Up: November 22     Time spent with patient: 13 minutes.  Tina Montenegro RD, LD      Again, thank you for allowing me to participate in the care of your patient.      Sincerely,    Tina Montenegro RD

## 2024-10-27 ENCOUNTER — ANESTHESIA EVENT (OUTPATIENT)
Dept: SURGERY | Facility: AMBULATORY SURGERY CENTER | Age: 34
End: 2024-10-27

## 2024-10-27 RX ORDER — NALOXONE HYDROCHLORIDE 0.4 MG/ML
0.4 INJECTION, SOLUTION INTRAMUSCULAR; INTRAVENOUS; SUBCUTANEOUS
Status: CANCELLED | OUTPATIENT
Start: 2024-10-27

## 2024-10-27 RX ORDER — ONDANSETRON 2 MG/ML
4 INJECTION INTRAMUSCULAR; INTRAVENOUS EVERY 6 HOURS PRN
Status: CANCELLED | OUTPATIENT
Start: 2024-10-27

## 2024-10-27 RX ORDER — FLUMAZENIL 0.1 MG/ML
0.2 INJECTION, SOLUTION INTRAVENOUS
Status: CANCELLED | OUTPATIENT
Start: 2024-10-27 | End: 2024-10-28

## 2024-10-27 RX ORDER — NALOXONE HYDROCHLORIDE 0.4 MG/ML
0.2 INJECTION, SOLUTION INTRAMUSCULAR; INTRAVENOUS; SUBCUTANEOUS
Status: CANCELLED | OUTPATIENT
Start: 2024-10-27

## 2024-10-27 RX ORDER — ONDANSETRON 4 MG/1
4 TABLET, ORALLY DISINTEGRATING ORAL EVERY 6 HOURS PRN
Status: CANCELLED | OUTPATIENT
Start: 2024-10-27

## 2024-10-27 RX ORDER — ONDANSETRON 2 MG/ML
4 INJECTION INTRAMUSCULAR; INTRAVENOUS
Status: CANCELLED | OUTPATIENT
Start: 2024-10-27

## 2024-10-27 RX ORDER — LIDOCAINE 40 MG/G
CREAM TOPICAL
Status: CANCELLED | OUTPATIENT
Start: 2024-10-27

## 2024-10-27 RX ORDER — PROCHLORPERAZINE MALEATE 10 MG
10 TABLET ORAL EVERY 6 HOURS PRN
Status: CANCELLED | OUTPATIENT
Start: 2024-10-27

## 2024-10-27 ASSESSMENT — COPD QUESTIONNAIRES: COPD: 0

## 2024-10-27 ASSESSMENT — ENCOUNTER SYMPTOMS
SEIZURES: 0
ORTHOPNEA: 0

## 2024-10-27 ASSESSMENT — LIFESTYLE VARIABLES: TOBACCO_USE: 0

## 2024-10-27 NOTE — ANESTHESIA PREPROCEDURE EVALUATION
Anesthesia Pre-Procedure Evaluation    Patient: Katelyn Erickson   MRN: [id number] : 1990        Procedure : Procedure(s):  Colonoscopy          Past Medical History:   Diagnosis Date     MLH1-related Jean syndrome (HNPCC2) 2024     NO ACTIVE PROBLEMS       Past Surgical History:   Procedure Laterality Date     COLONOSCOPY  2012    Procedure:COLONOSCOPY; COLONOSCOPY; Surgeon:JAKOB PETERS; Location:[facility]     COLONOSCOPY       COLONOSCOPY N/A 2021    Procedure: COLONOSCOPY;  Surgeon: Joseph Flores MD;  Location: [facility]     ESOPHAGOSCOPY, GASTROSCOPY, DUODENOSCOPY (EGD), COMBINED N/A 2021    Procedure: ESOPHAGOGASTRODUODENOSCOPY (EGD);  Surgeon: Joseph Flores MD;  Location: [facility]     LAPAROSCOPIC HYSTERECTOMY TOTAL, SALPINGECTOMY N/A 3/11/2024    Procedure: TOTAL LAPAROSCOPIC HYSTERECTOMY, WITH BILATERAL SALPINGECTOMY. PELVIC WASHINGS;  Surgeon: Elida Gutierres MD;  Location: [facility]     NO HISTORY OF SURGERY        No Known Allergies   Social History     Tobacco Use     Smoking status: Former     Types: Cigarettes     Passive exposure: Past     Smokeless tobacco: Never     Tobacco comments:     half a pack a day   Substance Use Topics     Alcohol use: Yes     Comment: rarely      Wt Readings from Last 1 Encounters:   10/22/24 104.3 kg (230 lb)        Anesthesia Evaluation   Pt has had prior anesthetic. Type: MAC.    No history of anesthetic complications       ROS/MED HX  ENT/Pulmonary:     (+)     JOLIE risk factors,   obese,                             (-) tobacco use, asthma, COPD, sleep apnea and recent URI   Neurologic:     (+)    no peripheral neuropathy                         (-) no seizures, no CVA, no TIA and migraines   Cardiovascular:    (-) hypertension, FERRARO, orthopnea/PND, syncope and irregular heartbeat/palpitations   METS/Exercise Tolerance:     Hematologic:       Musculoskeletal:       GI/Hepatic: Comment:   Jean syndrome    (+)        bowel prep,     liver  "disease (NAFLD),    (-) GERD   Renal/Genitourinary:       Endo:     (+)               Obesity (BMI 40),    (-) Type II DM and thyroid disease   Psychiatric/Substance Use:       Infectious Disease:       Malignancy:       Other:             OUTSIDE LABS:  CBC:   Lab Results   Component Value Date    WBC 7.5 03/11/2024    WBC 7.4 02/20/2024    HGB 13.2 03/11/2024    HGB 13.2 02/20/2024    HCT 38.7 03/11/2024    HCT 40.1 02/20/2024     03/11/2024     02/20/2024     BMP:   Lab Results   Component Value Date     02/20/2024    POTASSIUM 4.0 02/20/2024    CHLORIDE 105 02/20/2024    CO2 22 02/20/2024    BUN 9.8 02/20/2024    CR 0.73 02/20/2024    GLC 94 02/20/2024    GLC 96 04/12/2022     COAGS: No results found for: \"PTT\", \"INR\", \"FIBR\"  POC:   Lab Results   Component Value Date    HCG Negative 03/11/2024    HCGS  09/07/2010     Negative   This test provides a presumptive diagnosis of pregnancy or non-pregnancy. A   confirmed pregnancy diagnosis should only be made by a physician after all   clinical and laboratory findings have been evaluated.     HEPATIC:   Lab Results   Component Value Date    ALBUMIN 4.4 02/20/2024    PROTTOTAL 7.4 02/20/2024    ALT 26 02/20/2024    AST 20 02/20/2024    ALKPHOS 63 02/20/2024    BILITOTAL 0.4 02/20/2024     OTHER:   Lab Results   Component Value Date    A1C 5.4 02/20/2024    NICOLLE 9.8 02/20/2024    TSH 1.15 04/12/2022    T4 0.94 04/12/2022       Anesthesia Plan    ASA Status:  2       Anesthesia Type: MAC.              Consents            Postoperative Care            Comments:             Maday Washington MD    I have reviewed the pertinent notes and labs in the chart from the past 30 days and (re)examined the patient.  Any updates or changes from those notes are reflected in this note.                       # Obesity: Estimated body mass index is 35.23 kg/m  as calculated from the following:    Height as of 10/22/24: 1.721 m (5' 7.75\").    Weight as of 10/22/24: " 104.3 kg (230 lb).

## 2024-10-28 ENCOUNTER — ANESTHESIA (OUTPATIENT)
Dept: SURGERY | Facility: AMBULATORY SURGERY CENTER | Age: 34
End: 2024-10-28

## 2024-10-28 ENCOUNTER — OFFICE VISIT (OUTPATIENT)
Dept: URGENT CARE | Facility: URGENT CARE | Age: 34
End: 2024-10-28
Payer: COMMERCIAL

## 2024-10-28 ENCOUNTER — TELEPHONE (OUTPATIENT)
Dept: GASTROENTEROLOGY | Facility: CLINIC | Age: 34
End: 2024-10-28
Payer: COMMERCIAL

## 2024-10-28 VITALS
DIASTOLIC BLOOD PRESSURE: 81 MMHG | BODY MASS INDEX: 34.25 KG/M2 | TEMPERATURE: 99.5 F | OXYGEN SATURATION: 98 % | RESPIRATION RATE: 16 BRPM | HEART RATE: 107 BPM | SYSTOLIC BLOOD PRESSURE: 131 MMHG | HEIGHT: 68 IN | WEIGHT: 226 LBS

## 2024-10-28 DIAGNOSIS — J02.9 VIRAL PHARYNGITIS: Primary | ICD-10-CM

## 2024-10-28 LAB — DEPRECATED S PYO AG THROAT QL EIA: NEGATIVE

## 2024-10-28 PROCEDURE — 99213 OFFICE O/P EST LOW 20 MIN: CPT | Performed by: PHYSICIAN ASSISTANT

## 2024-10-28 PROCEDURE — 87651 STREP A DNA AMP PROBE: CPT | Performed by: PHYSICIAN ASSISTANT

## 2024-10-28 NOTE — TELEPHONE ENCOUNTER
Caller: Katelyn    Reason for Reschedule/Cancellation   (please be detailed, any staff messages or encounters to note?): pt illness       Prior to reschedule please review:  Ordering Provider: DIRK FERRARO   Sedation Determined: Moderate but case was MAC?  Does patient have any ASC Exclusions, please identify?: no      Notes on Cancelled Procedure:  Procedure: Lower Endoscopy [Colonoscopy]   Date: 10/28/2024  Location: [facility]; [address] [place] [address], [address] [address]  Surgeon:       Rescheduled: Yes,   Procedure: Lower Endoscopy [Colonoscopy]    Date: 11/12/2024   Location: [facility]; [address] [place] [address], [address] [place] [address]   Surgeon: RAISA   Sedation Level Scheduled  MAC ,  Reason for Sedation Level per previous scheduled sedation   Instructions updated and sent: Yes, federicat     Does patient need PAC or Pre -Op Rescheduled? : No       Did you cancel or rescheduled an EUS procedure? No.

## 2024-10-29 LAB — GROUP A STREP BY PCR: NOT DETECTED

## 2024-10-29 NOTE — PATIENT INSTRUCTIONS
Patient was educated on the natural course of viral illness which typically lasts up to 10 days. Strep PCR is pending. Conservative measures discussed including increased fluids, nasal saline irrigation (neti pot), warm steamy shower, salt water gargles,expectorants (Mucinex), Afrin nasal spray, and analgesics (Tylenol and/or Ibuprofen). See your primary care provider if symptoms worsen or do not improve in 7 days. Seek emergency care if you develop fever over 104 or shortness of breath.

## 2024-10-29 NOTE — TELEPHONE ENCOUNTER
Rescheduled Colonoscopy   Due to patient being ill    Pre visit planning completed.      Procedure details:    Patient scheduled for Colonoscopy on 11/12/24.     Arrival time: 0930. Procedure time 1030    Facility location: [facility]; [address] [place] [address], [address] Floor, [place] [address]. Check in location: 5th Floor.    Sedation type: MAC    Pre op exam needed? No.    Indication for procedure:   Jean syndrome [Z15.09]      Special screening for malignant neoplasms, colon            Chart review:     Electronic implanted devices? No    Recent diagnosis of diverticulitis within the last 6 weeks? No      Medication review:    Diabetic? No    Anticoagulants? No    Weight loss medication/injectable? Yes. Metformin: HOLD day of procedure.    Other medication HOLDING recommendations:  N/A      Prep for procedure:     Bowel prep recommendation: Standard Miralax  Due to: standard bowel prep.    Prep instructions sent via [employer]         Bessy Brown RN  Endoscopy Procedure Pre Assessment RN  [phone] option 2

## 2024-10-29 NOTE — PROGRESS NOTES
"URGENT CARE VISIT:    SUBJECTIVE:   Katelyn Erickson is a 34 year old female presenting with a chief complaint of stuffy nose, ear pain right > left, and sore throat.  Onset was 3 day(s) ago.   She denies the following symptoms: shortness of breath  Course of illness is same.    Treatment measures tried include None tried with no relief of symptoms.  Predisposing factors include None.    PMH:   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    NO ACTIVE PROBLEMS      Allergies: Patient has no known allergies.   Medications:   Current Outpatient Medications   Medication Sig Dispense Refill    buPROPion (WELLBUTRIN XL) 150 MG 24 hr tablet Take 1 tablet (150 mg) by mouth every morning 90 tablet 1    metFORMIN (GLUCOPHAGE XR) 500 MG 24 hr tablet Take 2 tablets (1,000 mg) by mouth daily with food. 60 tablet 4    topiramate (TOPAMAX) 25 MG tablet Take 3 tablets (75 mg) by mouth at bedtime 270 tablet 1     Social History:   Social History     Tobacco Use    Smoking status: Former     Types: Cigarettes     Passive exposure: Past    Smokeless tobacco: Never    Tobacco comments:     half a pack a day   Substance Use Topics    Alcohol use: Yes     Comment: rarely       ROS:  Review of systems negative except as stated above.    OBJECTIVE:  /81   Pulse 107   Temp 99.5  F (37.5  C) (Oral)   Resp 16   Ht 1.721 m (5' 7.75\")   Wt 102.5 kg (226 lb)   LMP 02/22/2024   SpO2 98%   BMI 34.62 kg/m    GENERAL APPEARANCE: healthy, alert and no distress  EYES: EOMI,  PERRL, conjunctiva clear  HENT: ear canals and TM's normal.  Mildly erythematous oropharynx  NECK: supple, nontender, no lymphadenopathy  RESP: lungs clear to auscultation - no rales, rhonchi or wheezes  CV: regular rates and rhythm, normal S1 S2, no murmur noted  SKIN: no suspicious lesions or rashes    Labs:    Results for orders placed or performed in visit on 10/28/24   Streptococcus A Rapid Screen w/Reflex to PCR - " Clinic Collect     Status: Normal    Specimen: Throat; Swab   Result Value Ref Range    Group A Strep antigen Negative Negative       ASSESSMENT:    ICD-10-CM    1. Viral pharyngitis  J02.9 Streptococcus A Rapid Screen w/Reflex to PCR - Clinic Collect     Group A Streptococcus PCR Throat Swab          PLAN:  Patient Instructions   Patient was educated on the natural course of viral illness which typically lasts up to 10 days. Strep PCR is pending. Conservative measures discussed including increased fluids, nasal saline irrigation (neti pot), warm steamy shower, salt water gargles,expectorants (Mucinex), Afrin nasal spray, and analgesics (Tylenol and/or Ibuprofen). See your primary care provider if symptoms worsen or do not improve in 7 days. Seek emergency care if you develop fever over 104 or shortness of breath.    Patient verbalized understanding and is agreeable to plan. The patient was discharged ambulatory and in stable condition.    Karen Bañuelos PA-C ....................  10/28/2024   7:48 PM

## 2024-10-29 NOTE — TELEPHONE ENCOUNTER
Attempted to contact patient in order to complete pre assessment questions.     Patient scheduled for Colonoscopy on 11/12/24.     No answer. Left message to return call to [phone] option 2.    Callback required communication sent via [employer].      Bessy Brown RN  Endoscopy Procedure Pre Assessment

## 2024-11-11 ENCOUNTER — ANESTHESIA EVENT (OUTPATIENT)
Dept: SURGERY | Facility: AMBULATORY SURGERY CENTER | Age: 34
End: 2024-11-11
Payer: COMMERCIAL

## 2024-11-11 RX ORDER — ONDANSETRON 2 MG/ML
4 INJECTION INTRAMUSCULAR; INTRAVENOUS EVERY 6 HOURS PRN
Status: CANCELLED | OUTPATIENT
Start: 2024-11-11

## 2024-11-11 RX ORDER — ONDANSETRON 4 MG/1
4 TABLET, ORALLY DISINTEGRATING ORAL EVERY 6 HOURS PRN
Status: CANCELLED | OUTPATIENT
Start: 2024-11-11

## 2024-11-11 RX ORDER — ONDANSETRON 2 MG/ML
4 INJECTION INTRAMUSCULAR; INTRAVENOUS EVERY 30 MIN PRN
Status: CANCELLED | OUTPATIENT
Start: 2024-11-11

## 2024-11-11 RX ORDER — FLUMAZENIL 0.1 MG/ML
0.2 INJECTION, SOLUTION INTRAVENOUS
Status: CANCELLED | OUTPATIENT
Start: 2024-11-11 | End: 2024-11-12

## 2024-11-11 RX ORDER — NALOXONE HYDROCHLORIDE 0.4 MG/ML
0.1 INJECTION, SOLUTION INTRAMUSCULAR; INTRAVENOUS; SUBCUTANEOUS
Status: CANCELLED | OUTPATIENT
Start: 2024-11-11

## 2024-11-11 RX ORDER — OXYCODONE HYDROCHLORIDE 5 MG/1
10 TABLET ORAL
Status: CANCELLED | OUTPATIENT
Start: 2024-11-11

## 2024-11-11 RX ORDER — NALOXONE HYDROCHLORIDE 0.4 MG/ML
0.2 INJECTION, SOLUTION INTRAMUSCULAR; INTRAVENOUS; SUBCUTANEOUS
Status: CANCELLED | OUTPATIENT
Start: 2024-11-11

## 2024-11-11 RX ORDER — PROCHLORPERAZINE MALEATE 10 MG
10 TABLET ORAL EVERY 6 HOURS PRN
Status: CANCELLED | OUTPATIENT
Start: 2024-11-11

## 2024-11-11 RX ORDER — NALOXONE HYDROCHLORIDE 0.4 MG/ML
0.4 INJECTION, SOLUTION INTRAMUSCULAR; INTRAVENOUS; SUBCUTANEOUS
Status: CANCELLED | OUTPATIENT
Start: 2024-11-11

## 2024-11-11 RX ORDER — OXYCODONE HYDROCHLORIDE 5 MG/1
5 TABLET ORAL
Status: CANCELLED | OUTPATIENT
Start: 2024-11-11

## 2024-11-11 RX ORDER — DEXAMETHASONE SODIUM PHOSPHATE 10 MG/ML
4 INJECTION, SOLUTION INTRAMUSCULAR; INTRAVENOUS
Status: CANCELLED | OUTPATIENT
Start: 2024-11-11

## 2024-11-11 RX ORDER — ONDANSETRON 4 MG/1
4 TABLET, ORALLY DISINTEGRATING ORAL EVERY 30 MIN PRN
Status: CANCELLED | OUTPATIENT
Start: 2024-11-11

## 2024-11-11 NOTE — TELEPHONE ENCOUNTER
Pre assessment completed for upcoming procedure.   (Please see previous telephone encounter notes for complete details)    Patient  returned call.       Procedure details:    Arrival time and facility location reviewed.    Pre op exam needed? No.    Designated  policy reviewed. Instructed to have someone stay 24  hours post procedure.       Medication review:    Medications reviewed. Please see supporting documentation below. Holding recommendations discussed (if applicable).       Prep for procedure:     Procedure prep instructions reviewed.        Any additional information needed:  N/A      Patient  verbalized understanding and had no questions or concerns at this time.      Tara Belle RN  Endoscopy Procedure Pre Assessment   [phone] option 2

## 2024-11-12 ENCOUNTER — HOSPITAL ENCOUNTER (OUTPATIENT)
Facility: AMBULATORY SURGERY CENTER | Age: 34
Discharge: HOME | End: 2024-11-12
Attending: INTERNAL MEDICINE | Admitting: INTERNAL MEDICINE
Payer: COMMERCIAL

## 2024-11-12 ENCOUNTER — ANESTHESIA (OUTPATIENT)
Dept: SURGERY | Facility: AMBULATORY SURGERY CENTER | Age: 34
End: 2024-11-12
Payer: COMMERCIAL

## 2024-11-12 ENCOUNTER — PATIENT OUTREACH (OUTPATIENT)
Dept: GASTROENTEROLOGY | Facility: CLINIC | Age: 34
End: 2024-11-12

## 2024-11-12 VITALS
TEMPERATURE: 97.1 F | OXYGEN SATURATION: 100 % | HEART RATE: 79 BPM | RESPIRATION RATE: 16 BRPM | SYSTOLIC BLOOD PRESSURE: 119 MMHG | DIASTOLIC BLOOD PRESSURE: 93 MMHG

## 2024-11-12 VITALS — HEART RATE: 80 BPM

## 2024-11-12 LAB — COLONOSCOPY: NORMAL

## 2024-11-12 PROCEDURE — 45378 DIAGNOSTIC COLONOSCOPY: CPT | Performed by: NURSE ANESTHETIST, CERTIFIED REGISTERED

## 2024-11-12 PROCEDURE — 45378 DIAGNOSTIC COLONOSCOPY: CPT | Performed by: STUDENT IN AN ORGANIZED HEALTH CARE EDUCATION/TRAINING PROGRAM

## 2024-11-12 RX ORDER — LIDOCAINE 40 MG/G
CREAM TOPICAL
Status: DISCONTINUED | OUTPATIENT
Start: 2024-11-12 | End: 2024-11-13 | Stop reason: HOSPADM

## 2024-11-12 RX ORDER — LIDOCAINE HYDROCHLORIDE 20 MG/ML
INJECTION, SOLUTION INFILTRATION; PERINEURAL PRN
Status: DISCONTINUED | OUTPATIENT
Start: 2024-11-12 | End: 2024-11-12

## 2024-11-12 RX ORDER — SODIUM CHLORIDE, SODIUM LACTATE, POTASSIUM CHLORIDE, CALCIUM CHLORIDE 600; 310; 30; 20 MG/100ML; MG/100ML; MG/100ML; MG/100ML
INJECTION, SOLUTION INTRAVENOUS CONTINUOUS PRN
Status: DISCONTINUED | OUTPATIENT
Start: 2024-11-12 | End: 2024-11-12

## 2024-11-12 RX ORDER — PROPOFOL 10 MG/ML
INJECTION, EMULSION INTRAVENOUS CONTINUOUS PRN
Status: DISCONTINUED | OUTPATIENT
Start: 2024-11-12 | End: 2024-11-12

## 2024-11-12 RX ORDER — PROPOFOL 10 MG/ML
INJECTION, EMULSION INTRAVENOUS PRN
Status: DISCONTINUED | OUTPATIENT
Start: 2024-11-12 | End: 2024-11-12

## 2024-11-12 RX ORDER — ONDANSETRON 2 MG/ML
4 INJECTION INTRAMUSCULAR; INTRAVENOUS
Status: DISCONTINUED | OUTPATIENT
Start: 2024-11-12 | End: 2024-11-13 | Stop reason: HOSPADM

## 2024-11-12 RX ADMIN — LIDOCAINE HYDROCHLORIDE 100 MG: 20 INJECTION, SOLUTION INFILTRATION; PERINEURAL at 10:30

## 2024-11-12 RX ADMIN — SODIUM CHLORIDE, SODIUM LACTATE, POTASSIUM CHLORIDE, CALCIUM CHLORIDE: 600; 310; 30; 20 INJECTION, SOLUTION INTRAVENOUS at 10:13

## 2024-11-12 RX ADMIN — PROPOFOL 100 MG: 10 INJECTION, EMULSION INTRAVENOUS at 10:30

## 2024-11-12 RX ADMIN — PROPOFOL 200 MCG/KG/MIN: 10 INJECTION, EMULSION INTRAVENOUS at 10:30

## 2024-11-12 NOTE — DISCHARGE INSTRUCTIONS
Discharge Instructions after Colonoscopy    Today you had a Colonoscopy     Activity and Diet  You were given medicine for pain. You may be dizzy or sleepy.  For 24 hours:   Do not drive or use heavy equipment.   Do not make important decisions.   Do not drink any alcohol.  You may return to your normal diet and medicines.    Discomfort   Air was placed in your colon during the exam in order to see it. Walking helps to pass the air.   You may take Tylenol (acetaminophen) for pain unless your doctor has told you not to.  Do not take aspirin or ibuprofen (Advil, Motrin, or other anti-inflammatory  drugs) for _____ days.    Follow-up  ____ We took small tissue samples or polyps to study. Your doctor will call you with the results  within two weeks.    When to call:    Call right away if you have:   Unusual pain in belly or chest pain not relieved with passing air.   More than 1 to 2 Tablespoons of bleeding from your rectum.   Fever above 100.6  F (37.5  C).    If you have severe pain, bleeding, or shortness of breath, go to an emergency room.    If you have questions, call:  Monday to Friday, 8 a.m. to 4:30 p.m.  Central Scheduling Department: [phone]    After hours  Hospital: [phone] (Ask for the GI fellow on call)

## 2024-11-12 NOTE — ANESTHESIA POSTPROCEDURE EVALUATION
Patient: Katelyn Erickson    Procedure: Procedure(s):  Colonoscopy       Anesthesia Type:  MAC    Note:  Disposition: Outpatient   Postop Pain Control: Uneventful            Sign Out: Well controlled pain   PONV: No   Neuro/Psych: Uneventful            Sign Out: Acceptable/Baseline neuro status   Airway/Respiratory: Uneventful            Sign Out: Acceptable/Baseline resp. status   CV/Hemodynamics: Uneventful            Sign Out: Acceptable CV status; No obvious hypovolemia; No obvious fluid overload   Other NRE: NONE   DID A NON-ROUTINE EVENT OCCUR? No           Last vitals:  Vitals Value Taken Time   /93 11/12/24 1116   Temp     Pulse 62 11/12/24 1115   Resp 16 11/12/24 1116   SpO2 90 % 11/12/24 1116   Vitals shown include unfiled device data.    Electronically Signed By: Vincent Candelaria MD  November 12, 2024  11:19 AM

## 2024-11-12 NOTE — ANESTHESIA CARE TRANSFER NOTE
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    Anesthesia Type:   MAC     Note:    Oropharynx: oropharynx clear of all foreign objects and spontaneously breathing  Level of Consciousness: awake  Oxygen Supplementation: room air    Independent Airway: airway patency satisfactory and stable  Dentition: dentition unchanged  Vital Signs Stable: post-procedure vital signs reviewed and stable  Report to RN Given: handoff report given  Patient transferred to: Phase II  Comments: VSS and WNL, comfortable, no PONV, report to Georgina RN  Handoff Report: Identifed the Patient, Identified the Reponsible Provider, Reviewed the pertinent medical history, Discussed the surgical course, Reviewed Intra-OP anesthesia mangement and issues during anesthesia, Set expectations for post-procedure period and Allowed opportunity for questions and acknowledgement of understanding      Vitals:  Vitals Value Taken Time   BP     Temp     Pulse     Resp     SpO2 98 % 11/12/24 1058   Vitals shown include unfiled device data.    Electronically Signed By: WINDY Hanson CRNA  November 12, 2024  10:59 AM

## 2024-11-12 NOTE — ANESTHESIA PREPROCEDURE EVALUATION
Anesthesia Pre-Procedure Evaluation    Patient: Katelyn Erickson   MRN: [id number] : 1990        Procedure : Procedure(s):  Colonoscopy          Past Medical History:   Diagnosis Date    MLH1-related Jean syndrome (HNPCC2) 2024    NO ACTIVE PROBLEMS       Past Surgical History:   Procedure Laterality Date    COLONOSCOPY  2012    Procedure:COLONOSCOPY; COLONOSCOPY; Surgeon:JAKOB PETERS; Location:[facility]    COLONOSCOPY      COLONOSCOPY N/A 2021    Procedure: COLONOSCOPY;  Surgeon: Joseph Flores MD;  Location: [facility]    ESOPHAGOSCOPY, GASTROSCOPY, DUODENOSCOPY (EGD), COMBINED N/A 2021    Procedure: ESOPHAGOGASTRODUODENOSCOPY (EGD);  Surgeon: Joseph Flores MD;  Location: [facility]    LAPAROSCOPIC HYSTERECTOMY TOTAL, SALPINGECTOMY N/A 3/11/2024    Procedure: TOTAL LAPAROSCOPIC HYSTERECTOMY, WITH BILATERAL SALPINGECTOMY. PELVIC WASHINGS;  Surgeon: Elida Gutierres MD;  Location: [facility]    NO HISTORY OF SURGERY        No Known Allergies   Social History     Tobacco Use    Smoking status: Former     Types: Cigarettes     Passive exposure: Past    Smokeless tobacco: Never    Tobacco comments:     half a pack a day   Substance Use Topics    Alcohol use: Yes     Comment: rarely      Wt Readings from Last 1 Encounters:   10/28/24 102.5 kg (226 lb)        Anesthesia Evaluation   Pt has had prior anesthetic.     No history of anesthetic complications       ROS/MED HX  ENT/Pulmonary:  - neg pulmonary ROS     Neurologic:  - neg neurologic ROS     Cardiovascular:  - neg cardiovascular ROS     METS/Exercise Tolerance: >4 METS    Hematologic:  - neg hematologic  ROS     Musculoskeletal:  - neg musculoskeletal ROS     GI/Hepatic:  - neg GI/hepatic ROS   (+)        bowel prep,            Renal/Genitourinary:  - neg Renal ROS     Endo:  - neg endo ROS   (+)  type II DM,   Not using insulin,                 Psychiatric/Substance Use:  - neg psychiatric ROS     Infectious Disease:  - neg infectious disease  "ROS     Malignancy:  - neg malignancy ROS     Other:  - neg other ROS          Physical Exam    Airway        Mallampati: II   TM distance: > 3 FB   Neck ROM: full   Mouth opening: > 3 cm    Respiratory Devices and Support         Dental       (+) Completely normal teeth      Cardiovascular   cardiovascular exam normal          Pulmonary   pulmonary exam normal                OUTSIDE LABS:  CBC:   Lab Results   Component Value Date    WBC 7.5 03/11/2024    WBC 7.4 02/20/2024    HGB 13.2 03/11/2024    HGB 13.2 02/20/2024    HCT 38.7 03/11/2024    HCT 40.1 02/20/2024     03/11/2024     02/20/2024     BMP:   Lab Results   Component Value Date     02/20/2024    POTASSIUM 4.0 02/20/2024    CHLORIDE 105 02/20/2024    CO2 22 02/20/2024    BUN 9.8 02/20/2024    CR 0.73 02/20/2024    GLC 94 02/20/2024    GLC 96 04/12/2022     COAGS: No results found for: \"PTT\", \"INR\", \"FIBR\"  POC:   Lab Results   Component Value Date    HCG Negative 03/11/2024    HCGS  09/07/2010     Negative   This test provides a presumptive diagnosis of pregnancy or non-pregnancy. A   confirmed pregnancy diagnosis should only be made by a physician after all   clinical and laboratory findings have been evaluated.     HEPATIC:   Lab Results   Component Value Date    ALBUMIN 4.4 02/20/2024    PROTTOTAL 7.4 02/20/2024    ALT 26 02/20/2024    AST 20 02/20/2024    ALKPHOS 63 02/20/2024    BILITOTAL 0.4 02/20/2024     OTHER:   Lab Results   Component Value Date    A1C 5.4 02/20/2024    NICOLLE 9.8 02/20/2024    TSH 1.15 04/12/2022    T4 0.94 04/12/2022       Anesthesia Plan    ASA Status:  2    NPO Status:  NPO Appropriate    Anesthesia Type: MAC.     - Reason for MAC: immobility needed, straight local not clinically adequate      Maintenance: TIVA.        Consents    Anesthesia Plan(s) and associated risks, benefits, and realistic alternatives discussed. Questions answered and patient/representative(s) expressed understanding.     - " "Discussed:     - Discussed with:  Patient            Postoperative Care       PONV prophylaxis: Background Propofol Infusion     Comments:               Vincent Candelaria MD    I have reviewed the pertinent notes and labs in the chart from the past 30 days and (re)examined the patient.  Any updates or changes from those notes are reflected in this note.                         # Obesity: Estimated body mass index is 34.62 kg/m  as calculated from the following:    Height as of 10/28/24: 1.721 m (5' 7.75\").    Weight as of 10/28/24: 102.5 kg (226 lb).             "

## 2024-11-12 NOTE — H&P
Katelyn Erickson  [id number]  female  34 year old      Reason for procedure/surgery: Jean Syndrome    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    FH: colon cancer in relative <50 years old    Gestational diabetes mellitus (GDM) affecting pregnancy, antepartum    Jean syndrome    Adenomatous polyp of colon, unspecified part of colon    Class 2 obesity due to excess calories without serious comorbidity with body mass index (BMI) of 39.0 to 39.9 in adult    Anxiety    Moderate episode of recurrent major depressive disorder (H)    Polyp of colon    NAFLD (nonalcoholic fatty liver disease)       Past Surgical History:    Past Surgical History:   Procedure Laterality Date    COLONOSCOPY  1/23/2012    Procedure:COLONOSCOPY; COLONOSCOPY; Surgeon:JAKOB PETERS; Location[facility]    COLONOSCOPY      COLONOSCOPY N/A 9/8/2021    Procedure: COLONOSCOPY;  Surgeon: Joesph Flores MD;  Location:  GI    ESOPHAGOSCOPY, GASTROSCOPY, DUODENOSCOPY (EGD), COMBINED N/A 9/8/2021    Procedure: ESOPHAGOGASTRODUODENOSCOPY (EGD);  Surgeon: Joseph Flores MD;  Location:  GI    LAPAROSCOPIC HYSTERECTOMY TOTAL, SALPINGECTOMY N/A 3/11/2024    Procedure: TOTAL LAPAROSCOPIC HYSTERECTOMY, WITH BILATERAL SALPINGECTOMY. PELVIC WASHINGS;  Surgeon: Elida Gutierres MD;  Location: [facility]    NO HISTORY OF SURGERY         Past Medical History:   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    NO ACTIVE PROBLEMS        Social History:   Social History     Tobacco Use    Smoking status: Former     Types: Cigarettes     Passive exposure: Past    Smokeless tobacco: Never    Tobacco comments:     half a pack a day   Substance Use Topics    Alcohol use: Not Currently     Comment: rarely       Family History:   Family History   Problem Relation Age of Onset    Cancer - colorectal Mother          diagnosed at age 33 and  at age 34    Jean Syndrome Mother         MLH1    Colon Cancer Maternal Grandmother     Cancer - colorectal Maternal Grandmother 49         age 53    Diabetes Maternal Grandmother     Cancer Maternal Grandfather         pancreatic cancer    Colon Cancer Maternal Aunt 37    Jean Syndrome Maternal Aunt         MLH1    Colon Cancer Maternal Aunt 48    Jean Syndrome Maternal Aunt         MLH1       Allergies: No Known Allergies    Active Medications:   Current Outpatient Medications   Medication Sig Dispense Refill    buPROPion (WELLBUTRIN XL) 150 MG 24 hr tablet Take 1 tablet (150 mg) by mouth every morning 90 tablet 1    metFORMIN (GLUCOPHAGE XR) 500 MG 24 hr tablet Take 2 tablets (1,000 mg) by mouth daily with food. 60 tablet 4    topiramate (TOPAMAX) 25 MG tablet Take 3 tablets (75 mg) by mouth at bedtime 270 tablet 1       Systemic Review:   CONSTITUTIONAL: NEGATIVE for fever, chills, change in weight  ENT/MOUTH: NEGATIVE for ear, mouth and throat problems  RESP: NEGATIVE for significant cough or SOB  CV: NEGATIVE for chest pain, palpitations or peripheral edema    Physical Examination:   Vital Signs: BP (!) 140/88   Temp 97.1  F (36.2  C) (Temporal)   Resp 16   LMP 2024   SpO2 99%   GENERAL: healthy, alert and no distress  NECK: no adenopathy, no asymmetry, masses, or scars  RESP: lungs clear to auscultation - no rales, rhonchi or wheezes  CV: regular rate and rhythm, normal S1 S2, no S3 or S4, no murmur, click or rub, no peripheral edema and peripheral pulses strong  ABDOMEN: soft, nontender, no hepatosplenomegaly, no masses and bowel sounds normal  MS: no gross musculoskeletal defects noted, no edema    Plan: Appropriate to proceed as scheduled.      Emeka Flores MD  2024    PCP:  Rose Phelps

## 2024-11-20 ENCOUNTER — OFFICE VISIT (OUTPATIENT)
Dept: URGENT CARE | Facility: URGENT CARE | Age: 34
End: 2024-11-20
Payer: COMMERCIAL

## 2024-11-20 ENCOUNTER — ANCILLARY PROCEDURE (OUTPATIENT)
Dept: GENERAL RADIOLOGY | Facility: CLINIC | Age: 34
End: 2024-11-20
Attending: PHYSICIAN ASSISTANT
Payer: COMMERCIAL

## 2024-11-20 VITALS
WEIGHT: 233 LBS | DIASTOLIC BLOOD PRESSURE: 78 MMHG | HEART RATE: 93 BPM | TEMPERATURE: 101.1 F | OXYGEN SATURATION: 98 % | SYSTOLIC BLOOD PRESSURE: 118 MMHG | BODY MASS INDEX: 35.69 KG/M2

## 2024-11-20 DIAGNOSIS — R05.1 ACUTE COUGH: ICD-10-CM

## 2024-11-20 DIAGNOSIS — R50.9 FEVER IN ADULT: ICD-10-CM

## 2024-11-20 DIAGNOSIS — J18.9 PNEUMONIA OF RIGHT MIDDLE LOBE DUE TO INFECTIOUS ORGANISM: Primary | ICD-10-CM

## 2024-11-20 LAB
FLUAV AG SPEC QL IA: NEGATIVE
FLUBV AG SPEC QL IA: NEGATIVE

## 2024-11-20 PROCEDURE — 99214 OFFICE O/P EST MOD 30 MIN: CPT | Performed by: PHYSICIAN ASSISTANT

## 2024-11-20 PROCEDURE — 71046 X-RAY EXAM CHEST 2 VIEWS: CPT | Mod: TC | Performed by: RADIOLOGY

## 2024-11-20 PROCEDURE — 87804 INFLUENZA ASSAY W/OPTIC: CPT | Performed by: PHYSICIAN ASSISTANT

## 2024-11-20 RX ORDER — AMOXICILLIN 500 MG/1
500 CAPSULE ORAL 2 TIMES DAILY
Qty: 10 CAPSULE | Refills: 0 | Status: SHIPPED | OUTPATIENT
Start: 2024-11-20 | End: 2024-11-25

## 2024-11-20 RX ORDER — AZITHROMYCIN 250 MG/1
TABLET, FILM COATED ORAL
Qty: 6 TABLET | Refills: 0 | Status: SHIPPED | OUTPATIENT
Start: 2024-11-20 | End: 2024-11-25

## 2024-11-20 NOTE — PROGRESS NOTES
Assessment & Plan     Pneumonia of right middle lobe due to infectious organism  Noted on chest x-ray today.  Zithromax is prescribed.  Amoxicillin also prescribed.  Tylenol/Motrin as needed for fever.  Patient educational information provided regarding course of symptoms.  Will anticipate gradual improvement.  Follow-up if any worsening symptoms.  Patient agrees with the plan.  - azithromycin (ZITHROMAX) 250 MG tablet  Dispense: 6 tablet; Refill: 0  - amoxicillin (AMOXIL) 500 MG capsule  Dispense: 10 capsule; Refill: 0    Fever in adult  Suspect in the setting of influenza-like illness and pneumonia.  COVID PCR and pertussis test are pending.  They will be notified if any positive results.  Recommend Tylenol/Motrin as needed for fever, rest, push fluids.  Follow-up if any worsening symptoms.  Patient agrees.  - Influenza A & B Antigen - Clinic Collect  - COVID-19 Virus (Coronavirus) by PCR Nose  - B. pertussis/parapertussis PCR-NP  - XR Chest 2 Views    Acute cough  In the setting of pneumonia.  Please see above treatment recommendations.  - Influenza A & B Antigen - Clinic Collect  - COVID-19 Virus (Coronavirus) by PCR Nose  - B. pertussis/parapertussis PCR-NP  - XR Chest 2 Views    30 minutes spent on the date of the encounter doing chart review, history and exam, patient education, documentation, and further activities per the note.         Return in about 1 week (around 11/27/2024) for Symptoms failing to improve.    RANJIT Berkowitz [facility] MARCELLA Zepeda is a 34 year old female who presents to clinic today for the following health issues:  Chief Complaint   Patient presents with    Urgent Care     Monday- fever,body aches,deep cough,phlegm, pt works in a school, son has had walking pneumonia.        HPI    Patient is presenting to urgent care today with a complaint of cough occasionally productive, lower thoracic back pain, fevers, chills.  Onset of symptoms 3  days ago.  Patient also reports slight shortness of breath.  No chest pain, no vomiting or diarrhea.  Patient notes exposure to pneumonia, her son was diagnosed with pneumonia last week.  Treatment tried: Tylenol/ibuprofen.  Patient also works at an elementary school, whooping cough going around at school, exposure to sick kids.      Review of Systems  Constitutional, HEENT, cardiovascular, pulmonary, GI, , musculoskeletal, neuro, skin, endocrine and psych systems are negative, except as otherwise noted.      Objective    /78 (BP Location: Right arm, Patient Position: Sitting, Cuff Size: Adult Large)   Pulse 93   Temp (!) 101.1  F (38.4  C) (Tympanic)   Wt 105.7 kg (233 lb)   LMP 02/22/2024   SpO2 98%   BMI 35.69 kg/m    Physical Exam   GENERAL: alert and no distress  HENT: ear canals and TM's normal, mouth without ulcers or lesions, throat is moist and pink, uvula is midline  RESP: lungs clear to auscultation - no rales, rhonchi or wheezes  CV: regular rate and rhythm, normal S1 S2  MS: no gross musculoskeletal defects noted, no edema    CXR - Reviewed and interpreted by me:  patchy opacity right middle lobe concerning for pneumonia, awaiting formal interpretation from Radiologist at this time    Results for orders placed or performed in visit on 11/20/24 (from the past 24 hours)   Influenza A & B Antigen - Clinic Collect    Specimen: Nasopharyngeal; Swab   Result Value Ref Range    Influenza A antigen Negative Negative    Influenza B antigen Negative Negative    Narrative    Test results must be correlated with clinical data. If necessary, results should be confirmed by a molecular assay or viral culture.

## 2024-11-21 LAB — SARS-COV-2 RNA RESP QL NAA+PROBE: NEGATIVE

## 2024-12-05 ENCOUNTER — MYC REFILL (OUTPATIENT)
Dept: ENDOCRINOLOGY | Facility: CLINIC | Age: 34
End: 2024-12-05
Payer: COMMERCIAL

## 2024-12-05 DIAGNOSIS — E66.09 CLASS 2 OBESITY DUE TO EXCESS CALORIES WITHOUT SERIOUS COMORBIDITY WITH BODY MASS INDEX (BMI) OF 39.0 TO 39.9 IN ADULT: ICD-10-CM

## 2024-12-05 DIAGNOSIS — E66.812 CLASS 2 OBESITY DUE TO EXCESS CALORIES WITHOUT SERIOUS COMORBIDITY WITH BODY MASS INDEX (BMI) OF 39.0 TO 39.9 IN ADULT: ICD-10-CM

## 2024-12-05 RX ORDER — TOPIRAMATE 25 MG/1
75 TABLET, FILM COATED ORAL AT BEDTIME
Qty: 270 TABLET | Refills: 1 | Status: SHIPPED | OUTPATIENT
Start: 2024-12-05

## 2024-12-26 NOTE — PROGRESS NOTES
Virtual Visit Details    Type of service:  Video Visit     Originating Location (pt. Location): Home  Distant Location (provider location):  Off-site  Platform used for Video Visit: [facility]      Oncology Risk Management Consultation:  Date on this visit: 12/30/2024    Katelyn Erickson returns to the Cancer Risk Management Program for an oncology risk management consultation. 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     Primary Physician: WINDY Shook CNP     History Of Present Illness:  Ms. Erickson is a 34 year old female who presents with MLH1+ associated Jean Syndrome.     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    Of note, she tested negative for mutations in 30 other genes (APC, DEBORAH, BAP1, BARD1, BMPR1A, BRCA1, BRCA2, BRIP1, CDH1, CDKN2A, CDK4, CHEK2, EPCAM, GREM1, MITF, MLH1, MSH2, MSH6, MUTYH, NBN, PALB2, PMS2, POLE, POLD1, PTEN, RAD51C, RAD51D, SMAD4, STK11, and TP53) associated with inherited cancer risks.      Past Medical/Surgical History:   Menarche: Age 13  First live birth: age 22  Premenopausal  OC use: 6-8 years  Ovaries intact    3/11/2024: THBS with Dr. Bhavik BRODERICK. left fallopian tube:  -Fallopian tube, unremarkable     B. right fallopian tube:  -Fallopian tube, unremarkable     C. uterus and cervix:  -Early secretory endometrium  -Unremarkable cervix and myometrium.    Smoking: History of 1 PPD x 7 years  Alcohol: 1 beverage per week     Screening History:   12/16/2010: colonoscopy: The rectum, sigmoid colon, descending colon, splenic flexure, transverse colon, hepatic flexure, ascending colon, cecum, appendiceal orifice, ileocecal valve and terminal ileum are normal. The examined portion of the ileum was normal.   1/23/2012: colonoscopy: The rectum,  "sigmoid colon, descending colon, splenic flexure, transverse colon, hepatic flexure, ascending colon, cecum, appendiceal orifice, ileocecal valve and terminal ileum are normal. The examined portion of the ileum was normal.   9/8/2021: The entire examined colon is normal on direct and retroflexion views. No specimens collected.   9/8/2021: Upper GI Endoscopy: Normal esophagus. Normal stomach. Normal examined duodenum. No specimens collected.   9/25/2023: Colonoscopy. \"Negative\" per [facility]  11/12/2024: Colonoscopy (Dr. Flores), normal exam     At this visit she denies any changes in her bowels including constipation, diarrhea, bloating or new fatigue.     Past Medical/Surgical History:  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    NO ACTIVE PROBLEMS      Past Surgical History:   Procedure Laterality Date    COLONOSCOPY  1/23/2012    Procedure:COLONOSCOPY; COLONOSCOPY; Surgeon:JAKOB PETERS; Location: GI    COLONOSCOPY      COLONOSCOPY N/A 9/8/2021    Procedure: COLONOSCOPY;  Surgeon: Joseph Flores MD;  Location:  GI    COLONOSCOPY N/A 11/12/2024    Procedure: Colonoscopy;  Surgeon: Emeka Flores MD;  Location: [facility] OR    ESOPHAGOSCOPY, GASTROSCOPY, DUODENOSCOPY (EGD), COMBINED N/A 9/8/2021    Procedure: ESOPHAGOGASTRODUODENOSCOPY (EGD);  Surgeon: Joseph Flores MD;  Location:  GI    LAPAROSCOPIC HYSTERECTOMY TOTAL, SALPINGECTOMY N/A 3/11/2024    Procedure: TOTAL LAPAROSCOPIC HYSTERECTOMY, WITH BILATERAL SALPINGECTOMY. PELVIC WASHINGS;  Surgeon: Elida Gutierres MD;  Location: [facility] OR    NO HISTORY OF SURGERY         Allergies:  Allergies as of 12/30/2024    (No Known Allergies)       Current Medications:  Current Outpatient Medications   Medication Sig Dispense Refill    buPROPion (WELLBUTRIN XL) 150 MG 24 hr tablet Take 1 tablet (150 mg) by mouth every morning 90 tablet 1    metFORMIN (GLUCOPHAGE XR) 500 MG 24 hr tablet Take 2 tablets (1,000 mg) " by mouth daily with food. 60 tablet 4    topiramate (TOPAMAX) 25 MG tablet Take 3 tablets (75 mg) by mouth at bedtime. 270 tablet 1        Family History:  Family History   Problem Relation Age of Onset    Cancer - colorectal Mother         diagnosed at age 33 and  at age 34    Jean Syndrome Mother         MLH1    Colon Cancer Maternal Grandmother     Cancer - colorectal Maternal Grandmother 49         age 53    Diabetes Maternal Grandmother     Cancer Maternal Grandfather         pancreatic cancer    Colon Cancer Maternal Aunt 37    Jean Syndrome Maternal Aunt         MLH1    Colon Cancer Maternal Aunt 48    Jean Syndrome Maternal Aunt         MLH1       Social History:  Social History     Socioeconomic History    Marital status:      Spouse name: Not on file    Number of children: 3    Years of education: Not on file    Highest education level: Not on file   Occupational History     Employer: [employer]    Occupation: [employer]   Tobacco Use    Smoking status: Former     Types: Cigarettes     Passive exposure: Past    Smokeless tobacco: Never    Tobacco comments:     half a pack a day   Vaping Use    Vaping status: Former   Substance and Sexual Activity    Alcohol use: Not Currently     Comment: rarely    Drug use: Never    Sexual activity: Yes     Partners: Male     Birth control/protection: Male Surgical   Other Topics Concern    Parent/sibling w/ CABG, MI or angioplasty before 65F 55M? No   Social History Narrative    ** Merged History Encounter **         Single, lives with boyfriend and his family.     Social Drivers of Health     Financial Resource Strain: Low Risk  (2024)    Financial Resource Strain     Within the past 12 months, have you or your family members you live with been unable to get utilities (heat, electricity) when it was really needed?: No   Food Insecurity: Low Risk  (2024)    Food Insecurity     Within the past 12 months, did you worry that your food  "would run out before you got money to buy more?: No     Within the past 12 months, did the food you bought just not last and you didn t have money to get more?: No   Transportation Needs: Low Risk  (12/30/2024)    Transportation Needs     Within the past 12 months, has lack of transportation kept you from medical appointments, getting your medicines, non-medical meetings or appointments, work, or from getting things that you need?: No   Physical Activity: Inactive (12/30/2024)    Exercise Vital Sign     Days of Exercise per Week: 0 days     Minutes of Exercise per Session: 0 min   Stress: No Stress Concern Present (12/30/2024)    [employer] - Occupational Stress Questionnaire     Feeling of Stress : Only a little   Social Connections: Unknown (12/30/2024)    Social Connection and Isolation Panel [NHANES]     Frequency of Communication with Friends and Family: Not on file     Frequency of Social Gatherings with Friends and Family: Patient declined     Attends Gnosticist Services: Not on file     Active Member of Clubs or Organizations: Not on file     Attends Club or Organization Meetings: Not on file     Marital Status: Not on file   Interpersonal Safety: Low Risk  (11/12/2024)    Interpersonal Safety     Do you feel physically and emotionally safe where you currently live?: Yes     Within the past 12 months, have you been hit, slapped, kicked or otherwise physically hurt by someone?: No     Within the past 12 months, have you been humiliated or emotionally abused in other ways by your partner or ex-partner?: No   Housing Stability: Low Risk  (12/30/2024)    Housing Stability     Do you have housing? : Yes     Are you worried about losing your housing?: No       Physical Exam:  Ht 1.702 m (5' 7\")   Wt 102.5 kg (226 lb)   LMP 02/22/2024   BMI 35.40 kg/m    GENERAL: alert and no distress  EYES: Eyes grossly normal to inspection.  No discharge or erythema, or obvious scleral/conjunctival " abnormalities.  RESP: No audible wheeze, cough, or visible cyanosis.    SKIN: Visible skin clear. No significant rash, abnormal pigmentation or lesions.  NEURO: Cranial nerves grossly intact.  Mentation and speech appropriate for age.  PSYCH: Appropriate affect, tone, and pace of words       Laboratory/Imaging Studies  No results found for any visits on 12/30/24.    ASSESSMENT    Katelyn reports that she is doing well at this visit. She had a hysterectomy earlier this year, and is now dealing a rectocele. She is managing with a high fiber diet and hydration. She is potentially planning for weight loss surgery next summer. She wonders about the timing of an EGD after surgery. She will let me know when she has surgery, so we can work with her surgeon to determine when it would be safe for her to resume upper GI screening with an EGD.     We discussed the plan below. Katelyn will be due for a colonoscopy and EGD next year. She recently started a job working in the [employer], so she prefers to have this done over winter break, and will schedule accordingly. She is due for a UA. I will see her back in one year to revisit screening recommendations.       INDIVIDUALIZED CANCER RISK MANAGEMENT PLAN:    Individualized Surveillance Plan for Jean Syndrome   (MLH1+,  MSH2+ and EPCAM+ mutation carriers)   Based on NCCN Guidelines Version 2.2024   Type of Screening Recommendation Last Done Next Due   Colon Cancer Screening Colonoscopy at age 20-25 years or 2-5 years prior to the earliest colon cancer in the family if it is diagnosed prior to age 25.     Repeat every 1-2 years.    11/12/2024: colonoscopy (Dr. Flores) normal exam   Colonoscopy and EGD due in November, 2025   Endometrial and Ovarian Cancer    Epithelial Ovarian Cancer risk    4-20% for MLH1+   8-38% for EPCAM and MSH2+ (strong evidence) Prophylactic hysterectomy and bilateral salpingo-oophorectomy (BSO) can be considered by women who have completed childbearing.      Hysterectomy in 2024             Screening using endometrial sampling is an option every 1-2 years; women should be aware that dysfunctional uterine bleeding warrants evaluation.    Data do not support ovarian cancer screening for Jean Syndrome. Annual transvaginal ultrasound and  tests may be considered at a clinician s discretion. 7/17/2024: TVUS, resolution of previously seen right ovarian cyst TVUS scheduled for 3/31/2025     Pancreatic Screening  Risk is <5%-10%    Screen carriers with family history of pancreatic cancer. Starting at 50    Annual contrast-enhanced MRI/MRCP and/or EUS, with consideration of shorter screening intervals for individuals found to have worrisome abnormalities on screening.     Most small cystic lesions found on screening will not warrant biopsy, surgical resection, or any other intervention.   Maternal grandfather with pancreatic cancer.   Discuss screening starting at age 50   Gastric and small bowel cancer Upper GI screening every 2-4 years, starting at age 30 9/8/2021: Normal exam Due with colonoscopy in 2025   Urothelial cancer Consider annual urinalysis at age 30-35. MSH2+ carriers, especially males are most at risk. 2/20/2024: UA negative for blood UA due in February   Dermatology Routine dermatological screening. 8/14/2024: Dermatology visit, benign findings Due next year   Prostate Screening  Annual PSA test, refer to Urology if elevated; MSH2+ (30-32% lifetime risk) and MLH1+ (up to 17%). MSH6+ (up to 5%). PMS2+ (not well established)   NA   NA   Central Nervous System cancer Annual physical/neurological examinations starting at age 25-30. December 2024 December 2025   There is an increased incidence of prostate cancer; no additional screening recommendations are made at this time.    There are data to suggest that aspirin may decrease the risk of colon cancer in Jean Syndrome.  However, optimal dose and duration of aspirin therapy is uncertain at this  time.    There have been suggestions that there is an increased risk for breast cancer in Jean Syndrome patients, up to 15%. However, there is not enough evidence to support increased screening above average risk breast cancer screening; management should be based on personal family history.             I spent a total of 30 minutes on the day of the visit. Please see the note for further information on patient assessment and treatment.     Annette Szymanski DNP, WINDY, [facility]  Clinical Nurse Specialist  Cancer Risk Management Program  [facility]    Cc:  WINDY Shook CNP

## 2024-12-30 ENCOUNTER — VIRTUAL VISIT (OUTPATIENT)
Dept: ONCOLOGY | Facility: CLINIC | Age: 34
End: 2024-12-30
Payer: COMMERCIAL

## 2024-12-30 VITALS — BODY MASS INDEX: 35.47 KG/M2 | WEIGHT: 226 LBS | HEIGHT: 67 IN

## 2024-12-30 DIAGNOSIS — Z12.0 ENCOUNTER FOR SCREENING FOR GASTRIC CANCER: ICD-10-CM

## 2024-12-30 DIAGNOSIS — Z12.11 SCREENING FOR COLON CANCER: ICD-10-CM

## 2024-12-30 DIAGNOSIS — Z15.09 MLH1-RELATED LYNCH SYNDROME (HNPCC2): Primary | ICD-10-CM

## 2024-12-30 DIAGNOSIS — Z12.11 SPECIAL SCREENING FOR MALIGNANT NEOPLASMS, COLON: ICD-10-CM

## 2024-12-30 DIAGNOSIS — Z12.6 SCREENING FOR BLADDER CANCER: ICD-10-CM

## 2024-12-30 PROCEDURE — 99214 OFFICE O/P EST MOD 30 MIN: CPT | Mod: 95

## 2024-12-30 ASSESSMENT — PAIN SCALES - GENERAL: PAINLEVEL_OUTOF10: NO PAIN (0)

## 2024-12-30 NOTE — NURSING NOTE
Current patient location: [address] [place] [id number]    Is the patient currently in the state of [place]? YES    Visit mode:VIDEO    If the visit is dropped, the patient can be reconnected by:VIDEO VISIT: Text to cell phone:   Telephone Information:   Mobile [phone]       Will anyone else be joining the visit? NO  (If patient encounters technical issues they should call [phone]    Are changes needed to the allergy or medication list? No    Are refills needed on medications prescribed by this physician? NO    Rooming Documentation:  Unable to complete questionnaire(s) due to time    Reason for visit: JESUSITA CASTORENA

## 2024-12-30 NOTE — PATIENT INSTRUCTIONS
Individualized Surveillance Plan for Jean Syndrome   (MLH1+,  MSH2+ and EPCAM+ mutation carriers)   Based on NCCN Guidelines Version 2.2024   Type of Screening Recommendation Last Done Next Due   Colon Cancer Screening Colonoscopy at age 20-25 years or 2-5 years prior to the earliest colon cancer in the family if it is diagnosed prior to age 25.     Repeat every 1-2 years.    11/12/2024: colonoscopy (Dr. Flores) normal exam   Colonoscopy and EGD due in November, 2025   Endometrial and Ovarian Cancer    Epithelial Ovarian Cancer risk    4-20% for MLH1+   8-38% for EPCAM and MSH2+ (strong evidence) Prophylactic hysterectomy and bilateral salpingo-oophorectomy (BSO) can be considered by women who have completed childbearing.     Hysterectomy in 2024             Screening using endometrial sampling is an option every 1-2 years; women should be aware that dysfunctional uterine bleeding warrants evaluation.    Data do not support ovarian cancer screening for Jean Syndrome. Annual transvaginal ultrasound and  tests may be considered at a clinician s discretion. 7/17/2024: TVUS, resolution of previously seen right ovarian cyst TVUS scheduled for 3/31/2025     Pancreatic Screening  Risk is <5%-10%    Screen carriers with family history of pancreatic cancer. Starting at 50    Annual contrast-enhanced MRI/MRCP and/or EUS, with consideration of shorter screening intervals for individuals found to have worrisome abnormalities on screening.     Most small cystic lesions found on screening will not warrant biopsy, surgical resection, or any other intervention.   Maternal grandfather with pancreatic cancer.   Discuss screening starting at age 50   Gastric and small bowel cancer Upper GI screening every 2-4 years, starting at age 30 9/8/2021: Normal exam Due with colonoscopy in 2025   Urothelial cancer Consider annual urinalysis at age 30-35. MSH2+ carriers, especially males are most at risk. 2/20/2024: UA negative for blood  UA due in February   Dermatology Routine dermatological screening. 8/14/2024: Dermatology visit, benign findings Due next year   Prostate Screening  Annual PSA test, refer to Urology if elevated; MSH2+ (30-32% lifetime risk) and MLH1+ (up to 17%). MSH6+ (up to 5%). PMS2+ (not well established)   NA   NA   Central Nervous System cancer Annual physical/neurological examinations starting at age 25-30. December 2024 December 2025   There is an increased incidence of prostate cancer; no additional screening recommendations are made at this time.    There are data to suggest that aspirin may decrease the risk of colon cancer in Jean Syndrome.  However, optimal dose and duration of aspirin therapy is uncertain at this time.    There have been suggestions that there is an increased risk for breast cancer in Jean Syndrome patients, up to 15%. However, there is not enough evidence to support increased screening above average risk breast cancer screening; management should be based on personal family history.

## 2024-12-30 NOTE — LETTER
12/30/2024      Katelyn Erickson  [address] [facility] [address]      Dear Colleague,    Thank you for referring your patient, Katelyn Erickson, to the [facility]. Please see a copy of my visit note below.    Virtual Visit Details    Type of service:  Video Visit     Originating Location (pt. Location): Home  Distant Location (provider location):  Off-site  Platform used for Video Visit: [facility]      Oncology Risk Management Consultation:  Date on this visit: 12/30/2024    Katelyn Erickson returns to the Cancer Risk Management Program for an oncology risk management consultation. 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     Primary Physician: WINDY Shook CNP     History Of Present Illness:  Ms. Erickson is a 34 year old female who presents with MLH1+ associated Jean Syndrome.     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    Of note, she tested negative for mutations in 30 other genes (APC, DEBORAH, BAP1, BARD1, BMPR1A, BRCA1, BRCA2, BRIP1, CDH1, CDKN2A, CDK4, CHEK2, EPCAM, GREM1, MITF, MLH1, MSH2, MSH6, MUTYH, NBN, PALB2, PMS2, POLE, POLD1, PTEN, RAD51C, RAD51D, SMAD4, STK11, and TP53) associated with inherited cancer risks.      Past Medical/Surgical History:   Menarche: Age 13  First live birth: age 22  Premenopausal  OC use: 6-8 years  Ovaries intact    3/11/2024: THBS with Dr. Bhavik BRODERICK. left fallopian tube:  -Fallopian tube, unremarkable     B. right fallopian tube:  -Fallopian tube, unremarkable     C. uterus and cervix:  -Early secretory endometrium  -Unremarkable cervix and myometrium.    Smoking: History of 1 PPD x 7 years  Alcohol: 1 beverage per week     Screening History:   12/16/2010: colonoscopy: The rectum, sigmoid colon,  "descending colon, splenic flexure, transverse colon, hepatic flexure, ascending colon, cecum, appendiceal orifice, ileocecal valve and terminal ileum are normal. The examined portion of the ileum was normal.   1/23/2012: colonoscopy: The rectum, sigmoid colon, descending colon, splenic flexure, transverse colon, hepatic flexure, ascending colon, cecum, appendiceal orifice, ileocecal valve and terminal ileum are normal. The examined portion of the ileum was normal.   9/8/2021: The entire examined colon is normal on direct and retroflexion views. No specimens collected.   9/8/2021: Upper GI Endoscopy: Normal esophagus. Normal stomach. Normal examined duodenum. No specimens collected.   9/25/2023: Colonoscopy. \"Negative\" per [facility]  11/12/2024: Colonoscopy (Dr. Flores), normal exam     At this visit she denies any changes in her bowels including constipation, diarrhea, bloating or new fatigue.     Past Medical/Surgical History:  Past Medical History:   Diagnosis Date     MLH1-related Jean syndrome (HNPCC2) 01/12/2024     NO ACTIVE PROBLEMS      Past Surgical History:   Procedure Laterality Date     COLONOSCOPY  1/23/2012    Procedure:COLONOSCOPY; COLONOSCOPY; Surgeon:JAKOB PETERS; Location: GI     COLONOSCOPY       COLONOSCOPY N/A 9/8/2021    Procedure: COLONOSCOPY;  Surgeon: Joseph Flores MD;  Location:  GI     COLONOSCOPY N/A 11/12/2024    Procedure: Colonoscopy;  Surgeon: Emeka Flores MD;  Location: [facility] OR     ESOPHAGOSCOPY, GASTROSCOPY, DUODENOSCOPY (EGD), COMBINED N/A 9/8/2021    Procedure: ESOPHAGOGASTRODUODENOSCOPY (EGD);  Surgeon: Joseph Flores MD;  Location:  GI     LAPAROSCOPIC HYSTERECTOMY TOTAL, SALPINGECTOMY N/A 3/11/2024    Procedure: TOTAL LAPAROSCOPIC HYSTERECTOMY, WITH BILATERAL SALPINGECTOMY. PELVIC WASHINGS;  Surgeon: Elida Gutierres MD;  Location: [facility] OR     NO HISTORY OF SURGERY         Allergies:  Allergies as of 12/30/2024     (No Known Allergies)       Current " Medications:  Current Outpatient Medications   Medication Sig Dispense Refill     buPROPion (WELLBUTRIN XL) 150 MG 24 hr tablet Take 1 tablet (150 mg) by mouth every morning 90 tablet 1     metFORMIN (GLUCOPHAGE XR) 500 MG 24 hr tablet Take 2 tablets (1,000 mg) by mouth daily with food. 60 tablet 4     topiramate (TOPAMAX) 25 MG tablet Take 3 tablets (75 mg) by mouth at bedtime. 270 tablet 1        Family History:  Family History   Problem Relation Age of Onset     Cancer - colorectal Mother         diagnosed at age 33 and  at age 34     Jean Syndrome Mother         MLH1     Colon Cancer Maternal Grandmother      Cancer - colorectal Maternal Grandmother 49         age 53     Diabetes Maternal Grandmother      Cancer Maternal Grandfather         pancreatic cancer     Colon Cancer Maternal Aunt 37     Jean Syndrome Maternal Aunt         MLH1     Colon Cancer Maternal Aunt 48     Jean Syndrome Maternal Aunt         MLH1       Social History:  Social History     Socioeconomic History     Marital status:      Spouse name: Not on file     Number of children: 3     Years of education: Not on file     Highest education level: Not on file   Occupational History     Employer: [employer]     Occupation: realtor   Tobacco Use     Smoking status: Former     Types: Cigarettes     Passive exposure: Past     Smokeless tobacco: Never     Tobacco comments:     half a pack a day   Vaping Use     Vaping status: Former   Substance and Sexual Activity     Alcohol use: Not Currently     Comment: rarely     Drug use: Never     Sexual activity: Yes     Partners: Male     Birth control/protection: Male Surgical   Other Topics Concern     Parent/sibling w/ CABG, MI or angioplasty before 65F 55M? No   Social History Narrative    ** Merged History Encounter **         Single, lives with boyfriend and his family.     Social Drivers of Health     Financial Resource Strain: Low Risk  (2024)    Financial Resource  Strain      Within the past 12 months, have you or your family members you live with been unable to get utilities (heat, electricity) when it was really needed?: No   Food Insecurity: Low Risk  (12/30/2024)    Food Insecurity      Within the past 12 months, did you worry that your food would run out before you got money to buy more?: No      Within the past 12 months, did the food you bought just not last and you didn t have money to get more?: No   Transportation Needs: Low Risk  (12/30/2024)    Transportation Needs      Within the past 12 months, has lack of transportation kept you from medical appointments, getting your medicines, non-medical meetings or appointments, work, or from getting things that you need?: No   Physical Activity: Inactive (12/30/2024)    Exercise Vital Sign      Days of Exercise per Week: 0 days      Minutes of Exercise per Session: 0 min   Stress: No Stress Concern Present (12/30/2024)    [employer] [place] [employer] - Occupational Stress Questionnaire      Feeling of Stress : Only a little   Social Connections: Unknown (12/30/2024)    Social Connection and Isolation Panel [NHANES]      Frequency of Communication with Friends and Family: Not on file      Frequency of Social Gatherings with Friends and Family: Patient declined      Attends Latter day Services: Not on file      Active Member of Clubs or Organizations: Not on file      Attends Club or Organization Meetings: Not on file      Marital Status: Not on file   Interpersonal Safety: Low Risk  (11/12/2024)    Interpersonal Safety      Do you feel physically and emotionally safe where you currently live?: Yes      Within the past 12 months, have you been hit, slapped, kicked or otherwise physically hurt by someone?: No      Within the past 12 months, have you been humiliated or emotionally abused in other ways by your partner or ex-partner?: No   Housing Stability: Low Risk  (12/30/2024)    Housing Stability      Do you have  "housing? : Yes      Are you worried about losing your housing?: No       Physical Exam:  Ht 1.702 m (5' 7\")   Wt 102.5 kg (226 lb)   LMP 02/22/2024   BMI 35.40 kg/m    GENERAL: alert and no distress  EYES: Eyes grossly normal to inspection.  No discharge or erythema, or obvious scleral/conjunctival abnormalities.  RESP: No audible wheeze, cough, or visible cyanosis.    SKIN: Visible skin clear. No significant rash, abnormal pigmentation or lesions.  NEURO: Cranial nerves grossly intact.  Mentation and speech appropriate for age.  PSYCH: Appropriate affect, tone, and pace of words       Laboratory/Imaging Studies  No results found for any visits on 12/30/24.    ASSESSMENT    Katelyn reports that she is doing well at this visit. She had a hysterectomy earlier this year, and is now dealing a rectocele. She is managing with a high fiber diet and hydration. She is potentially planning for weight loss surgery next summer. She wonders about the timing of an EGD after surgery. She will let me know when she has surgery, so we can work with her surgeon to determine when it would be safe for her to resume upper GI screening with an EGD.     We discussed the plan below. Katelyn will be due for a colonoscopy and EGD next year. She recently started a job working in the [employer], so she prefers to have this done over winter break, and will schedule accordingly. She is due for a UA. I will see her back in one year to revisit screening recommendations.       INDIVIDUALIZED CANCER RISK MANAGEMENT PLAN:    Individualized Surveillance Plan for Jean Syndrome   (MLH1+,  MSH2+ and EPCAM+ mutation carriers)   Based on NCCN Guidelines Version 2.2024   Type of Screening Recommendation Last Done Next Due   Colon Cancer Screening Colonoscopy at age 20-25 years or 2-5 years prior to the earliest colon cancer in the family if it is diagnosed prior to age 25.     Repeat every 1-2 years.    11/12/2024: colonoscopy (Dr. Flores) normal exam "   Colonoscopy and EGD due in November, 2025   Endometrial and Ovarian Cancer    Epithelial Ovarian Cancer risk    4-20% for MLH1+   8-38% for EPCAM and MSH2+ (strong evidence) Prophylactic hysterectomy and bilateral salpingo-oophorectomy (BSO) can be considered by women who have completed childbearing.     Hysterectomy in 2024             Screening using endometrial sampling is an option every 1-2 years; women should be aware that dysfunctional uterine bleeding warrants evaluation.    Data do not support ovarian cancer screening for Jean Syndrome. Annual transvaginal ultrasound and  tests may be considered at a clinician s discretion. 7/17/2024: TVUS, resolution of previously seen right ovarian cyst TVUS scheduled for 3/31/2025     Pancreatic Screening  Risk is <5%-10%    Screen carriers with family history of pancreatic cancer. Starting at 50    Annual contrast-enhanced MRI/MRCP and/or EUS, with consideration of shorter screening intervals for individuals found to have worrisome abnormalities on screening.     Most small cystic lesions found on screening will not warrant biopsy, surgical resection, or any other intervention.   Maternal grandfather with pancreatic cancer.   Discuss screening starting at age 50   Gastric and small bowel cancer Upper GI screening every 2-4 years, starting at age 30 9/8/2021: Normal exam Due with colonoscopy in 2025   Urothelial cancer Consider annual urinalysis at age 30-35. MSH2+ carriers, especially males are most at risk. 2/20/2024: UA negative for blood UA due in February   Dermatology Routine dermatological screening. 8/14/2024: Dermatology visit, benign findings Due next year   Prostate Screening  Annual PSA test, refer to Urology if elevated; MSH2+ (30-32% lifetime risk) and MLH1+ (up to 17%). MSH6+ (up to 5%). PMS2+ (not well established)   NA   NA   Central Nervous System cancer Annual physical/neurological examinations starting at age 25-30. December 2024 December 2025    There is an increased incidence of prostate cancer; no additional screening recommendations are made at this time.    There are data to suggest that aspirin may decrease the risk of colon cancer in Jean Syndrome.  However, optimal dose and duration of aspirin therapy is uncertain at this time.    There have been suggestions that there is an increased risk for breast cancer in Jean Syndrome patients, up to 15%. However, there is not enough evidence to support increased screening above average risk breast cancer screening; management should be based on personal family history.             I spent a total of 30 minutes on the day of the visit. Please see the note for further information on patient assessment and treatment.     Annette Szymanski DNP, WINDY, [facility]  Clinical Nurse Specialist  Cancer Risk Management Program  [facility]    Cc:  WINDY Shook CNP          Again, thank you for allowing me to participate in the care of your patient.        Sincerely,        WINDY Covington CNP    Electronically signed

## 2025-01-03 PROBLEM — Z90.710 S/P HYSTERECTOMY: Status: ACTIVE | Noted: 2025-01-03

## 2025-01-14 NOTE — PROGRESS NOTES
"Phone-Visit Details    Type of service:  Phone Visit    Phone call duration: 9 minutes    Distant Location (provider location):  Offsite (providers home) [facility] [place]       Return Bariatric Nutrition Consultation Note    Reason For Visit: Nutrition Assessment    Katelyn Erickson is a 34 year old presenting today for return bariatric nutrition consult.   Patient is interested in weight loss surgery with Dr. Hurley expected surgery in TBD.  Patient is accompanied by self.  This is patient's 12th of 6 required nutrition visits prior to surgery. Patient attended the WLS nutrition class on 2/23/24.     Pt referred by Steffi Sánchez NP  on February 20, 2024.     CO-MORBIDITIES OF OBESITY INCLUDE:        2/15/2024     2:47 PM   --   I have the following health issues associated with obesity None of the above     SUPPORT:      2/15/2024     2:47 PM   Support System Reviewed With Patient   Who do you have in your support network that can be available to help you for the first 2 weeks after surgery?  and MIL   Who can you count on for support throughout your weight loss surgery journey?  and MIL     ANTHROPOMETRICS:  Initial consult weight: 254 lb on 2/20/24   Estimated body mass index is 34.46 kg/m  as calculated from the following:    Height as of this encounter: 1.721 m (5' 7.76\").    Weight as of this encounter: 102.1 kg (225 lb).  Current Weight: 225 lb per pt report     Required weight loss goal pre-op: -10 lbs from initial consult weight (goal weight 244 lbs or less before surgery) - met        2/15/2024     2:47 PM   --   I have tried the following methods to lose weight Watching portions or calories    Exercise    Weight Watchers           2/15/2024     2:47 PM   Weight Loss Questions Reviewed With Patient   How long have you been overweight? Since early childhood     MEDICATION FOR WEIGHT LOSS: Topiramate 75 mg - no side effects.   Metformin     VITAMIN/MINERAL " SUPPLEMENTS: None     NUTRITION HISTORY:  Food allergies: NKFA  Food intolerances: none   Previous experience with diet modification for weight loss: weight watchers and exercise   Barriers to lifestyle change: reports none     Per Steffi's note: Patient describes significant thoughts about food and cravings in the evenings with limited hunger during the day.     Patient herself is trying to eat less meat. Trying to increase her vegetables. No overnight eating    Recent Diet Recall:  Breakfast: coffee with Premier Protein   Lunch: 11 AM if at home, scrambled eggs with cottage and spinach with English muffin or toast. If goes into office will eat at 1 or 2 pm, Leftovers   Dinner: Tacos, spaghetti, chicken. Protein + vegetable + carb.   Snacks: After the kids go to bed - crackers and Nutella  Beverages: Water, soda seldomly.   Alcohol: None for the most part, seldomly   Dining Out: Not very often. Twice a month.     Patient recently had a total laparoscopic hysterectomy on 3/11/24.     April 2024: Downloaded the mark [employer] to track her nutrition.  Helps to monitor her protein amount. Plans to practice  liquids from meal times and chew her food really well to an applesauce consistency. Continues to work on tasklist items needed for surgery. Needs more clarification around if she needs the sleep medicine consult.     May 2024: Focusing more on fiber intake and is drinking a lot of water. For breakfast started incorporating overnight oats with protein and shaun seeds. Has been tracking nutrition in [employer]. Continues to practice  liquids from meal times. Has appointment with Steffi in June and psych visits in July.     June 2024: Met with Steffi earlier this month. Started taking Metformin since the beginning of June. Hasn't noticed a change in her weight this past month. Stopped taking Senna so often. Lives a busy lifestyle. Recently started pelvic floor physical activity. Struggles with   liquids from meal times, especially before a meal. Has slowed down with eating and trying to take her time. Averaging around 80 grams of protein with most meals. Has psych visits this next month.     2024: Had her first psych visit out of a total of 3.  Has been focusing on protein and fiber in her meals. Doing Pelvic floor physical therapy and also doing good with other physical activity by staying busy with her kids. Still struggles with  liquids prior to her meal but is aware and working on it.     2024: Has her last psych visit for the middle of September.  Busy August, still was able to stay focused on her nutrition though. Hasn't thought anymore about the surgery process. Focusing on her fiber and protein intake throughout the day.     Breakfast will be overnight oats if she is prepared (18 g of fiber and 40-45 g protein). Coffee with premier protein as the creamer. Lunch is normally later in the day around 1 or 2 pm 0 cucumber/chickpea/feta/salad may add some chicken to it. Dinner protein + greens.     Walking more often, about 4x a week for 30 minutes and goes about 2 miles.     Patient mentioned that her psych provider was questioning if she was eating enough. Provided patient with a calorie goal to ensure she is eating enough while still working on her nutrition goals.     2024: On , patient got her psych letter of support. The month of September was very busy for the patient with starting a new job and other things she had going on. She was not able to track her nutrition. However still focusing on good amounts of protein and fiber in her diet.      Patient continues to report she plans to wait to schedule bariatric surgery. She is wanting to know more about when some of things she has been working on would  before she would have to re-do some things.     2024: Has a colonoscopy next week.  Plans on doing surgery this coming summer when she has  more time off from work. Patient's mother in law and grandmother in law have moved out of their house recently and that has been a big adjustment as it has kept her and family very busy. Prepping meals ahead of time. Eating a smaller lunch to avoid getting too tired after lunch. Getting in a lot of steps at work.     November 2024: Had a colonoscopy recently this month - everything turned out well.  Continues to work on lifestyle changes necessary for surgery. Has been working on  liquids from meal times. Still meal prepping her lunches for work. Finds it a little harder to make good nutritious meals for dinner now that her mother in law and grandmother in law moved out but trying to get back on track.     Physical activity - getting in a lot of steps at work and then has a busy life after work.     December 2024: Still doing Metformin and Topiramate. Doing well with her nutrition. Trying to make good choices during the holiday season. Continues to work on lifestyle and diet changes for surgery. Has some appointments coming up at the first of the year.     Doing well with physical activity - getting in a lot of steps, averaging at least 26930 steps at work     January 2025: Patient reports things are going well with her nutrition over the last month. Has been tracking her intake over the last couple of weeks to check in with herself on how she is doing with both her protein and fiber intake.  Getting in a lot of steps while at work and when doing activities with kids. Still being mindful of separation of liquids from meal times.     Has a follow up with JAS Omalley next week.   Still need clearance letter from PCP.           2/15/2024     2:47 PM   Recall Diet Questions Reviewed With Patient   Describe what you typically consume for breakfast (typical or most recent) Scrambled eggs english muffine   How many ounces of water, or other low calorie drinks, do you drink daily (8 oz=1 glass)? 64 oz or  more   How many ounces of caffeine (coffee, tea, pop) do you drink daily (8 oz=1 glass)? 16 oz   How many ounces of carbonated (pop, beer, sparkling water) drinks do you drinky daily (8 oz=1 glass)? 0 oz   How many ounces of juice, pop, sweet tea, sports drinks, protein drinks, other sweetened drinks, do you drink daily (8 oz=1 glass)? 0 oz   How many ounces of milk do you drink daily (8 oz=1 glass) 0 oz   How often do you drink alcohol? Monthly or less   If you do drink alcohol, how many drinks might you have in a day? (one drink = 5 oz. wine, 1 can/bottle of beer, 1 shot liquor) 1 or 2           2/15/2024     2:47 PM   Eating Habits   What foods do you crave? Sweets           2/15/2024     2:47 PM   Dining Out History Reviewed With Patient   How often do you dine out? Rarely.   Where do you dine out? (select all that apply) fast food chains   What types of food do you order when you dine out? Hamburger     EXERCISE: Busy with kids activities.         6/4/2024     3:39 PM   --   How often do you exercise? Never   What keeps you from being more active? 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   Patient has 3 kids ages 10, 7, 5.   Aunt had bariatric surgery.     NUTRITION DIAGNOSIS:  Obesity r/t long history of positive energy balance aeb BMI >30 kg/m2.    INTERVENTION:  Intervention Provided/Education Provided on post-op diet guidelines, vitamins/minerals essential post-operatively, GI anatomy of bariatric surgeries, ways to help prepare for post-op diet guidelines pre-operatively, portion/calorie-control, mindful eating and sources of protein.  Patient demonstrates understanding.     Personal barriers to making and continuing required life changes have been identified, and strategies to overcome those barriers have been recommended AND family and social supports have  been assessed and strategies to strengthen those supports have been recommended.    Provided pt with list of goals, RD contact information and resources listed below via [employer].           2/15/2024     2:47 PM   Questions Reviewed With Patient   How ready are you to make changes regarding your weight? Number 1 = Not ready at all to make changes up to 10 = very ready. 7   How confident are you that you can change? 1 = Not confident that you will be successful making changes up to 10 = very confident. 7     Expected Engagement: good    GOALS:  Relating To Eating:  Eat slowly (20-30 minutes per meal), chewing foods well (25 chews per bite/applesauce consistency)  Eat 3 meals per day  Consume 60-90 g protein per day    Relating to beverages:  Reduce caffeine/carbonation/calorie containing beverages  Separate fluids from meals by 30 minutes before, during, and after eating  Drink 48-64 ounces of fluid per day    Relating to dietary supplements:  Start thinking about a post op multivitamin     Relating to activity:  Increase activity as able    Post-op Diet Handouts:  Diet Guidelines after Weight-loss Surgery  [email or web address]     Your Stage 1 Diet: Clear Liquids  [email or web address]     Your Stage 2 Diet: Low-fat Full Liquids  [email or web address]     Your Stage 3 Diet: Pureed Foods  [email or web address]     Pureed Recipes  [email or web address]    Your Stage 4 Diet: Soft Foods  [email or web address]    Your Stage 5 Diet: Regular Foods  [email or web address]    Supplements after Sleeve Gastrectomy, Gastric Bypass or Single Anastomosis Duodenal Switch  [email or web address]    Keeping Track of Fluids  [email or web address]    Follow Up: February 14.     Time spent with patient: 9 minutes.  Tina Montenegro, PABLO, LD

## 2025-01-15 ENCOUNTER — CARE COORDINATION (OUTPATIENT)
Dept: ENDOCRINOLOGY | Facility: CLINIC | Age: 35
End: 2025-01-15
Payer: COMMERCIAL

## 2025-01-15 NOTE — LETTER
"    January 15, 2025   Re: Katelyn Erickson   Address: [address] [place] [address]   : 1990       Dear WINDY Caicedo CNP,     Thank you for coordinating with us to evaluate Katelyn for possible bariatric surgery.     It is important to us that the primary care provider is aware of their patients' desire to have weight loss surgery and is supportive of the patient having surgery. If the patient meets criteria and clearances for surgery, we will submit to the insurance company for insurance approval and coordinate the needed appointments with our department. If you have any questions, feel free to contact us via our [facility] at [phone]. The letter of support can be faxed to the number below or routed via [facility] [employer] to our team.       Sample letter:     \"Dear Weight Loss Surgery Clinic,     I am the primary care provider for (patient name) and I support (him/her/they) having weight loss surgery.     Sincerely,     (provider name)\"     Sincerely,     Comprehensive Weight Management Team     [facility]   [facility]   [address], [address], [place], [place], [address]     Ph: [phone]   Fax: [phone], Dr Hurley       "

## 2025-01-15 NOTE — PROGRESS NOTES
Tasklist updated and sent to patient via [employer].  Bariatric Task List  Fax:  Please fax all paperwork to: [phone] -     Status:  Is patient a candidate for bariatric surgery?:  patient is a candidate for bariatric surgery -     Cleared to schedule surgeon consult?:  cleared to schedule surgeon consult - Call [phone] to meet with Dr Hurley. To see surgeon within 6 months of surgery date.  To see  one of the PA's or the NP within 6 months of the surgery date.   Status:  surgery evaluation in process -     Surgeon: Xavi -     Tentative surgery month/year: Summer 2025, one month after all tasks are done. -        Insurance: Insurance:  [facility] -      Contact insurance to discuss coverage: Needed -          Patient Info: Initial Weight:  254 -     Date of Initial Weight/Height:  2/20/2024 -     Goal Weight (lbs):  244 -     Required Weight Loss:  10 -     Surgery Type:  sleeve gastrectomy -        Dietician Visits: Structured weight loss required by insurance?:  structured weight loss required - At least 6 consecutive months within a year of the surgery date. s   Dietician Visit 1:  Completed - 2/23/24-    Dietician Visit 2:  Completed - 3/18/24 s   Dietician Visit 3:  Completed - 4/17/24-    Dietician Visit 4:  Completed - 5/22/24 bks   Dietician Visit 5:  Completed - 6/28/24-    Dietician Visit 6:  Completed - 7/23/24-    Dietician Visit additional:  Needed - monthly until surgery   Clearance from dietician to see surgeon?:    -     Dietician Notes:  RD cont: 8/29/24, 9/30/24, 10/22/24,  11/22/24, 12/20/24, 1/16/25, 2/14/25appt. -        Psychological Evaluation: Psych eval:  Completed - letter sent to pt; 9/18/24Dr Viera clears for surgery. Need to follow recommendations. Establish care with a therapist, etc. bks   Therapist letter of support:  Needed - Need letter of support from therapist. bks   Establish care with therapist:  Needed - Dr Viera placed referral to [facility], ph: [phone].  Insurance and surgeon will want to see that you have established care with a therapist prior to surgery.  Have a followup visit with Dr Viera after establishing care with a therapist and working with them for a few months. bks      Lab Work: Complete Blood Count:  Completed - 2/20/24   Comprehensive Metabolic Panel:  Completed - 2/20/24   Vitamin D:  Completed - 2/20/24   PTH:  Completed - 2/20/24   Hgb A1c:  Completed - 2/20/24    Lipids: Completed - 2/20/24    TSH ([facility], [facility], [place] MA): Completed - 2/20/24   Vitamin A: Completed - 2/20/24   Vitamin B12: Completed - 2/20/24      PCP: Establish care with PCP:  Completed -     Follow up with PCP:    -     PCP letter of support:  Needed - letter sent to pt RR      Patient Education:  Information Session:  Completed -     Attended New Consult Class?: Completed - 2/27/24 - AS   Given \"Making your decision\" handout?:  Yes -     Given \"A Roadmap to you Weight Loss Surgery\" handout?: Yes -     Given \"Epic Care Companion\" information?: Yes -     Attended support group?:  Needed -     Support plan in place?:  Completed -  and MIL. bks      Final Tasks:  Before surgery online preop class:  Needed -     Nurse visit for information:  Needed -     Weight Check: Needed -     History and Physical: Pre-Assessment Clinic (PAC) -   Needed -     Final labs per clinic: Needed -     Schedule postop appointments: Needed -     Other:   -   -        Notes: Please register for the Weight Loss Surgery Care Companion Pathway through the [employer] mobile lolis or via web browser. You register by answering \"yes\" to the following question, \"Do you agree to take part in this free, online program?\"  Information from this Pathway can help you be more successful before surgery and for up to one year after surgery.  This Pathway through [employer] can also answer questions you may have about your surgery.   The Pathway will start when you get your Tasklist after your " initial consultation or when your surgery is scheduled.   -

## 2025-01-15 NOTE — LETTER
January 15, 2025   Re: Katelyn Erickson   Address: [address] [place] [address]   : 1990       Dear Therapist,     Katelyn has been diagnosed with morbid obesity and is considering undergoing bariatric surgery. A psychological evaluation is being done to see if this patient is cleared from a psychological perspective to undergo the elective surgery. However, we need your assistance with a letter of support as outlined below. Your input is valuable and will affect our decision to hold or proceed with bariatric surgery.     This letter of support should outline:     Summary of treatment to date.   Treatment goals for before and after surgery that indicate mental health support and continuation of care.   Any concerns regarding this patient's ability to follow through with treatment recommendations.   If known, documentation of cessation of illicit drug use (our policy requires patients to be drug free of illicit drugs for at least one year prior to surgery).   A statement that indicates if you support or do not support this patient for weight loss surgery.     If you have any questions, feel free to contact us via our [facility] at [phone]. Please fax the evaluation to the number below.     Sincerely,     Comprehensive Weight Management Team     [facility]   [facility]   [address], [place], [place], [address]     Ph: [phone]   Fax: [phone], Dr Hurley

## 2025-01-16 ENCOUNTER — VIRTUAL VISIT (OUTPATIENT)
Dept: ENDOCRINOLOGY | Facility: CLINIC | Age: 35
End: 2025-01-16
Payer: COMMERCIAL

## 2025-01-16 VITALS — HEIGHT: 68 IN | WEIGHT: 225 LBS | BODY MASS INDEX: 34.1 KG/M2

## 2025-01-16 DIAGNOSIS — Z71.3 NUTRITIONAL COUNSELING: Primary | ICD-10-CM

## 2025-01-16 DIAGNOSIS — E66.9 OBESITY: ICD-10-CM

## 2025-01-16 NOTE — LETTER
"1/16/2025       RE: Katelyn Erickson  [address] [facility] [address]     Dear Colleague,    Thank you for referring your patient, Katelyn Erickson, to the [facility] at [facility]. Please see a copy of my visit note below.    Phone-Visit Details    Type of service:  Phone Visit    Phone call duration: 9 minutes    Distant Location (provider location):  Offsite (providers home) [facility]       Return Bariatric Nutrition Consultation Note    Reason For Visit: Nutrition Assessment    Katelyn Erickson is a 34 year old presenting today for return bariatric nutrition consult.   Patient is interested in weight loss surgery with Dr. Hurley expected surgery in TBD.  Patient is accompanied by self.  This is patient's 12th of 6 required nutrition visits prior to surgery. Patient attended the W[facility] nutrition class on 2/23/24.     Pt referred by Steffi Sánchez NP  on February 20, 2024.     CO-MORBIDITIES OF OBESITY INCLUDE:        2/15/2024     2:47 PM   --   I have the following health issues associated with obesity None of the above     SUPPORT:      2/15/2024     2:47 PM   Support System Reviewed With Patient   Who do you have in your support network that can be available to help you for the first 2 weeks after surgery?  and MIL   Who can you count on for support throughout your weight loss surgery journey?  and MIL     ANTHROPOMETRICS:  Initial consult weight: 254 lb on 2/20/24   Estimated body mass index is 34.46 kg/m  as calculated from the following:    Height as of this encounter: 1.721 m (5' 7.76\").    Weight as of this encounter: 102.1 kg (225 lb).  Current Weight: 225 lb per pt report     Required weight loss goal pre-op: -10 lbs from initial consult weight (goal weight 244 lbs or less before surgery) - met        2/15/2024     2:47 PM   --   I have tried the following " methods to lose weight Watching portions or calories    Exercise    Weight Watchers           2/15/2024     2:47 PM   Weight Loss Questions Reviewed With Patient   How long have you been overweight? Since early childhood     MEDICATION FOR WEIGHT LOSS: Topiramate 75 mg - no side effects.   Metformin     VITAMIN/MINERAL SUPPLEMENTS: None     NUTRITION HISTORY:  Food allergies: NKFA  Food intolerances: none   Previous experience with diet modification for weight loss: weight watchers and exercise   Barriers to lifestyle change: reports none     Per Steffi's note: Patient describes significant thoughts about food and cravings in the evenings with limited hunger during the day.     Patient herself is trying to eat less meat. Trying to increase her vegetables. No overnight eating    Recent Diet Recall:  Breakfast: coffee with Premier Protein   Lunch: 11 AM if at home, scrambled eggs with cottage and spinach with English muffin or toast. If goes into office will eat at 1 or 2 pm, Leftovers   Dinner: Tacos, spaghetti, chicken. Protein + vegetable + carb.   Snacks: After the kids go to bed - crackers and Nutella  Beverages: Water, soda seldomly.   Alcohol: None for the most part, seldomly   Dining Out: Not very often. Twice a month.     Patient recently had a total laparoscopic hysterectomy on 3/11/24.     April 2024: Downloaded the mark CheckBonus to track her nutrition.  Helps to monitor her protein amount. Plans to practice  liquids from meal times and chew her food really well to an applesauce consistency. Continues to work on tasklist items needed for surgery. Needs more clarification around if she needs the sleep medicine consult.     May 2024: Focusing more on fiber intake and is drinking a lot of water. For breakfast started incorporating overnight oats with protein and shaun seeds. Has been tracking nutrition in [employer]. Continues to practice  liquids from meal times. Has appointment with Steffi in  June and psych visits in July.     June 2024: Met with Steffi earlier this month. Started taking Metformin since the beginning of June. Hasn't noticed a change in her weight this past month. Stopped taking Senna so often. Lives a busy lifestyle. Recently started pelvic floor physical activity. Struggles with  liquids from meal times, especially before a meal. Has slowed down with eating and trying to take her time. Averaging around 80 grams of protein with most meals. Has psych visits this next month.     July 2024: Had her first psych visit out of a total of 3.  Has been focusing on protein and fiber in her meals. Doing Pelvic floor physical therapy and also doing good with other physical activity by staying busy with her kids. Still struggles with  liquids prior to her meal but is aware and working on it.     August 2024: Has her last psych visit for the middle of September.  Busy August, still was able to stay focused on her nutrition though. Hasn't thought anymore about the surgery process. Focusing on her fiber and protein intake throughout the day.     Breakfast will be overnight oats if she is prepared (18 g of fiber and 40-45 g protein). Coffee with premier protein as the creamer. Lunch is normally later in the day around 1 or 2 pm 0 cucumber/chickpea/feta/salad may add some chicken to it. Dinner protein + greens.     Walking more often, about 4x a week for 30 minutes and goes about 2 miles.     Patient mentioned that her psych provider was questioning if she was eating enough. Provided patient with a calorie goal to ensure she is eating enough while still working on her nutrition goals.     September 2024: On September 18, patient got her psych letter of support. The month of September was very busy for the patient with starting a new job and other things she had going on. She was not able to track her nutrition. However still focusing on good amounts of protein and fiber in her diet.       Patient continues to report she plans to wait to schedule bariatric surgery. She is wanting to know more about when some of things she has been working on would  before she would have to re-do some things.     2024: Has a colonoscopy next week.  Plans on doing surgery this coming summer when she has more time off from work. Patient's mother in law and grandmother in law have moved out of their house recently and that has been a big adjustment as it has kept her and family very busy. Prepping meals ahead of time. Eating a smaller lunch to avoid getting too tired after lunch. Getting in a lot of steps at work.     2024: Had a colonoscopy recently this month - everything turned out well.  Continues to work on lifestyle changes necessary for surgery. Has been working on  liquids from meal times. Still meal prepping her lunches for work. Finds it a little harder to make good nutritious meals for dinner now that her mother in law and grandmother in law moved out but trying to get back on track.     Physical activity - getting in a lot of steps at work and then has a busy life after work.     2024: Still doing Metformin and Topiramate. Doing well with her nutrition. Trying to make good choices during the holiday season. Continues to work on lifestyle and diet changes for surgery. Has some appointments coming up at the first of the year.     Doing well with physical activity - getting in a lot of steps, averaging at least [id number] steps at work     2025: Patient reports things are going well with her nutrition over the last month. Has been tracking her intake over the last couple of weeks to check in with herself on how she is doing with both her protein and fiber intake.  Getting in a lot of steps while at work and when doing activities with kids. Still being mindful of separation of liquids from meal times.     Has a follow up with JAS Omalley next week.   Still  need clearance letter from PCP.           2/15/2024     2:47 PM   Recall Diet Questions Reviewed With Patient   Describe what you typically consume for breakfast (typical or most recent) Scrambled eggs english muffine   How many ounces of water, or other low calorie drinks, do you drink daily (8 oz=1 glass)? 64 oz or more   How many ounces of caffeine (coffee, tea, pop) do you drink daily (8 oz=1 glass)? 16 oz   How many ounces of carbonated (pop, beer, sparkling water) drinks do you drinky daily (8 oz=1 glass)? 0 oz   How many ounces of juice, pop, sweet tea, sports drinks, protein drinks, other sweetened drinks, do you drink daily (8 oz=1 glass)? 0 oz   How many ounces of milk do you drink daily (8 oz=1 glass) 0 oz   How often do you drink alcohol? Monthly or less   If you do drink alcohol, how many drinks might you have in a day? (one drink = 5 oz. wine, 1 can/bottle of beer, 1 shot liquor) 1 or 2           2/15/2024     2:47 PM   Eating Habits   What foods do you crave? Sweets           2/15/2024     2:47 PM   Dining Out History Reviewed With Patient   How often do you dine out? Rarely.   Where do you dine out? (select all that apply) fast food chains   What types of food do you order when you dine out? Hamburger     EXERCISE: Busy with kids activities.         6/4/2024     3:39 PM   --   How often do you exercise? Never   What keeps you from being more active? 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   Patient has 3 kids ages 10, 7, 5.   Aunt had bariatric surgery.     NUTRITION DIAGNOSIS:  Obesity r/t long history of positive energy balance aeb BMI >30 kg/m2.    INTERVENTION:  Intervention Provided/Education Provided on post-op diet guidelines, vitamins/minerals essential post-operatively, GI anatomy of bariatric surgeries, ways to help prepare for post-op diet  guidelines pre-operatively, portion/calorie-control, mindful eating and sources of protein.  Patient demonstrates understanding.     Personal barriers to making and continuing required life changes have been identified, and strategies to overcome those barriers have been recommended AND family and social supports have been assessed and strategies to strengthen those supports have been recommended.    Provided pt with list of goals, RD contact information and resources listed below via [employer].           2/15/2024     2:47 PM   Questions Reviewed With Patient   How ready are you to make changes regarding your weight? Number 1 = Not ready at all to make changes up to 10 = very ready. 7   How confident are you that you can change? 1 = Not confident that you will be successful making changes up to 10 = very confident. 7     Expected Engagement: good    GOALS:  Relating To Eating:  Eat slowly (20-30 minutes per meal), chewing foods well (25 chews per bite/applesauce consistency)  Eat 3 meals per day  Consume 60-90 g protein per day    Relating to beverages:  Reduce caffeine/carbonation/calorie containing beverages  Separate fluids from meals by 30 minutes before, during, and after eating  Drink 48-64 ounces of fluid per day    Relating to dietary supplements:  Start thinking about a post op multivitamin     Relating to activity:  Increase activity as able    Post-op Diet Handouts:  Diet Guidelines after Weight-loss Surgery  [email or web address]     Your Stage 1 Diet: Clear Liquids  [email or web address]     Your Stage 2 Diet: Low-fat Full Liquids  [email or web address]     Your Stage 3 Diet: Pureed Foods  [email or web address]     Pureed Recipes  [email or web address]    Your Stage 4 Diet: Soft Foods  [email or web address]    Your Stage 5 Diet: Regular Foods  [email or web address]    Supplements after Sleeve Gastrectomy, Gastric Bypass or Single Anastomosis  Duodenal Switch  [email or web address]    Keeping Track of Fluids  [email or web address]    Follow Up: February 14.     Time spent with patient: 9 minutes.  Tina Montenegro RD, LD      Again, thank you for allowing me to participate in the care of your patient.      Sincerely,    Tina Montenegro RD

## 2025-01-16 NOTE — PATIENT INSTRUCTIONS
Devon Zepeda,     Follow-up with RD on February 14.     Thank you,    Tina Montenegro, PABLO, LD  If you would like to schedule or reschedule an appointment with the RD, please call [phone]    Nutrition Goals  Relating To Eating:  Eat slowly (20-30 minutes per meal), chewing foods well (25 chews per bite/applesauce consistency)  Eat 3 meals per day  Consume 60-90 g protein per day    Relating to beverages:  Reduce caffeine/carbonation/calorie containing beverages  Separate fluids from meals by 30 minutes before, during, and after eating  Drink 48-64 ounces of fluid per day    Relating to dietary supplements:  Start thinking about a post op multivitamin     Relating to activity:  Increase activity as able    Post-op Diet Handouts:  Diet Guidelines after Weight-loss Surgery  [email or web address]     Your Stage 1 Diet: Clear Liquids  [email or web address]     Your Stage 2 Diet: Low-fat Full Liquids  [email or web address]     Your Stage 3 Diet: Pureed Foods  [email or web address]     Pureed Recipes  [email or web address]    Your Stage 4 Diet: Soft Foods  [email or web address]    Your Stage 5 Diet: Regular Foods  [email or web address]    Supplements after Sleeve Gastrectomy, Gastric Bypass or Single Anastomosis Duodenal Switch  [email or web address]    Keeping Track of Fluids  [email or web address]    COMPREHENSIVE WEIGHT MANAGEMENT PROGRAM  VIRTUAL SUPPORT GROUPS    At [facility], our Comprehensive Weight Management program offers on-line support groups for patients who are working on weight loss and considering, preparing for, or have had weight loss surgery.     There is no cost for this opportunity.  You are invited to attend the?Virtual Support Groups?provided by any of the following locations:    [facility] via Go800 Teams with Loree Armendariz RD, RN  2.   [place] via Go800 Teams with Lj Waldron, PhD, LP  3.   [place]  via Microsoft Teams with Mary Jane Li RN  4.   [facility] via a Zoom Meeting with CARLOS Lennon-    The following Support Group information can also be found on our website:  [email or web address]      [facility]   WEIGHT LOSS SURGERY SUPPORT GROUP  The support group is a patient-lead forum that meets monthly to share experiences, encouragement and education. It is open to those who have had weight loss surgery, are scheduled for surgery, or are considering surgery.   WHEN: 3rd Wednesday of each month from 5:00PM - 6:00PM using Microsoft Teams.   FACILITATOR: Led by Loree Armendariz RD, OBI, RN, the program's Clinical Coordinator.   TO REGISTER: Please contact the clinic via QikServe or call the nurse line directly at [phone] to inform our staff that you would like an invite sent to you and to let us know the email you would like the invite sent to. Prior to the meeting, a link with directions on how to join the meeting will be sent to you.    2025 Meetings, 3rd Wednesday, 5:00pm - 6:00pm    January 15: Let's Talk  February19: Let's Talk  March 19: Speaker: Bib Cerna RD, LD  April 16: Let's Talk  May 21: Speaker: Ann Marie Alanis RD, OBI  June18: Let's Talk  July 16: Let's Talk  August 20: Let's Talk  September 17: No meeting.  October 15: Speaker: Angelica Ely PsyD, LP  November 19: Let's Talk  December 17: Let's Talk    [facility] BARIATRIC CARE SUPPORT GROUP  This is open to all pre- and post- operative bariatric surgery patients as well as their support system.   WHEN: 3rd Tuesday of each month from 6:30 PM - 8:00 PM using Microsoft Teams.   FACILITATOR: Led by Lj Waldron, Ph.D who is a Licensed Psychologist with the [facility] Comprehensive Weight Management Program.   TO REGISTER: Please send an email to Lj Waldron, Ph.D.,   at?[email or web address]?if you would like an invitation to the group. Prior to the meeting, a link with directions on how to join the meeting will be sent to you.    2025 Meetings, 3rd Tuesday January 21st: Open Forum  February 18th: Medications and Bariatric Surgery  Speaker: Celia Major, [facility] Resident  March 18th: Open Forum  April 15th: Genetics of Obesity as well as Q&A, Speaker: Laya Llamas MD, [facility] Comprehensive Weight Management Program.  May 20th: Open Forum  June 17th: Nutritional Labeling, Speaker: TBD  July 15th: Open Forum  August 19th: Open Forum  September 16th: Open Forum  October 21st: Open Forum  November 18th: Holiday Eating, Speaker: TBAMERICA  December 16th: Open Forum    [facility] POST-OPERATIVE BARIATRIC SURGERY SUPPORT GROUP  This is a support group for [facility] bariatric patients (and those external to [facility]) who have had bariatric surgery and are at least 3 months post-surgery.  WHEN: 4th Thursday of the month from 11:00 AM - 12:00 PM using Microsoft Teams.   FACILITATOR: Led by Certified Bariatric Nurse, Mary Jane Li RN, CBN.   TO REGISTER: Please send an email to Mary Jane at [email or web address] if you would like an invitation to the group.  Prior to the meeting, a link with directions on how to join the meeting will be sent to you.    2025 Meetings, 4th Thursday, 11:00am - 12:pm  January 23  February 27  March 27  April 24  May 22  Linda 26  July 24  August 28  September 25  October 23  November 27: Thanksgiving Day, No meeting.      December 25: [place] Day, No meeting.        [facility]   HEALTHY LIFESTYLE COACHING GROUP  This is a 60 minute virtual coaching group for those who want to lead a healthier lifestyle. Come together to set goals and overcome barriers in a supportive group environment. We will address the four pillars  of health: nutrition, exercise, sleep, and emotional well-being.   WHEN: 1st Friday of the month, 12:30 PM - 1:30 PM   using a Zoom meeting.     FACILITATOR: Led by National Board-Certified Health and , Mary Jane Chambers, [facility].  TO REGISTER: Please call the [employer] at [phone] to register. You will get an appointment to attend in [employer]. Fifteen minutes prior to the meeting, complete the e-check in and you will get the link to join the meeting.  There is no charge to attend this group and space is limited.  Please register for each month you wish to attend    2025 Meetings, 1st Friday, 12:30pm-1:30pm  January 3  February 7  March 7: No meeting.  April 4  May 2  Linda 6  July 4: Fourth of July Holiday, No meeting.    August 1  September 5  October 3  November 7  December 5

## 2025-01-16 NOTE — NURSING NOTE
Current patient location: [address] [place] [id number]    Is the patient currently in the state of [place]? YES    Visit mode:telephone    If the visit is dropped, the patient can be reconnected by: VIDEO VISIT: Text to cell phone:   Telephone Information:   Mobile [phone]       Will anyone else be joining the visit? NO  (If patient encounters technical issues they should call [phone]    Are changes needed to the allergy or medication list? Pt stated no changes to allergies and Pt stated no med changes    Are refills needed on medications prescribed by this physician? NO    Reason for visit: RECHECK    Camille CASTORENA

## 2025-01-20 ENCOUNTER — TELEPHONE (OUTPATIENT)
Dept: ENDOCRINOLOGY | Facility: CLINIC | Age: 35
End: 2025-01-20

## 2025-01-20 ENCOUNTER — OFFICE VISIT (OUTPATIENT)
Dept: ENDOCRINOLOGY | Facility: CLINIC | Age: 35
End: 2025-01-20
Payer: COMMERCIAL

## 2025-01-20 VITALS
DIASTOLIC BLOOD PRESSURE: 81 MMHG | WEIGHT: 226.2 LBS | SYSTOLIC BLOOD PRESSURE: 113 MMHG | OXYGEN SATURATION: 100 % | HEART RATE: 71 BPM | BODY MASS INDEX: 34.28 KG/M2 | HEIGHT: 68 IN

## 2025-01-20 DIAGNOSIS — E66.09 CLASS 2 OBESITY DUE TO EXCESS CALORIES WITHOUT SERIOUS COMORBIDITY WITH BODY MASS INDEX (BMI) OF 39.0 TO 39.9 IN ADULT: ICD-10-CM

## 2025-01-20 DIAGNOSIS — E66.812 CLASS 2 OBESITY DUE TO EXCESS CALORIES WITHOUT SERIOUS COMORBIDITY WITH BODY MASS INDEX (BMI) OF 39.0 TO 39.9 IN ADULT: ICD-10-CM

## 2025-01-20 DIAGNOSIS — K76.0 NAFLD (NONALCOHOLIC FATTY LIVER DISEASE): ICD-10-CM

## 2025-01-20 RX ORDER — TOPIRAMATE 25 MG/1
75 TABLET, FILM COATED ORAL AT BEDTIME
Qty: 270 TABLET | Refills: 1 | Status: SHIPPED | OUTPATIENT
Start: 2025-01-20

## 2025-01-20 RX ORDER — METFORMIN HYDROCHLORIDE 500 MG/1
1000 TABLET, EXTENDED RELEASE ORAL
Qty: 60 TABLET | Refills: 4 | Status: SHIPPED | OUTPATIENT
Start: 2025-01-20

## 2025-01-20 ASSESSMENT — PAIN SCALES - GENERAL: PAINLEVEL_OUTOF10: NO PAIN (0)

## 2025-01-20 NOTE — Clinical Note
#[id number]  H&P dictated.    She continues to want to work towards surgery, but is on the fence overall.   She wanted to start a GLP-1 today. Approved by cancer risk provider. Will monitor GI symptoms closely with rectocele.   Said it was ok to put her in an NBS to see in you April. Will have her followed with MTM if GLP-1 start for close SE monitoring.   ARCHANA

## 2025-01-20 NOTE — TELEPHONE ENCOUNTER
PA Initiation    Medication: ZEPBOUND 2.5 MG/0.5ML SC SOAJ  Insurance Company: [employer] ([facility]) - Phone [phone] Fax [phone]  Pharmacy Filling the Rx:    Filling Pharmacy Phone:    Filling Pharmacy Fax:    Start Date: 1/20/2025    [id number]

## 2025-01-20 NOTE — PROGRESS NOTES
Pre-Bariatric Surgery Note    Rose Phelps    Date: 2025     RE: Katelyn Erickson    MR#: [id number]   : 1990   Date of Visit: 2025    REASON FOR VISIT: Preoperative evaluation for possible weight loss surgery    Dear Rose Saha,    I had the pleasure of seeing your patient, Katelyn Erickson, in my preoperative bariatric clinic.    As you know, she has morbid obesity and is considering weight loss surgery to treat obesity in association with her medical conditions of obesity.  Her consult weight was 254lb.  She has lost 28 pounds since her consult weight. She has met her required pre-surgery weight. Please refer to initial consult note from date 2024 for patient's weight history and co-morbidities.    Assessment & Plan   Problem List Items Addressed This Visit       Class 2 obesity due to excess calories without serious comorbidity with body mass index (BMI) of 39.0 to 39.9 in adult     Last seen by Steffi Sánchez CNP on 10/11/2024. Continued Metformin and Topiramate. Since then she has continued to see steady weight loss through AOMs and great life style changes. She continues to see dietitian monthly.     Overall she is still interested in bariatric surgery, but wants to be 100% positive its the next best step. Still needs to establish with therapist.     While she is doing great with Metformin and Topiramate, she is interested in GLP-1 start. Was approved with starting with cancer risk provider with a hx of Jean syndrome. Rectocele is currently stable, with no symptoms. Will monitor very closely for GI side effects. No contraindications. Discussed side effects, risks, and benefits. No hx of pancreatitis. No personal or family hx of MTC or MENII.         Relevant Medications    tirzepatide-Weight Management (ZEPBOUND) 2.5 MG/0.5ML prefilled pen    tirzepatide-Weight Management (ZEPBOUND) 5 MG/0.5ML prefilled pen (Start on 2025)    metFORMIN (GLUCOPHAGE XR) 500 MG 24 hr  "tablet    topiramate (TOPAMAX) 25 MG tablet    Other Relevant Orders    Med Therapy Management Referral    NAFLD (nonalcoholic fatty liver disease)    Relevant Medications    tirzepatide-Weight Management (ZEPBOUND) 2.5 MG/0.5ML prefilled pen    tirzepatide-Weight Management (ZEPBOUND) 5 MG/0.5ML prefilled pen (Start on 2/19/2025)    metFORMIN (GLUCOPHAGE XR) 500 MG 24 hr tablet      Start Zepbound 2.5mg once weekly for 4 weeks, then increase to 5.0mg once weekly. Consider Wegovy and Saxenda as needed.   Continue Topiramate 75mg at night. Refills sent   Continue Metformin 1000mg daily. Refills sent   Establish with therapist   MTM pharmacist in 6 weeks if GLP-1 is started for close monitoring of side effects.   Continue to see dietitian monthly   Steffi Sánchez CNP or Irena Tavares PA-C in 3 months       Reviewed tasklist with patient   PCP - needed - talked to PCP, who thought it was sent   Therapist - needed   Psych Eval - completed 9/2024  Dr. Hurley - needed.     Had a hysterectomy 3/2024. Post op complication of rectocele and will eventually need corrective surgery. Is still interested in bariatric surgery and continuing this process. However, wants to wait till at least summer. Overall wants to make sure it is really her next best step.     Topiramate + Metformin - continues to be helpful with weight loss. No side effects. Has continued to see slow, but consistent weight loss.   Wellbutrin - just increased to 300mg with PCP to help with depression symptoms. Does think it has been helpful.     Would like to start a GLP-1 today if covered by insurance. Talked to cancer provider with concerns of unknown MTC risk and her personal hx of Jean syndrome. They are ok with her starting GLP-1, do not believe she is at increase risk. Rectocele post hysterectomy - no current symptoms. Went through pelvic floor PT.     Has been tracking more food. Getting in more protein and fiber. Drinking \"tons\" of water     Activity - active " around the house and school. 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    NO ACTIVE PROBLEMS        Past Surgical History:   Procedure Laterality Date    COLONOSCOPY  1/23/2012    Procedure:COLONOSCOPY; COLONOSCOPY; Surgeon:JAKOB PETERS; Location:[facility]    COLONOSCOPY      COLONOSCOPY N/A 9/8/2021    Procedure: COLONOSCOPY;  Surgeon: Joseph Flores MD;  Location: [facility]    COLONOSCOPY N/A 11/12/2024    Procedure: Colonoscopy;  Surgeon: Emeka Flores MD;  Location: [facility]    ESOPHAGOSCOPY, GASTROSCOPY, DUODENOSCOPY (EGD), COMBINED N/A 9/8/2021    Procedure: ESOPHAGOGASTRODUODENOSCOPY (EGD);  Surgeon: Joseph Flores MD;  Location: [facility]    LAPAROSCOPIC HYSTERECTOMY TOTAL, SALPINGECTOMY N/A 3/11/2024    Procedure: TOTAL LAPAROSCOPIC HYSTERECTOMY, WITH BILATERAL SALPINGECTOMY. PELVIC WASHINGS;  Surgeon: Elida Gutierres MD;  Location: [facility]    NO HISTORY OF SURGERY         Current Outpatient Medications   Medication Sig Dispense Refill    buPROPion (WELLBUTRIN XL) 300 MG 24 hr tablet Take 1 tablet (300 mg) by mouth every morning. 90 tablet 1    metFORMIN (GLUCOPHAGE XR) 500 MG 24 hr tablet Take 2 tablets (1,000 mg) by mouth daily with food. 60 tablet 4    tirzepatide-Weight Management (ZEPBOUND) 2.5 MG/0.5ML prefilled pen Inject 0.5 mLs (2.5 mg) subcutaneously every 7 days. For 4 weeks 2 mL 0    [START ON 2/19/2025] tirzepatide-Weight Management (ZEPBOUND) 5 MG/0.5ML prefilled pen Inject 0.5 mLs (5 mg) subcutaneously every 7 days. After completing 4 weeks of 2.5mg dose 2 mL 2    topiramate (TOPAMAX) 25 MG tablet Take 3 tablets (75 mg) by mouth at bedtime. 270 tablet 1     No current  facility-administered medications for this visit.       No Known Allergies    Office Visit on 01/03/2025   Component Date Value Ref Range Status    Final Diagnosis 01/03/2025    Final                    Value:Specimen A     Interpretation:      Negative for High Grade Urothelial Carcinoma     Other Findings:      Crystals present.     Adequacy:     Satisfactory for evaluation          Clinical Information 01/03/2025    Final                    Value:34-year-old woman with MLH1 mutation       Gross Description 01/03/2025    Final                    Value:A(A). Urine, Clean Catch, :A. Urine, Clean Catch, , Urine Cytology:  Received 30 ml of cloudy, yellow fluid, processed as 1 Pap stained Autocyte.'        Microscopic Description 01/03/2025    Final                    Value:A microscopic examination was performed.       Performing Labs 01/03/2025    Final                    Value:The technical component of this testing was completed at [facility] and [facility].     Stain controls for all stains resulted within this report have been reviewed and show appropriate reactivity.       Color Urine 01/03/2025 Yellow  Colorless, Straw, Light Yellow, Yellow Final    Appearance Urine 01/03/2025 Clear  Clear Final    Glucose Urine 01/03/2025 Negative  Negative mg/dL Final    Bilirubin Urine 01/03/2025 Negative  Negative Final    Ketones Urine 01/03/2025 Negative  Negative mg/dL Final    Specific Gravity Urine 01/03/2025 >=1.030  1.003 - 1.035 Final    Blood Urine 01/03/2025 Negative  Negative Final    pH Urine 01/03/2025 6.0  5.0 - 7.0 Final    Protein Albumin Urine 01/03/2025 Negative  Negative mg/dL Final    Urobilinogen Urine 01/03/2025 0.2  0.2, 1.0 E.U./dL Final    Nitrite Urine 01/03/2025 Negative  Negative Final    Leukocyte Esterase Urine 01/03/2025 Negative  Negative Final    Sodium 01/03/2025 139  135 - 145 mmol/L Final    Potassium 01/03/2025 4.0  3.4 - 5.3  "mmol/L Final    Chloride 01/03/2025 106  98 - 107 mmol/L Final    Carbon Dioxide (CO2) 01/03/2025 25  22 - 29 mmol/L Final    Anion Gap 01/03/2025 8  7 - 15 mmol/L Final    Urea Nitrogen 01/03/2025 11.4  6.0 - 20.0 mg/dL Final    Creatinine 01/03/2025 0.79  0.51 - 0.95 mg/dL Final    GFR Estimate 01/03/2025 >90  >60 mL/min/1.73m2 Final    eGFR calculated using 2021 CKD-EPI equation.    Calcium 01/03/2025 10.1  8.8 - 10.4 mg/dL Final    Reference intervals for this test were updated on 7/16/2024 to reflect our healthy population more accurately. There may be differences in the flagging of prior results with similar values performed with this method. Those prior results can be interpreted in the context of the updated reference intervals.    Glucose 01/03/2025 94  70 - 99 mg/dL Final    Patient Fasting > 8hrs? 01/03/2025 Yes   Final    Cholesterol 01/03/2025 169  <200 mg/dL Final    Triglycerides 01/03/2025 82  <150 mg/dL Final    Direct Measure HDL 01/03/2025 67  >=50 mg/dL Final    LDL Cholesterol Calculated 01/03/2025 86  <100 mg/dL Final    Non HDL Cholesterol 01/03/2025 102  <130 mg/dL Final    Patient Fasting > 8hrs? 01/03/2025 Yes   Final    RBC Urine 01/03/2025 None Seen  0-2 /HPF /HPF Final    WBC Urine 01/03/2025 None Seen  0-5 /HPF /HPF Final         Objective    /81 (BP Location: Left arm, Patient Position: Sitting, Cuff Size: Adult Large)   Pulse 71   Ht 1.715 m (5' 7.5\")   Wt 102.6 kg (226 lb 3.2 oz)   LMP 02/22/2024   SpO2 100%   BMI 34.91 kg/m      Physical Exam   GENERAL: alert and no distress  EYES: Eyes grossly normal to inspection.  No discharge or erythema, or obvious scleral/conjunctival abnormalities.  RESP: No audible wheeze, cough, or visible cyanosis.    SKIN: Visible skin clear. No significant rash, abnormal pigmentation or lesions.  NEURO: Cranial nerves grossly intact.  Mentation and speech appropriate for age.  PSYCH: Appropriate affect, tone, and pace of words      Sleeve " Gastrectomy: Risks and Side Effects    The complications or risks of surgery include but are not limited to: death, heart attack, infection in the surgical site (wound infection), abdomen (abscess), bladder (urinary tract infection), lungs (pneumonia), clots in legs (deep vein thrombosis) or lungs (pulmonary emboli),  injury to the bowels or other organs, bowel obstruction, hernia at the incision and gastrointestional bleeding.    More specific risks related to vertical sleeve gastrectomy were detailed at the bariatric informational seminar and include the following: leak at the vertical sleeve staple line, leak at the anastomoses,  nausea, vomiting, and dehydration for several months,  adhesions causing bowel obstruction, rapid weight loss causing a higher rate of gallstone formation during the first 6 months after surgery, decreased absorption of vitamins and protein because of the reduced stomach size, weight regain if inappropriate food intake occurs, stricture, injury to other organs, hernia,  and ulcers.       Side effects of bariatric surgery include but are not limited to: abdominal pain, cramping, bloating, constipation, nausea, vomiting, diarrhea, difficulty swallowing,  dehydration, hair loss, excess skin, protein, iron and vitamin deficiencies, heartburn, transfer of addictions, increased anxiety and worsening depression.       I emphasized exercise and activity behavior along with appropriate food choice as the main foundation for weight loss with surgery providing surgical reinforcement of the appropriate behavior set.        Review of general surgery weight loss process    1. Complete preoperative requirements, including weight loss.  Final weight check to confirm MANDATORY weight loss requirement must be documented on a clinic scale.    2. Discuss prior authorization with .    3. History and physical evaluation by PCP of PAC clinic within 30 days of surgery date, preoperative  class, and weight check (weigh-in visit) to be scheduled by patient.  Pre-anesthesia clinic for risk evaluation to be scheduled by anesthesia clinic.    4. We cannot guarantee that patient will qualify for surgery unless all preoperative requirements are met, prior authorization from primary insurance company is granted, and insurance changes do not occur.    5. It is possible for patients to regain all weight after weight loss surgery unless they follow guidelines prescribed by our bariatric center.    6. All patients with gastrointestinal complaints after weight loss surgery must have complaints conveyed to the bariatric team for appropriate treatment.    7. Vitamin deficiencies may develop post-bariatric surgery and annual laboratory testing should be performed.    8. Persistent nausea/vomiting after bariatric surgery entails risk of thiamine deficiency and should be treated early.  Vitamin B12 deficiency may develop, especially after gastric bypass surgery and must be recognized.        If you have any questions about our plans please don't hesitate to contact me.    Sincerely,    Irena Wilson PA-C      40 minutes spent by me on the date of the encounter doing chart review, history and exam, documentation and further activities per the note

## 2025-01-20 NOTE — LETTER
2025       RE: Katelyn Erickson  [address]  [facility] [address]     Dear Colleague,    Thank you for referring your patient, Katelyn Erickson, to the [facility] [place] at [facility]. Please see a copy of my visit note below.      Pre-Bariatric Surgery Note    Rose Phelps    Date: 2025     RE: Katelyn Erickson    MR#: [id number]   : 1990   Date of Visit: 2025    REASON FOR VISIT: Preoperative evaluation for possible weight loss surgery    Dear Rose Saha,    I had the pleasure of seeing your patient, Katelyn Erickson, in my preoperative bariatric clinic.    As you know, she has morbid obesity and is considering weight loss surgery to treat obesity in association with her medical conditions of obesity.  Her consult weight was 254lb.  She has lost 28 pounds since her consult weight. She has met her required pre-surgery weight. Please refer to initial consult note from date 2024 for patient's weight history and co-morbidities.    Assessment & Plan  Problem List Items Addressed This Visit       Class 2 obesity due to excess calories without serious comorbidity with body mass index (BMI) of 39.0 to 39.9 in adult     Last seen by Steffi Sánchez CNP on 10/11/2024. Continued Metformin and Topiramate. Since then she has continued to see steady weight loss through AOMs and great life style changes. She continues to see dietitian monthly.     Overall she is still interested in bariatric surgery, but wants to be 100% positive its the next best step. Still needs to establish with therapist.     While she is doing great with Metformin and Topiramate, she is interested in GLP-1 start. Was approved with starting with cancer risk provider with a hx of Jean syndrome. Rectocele is currently stable, with no symptoms. Will monitor very closely for GI side effects. No contraindications. Discussed side effects,  risks, and benefits. No hx of pancreatitis. No personal or family hx of MTC or MENII.         Relevant Medications    tirzepatide-Weight Management (ZEPBOUND) 2.5 MG/0.5ML prefilled pen    tirzepatide-Weight Management (ZEPBOUND) 5 MG/0.5ML prefilled pen (Start on 2/19/2025)    metFORMIN (GLUCOPHAGE XR) 500 MG 24 hr tablet    topiramate (TOPAMAX) 25 MG tablet    Other Relevant Orders    Med Therapy Management Referral    NAFLD (nonalcoholic fatty liver disease)    Relevant Medications    tirzepatide-Weight Management (ZEPBOUND) 2.5 MG/0.5ML prefilled pen    tirzepatide-Weight Management (ZEPBOUND) 5 MG/0.5ML prefilled pen (Start on 2/19/2025)    metFORMIN (GLUCOPHAGE XR) 500 MG 24 hr tablet      Start Zepbound 2.5mg once weekly for 4 weeks, then increase to 5.0mg once weekly. Consider Wegovy and Saxenda as needed.   Continue Topiramate 75mg at night. Refills sent   Continue Metformin 1000mg daily. Refills sent   Establish with therapist   MTM pharmacist in 6 weeks if GLP-1 is started for close monitoring of side effects.   Continue to see dietitian monthly   Steffi Sánchez CNP or Irena Tavares PA-C in 3 months       Reviewed tasklist with patient   PCP - needed - talked to PCP, who thought it was sent   Therapist - needed   Psych Eval - completed 9/2024  Dr. Hurley - needed.     Had a hysterectomy 3/2024. Post op complication of rectocele and will eventually need corrective surgery. Is still interested in bariatric surgery and continuing this process. However, wants to wait till at least summer. Overall wants to make sure it is really her next best step.     Topiramate + Metformin - continues to be helpful with weight loss. No side effects. Has continued to see slow, but consistent weight loss.   Wellbutrin - just increased to 300mg with PCP to help with depression symptoms. Does think it has been helpful.     Would like to start a GLP-1 today if covered by insurance. Talked to cancer provider with concerns of unknown MTC  "risk and her personal hx of Jean syndrome. They are ok with her starting GLP-1, do not believe she is at increase risk. Rectocele post hysterectomy - no current symptoms. Went through pelvic floor PT.     Has been tracking more food. Getting in more protein and fiber. Drinking \"tons\" of water     Activity - active around the house and school. 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     NO ACTIVE PROBLEMS        Past Surgical History:   Procedure Laterality Date     COLONOSCOPY  1/23/2012    Procedure:COLONOSCOPY; COLONOSCOPY; Surgeon:JAKOB PETERS; Location: GI     COLONOSCOPY       COLONOSCOPY N/A 9/8/2021    Procedure: COLONOSCOPY;  Surgeon: Joseph Flores MD;  Location:  GI     COLONOSCOPY N/A 11/12/2024    Procedure: Colonoscopy;  Surgeon: Emeka Flores MD;  Location: [facility] OR     ESOPHAGOSCOPY, GASTROSCOPY, DUODENOSCOPY (EGD), COMBINED N/A 9/8/2021    Procedure: ESOPHAGOGASTRODUODENOSCOPY (EGD);  Surgeon: Joseph Flores MD;  Location:  GI     LAPAROSCOPIC HYSTERECTOMY TOTAL, SALPINGECTOMY N/A 3/11/2024    Procedure: TOTAL LAPAROSCOPIC HYSTERECTOMY, WITH BILATERAL SALPINGECTOMY. PELVIC WASHINGS;  Surgeon: Elida Gutierres MD;  Location: [facility] OR     NO HISTORY OF SURGERY         Current Outpatient Medications   Medication Sig Dispense Refill     buPROPion (WELLBUTRIN XL) 300 MG 24 hr tablet Take 1 tablet (300 mg) by mouth every morning. 90 tablet 1     metFORMIN (GLUCOPHAGE XR) 500 MG 24 hr tablet Take 2 tablets (1,000 mg) by mouth daily with food. 60 tablet 4     tirzepatide-Weight Management (ZEPBOUND) 2.5 MG/0.5ML prefilled pen Inject 0.5 mLs (2.5 mg) subcutaneously " every 7 days. For 4 weeks 2 mL 0     [START ON 2/19/2025] tirzepatide-Weight Management (ZEPBOUND) 5 MG/0.5ML prefilled pen Inject 0.5 mLs (5 mg) subcutaneously every 7 days. After completing 4 weeks of 2.5mg dose 2 mL 2     topiramate (TOPAMAX) 25 MG tablet Take 3 tablets (75 mg) by mouth at bedtime. 270 tablet 1     No current facility-administered medications for this visit.       No Known Allergies    Office Visit on 01/03/2025   Component Date Value Ref Range Status     Final Diagnosis 01/03/2025    Final                    Value:Specimen A     Interpretation:      Negative for High Grade Urothelial Carcinoma     Other Findings:      Crystals present.     Adequacy:     Satisfactory for evaluation           Clinical Information 01/03/2025    Final                    Value:34-year-old woman with MLH1 mutation        Gross Description 01/03/2025    Final                    Value:A(A). Urine, Clean Catch, :A. Urine, Clean Catch, , Urine Cytology:  Received 30 ml of cloudy, yellow fluid, processed as 1 Pap stained Autocyte.'         Microscopic Description 01/03/2025    Final                    Value:A microscopic examination was performed.        Performing Labs 01/03/2025    Final                    Value:The technical component of this testing was completed at [facility].     Stain controls for all stains resulted within this report have been reviewed and show appropriate reactivity.        Color Urine 01/03/2025 Yellow  Colorless, Straw, Light Yellow, Yellow Final     Appearance Urine 01/03/2025 Clear  Clear Final     Glucose Urine 01/03/2025 Negative  Negative mg/dL Final     Bilirubin Urine 01/03/2025 Negative  Negative Final     Ketones Urine 01/03/2025 Negative  Negative mg/dL Final     Specific Gravity Urine 01/03/2025 >=1.030  1.003 - 1.035 Final     Blood Urine 01/03/2025 Negative  Negative Final     pH Urine 01/03/2025 6.0  5.0 -  "7.0 Final     Protein Albumin Urine 01/03/2025 Negative  Negative mg/dL Final     Urobilinogen Urine 01/03/2025 0.2  0.2, 1.0 E.U./dL Final     Nitrite Urine 01/03/2025 Negative  Negative Final     Leukocyte Esterase Urine 01/03/2025 Negative  Negative Final     Sodium 01/03/2025 139  135 - 145 mmol/L Final     Potassium 01/03/2025 4.0  3.4 - 5.3 mmol/L Final     Chloride 01/03/2025 106  98 - 107 mmol/L Final     Carbon Dioxide (CO2) 01/03/2025 25  22 - 29 mmol/L Final     Anion Gap 01/03/2025 8  7 - 15 mmol/L Final     Urea Nitrogen 01/03/2025 11.4  6.0 - 20.0 mg/dL Final     Creatinine 01/03/2025 0.79  0.51 - 0.95 mg/dL Final     GFR Estimate 01/03/2025 >90  >60 mL/min/1.73m2 Final    eGFR calculated using 2021 CKD-EPI equation.     Calcium 01/03/2025 10.1  8.8 - 10.4 mg/dL Final    Reference intervals for this test were updated on 7/16/2024 to reflect our healthy population more accurately. There may be differences in the flagging of prior results with similar values performed with this method. Those prior results can be interpreted in the context of the updated reference intervals.     Glucose 01/03/2025 94  70 - 99 mg/dL Final     Patient Fasting > 8hrs? 01/03/2025 Yes   Final     Cholesterol 01/03/2025 169  <200 mg/dL Final     Triglycerides 01/03/2025 82  <150 mg/dL Final     Direct Measure HDL 01/03/2025 67  >=50 mg/dL Final     LDL Cholesterol Calculated 01/03/2025 86  <100 mg/dL Final     Non HDL Cholesterol 01/03/2025 102  <130 mg/dL Final     Patient Fasting > 8hrs? 01/03/2025 Yes   Final     RBC Urine 01/03/2025 None Seen  0-2 /HPF /HPF Final     WBC Urine 01/03/2025 None Seen  0-5 /HPF /HPF Final         Objective   /81 (BP Location: Left arm, Patient Position: Sitting, Cuff Size: Adult Large)   Pulse 71   Ht 1.715 m (5' 7.5\")   Wt 102.6 kg (226 lb 3.2 oz)   LMP 02/22/2024   SpO2 100%   BMI 34.91 kg/m      Physical Exam   GENERAL: alert and no distress  EYES: Eyes grossly normal to " inspection.  No discharge or erythema, or obvious scleral/conjunctival abnormalities.  RESP: No audible wheeze, cough, or visible cyanosis.    SKIN: Visible skin clear. No significant rash, abnormal pigmentation or lesions.  NEURO: Cranial nerves grossly intact.  Mentation and speech appropriate for age.  PSYCH: Appropriate affect, tone, and pace of words      Sleeve Gastrectomy: Risks and Side Effects    The complications or risks of surgery include but are not limited to: death, heart attack, infection in the surgical site (wound infection), abdomen (abscess), bladder (urinary tract infection), lungs (pneumonia), clots in legs (deep vein thrombosis) or lungs (pulmonary emboli),  injury to the bowels or other organs, bowel obstruction, hernia at the incision and gastrointestional bleeding.    More specific risks related to vertical sleeve gastrectomy were detailed at the bariatric informational seminar and include the following: leak at the vertical sleeve staple line, leak at the anastomoses,  nausea, vomiting, and dehydration for several months,  adhesions causing bowel obstruction, rapid weight loss causing a higher rate of gallstone formation during the first 6 months after surgery, decreased absorption of vitamins and protein because of the reduced stomach size, weight regain if inappropriate food intake occurs, stricture, injury to other organs, hernia,  and ulcers.       Side effects of bariatric surgery include but are not limited to: abdominal pain, cramping, bloating, constipation, nausea, vomiting, diarrhea, difficulty swallowing,  dehydration, hair loss, excess skin, protein, iron and vitamin deficiencies, heartburn, transfer of addictions, increased anxiety and worsening depression.       I emphasized exercise and activity behavior along with appropriate food choice as the main foundation for weight loss with surgery providing surgical reinforcement of the appropriate behavior set.        Review of  general surgery weight loss process    1. Complete preoperative requirements, including weight loss.  Final weight check to confirm MANDATORY weight loss requirement must be documented on a clinic scale.    2. Discuss prior authorization with .    3. History and physical evaluation by PCP of PAC clinic within 30 days of surgery date, preoperative class, and weight check (weigh-in visit) to be scheduled by patient.  Pre-anesthesia clinic for risk evaluation to be scheduled by anesthesia clinic.    4. We cannot guarantee that patient will qualify for surgery unless all preoperative requirements are met, prior authorization from primary insurance company is granted, and insurance changes do not occur.    5. It is possible for patients to regain all weight after weight loss surgery unless they follow guidelines prescribed by our bariatric center.    6. All patients with gastrointestinal complaints after weight loss surgery must have complaints conveyed to the bariatric team for appropriate treatment.    7. Vitamin deficiencies may develop post-bariatric surgery and annual laboratory testing should be performed.    8. Persistent nausea/vomiting after bariatric surgery entails risk of thiamine deficiency and should be treated early.  Vitamin B12 deficiency may develop, especially after gastric bypass surgery and must be recognized.        If you have any questions about our plans please don't hesitate to contact me.    Sincerely,    Irena Wilson PA-C      40 minutes spent by me on the date of the encounter doing chart review, history and exam, documentation and further activities per the note      Again, thank you for allowing me to participate in the care of your patient.      Sincerely,    Irena Wilson PA-C

## 2025-01-20 NOTE — NURSING NOTE
"(   Chief Complaint   Patient presents with    Follow Up     Return MW    )    ( Weight: 102.6 kg (226 lb 3.2 oz) )  ( Height: 171.5 cm (5' 7.5\") )  ( BMI (Calculated): 34.9 )  (   )  ( Cumulative weight loss (lbs): 28 )  ( Last Visits Weight: 102.1 kg (225 lb) )  ( Wt change since last visit (lbs): 1.2 )  ( Waist Circumference (cm): 113 cm )  (   )    ( BP: 113/81 )  (   )  (   )  (   )  ( Pulse: 71 )  (   )  ( SpO2: 100 % )    (   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    FH: colon cancer in relative <50 years old    Gestational diabetes mellitus (GDM) affecting pregnancy, antepartum    Jean syndrome    Adenomatous polyp of colon, unspecified part of colon    Class 2 obesity due to excess calories without serious comorbidity with body mass index (BMI) of 39.0 to 39.9 in adult    Anxiety    Moderate episode of recurrent major depressive disorder (H)    Polyp of colon    NAFLD (nonalcoholic fatty liver disease)    S/P hysterectomy    )  (   Current Outpatient Medications   Medication Sig Dispense Refill    buPROPion (WELLBUTRIN XL) 300 MG 24 hr tablet Take 1 tablet (300 mg) by mouth every morning. 90 tablet 1    metFORMIN (GLUCOPHAGE XR) 500 MG 24 hr tablet Take 2 tablets (1,000 mg) by mouth daily with food. 60 tablet 4    topiramate (TOPAMAX) 25 MG tablet Take 3 tablets (75 mg) by mouth at bedtime. 270 tablet 1    )  ( Diabetes Eval:    )    ( Pain Eval:  No Pain (0) )    ( Wound Eval:       )    (   History   Smoking Status    Former    Types: Cigarettes   Smokeless Tobacco    Never    )    ( Signed By:  Jackie Abdullahi, EMT January 20, 2025; 1:21 PM )    "

## 2025-01-21 NOTE — ASSESSMENT & PLAN NOTE
Last seen by Steffi Sánchez CNP on 10/11/2024. Continued Metformin and Topiramate. Since then she has continued to see steady weight loss through AOMs and great life style changes. She continues to see dietitian monthly.     Overall she is still interested in bariatric surgery, but wants to be 100% positive its the next best step. Still needs to establish with therapist.     While she is doing great with Metformin and Topiramate, she is interested in GLP-1 start. Was approved with starting with cancer risk provider with a hx of Jean syndrome. Rectocele is currently stable, with no symptoms. Will monitor very closely for GI side effects. No contraindications. Discussed side effects, risks, and benefits. No hx of pancreatitis. No personal or family hx of MTC or MENII.

## 2025-01-21 NOTE — PATIENT INSTRUCTIONS
Visit Plan:     Start Zepbound 2.5mg once weekly for 4 weeks, then increase to 5.0mg once weekly. Consider Wegovy and Saxenda as needed.   Continue Topiramate 75mg at night. Refills sent   Continue Metformin 1000mg daily. Refills sent   Establish with therapist   MTM pharmacist in 6 weeks if GLP-1 is started for close monitoring of side effects.   Continue to see dietitian monthly   Steffi Sánchez CNP or Irena Tavares PA-C in 3 months

## 2025-01-23 NOTE — TELEPHONE ENCOUNTER
Prior Authorization Approval    Medication: ZEPBOUND 2.5 MG/0.5ML SC SOAJ  Authorization Effective Date: 1/20/2025  Authorization Expiration Date: 7/20/2025  Approved Dose/Quantity: 2ml per 28 days  Reference #: [id number]   Insurance Company: [employer] ([facility]) - Phone [phone] Fax [phone]  Expected CoPay: $    CoPay Card Available:      Financial Assistance Needed:   Which Pharmacy is filling the prescription:    Pharmacy Notified:   Patient Notified:

## 2025-02-11 ENCOUNTER — CARE COORDINATION (OUTPATIENT)
Dept: ENDOCRINOLOGY | Facility: CLINIC | Age: 35
End: 2025-02-11
Payer: COMMERCIAL

## 2025-02-11 NOTE — PROGRESS NOTES
Tasklist updated and sent to patient via [employer].    Bariatric Task List  Fax:  Please fax all paperwork to: [phone] -     Status:  Is patient a candidate for bariatric surgery?:  patient is a candidate for bariatric surgery -     Cleared to schedule surgeon consult?:  cleared to schedule surgeon consult - Call [phone] to meet with Dr Hurley. To see surgeon within 6 months of surgery date. bks   Status:  surgery evaluation in process -     Surgeon: Xavi -     Tentative surgery month/year: Summer 2025, one month after all tasks are done. -        Insurance: Insurance:  [facility] -      Contact insurance to discuss coverage: Needed -          Patient Info: Initial Weight:  254 -     Date of Initial Weight/Height:  2/20/2024 -     Goal Weight (lbs):  244 -     Required Weight Loss:  10 -     Surgery Type:  sleeve gastrectomy -        Dietician Visits: Structured weight loss required by insurance?:  structured weight loss required - At least 6 consecutive months within a year of the surgery date. [facility]   Dietician Visit 1:  Completed - 2/23/24-    Dietician Visit 2:  Completed - 3/18/24 [facility]   Dietician Visit 3:  Completed - 4/17/24-    Dietician Visit 4:  Completed - 5/22/24 bks   Dietician Visit 5:  Completed - 6/28/24-    Dietician Visit 6:  Completed - 7/23/24-    Dietician Visit additional:  Needed - monthly until surgery   Clearance from dietician to see surgeon?:    -     Dietician Notes:  RD cont: 8/29/24, 9/30/24, 10/22/24,  11/22/24, 12/20/24, 1/16/25, 2/14/25, 3/17/25 appt. -        Psychological Evaluation: Psych eval:  Completed - letter sent to pt; 9/18/24Dr Viera clears for surgery. Need to follow recommendations. Establish care with a therapist, etc. [facility]   Therapist letter of support:  Needed - Need letter of support from therapist. [facility]   Establish care with therapist:  Needed - Dr Viera placed referral to [facility] to treat social anxiety , ph: [phone]. [facility]  "  Other:  Anticipate at least 3 visits with a therapist \"to establish care\". -  Insurance will check to see that you followed recommendations from Dr Viera. bks      Lab Work: Complete Blood Count:  Completed - 2/20/24   Comprehensive Metabolic Panel:  Completed - 2/20/24   Vitamin D:  Completed - 2/20/24   PTH:  Completed - 2/20/24   Hgb A1c:  Completed - 2/20/24    Lipids: Completed - 2/20/24    TSH ([facility], [facility], [place] MA): Completed - 2/20/24   Vitamin A: Completed - 2/20/24   Vitamin B12: Completed - 2/20/24   Other:    - Labs need to be done within 1 year of the surgery date. Can re-order if needed. bks      PCP: Establish care with PCP:  Completed -     Follow up with PCP:    -     PCP letter of support:  Completed - letter sent to pt RR; 1/21/25 Ltr in Epic from Rose Phelps. bks      Patient Education:  Information Session:  Completed -     Attended New Consult Class?: Completed - 2/27/24 - AS   Given \"Making your decision\" handout?:  Yes -     Given \"A Roadmap to you Weight Loss Surgery\" handout?: Yes -     Given \"Epic Care Companion\" information?: Yes -     Attended support group?:  Needed -     Support plan in place?:  Completed -  and MIL. bks      Final Tasks to complete after surgery date is determined:  Before surgery online preop class:  Needed -     Nurse visit for information:  Needed -     Weight Check: Needed -     History and Physical: Pre-Assessment Clinic (PAC) -   Needed -     Final labs per clinic: Needed - Ordered at PAC visit. bks   Schedule postop appointments: Needed -     Other:   -   -        Notes: Please register for the Weight Loss Surgery Care Companion Pathway through the [employer] mobile lolis or via web browser. You register by answering \"yes\" to the following question, \"Do you agree to take part in this free, online program?\"  Information from this Pathway can help you be more successful before surgery and for up to one year after surgery.  This Pathway through " [employer] can also answer questions you may have about your surgery.   The Pathway will start when you get your Tasklist after your initial consultation or when your surgery is scheduled.   -

## 2025-03-10 ENCOUNTER — VIRTUAL VISIT (OUTPATIENT)
Dept: PHARMACY | Facility: CLINIC | Age: 35
End: 2025-03-10
Payer: COMMERCIAL

## 2025-03-10 VITALS — HEIGHT: 68 IN | BODY MASS INDEX: 31.98 KG/M2 | WEIGHT: 211 LBS

## 2025-03-10 DIAGNOSIS — F41.9 ANXIETY: ICD-10-CM

## 2025-03-10 DIAGNOSIS — K76.0 NAFLD (NONALCOHOLIC FATTY LIVER DISEASE): ICD-10-CM

## 2025-03-10 DIAGNOSIS — E66.811 CLASS 1 OBESITY WITH SERIOUS COMORBIDITY AND BODY MASS INDEX (BMI) OF 32.0 TO 32.9 IN ADULT, UNSPECIFIED OBESITY TYPE: Primary | ICD-10-CM

## 2025-03-10 DIAGNOSIS — F33.1 MODERATE EPISODE OF RECURRENT MAJOR DEPRESSIVE DISORDER (H): ICD-10-CM

## 2025-03-10 NOTE — Clinical Note
Yelena MAGAÑA- what are your thoughts on her tapering off topiramate to see how she does on metformin + zepbound? She has pretty strong appetite suppression the first few days after an injection and feels fatigued so zepbound may be strong enough without topiramate. She'll continue the 5 mg/week dose another month at least. Tolerating from a GI standpoint. Let me know. Thanks!

## 2025-03-10 NOTE — NURSING NOTE
Current patient location:  at work in [place], [place]    Is the patient currently in the state of [place]? YES    Visit mode:VIDEO    If the visit is dropped, the patient can be reconnected by: VIDEO VISIT: Text to cell phone:   Telephone Information:   Mobile [phone]       Will anyone else be joining the visit? NO  (If patient encounters technical issues they should call [phone]    Are changes needed to the allergy or medication list? No    Are refills needed on medications prescribed by this physician? NO    Reason for visit: Medication Therapy Management    Camille Bartholomew VVF

## 2025-03-10 NOTE — PROGRESS NOTES
Medication Therapy Management (MTM) Encounter    ASSESSMENT:                            Medication Adherence/Access: No issues identified.    Weight Management /NAFLD:  Weight loss progressing since starting Zepbound. Metformin can sometimes contribute to loose stools- continue to monitor. Could try tapering off topiramate to see if she feels less tired. Would like to continue Zepbound 5 mg/week for at least another month and then can try increasing to 7.5 mg/week if fatigue and strong appetite suppression the first few days improves. Could consider tapering off topiramate to see if she has more energy.    Mental Health   Depression/anxiety:   stable    PLAN:                            Continue Zepbound 5 mg once weekly for 5 more weeks. If fatigue and strong appetite suppression improves, can increase to 7.5 mg once weekly. Otherwise, continue 5 mg once weekly.    Discuss tapering off topiramate with Irena Wilson PA-C    Follow-up: 6 weeks with Steffi Sánchez CNP, 3 months with medication therapy management     SUBJECTIVE/OBJECTIVE:                          Katelyn Erickson is a 34 year old female seen for an initial visit. She was referred to me from Irena Wilson PA-C.     Reason for visit: weight management follow-up.    Allergies/ADRs: Reviewed in chart  Past Medical History: Reviewed in chart  Tobacco: She reports that she has quit smoking. Her smoking use included cigarettes. She has been exposed to tobacco smoke. She has never used smokeless tobacco.  Alcohol: no  Has 3 kids.   Medication Adherence/Access: no issues reported.    Weight Management /NAFLD:  Zepbound 5 mg once weekly - 3 doses so far on Fridays  Topiramate 75 mg once daily at bedtime   Metformin ER 1000 mg once daily     Following with Irena Wilson PA-C for weight management. Was initially interested in bariatric surgery but now not sure if she wants surgery due to concerns about having long recovery. Feels tired a few days after each  "Zepbound injection. Zepbound reduces appetite at the beginning of the week and then increases the second half of the week. Diarrhea a few days after increasing Zepbound to 5 mg/week.    Nutrition/Eating Habits: during the week has five small meals per week. Not very hungry on Saturdays and Sundays. Feels like she has to force herself to eat on the weekend. Water: over 90 ounces per day.    Exercise/Activity: active lifestyle walking for her job. Three kids at home. Walking dogs. Yoga.    Medications Tried/Failed:  No other medications.     Initial Consult Weight: 254 lbs 3.2 ounces     Current weight today: 211 lbs 0 oz  Cumulative Weight Loss: -43 lb, -17% from baseline    Wt Readings from Last 4 Encounters:   03/10/25 211 lb (95.7 kg)   02/14/25 218 lb 14.4 oz (99.3 kg)   01/20/25 226 lb 3.2 oz (102.6 kg)   01/16/25 225 lb (102.1 kg)     Estimated body mass index is 32.54 kg/m  as calculated from the following:    Height as of this encounter: 5' 7.52\" (1.715 m).    Weight as of this encounter: 211 lb (95.7 kg).      Mental Health   Depression/anxiety:   Bupropion  mg once daily   Patient reports no current medication side effects.  Patient reports symptoms are improved. Increased the dose in January.          Today's Vitals: Ht 5' 7.52\" (1.715 m)   Wt 211 lb (95.7 kg)   LMP 02/22/2024   BMI 32.54 kg/m    ----------------    I spent 18 minutes with this patient today. All changes were made via collaborative practice agreement with Irena Wilson PA-C    A summary of these recommendations was sent via [employer].    Telemedicine Visit Details  The patient's medications can be safely assessed via a telemedicine encounter.  Type of service:  Video Conference via [employer]  Originating Location (pt. Location): Home    Distant Location (provider location):  Off-site  Start Time: 2:38 PM  End Time:  2:56 PM     Medication Therapy Recommendations  Class 1 obesity with serious comorbidity and body mass index (BMI) of " 32.0 to 32.9 in adult, unspecified obesity type   1 Current Medication: topiramate (TOPAMAX) 25 MG tablet   Current Medication Sig: Take 3 tablets (75 mg) by mouth at bedtime.   Rationale: More effective medication available - Ineffective medication - Effectiveness   Recommendation: Discontinue Medication   Status: Accepted per CPA   Identified Date: 3/10/2025 Completed Date: 3/11/2025

## 2025-03-10 NOTE — PATIENT INSTRUCTIONS
"Recommendations from today's MTM visit:                                                      Continue Zepbound 5 mg once weekly for 5 more weeks. If fatigue and strong appetite suppression improves, can increase to 7.5 mg once weekly. Otherwise, continue 5 mg once weekly.    Discuss tapering off topiramate with Irena Wilson PA-C    Follow-up: 6 weeks with Steffi Sánchez CNP, 3 months with medication therapy management     It was great speaking with you today.  I value your experience and would be very thankful for your time in providing feedback in our clinic survey. In the next few days, you may receive an email or text message from [employer] with a link to a survey related to your  clinical pharmacist.\"     To schedule another MTM appointment, please call the clinic directly or you may call the MTM scheduling line at [phone].    My Clinical Pharmacist's contact information:                                                      Please feel free to contact me with any questions or concerns you have.      Katelyn Pagan, PharmD, AAHIVP  Medication Therapy Management Pharmacist      "

## 2025-03-11 NOTE — PROGRESS NOTES
Okay to taper off topiramate per Irena Wilson PA-C    Could try Vitamin B12 500mcg once daily to help with fatigue on GLP-1 RA if tapering off topiramate doesn't help.     Katelyn Pagan, PharmD, AAHIVP  Medication Therapy Management Pharmacist

## 2025-03-14 NOTE — PROGRESS NOTES
"Phone-Visit Details    Type of service:  Phone Visit    Phone call duration: 14 minutes    Distant Location (provider location):  Offsite (providers home) [facility] [place]       Return Bariatric Nutrition Consultation Note    Reason For Visit: Nutrition Assessment    Katelyn Erickson is a 34 year old presenting today for return bariatric nutrition consult.   Patient is interested in weight loss surgery with Dr. Hurley expected surgery in TBD.  Patient is accompanied by self.  This is patient's 13th of 6 required nutrition visits prior to surgery. Patient attended the [facility] nutrition class on 2/23/24.     Pt referred by Steffi Sánchez NP  on February 20, 2024.     CO-MORBIDITIES OF OBESITY INCLUDE:        2/15/2024     2:47 PM   --   I have the following health issues associated with obesity None of the above     SUPPORT:      2/15/2024     2:47 PM   Support System Reviewed With Patient   Who do you have in your support network that can be available to help you for the first 2 weeks after surgery?  and MIL   Who can you count on for support throughout your weight loss surgery journey?  and MIL     ANTHROPOMETRICS:  Initial consult weight: 254 lb on 2/20/24   Estimated body mass index is 32.69 kg/m  as calculated from the following:    Height as of this encounter: 1.715 m (5' 7.52\").    Weight as of this encounter: 96.2 kg (212 lb).  Current Weight: 212 lb per pt report     Required weight loss goal pre-op: -10 lbs from initial consult weight (goal weight 244 lbs or less before surgery) - met        2/15/2024     2:47 PM   --   I have tried the following methods to lose weight Watching portions or calories    Exercise    Weight Watchers           2/15/2024     2:47 PM   Weight Loss Questions Reviewed With Patient   How long have you been overweight? Since early childhood     MEDICATION FOR WEIGHT LOSS:   Metformin   Zepbound 5 mg     VITAMIN/MINERAL SUPPLEMENTS: " Biotin    NUTRITION HISTORY:  Food allergies: NKFA  Food intolerances: none   Previous experience with diet modification for weight loss: weight watchers and exercise   Barriers to lifestyle change: reports none     Per Steffi's note: Patient describes significant thoughts about food and cravings in the evenings with limited hunger during the day.     Patient herself is trying to eat less meat. Trying to increase her vegetables. No overnight eating    Recent Diet Recall:  Breakfast: coffee with Premier Protein   Lunch: 11 AM if at home, scrambled eggs with cottage and spinach with English muffin or toast. If goes into office will eat at 1 or 2 pm, Leftovers   Dinner: Tacos, spaghetti, chicken. Protein + vegetable + carb.   Snacks: After the kids go to bed - crackers and Nutella  Beverages: Water, soda seldomly.   Alcohol: None for the most part, seldomly   Dining Out: Not very often. Twice a month.     Patient recently had a total laparoscopic hysterectomy on 3/11/24.     April 2024: Downloaded the mark [employer] to track her nutrition.  Helps to monitor her protein amount. Plans to practice  liquids from meal times and chew her food really well to an applesauce consistency. Continues to work on tasklist items needed for surgery. Needs more clarification around if she needs the sleep medicine consult.     May 2024: Focusing more on fiber intake and is drinking a lot of water. For breakfast started incorporating overnight oats with protein and shaun seeds. Has been tracking nutrition in [employer]. Continues to practice  liquids from meal times. Has appointment with Steffi in June and psych visits in July.     June 2024: Met with Steffi earlier this month. Started taking Metformin since the beginning of June. Hasn't noticed a change in her weight this past month. Stopped taking Senna so often. Lives a busy lifestyle. Recently started pelvic floor physical activity. Struggles with  liquids from  meal times, especially before a meal. Has slowed down with eating and trying to take her time. Averaging around 80 grams of protein with most meals. Has psych visits this next month.     2024: Had her first psych visit out of a total of 3.  Has been focusing on protein and fiber in her meals. Doing Pelvic floor physical therapy and also doing good with other physical activity by staying busy with her kids. Still struggles with  liquids prior to her meal but is aware and working on it.     2024: Has her last psych visit for the middle of September.  Busy August, still was able to stay focused on her nutrition though. Hasn't thought anymore about the surgery process. Focusing on her fiber and protein intake throughout the day.     Breakfast will be overnight oats if she is prepared (18 g of fiber and 40-45 g protein). Coffee with premier protein as the creamer. Lunch is normally later in the day around 1 or 2 pm 0 cucumber/chickpea/feta/salad may add some chicken to it. Dinner protein + greens.     Walking more often, about 4x a week for 30 minutes and goes about 2 miles.     Patient mentioned that her psych provider was questioning if she was eating enough. Provided patient with a calorie goal to ensure she is eating enough while still working on her nutrition goals.     2024: On , patient got her psych letter of support. The month of September was very busy for the patient with starting a new job and other things she had going on. She was not able to track her nutrition. However still focusing on good amounts of protein and fiber in her diet.      Patient continues to report she plans to wait to schedule bariatric surgery. She is wanting to know more about when some of things she has been working on would  before she would have to re-do some things.     2024: Has a colonoscopy next week.  Plans on doing surgery this coming summer when she has more time off  from work. Patient's mother in law and grandmother in law have moved out of their house recently and that has been a big adjustment as it has kept her and family very busy. Prepping meals ahead of time. Eating a smaller lunch to avoid getting too tired after lunch. Getting in a lot of steps at work.     November 2024: Had a colonoscopy recently this month - everything turned out well.  Continues to work on lifestyle changes necessary for surgery. Has been working on  liquids from meal times. Still meal prepping her lunches for work. Finds it a little harder to make good nutritious meals for dinner now that her mother in law and grandmother in law moved out but trying to get back on track.     Physical activity - getting in a lot of steps at work and then has a busy life after work.     December 2024: Still doing Metformin and Topiramate. Doing well with her nutrition. Trying to make good choices during the holiday season. Continues to work on lifestyle and diet changes for surgery. Has some appointments coming up at the first of the year.     Doing well with physical activity - getting in a lot of steps, averaging at least [id number] steps at work     January 2025: Patient reports things are going well with her nutrition over the last month. Has been tracking her intake over the last couple of weeks to check in with herself on how she is doing with both her protein and fiber intake.  Getting in a lot of steps while at work and when doing activities with kids. Still being mindful of separation of liquids from meal times.     Has a follow up with JAS Omalley next week.   Still need clearance letter from PCP.     February 2025:  Met with JAS Omalley last week for a follow up. Patient decided that she would like to start a GLP-1 medication. Has been taking Zepbound for the last 3 weeks. Feels this has been going really well. Does notice that the day after injection she is not hungry at all and will  be very fatigued but knows she needs to continue to do her best with eating. Tries to make sure she is taking in some protein. Talked about trying snack sized meals every couple of hours on those days.     March 2025: Has increased her Zepbound dose to 5 mg. Has been tapering off the Topiramate medication.  Has been focusing on getting water, fiber and protein in her diet.  Has not been skipping meals. Eating multiple small meals per day. Started taking Vitamin B12 500 mcg to see if it helps with fatigue. Talked about some protein options for those days right after the injection that are a little harder to get in adequate nutrition.     Started taking Biotin supplement for hair loss.         2/15/2024     2:47 PM   Recall Diet Questions Reviewed With Patient   Describe what you typically consume for breakfast (typical or most recent) Scrambled eggs english muffine   How many ounces of water, or other low calorie drinks, do you drink daily (8 oz=1 glass)? 64 oz or more   How many ounces of caffeine (coffee, tea, pop) do you drink daily (8 oz=1 glass)? 16 oz   How many ounces of carbonated (pop, beer, sparkling water) drinks do you drinky daily (8 oz=1 glass)? 0 oz   How many ounces of juice, pop, sweet tea, sports drinks, protein drinks, other sweetened drinks, do you drink daily (8 oz=1 glass)? 0 oz   How many ounces of milk do you drink daily (8 oz=1 glass) 0 oz   How often do you drink alcohol? Monthly or less   If you do drink alcohol, how many drinks might you have in a day? (one drink = 5 oz. wine, 1 can/bottle of beer, 1 shot liquor) 1 or 2           2/15/2024     2:47 PM   Eating Habits   What foods do you crave? Sweets           2/15/2024     2:47 PM   Dining Out History Reviewed With Patient   How often do you dine out? Rarely.   Where do you dine out? (select all that apply) fast food chains   What types of food do you order when you dine out? Hamburger     EXERCISE: Busy with kids activities.          6/4/2024     3:39 PM   --   How often do you exercise? Never   What keeps you from being more active? 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   Patient has 3 kids ages 10, 7, 5.   Aunt had bariatric surgery.     NUTRITION DIAGNOSIS:  Obesity r/t long history of positive energy balance aeb BMI >30 kg/m2.    INTERVENTION:  Intervention Provided/Education Provided on post-op diet guidelines, vitamins/minerals essential post-operatively, GI anatomy of bariatric surgeries, ways to help prepare for post-op diet guidelines pre-operatively, portion/calorie-control, mindful eating and sources of protein.  Patient demonstrates understanding.     Personal barriers to making and continuing required life changes have been identified, and strategies to overcome those barriers have been recommended AND family and social supports have been assessed and strategies to strengthen those supports have been recommended.    Provided pt with list of goals, RD contact information and resources listed below via [employer]           2/15/2024     2:47 PM   Questions Reviewed With Patient   How ready are you to make changes regarding your weight? Number 1 = Not ready at all to make changes up to 10 = very ready. 7   How confident are you that you can change? 1 = Not confident that you will be successful making changes up to 10 = very confident. 7     Expected Engagement: good    GOALS:  Relating To Eating:  Eat slowly (20-30 minutes per meal), chewing foods well (25 chews per bite/applesauce consistency)  Eat 3 meals per day  Consume 60-90 g protein per day    Relating to beverages:  Reduce caffeine/carbonation/calorie containing beverages  Separate fluids from meals by 30 minutes before, during, and after eating  Drink 48-64 ounces of fluid per day    Relating to activity:  Increase activity as  able    Post-op Diet Handouts:  Diet Guidelines after Weight-loss Surgery  [email or web address]     Your Stage 1 Diet: Clear Liquids  [email or web address]     Your Stage 2 Diet: Low-fat Full Liquids  [email or web address]     Your Stage 3 Diet: Pureed Foods  [email or web address]     Pureed Recipes  [email or web address]    Your Stage 4 Diet: Soft Foods  [email or web address]    Your Stage 5 Diet: Regular Foods  [email or web address]    Supplements after Sleeve Gastrectomy, Gastric Bypass or Single Anastomosis Duodenal Switch  [email or web address]    Keeping Track of Fluids  [email or web address]    Follow Up: April 28.      Time spent with patient: 14 minutes.  Tina Montenegro RD, LD

## 2025-03-17 ENCOUNTER — VIRTUAL VISIT (OUTPATIENT)
Dept: ENDOCRINOLOGY | Facility: CLINIC | Age: 35
End: 2025-03-17
Payer: COMMERCIAL

## 2025-03-17 VITALS — HEIGHT: 68 IN | BODY MASS INDEX: 32.13 KG/M2 | WEIGHT: 212 LBS

## 2025-03-17 DIAGNOSIS — Z71.3 NUTRITIONAL COUNSELING: Primary | ICD-10-CM

## 2025-03-17 DIAGNOSIS — E66.9 OBESITY: ICD-10-CM

## 2025-03-17 PROCEDURE — 99207 PR NO CHARGE LOS: CPT | Mod: 93

## 2025-03-17 PROCEDURE — 97803 MED NUTRITION INDIV SUBSEQ: CPT | Mod: 93

## 2025-03-17 PROCEDURE — 1126F AMNT PAIN NOTED NONE PRSNT: CPT | Mod: 93

## 2025-03-17 ASSESSMENT — PAIN SCALES - GENERAL: PAINLEVEL_OUTOF10: NO PAIN (0)

## 2025-03-17 NOTE — LETTER
"3/17/2025       RE: Katelyn Erickson  [address] [facility] [address]     Dear Colleague,    Thank you for referring your patient, Katelyn Erickson, to the [facility]NA[facility] [place] at [facility]. Please see a copy of my visit note below.    Phone-Visit Details    Type of service:  Phone Visit    Phone call duration: 14 minutes    Distant Location (provider location):  Offsite (providers home) [facility] [place]       Return Bariatric Nutrition Consultation Note    Reason For Visit: Nutrition Assessment    Katelyn Erickson is a 34 year old presenting today for return bariatric nutrition consult.   Patient is interested in weight loss surgery with Dr. Hurley expected surgery in TBD.  Patient is accompanied by self.  This is patient's 13th of 6 required nutrition visits prior to surgery. Patient attended the WLS nutrition class on 2/23/24.     Pt referred by Steffi Sánchez NP  on February 20, 2024.     CO-MORBIDITIES OF OBESITY INCLUDE:        2/15/2024     2:47 PM   --   I have the following health issues associated with obesity None of the above     SUPPORT:      2/15/2024     2:47 PM   Support System Reviewed With Patient   Who do you have in your support network that can be available to help you for the first 2 weeks after surgery?  and MIL   Who can you count on for support throughout your weight loss surgery journey?  and MIL     ANTHROPOMETRICS:  Initial consult weight: 254 lb on 2/20/24   Estimated body mass index is 32.69 kg/m  as calculated from the following:    Height as of this encounter: 1.715 m (5' 7.52\").    Weight as of this encounter: 96.2 kg (212 lb).  Current Weight: 212 lb per pt report     Required weight loss goal pre-op: -10 lbs from initial consult weight (goal weight 244 lbs or less before surgery) - met        2/15/2024     2:47 PM   --   I have tried the following " methods to lose weight Watching portions or calories    Exercise    Weight Watchers           2/15/2024     2:47 PM   Weight Loss Questions Reviewed With Patient   How long have you been overweight? Since early childhood     MEDICATION FOR WEIGHT LOSS:   Metformin   Zepbound 5 mg     VITAMIN/MINERAL SUPPLEMENTS: Biotin    NUTRITION HISTORY:  Food allergies: NKFA  Food intolerances: none   Previous experience with diet modification for weight loss: weight watchers and exercise   Barriers to lifestyle change: reports none     Per Steffi's note: Patient describes significant thoughts about food and cravings in the evenings with limited hunger during the day.     Patient herself is trying to eat less meat. Trying to increase her vegetables. No overnight eating    Recent Diet Recall:  Breakfast: coffee with Premier Protein   Lunch: 11 AM if at home, scrambled eggs with cottage and spinach with English muffin or toast. If goes into office will eat at 1 or 2 pm, Leftovers   Dinner: Tacos, spaghetti, chicken. Protein + vegetable + carb.   Snacks: After the kids go to bed - crackers and Nutella  Beverages: Water, soda seldomly.   Alcohol: None for the most part, seldomly   Dining Out: Not very often. Twice a month.     Patient recently had a total laparoscopic hysterectomy on 3/11/24.     April 2024: Downloaded the [employer]rak [employer] to track her nutrition.  Helps to monitor her protein amount. Plans to practice  liquids from meal times and chew her food really well to an applesauce consistency. Continues to work on tasklist items needed for surgery. Needs more clarification around if she needs the sleep medicine consult.     May 2024: Focusing more on fiber intake and is drinking a lot of water. For breakfast started incorporating overnight oats with protein and shaun seeds. Has been tracking nutrition in [employer]. Continues to practice  liquids from meal times. Has appointment with Steffi in June and psych  visits in July.     June 2024: Met with Steffi earlier this month. Started taking Metformin since the beginning of June. Hasn't noticed a change in her weight this past month. Stopped taking Senna so often. Lives a busy lifestyle. Recently started pelvic floor physical activity. Struggles with  liquids from meal times, especially before a meal. Has slowed down with eating and trying to take her time. Averaging around 80 grams of protein with most meals. Has psych visits this next month.     July 2024: Had her first psych visit out of a total of 3.  Has been focusing on protein and fiber in her meals. Doing Pelvic floor physical therapy and also doing good with other physical activity by staying busy with her kids. Still struggles with  liquids prior to her meal but is aware and working on it.     August 2024: Has her last psych visit for the middle of September.  Busy August, still was able to stay focused on her nutrition though. Hasn't thought anymore about the surgery process. Focusing on her fiber and protein intake throughout the day.     Breakfast will be overnight oats if she is prepared (18 g of fiber and 40-45 g protein). Coffee with premier protein as the creamer. Lunch is normally later in the day around 1 or 2 pm 0 cucumber/chickpea/feta/salad may add some chicken to it. Dinner protein + greens.     Walking more often, about 4x a week for 30 minutes and goes about 2 miles.     Patient mentioned that her psych provider was questioning if she was eating enough. Provided patient with a calorie goal to ensure she is eating enough while still working on her nutrition goals.     September 2024: On September 18, patient got her psych letter of support. The month of September was very busy for the patient with starting a new job and other things she had going on. She was not able to track her nutrition. However still focusing on good amounts of protein and fiber in her diet.      Patient  continues to report she plans to wait to schedule bariatric surgery. She is wanting to know more about when some of things she has been working on would  before she would have to re-do some things.     2024: Has a colonoscopy next week.  Plans on doing surgery this coming summer when she has more time off from work. Patient's mother in law and grandmother in law have moved out of their house recently and that has been a big adjustment as it has kept her and family very busy. Prepping meals ahead of time. Eating a smaller lunch to avoid getting too tired after lunch. Getting in a lot of steps at work.     2024: Had a colonoscopy recently this month - everything turned out well.  Continues to work on lifestyle changes necessary for surgery. Has been working on  liquids from meal times. Still meal prepping her lunches for work. Finds it a little harder to make good nutritious meals for dinner now that her mother in law and grandmother in law moved out but trying to get back on track.     Physical activity - getting in a lot of steps at work and then has a busy life after work.     2024: Still doing Metformin and Topiramate. Doing well with her nutrition. Trying to make good choices during the holiday season. Continues to work on lifestyle and diet changes for surgery. Has some appointments coming up at the first of the year.     Doing well with physical activity - getting in a lot of steps, averaging at least [id number] steps at work     2025: Patient reports things are going well with her nutrition over the last month. Has been tracking her intake over the last couple of weeks to check in with herself on how she is doing with both her protein and fiber intake.  Getting in a lot of steps while at work and when doing activities with kids. Still being mindful of separation of liquids from meal times.     Has a follow up with JAS Omalley next week.   Still need  clearance letter from PCP.     February 2025:  Met with JAS Omalley last week for a follow up. Patient decided that she would like to start a GLP-1 medication. Has been taking Zepbound for the last 3 weeks. Feels this has been going really well. Does notice that the day after injection she is not hungry at all and will be very fatigued but knows she needs to continue to do her best with eating. Tries to make sure she is taking in some protein. Talked about trying snack sized meals every couple of hours on those days.     March 2025: Has increased her Zepbound dose to 5 mg. Has been tapering off the Topiramate medication.  Has been focusing on getting water, fiber and protein in her diet.  Has not been skipping meals. Eating multiple small meals per day. Started taking Vitamin B12 500 mcg to see if it helps with fatigue. Talked about some protein options for those days right after the injection that are a little harder to get in adequate nutrition.     Started taking Biotin supplement for hair loss.         2/15/2024     2:47 PM   Recall Diet Questions Reviewed With Patient   Describe what you typically consume for breakfast (typical or most recent) Scrambled eggs english muffine   How many ounces of water, or other low calorie drinks, do you drink daily (8 oz=1 glass)? 64 oz or more   How many ounces of caffeine (coffee, tea, pop) do you drink daily (8 oz=1 glass)? 16 oz   How many ounces of carbonated (pop, beer, sparkling water) drinks do you drinky daily (8 oz=1 glass)? 0 oz   How many ounces of juice, pop, sweet tea, sports drinks, protein drinks, other sweetened drinks, do you drink daily (8 oz=1 glass)? 0 oz   How many ounces of milk do you drink daily (8 oz=1 glass) 0 oz   How often do you drink alcohol? Monthly or less   If you do drink alcohol, how many drinks might you have in a day? (one drink = 5 oz. wine, 1 can/bottle of beer, 1 shot liquor) 1 or 2           2/15/2024     2:47 PM   Eating Habits    What foods do you crave? Sweets           2/15/2024     2:47 PM   Dining Out History Reviewed With Patient   How often do you dine out? Rarely.   Where do you dine out? (select all that apply) fast food chains   What types of food do you order when you dine out? Hamburger     EXERCISE: Busy with kids activities.         6/4/2024     3:39 PM   --   How often do you exercise? Never   What keeps you from being more active? 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   Patient has 3 kids ages 10, 7, 5.   Aunt had bariatric surgery.     NUTRITION DIAGNOSIS:  Obesity r/t long history of positive energy balance aeb BMI >30 kg/m2.    INTERVENTION:  Intervention Provided/Education Provided on post-op diet guidelines, vitamins/minerals essential post-operatively, GI anatomy of bariatric surgeries, ways to help prepare for post-op diet guidelines pre-operatively, portion/calorie-control, mindful eating and sources of protein.  Patient demonstrates understanding.     Personal barriers to making and continuing required life changes have been identified, and strategies to overcome those barriers have been recommended AND family and social supports have been assessed and strategies to strengthen those supports have been recommended.    Provided pt with list of goals, RD contact information and resources listed below via [employer].           2/15/2024     2:47 PM   Questions Reviewed With Patient   How ready are you to make changes regarding your weight? Number 1 = Not ready at all to make changes up to 10 = very ready. 7   How confident are you that you can change? 1 = Not confident that you will be successful making changes up to 10 = very confident. 7     Expected Engagement: good    GOALS:  Relating To Eating:  Eat slowly (20-30 minutes per meal), chewing foods well (25 chews per  bite/applesauce consistency)  Eat 3 meals per day  Consume 60-90 g protein per day    Relating to beverages:  Reduce caffeine/carbonation/calorie containing beverages  Separate fluids from meals by 30 minutes before, during, and after eating  Drink 48-64 ounces of fluid per day    Relating to activity:  Increase activity as able    Post-op Diet Handouts:  Diet Guidelines after Weight-loss Surgery  [email or web address]     Your Stage 1 Diet: Clear Liquids  [email or web address]     Your Stage 2 Diet: Low-fat Full Liquids  [email or web address]     Your Stage 3 Diet: Pureed Foods  [email or web address]     Pureed Recipes  [email or web address]    Your Stage 4 Diet: Soft Foods  [email or web address]    Your Stage 5 Diet: Regular Foods  [email or web address]    Supplements after Sleeve Gastrectomy, Gastric Bypass or Single Anastomosis Duodenal Switch  [email or web address]    Keeping Track of Fluids  [email or web address]    Follow Up: April 28.      Time spent with patient: 14 minutes.  Tina Montenegro RD, LD      Again, thank you for allowing me to participate in the care of your patient.      Sincerely,    Tina Montenegro RD

## 2025-03-17 NOTE — NURSING NOTE
Current patient location:  at work    Is the patient currently in the state of [place]? YES    Visit mode: TELEPHONE    If the visit is dropped, the patient can be reconnected by:TELEPHONE VISIT: Phone number:   Telephone Information:   Mobile [phone]       Will anyone else be joining the visit? NO  (If patient encounters technical issues they should call [phone])    Are changes needed to the allergy or medication list? N/A    Are refills needed on medications prescribed by this physician? NO    Rooming Documentation:  Questionnaire(s) not pre-assigned    Reason for visit: JESUSITA STERLINGF

## 2025-03-17 NOTE — PATIENT INSTRUCTIONS
Devon Zepeda,     Keep up the great work!    Follow-up with RD on April 28.     Thank you,    Tina Montenegro, PABLO, LD  If you would like to schedule or reschedule an appointment with the RD, please call [phone]    Nutrition Goals  Relating To Eating:  Eat slowly (20-30 minutes per meal), chewing foods well (25 chews per bite/applesauce consistency)  Eat 3 meals per day  Consume 60-90 g protein per day    Relating to beverages:  Reduce caffeine/carbonation/calorie containing beverages  Separate fluids from meals by 30 minutes before, during, and after eating  Drink 48-64 ounces of fluid per day    Relating to activity:  Increase activity as able    Post-op Diet Handouts:  Diet Guidelines after Weight-loss Surgery  [email or web address]     Your Stage 1 Diet: Clear Liquids  [email or web address]     Your Stage 2 Diet: Low-fat Full Liquids  [email or web address]     Your Stage 3 Diet: Pureed Foods  [email or web address]     Pureed Recipes  [email or web address]    Your Stage 4 Diet: Soft Foods  [email or web address]    Your Stage 5 Diet: Regular Foods  [email or web address]    Supplements after Sleeve Gastrectomy, Gastric Bypass or Single Anastomosis Duodenal Switch  [email or web address]    Keeping Track of Fluids  [email or web address]    COMPREHENSIVE WEIGHT MANAGEMENT PROGRAM  VIRTUAL SUPPORT GROUPS    At [facility], our Comprehensive Weight Management program offers on-line support groups for patients who are working on weight loss and considering, preparing for, or have had weight loss surgery.     There is no cost for this opportunity.  You are invited to attend the?Virtual Support Groups?provided by any of the following locations:    [facility] via [employer] Teams with Loree Armendariz RD, RN  2.   [place] via [employer] Teams with Lj Waldron, PhD, LP  3.   [place] via [employer] Teams with Mary Jane Li RN  4.   [place]  [facility] via a Zoom Meeting with DIAZ Lennon    The following Support Group information can also be found on our website:  [email or web address]      [facility]   WEIGHT LOSS SURGERY SUPPORT GROUP  The support group is a patient-lead forum that meets monthly to share experiences, encouragement and education. It is open to those who have had weight loss surgery, are scheduled for surgery, or are considering surgery.   WHEN: 3rd Wednesday of each month from 5:00PM - 6:00PM using Microsoft Teams.   FACILITATOR: Led by Loree Armendariz RD, OBI, RN, the program's Clinical Coordinator.   TO REGISTER: Please contact the clinic via [employer] or call the nurse line directly at [phone] to inform our staff that you would like an invite sent to you and to let us know the email you would like the invite sent to. Prior to the meeting, a link with directions on how to join the meeting will be sent to you.    2025 Meetings, 3rd Wednesday, 5:00pm - 6:00pm    January 15: Let's Talk  February19: Let's Talk  March 19: Speaker: Bib Cerna RD, LD  April 16: Let's Talk  May 21: Speaker: Ann Marie Alanis RD, LD  June18: Let's Talk  July 16: Let's Talk  August 20: Let's Talk  September 17: No meeting.  October 15: Speaker: Angelica Ely PsyD, LP  November 19: Let's Talk  December 17: Let's Talk    [facility] and [facility] BARIATRIC CARE SUPPORT GROUP  This is open to all pre- and post- operative bariatric surgery patients as well as their support system.   WHEN: 3rd Tuesday of each month from 6:30 PM - 8:00 PM using Microsoft Teams.   FACILITATOR: Led by Lj Waldron, Ph.D who is a Licensed Psychologist with the [facility] Comprehensive Weight Management Program.   TO REGISTER: Please send an email to Lj Waldron, Ph.D., BARRY at?[email or web address]?if you would like an invitation to the  group. Prior to the meeting, a link with directions on how to join the meeting will be sent to you.    2025 Meetings, 3rd Tuesday January 21st: Open Forum  February 18th: Medications and Bariatric Surgery  Speaker: [facility] Resident  March 18th: Open Forum  April 15th: Genetics of Obesity as well as Q&A, Speaker: Laya Llamas MD, [facility] Comprehensive Weight Management Program.  May 20th: Open Forum  June 17th: Nutritional Labeling, Speaker: TEODORA  July 15th: Open Forum  August 19th: Open Forum  September 16th: Open Forum  October 21st: Open Forum  November 18th: Holiday Eating, Speaker: TBAMERICA  December 16th: Open Forum    [facility] and [facility]ty [facility] POST-OPERATIVE BARIATRIC SURGERY SUPPORT GROUP  This is a support group for [facility] bariatric patients (and those external to [facility]) who have had bariatric surgery and are at least 3 months post-surgery.  WHEN: 4th Thursday of the month from 11:00 AM - 12:00 PM using Microsoft Teams.   FACILITATOR: Led by Certified Bariatric Nurse, Mary Jane Li RN, CBN.   TO REGISTER: Please send an email to Mary Jane at [email or web address] if you would like an invitation to the group.  Prior to the meeting, a link with directions on how to join the meeting will be sent to you.    2025 Meetings, 4th Thursday, 11:00am - 12:pm  January 23  February 27  March 27  April 24  May 22  Linda 26  July 24  August 28  September 25  October 23  November 27: Thanksgiving Day, No meeting.      December 25: [place] Day, No meeting.        [facility]   HEALTHY LIFESTYLE COACHING GROUP  This is a 60 minute virtual coaching group for those who want to lead a healthier lifestyle. Come together to set goals and overcome barriers in a supportive group environment. We will address the four pillars of health: nutrition, exercise, sleep, and emotional well-being.    WHEN: 1st Friday of the month, 12:30 PM - 1:30 PM   using a Zoom meeting.     FACILITATOR: Led by National Board-Certified Health and , Mary Jane Chambers, [facility].  TO REGISTER: Please call the [employer] at [phone] to register. You will get an appointment to attend in [employer]. Fifteen minutes prior to the meeting, complete the e-check in and you will get the link to join the meeting.  There is no charge to attend this group and space is limited.  Please register for each month you wish to attend    2025 Meetings, 1st Friday, 12:30pm-1:30pm  January 3  February 7  March 7: No meeting.  April 4  May 2  Linda 6  July 4: Fourth of July Holiday, No meeting.    August 1  September 5  October 3  November 7  December 5

## 2025-03-31 ENCOUNTER — HOSPITAL ENCOUNTER (OUTPATIENT)
Dept: ULTRASOUND IMAGING | Facility: CLINIC | Age: 35
Discharge: HOME | End: 2025-03-31
Attending: OBSTETRICS & GYNECOLOGY | Admitting: OBSTETRICS & GYNECOLOGY
Payer: COMMERCIAL

## 2025-03-31 DIAGNOSIS — Z15.09 LYNCH SYNDROME: ICD-10-CM

## 2025-03-31 PROCEDURE — 76856 US EXAM PELVIC COMPLETE: CPT

## 2025-04-01 ENCOUNTER — ONCOLOGY VISIT (OUTPATIENT)
Dept: ONCOLOGY | Facility: CLINIC | Age: 35
End: 2025-04-01
Attending: NURSE PRACTITIONER
Payer: COMMERCIAL

## 2025-04-01 DIAGNOSIS — Z15.09 LYNCH SYNDROME: Primary | ICD-10-CM

## 2025-04-01 NOTE — PROGRESS NOTES
Same day cancel (4 h)    Jean Syndrome, MLH 1 mutation.   She is here today for follow up.     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  Of note, she tested negative for mutations in  30 other genes (APC, DEBORAH, BAP1, BARD1, BMPR1A, BRCA1, BRCA2, BRIP1, CDH1, CDKN2A, CDK4, CHEK2, EPCAM, GREM1, MITF, MLH1, MSH2, MSH6, MUTYH, NBN, PALB2, PMS2, POLE, POLD1, PTEN, RAD51C, RAD51D, SMAD4, STK11, and TP53) associated with inherited cancer risks.      She is closely followed with Upper GI endoscopy and colonoscopy which were negative.      3/11/24 Surgery: Total laparoscopic hysterectomy with bilateral salpingectomy, pelvic washings     Pathology: Benign Uterus, cervix and bilateral fallopian tubes  Cytology - negative    7/17/24: Normal pelvic ultrasound  3/31/25: Normal pelvic ultrasound.

## 2025-04-01 NOTE — LETTER
4/1/2025      Katelyn Erickson  [address] [facility] [address]      Dear Colleague,    Thank you for referring your patient, Katelyn Erickson, to the [facility]. Please see a copy of my visit note below.    Same day cancel (4 h)    Jean Syndrome, MLH 1 mutation.   She is here today for follow up.     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  Of note, she tested negative for mutations in  30 other genes (APC, DEBORAH, BAP1, BARD1, BMPR1A, BRCA1, BRCA2, BRIP1, CDH1, CDKN2A, CDK4, CHEK2, EPCAM, GREM1, MITF, MLH1, MSH2, MSH6, MUTYH, NBN, PALB2, PMS2, POLE, POLD1, PTEN, RAD51C, RAD51D, SMAD4, STK11, and TP53) associated with inherited cancer risks.      She is closely followed with Upper GI endoscopy and colonoscopy which were negative.      3/11/24 Surgery: Total laparoscopic hysterectomy with bilateral salpingectomy, pelvic washings     Pathology: Benign Uterus, cervix and bilateral fallopian tubes  Cytology - negative    7/17/24: Normal pelvic ultrasound  3/31/25: Normal pelvic ultrasound.       Again, thank you for allowing me to participate in the care of your patient.        Sincerely,        WINDY Saleem CNP    Electronically signed

## 2025-04-24 NOTE — PROGRESS NOTES
"Phone-Visit Details    Type of service:  Phone Visit    Phone call duration: 7 minutes    Distant Location (provider location):  Offsite (providers home) [facility] [place]       Return Bariatric Nutrition Consultation Note    Reason For Visit: Nutrition Assessment    Katelyn Erickson is a 34 year old presenting today for return bariatric nutrition consult.   Patient is interested in possible weight loss surgery but focusing on medical weight management at this time.  Patient is accompanied by self.  This is patient's 14th nutrition visit. Patient attended the [facility] nutrition class on 2/23/24.     Pt referred by Steffi Sánchez NP  on February 20, 2024.     CO-MORBIDITIES OF OBESITY INCLUDE:        2/15/2024     2:47 PM   --   I have the following health issues associated with obesity None of the above     SUPPORT:      2/15/2024     2:47 PM   Support System Reviewed With Patient   Who do you have in your support network that can be available to help you for the first 2 weeks after surgery?  and MIL   Who can you count on for support throughout your weight loss surgery journey?  and MIL     ANTHROPOMETRICS:  Initial consult weight: 254 lb on 2/20/24   Estimated body mass index is 3.24 kg/m  as calculated from the following:    Height as of this encounter: 1.702 m (5' 7\").    Weight as of this encounter: 9.389 kg (20 lb 11.2 oz).  Current Weight: 201 lb per pt report     Weight 225 lb when she started on Zepbound.     Required weight loss goal pre-op: -10 lbs from initial consult weight (goal weight 244 lbs or less before surgery) - met        2/15/2024     2:47 PM   --   I have tried the following methods to lose weight Watching portions or calories    Exercise    Weight Watchers           2/15/2024     2:47 PM   Weight Loss Questions Reviewed With Patient   How long have you been overweight? Since early childhood     MEDICATION FOR WEIGHT LOSS:   Metformin   Zepbound 5 " mg     VITAMIN/MINERAL SUPPLEMENTS: Biotin, Vitamin B12     NUTRITION HISTORY:  Food allergies: NKFA  Food intolerances: none   Previous experience with diet modification for weight loss: weight watchers and exercise   Barriers to lifestyle change: reports none     Per Steffi's note: Patient describes significant thoughts about food and cravings in the evenings with limited hunger during the day.     Patient herself is trying to eat less meat. Trying to increase her vegetables. No overnight eating    Recent Diet Recall:  Breakfast: coffee with Premier Protein   Lunch: 11 AM if at home, scrambled eggs with cottage and spinach with English muffin or toast. If goes into office will eat at 1 or 2 pm, Leftovers   Dinner: Tacos, spaghetti, chicken. Protein + vegetable + carb.   Snacks: After the kids go to bed - crackers and Nutella  Beverages: Water, soda seldomly.   Alcohol: None for the most part, seldomly   Dining Out: Not very often. Twice a month.     Patient recently had a total laparoscopic hysterectomy on 3/11/24.     April 2024: Downloaded the mark [employer] to track her nutrition.  Helps to monitor her protein amount. Plans to practice  liquids from meal times and chew her food really well to an applesauce consistency. Continues to work on tasklist items needed for surgery. Needs more clarification around if she needs the sleep medicine consult.     May 2024: Focusing more on fiber intake and is drinking a lot of water. For breakfast started incorporating overnight oats with protein and shaun seeds. Has been tracking nutrition in [employer]. Continues to practice  liquids from meal times. Has appointment with Steffi in June and psych visits in July.     June 2024: Met with Steffi earlier this month. Started taking Metformin since the beginning of June. Hasn't noticed a change in her weight this past month. Stopped taking Senna so often. Lives a busy lifestyle. Recently started pelvic floor  physical activity. Struggles with  liquids from meal times, especially before a meal. Has slowed down with eating and trying to take her time. Averaging around 80 grams of protein with most meals. Has psych visits this next month.     2024: Had her first psych visit out of a total of 3.  Has been focusing on protein and fiber in her meals. Doing Pelvic floor physical therapy and also doing good with other physical activity by staying busy with her kids. Still struggles with  liquids prior to her meal but is aware and working on it.     2024: Has her last psych visit for the middle of September.  Busy August, still was able to stay focused on her nutrition though. Hasn't thought anymore about the surgery process. Focusing on her fiber and protein intake throughout the day.     Breakfast will be overnight oats if she is prepared (18 g of fiber and 40-45 g protein). Coffee with premier protein as the creamer. Lunch is normally later in the day around 1 or 2 pm 0 cucumber/chickpea/feta/salad may add some chicken to it. Dinner protein + greens.     Walking more often, about 4x a week for 30 minutes and goes about 2 miles.     Patient mentioned that her psych provider was questioning if she was eating enough. Provided patient with a calorie goal to ensure she is eating enough while still working on her nutrition goals.     2024: On , patient got her psych letter of support. The month of September was very busy for the patient with starting a new job and other things she had going on. She was not able to track her nutrition. However still focusing on good amounts of protein and fiber in her diet.      Patient continues to report she plans to wait to schedule bariatric surgery. She is wanting to know more about when some of things she has been working on would  before she would have to re-do some things.     2024: Has a colonoscopy next week.  Plans on  doing surgery this coming summer when she has more time off from work. Patient's mother in law and grandmother in law have moved out of their house recently and that has been a big adjustment as it has kept her and family very busy. Prepping meals ahead of time. Eating a smaller lunch to avoid getting too tired after lunch. Getting in a lot of steps at work.     November 2024: Had a colonoscopy recently this month - everything turned out well.  Continues to work on lifestyle changes necessary for surgery. Has been working on  liquids from meal times. Still meal prepping her lunches for work. Finds it a little harder to make good nutritious meals for dinner now that her mother in law and grandmother in law moved out but trying to get back on track.     Physical activity - getting in a lot of steps at work and then has a busy life after work.     December 2024: Still doing Metformin and Topiramate. Doing well with her nutrition. Trying to make good choices during the holiday season. Continues to work on lifestyle and diet changes for surgery. Has some appointments coming up at the first of the year.     Doing well with physical activity - getting in a lot of steps, averaging at least 18919 steps at work     January 2025: Patient reports things are going well with her nutrition over the last month. Has been tracking her intake over the last couple of weeks to check in with herself on how she is doing with both her protein and fiber intake.  Getting in a lot of steps while at work and when doing activities with kids. Still being mindful of separation of liquids from meal times.     Has a follow up with JAS Omalley next week.   Still need clearance letter from PCP.     February 2025:  Met with JAS Omalley last week for a follow up. Patient decided that she would like to start a GLP-1 medication. Has been taking Zepbound for the last 3 weeks. Feels this has been going really well. Does notice  that the day after injection she is not hungry at all and will be very fatigued but knows she needs to continue to do her best with eating. Tries to make sure she is taking in some protein. Talked about trying snack sized meals every couple of hours on those days.     March 2025: Has increased her Zepbound dose to 5 mg. Has been tapering off the Topiramate medication.  Has been focusing on getting water, fiber and protein in her diet.  Has not been skipping meals. Eating multiple small meals per day. Started taking Vitamin B12 500 mcg to see if it helps with fatigue. Talked about some protein options for those days right after the injection that are a little harder to get in adequate nutrition.     Started taking Biotin supplement for hair loss.     April 2025: Has increased to Zepbound 7.5 mg - 2 weeks so far. No issues so far with this dose. Nutrition has been going well also. Liquid IV once a day to help keep up with hydration and getting in a little bit more electrolytes. Protein shakes. Still taking a Vitamin B12 supplement.  Has the Clean Simple Eats clear liquid protein powder coming soon that she is excited to try out.     Patient is very happy with the success she has been having with her weight loss and is thinking she may hold off on surgery now.         2/15/2024     2:47 PM   Recall Diet Questions Reviewed With Patient   Describe what you typically consume for breakfast (typical or most recent) Scrambled eggs english muffine   How many ounces of water, or other low calorie drinks, do you drink daily (8 oz=1 glass)? 64 oz or more   How many ounces of caffeine (coffee, tea, pop) do you drink daily (8 oz=1 glass)? 16 oz   How many ounces of carbonated (pop, beer, sparkling water) drinks do you drinky daily (8 oz=1 glass)? 0 oz   How many ounces of juice, pop, sweet tea, sports drinks, protein drinks, other sweetened drinks, do you drink daily (8 oz=1 glass)? 0 oz   How many ounces of milk do you drink  daily (8 oz=1 glass) 0 oz   How often do you drink alcohol? Monthly or less   If you do drink alcohol, how many drinks might you have in a day? (one drink = 5 oz. wine, 1 can/bottle of beer, 1 shot liquor) 1 or 2           2/15/2024     2:47 PM   Eating Habits   What foods do you crave? Sweets           2/15/2024     2:47 PM   Dining Out History Reviewed With Patient   How often do you dine out? Rarely.   Where do you dine out? (select all that apply) fast food chains   What types of food do you order when you dine out? Hamburger     EXERCISE: Busy with kids activities.         6/4/2024     3:39 PM   --   How often do you exercise? Never   What keeps you from being more active? 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   Patient has 3 kids ages 10, 7, 5.   Aunt had bariatric surgery.     NUTRITION DIAGNOSIS:  Obesity r/t long history of positive energy balance aeb BMI >30 kg/m2.    INTERVENTION:  Intervention Provided/Education Provided on post-op diet guidelines, vitamins/minerals essential post-operatively, GI anatomy of bariatric surgeries, ways to help prepare for post-op diet guidelines pre-operatively, portion/calorie-control, mindful eating and sources of protein.  Patient demonstrates understanding.     Personal barriers to making and continuing required life changes have been identified, and strategies to overcome those barriers have been recommended AND family and social supports have been assessed and strategies to strengthen those supports have been recommended.    Provided pt with list of goals, RD contact information and resources listed below via [employer]).           2/15/2024     2:47 PM   Questions Reviewed With Patient   How ready are you to make changes regarding your weight? Number 1 = Not ready at all to make changes up to 10 = very ready. 7   How  confident are you that you can change? 1 = Not confident that you will be successful making changes up to 10 = very confident. 7     Expected Engagement: good    GOALS:  1) Continue with protein focused meals.   2) Incorporate fiber rich foods in your diet where you can.   3) Aim for 64 oz of water daily.     Follow Up: as needed or monthly if choosing to have surgery.     Time spent with patient: 7 minutes.  Tina Montenegro RD, LD

## 2025-04-28 ENCOUNTER — VIRTUAL VISIT (OUTPATIENT)
Dept: ENDOCRINOLOGY | Facility: CLINIC | Age: 35
End: 2025-04-28
Payer: COMMERCIAL

## 2025-04-28 VITALS — WEIGHT: 201 LBS | BODY MASS INDEX: 31.55 KG/M2 | HEIGHT: 67 IN

## 2025-04-28 DIAGNOSIS — Z71.3 NUTRITIONAL COUNSELING: Primary | ICD-10-CM

## 2025-04-28 DIAGNOSIS — E66.9 OBESITY: ICD-10-CM

## 2025-04-28 PROCEDURE — 97803 MED NUTRITION INDIV SUBSEQ: CPT | Mod: 93

## 2025-04-28 PROCEDURE — 1126F AMNT PAIN NOTED NONE PRSNT: CPT | Mod: 93

## 2025-04-28 PROCEDURE — 99207 PR NO CHARGE LOS: CPT | Mod: 93

## 2025-04-28 ASSESSMENT — PAIN SCALES - GENERAL: PAINLEVEL_OUTOF10: NO PAIN (0)

## 2025-04-28 NOTE — PATIENT INSTRUCTIONS
Devon Zepeda,     Follow-up with RD as needed or monthly if choosing to have surgery.     Thank you,    Tina Montenegro RD, LD  If you would like to schedule or reschedule an appointment with the RD, please call [phone]    Nutrition Goals  1) Continue with protein focused meals.   2) Incorporate fiber rich foods in your diet where you can.   3) Aim for 64 oz of water daily.     COMPREHENSIVE WEIGHT MANAGEMENT PROGRAM  VIRTUAL SUPPORT GROUPS    At [facility], our Comprehensive Weight Management program offers on-line support groups for patients who are working on weight loss and considering, preparing for, or have had weight loss surgery.     There is no cost for this opportunity.  You are invited to attend the?Virtual Support Groups?provided by any of the following locations:    [facility] via Microsoft Teams with Loree Armendariz RD, RN  2.   [place] via [employer] with Lj Waldron, PhD, LP  3.   [place] via [employer] with Mary Jane Li RN  4.   [facility] via a Zoom Meeting with NATALIA Lennon    The following Support Group information can also be found on our website:  [email or web address]      [facility]   WEIGHT LOSS SURGERY SUPPORT GROUP  The support group is a patient-lead forum that meets monthly to share experiences, encouragement and education. It is open to those who have had weight loss surgery, are scheduled for surgery, or are considering surgery.   WHEN: 3rd Wednesday of each month from 5:00PM - 6:00PM using Microsoft Teams.   FACILITATOR: Led by Loree Armendariz RD, LD, RN, the program's Clinical Coordinator.   TO REGISTER: Please contact the clinic via [facility] EHR or call the nurse line directly at [phone] to inform our staff that you would like an invite sent to you and to let us know the email you would like the invite sent to. Prior to the meeting, a link with  directions on how to join the meeting will be sent to you.    2025 Meetings, 3rd Wednesday, 5:00pm - 6:00pm    January 15: Let's Talk  February19: Let's Talk  March 19: Speaker: Bib Cerna RD, LD  April 16: Let's Talk  May 21: Speaker: Ann Marie Alanis RD, OBI  June18: Let's Talk  July 16: Let's Talk  August 20: Let's Talk  September 17: No meeting.  October 15: Speaker: Angelica Ely PsyD, LP  November 19: Let's Talk  December 17: Let's Talk    [facility] BARIATRIC CARE SUPPORT GROUP  This is open to all pre- and post- operative bariatric surgery patients as well as their support system.   WHEN: 3rd Tuesday of each month from 6:30 PM - 8:00 PM using Microsoft Teams.   FACILITATOR: Led by Lj Waldron, Ph.D who is a Licensed Psychologist with the [facility] Comprehensive Weight Management Program.   TO REGISTER: Please send an email to Lj Waldron, Ph.D., LP at?[email or web address]?if you would like an invitation to the group. Prior to the meeting, a link with directions on how to join the meeting will be sent to you.    2025 Meetings, 3rd Tuesday January 21st: Open Forum  February 18th: Medications and Bariatric Surgery  Speaker: Celia Major, [place]'s Pharmacy Resident  March 18th: Open Forum  April 15th: Genetics of Obesity as well as Q&A, Speaker: Laya Llamas MD, [facility] Comprehensive Weight Management Program.  May 20th: Open Forum  June 17th: Nutritional Labeling, Speaker: TEODORA  July 15th: Open Forum  August 19th: Open Forum  September 16th: Open Forum  October 21st: Open Forum  November 18th: Holiday Eating, Speaker: TEODORA  December 16th: Open Forum    [facility] POST-OPERATIVE BARIATRIC SURGERY SUPPORT GROUP  This is a support group for [facility] bariatric patients (and those external to [facility]) who have had bariatric surgery and are at least 3  months post-surgery.  WHEN: 4th Thursday of the month from 11:00 AM - 12:00 PM using Microsoft Teams.   FACILITATOR: Led by Certified Bariatric Nurse, Mary Jane Li, RN, CBN.   TO REGISTER: Please send an email to Mary Jane at [email or web address] if you would like an invitation to the group.  Prior to the meeting, a link with directions on how to join the meeting will be sent to you.    2025 Meetings, 4th Thursday, 11:00am - 12:pm  January 23  February 27  March 27  April 24  May 22  Linda 26  July 24  August 28  September 25  October 23  November 27: Thanksgiving Day, No meeting.      December 25: [place] Day, No meeting.        [facility]   HEALTHY LIFESTYLE COACHING GROUP  This is a 60 minute virtual coaching group for those who want to lead a healthier lifestyle. Come together to set goals and overcome barriers in a supportive group environment. We will address the four pillars of health: nutrition, exercise, sleep, and emotional well-being.   WHEN: 1st Friday of the month, 12:30 PM - 1:30 PM   using a Zoom meeting.     FACILITATOR: Led by National Board-Certified Health and , Mary Jane Chambers, [facility].  TO REGISTER: Please call the [employer] at [phone] to register. You will get an appointment to attend in [place]. Fifteen minutes prior to the meeting, complete the e-check in and you will get the link to join the meeting.  There is no charge to attend this group and space is limited.  Please register for each month you wish to attend    2025 Meetings, 1st Friday, 12:30pm-1:30pm  January 3  February 7  March 7: No meeting.  April 4  May 2  Linda 6  July 4: Fourth of July Holiday, No meeting.    August 1  September 5  October 3  November 7  December 5

## 2025-04-28 NOTE — LETTER
"4/28/2025       RE: Katelyn Erickson  [address]  [facility] [address]     Dear Colleague,    Thank you for referring your patient, Katelyn Erickson, to the [facility] [place] at . Please see a copy of my visit note below.    Phone-Visit Details    Type of service:  Phone Visit    Phone call duration: 7 minutes    Distant Location (provider location):  Offsite (providers home) [facility] [place]       Return Bariatric Nutrition Consultation Note    Reason For Visit: Nutrition Assessment    Katelyn Erickson is a 34 year old presenting today for return bariatric nutrition consult.   Patient is interested in possible weight loss surgery but focusing on medical weight management at this time.  Patient is accompanied by self.  This is patient's 14th nutrition visit. Patient attended the [facility] nutrition class on 2/23/24.     Pt referred by Steffi Sánchez NP  on February 20, 2024.     CO-MORBIDITIES OF OBESITY INCLUDE:        2/15/2024     2:47 PM   --   I have the following health issues associated with obesity None of the above     SUPPORT:      2/15/2024     2:47 PM   Support System Reviewed With Patient   Who do you have in your support network that can be available to help you for the first 2 weeks after surgery?  and MIL   Who can you count on for support throughout your weight loss surgery journey?  and MIL     ANTHROPOMETRICS:  Initial consult weight: 254 lb on 2/20/24   Estimated body mass index is 3.24 kg/m  as calculated from the following:    Height as of this encounter: 1.702 m (5' 7\").    Weight as of this encounter: 9.389 kg (20 lb 11.2 oz).  Current Weight: 201 lb per pt report     Weight 225 lb when she started on Zepbound.     Required weight loss goal pre-op: -10 lbs from initial consult weight (goal weight 244 lbs or less before surgery) - met        2/15/2024 "     2:47 PM   --   I have tried the following methods to lose weight Watching portions or calories    Exercise    Weight Watchers           2/15/2024     2:47 PM   Weight Loss Questions Reviewed With Patient   How long have you been overweight? Since early childhood     MEDICATION FOR WEIGHT LOSS:   Metformin   Zepbound 5 mg     VITAMIN/MINERAL SUPPLEMENTS: Biotin, Vitamin B12     NUTRITION HISTORY:  Food allergies: NKFA  Food intolerances: none   Previous experience with diet modification for weight loss: weight watchers and exercise   Barriers to lifestyle change: reports none     Per Steffi's note: Patient describes significant thoughts about food and cravings in the evenings with limited hunger during the day.     Patient herself is trying to eat less meat. Trying to increase her vegetables. No overnight eating    Recent Diet Recall:  Breakfast: coffee with Premier Protein   Lunch: 11 AM if at home, scrambled eggs with cottage and spinach with English muffin or toast. If goes into office will eat at 1 or 2 pm, Leftovers   Dinner: Tacos, spaghetti, chicken. Protein + vegetable + carb.   Snacks: After the kids go to bed - crackers and Nutella  Beverages: Water, soda seldomly.   Alcohol: None for the most part, seldomly   Dining Out: Not very often. Twice a month.     Patient recently had a total laparoscopic hysterectomy on 3/11/24.     April 2024: Downloaded the mark [employer] to track her nutrition.  Helps to monitor her protein amount. Plans to practice  liquids from meal times and chew her food really well to an applesauce consistency. Continues to work on tasklist items needed for surgery. Needs more clarification around if she needs the sleep medicine consult.     May 2024: Focusing more on fiber intake and is drinking a lot of water. For breakfast started incorporating overnight oats with protein and shaun seeds. Has been tracking nutrition in [employer]. Continues to practice  liquids from  meal times. Has appointment with Steffi in June and psych visits in July.     June 2024: Met with Steffi earlier this month. Started taking Metformin since the beginning of June. Hasn't noticed a change in her weight this past month. Stopped taking Senna so often. Lives a busy lifestyle. Recently started pelvic floor physical activity. Struggles with  liquids from meal times, especially before a meal. Has slowed down with eating and trying to take her time. Averaging around 80 grams of protein with most meals. Has psych visits this next month.     July 2024: Had her first psych visit out of a total of 3.  Has been focusing on protein and fiber in her meals. Doing Pelvic floor physical therapy and also doing good with other physical activity by staying busy with her kids. Still struggles with  liquids prior to her meal but is aware and working on it.     August 2024: Has her last psych visit for the middle of September.  Busy August, still was able to stay focused on her nutrition though. Hasn't thought anymore about the surgery process. Focusing on her fiber and protein intake throughout the day.     Breakfast will be overnight oats if she is prepared (18 g of fiber and 40-45 g protein). Coffee with premier protein as the creamer. Lunch is normally later in the day around 1 or 2 pm 0 cucumber/chickpea/feta/salad may add some chicken to it. Dinner protein + greens.     Walking more often, about 4x a week for 30 minutes and goes about 2 miles.     Patient mentioned that her psych provider was questioning if she was eating enough. Provided patient with a calorie goal to ensure she is eating enough while still working on her nutrition goals.     September 2024: On September 18, patient got her psych letter of support. The month of September was very busy for the patient with starting a new job and other things she had going on. She was not able to track her nutrition. However still focusing on good  amounts of protein and fiber in her diet.      Patient continues to report she plans to wait to schedule bariatric surgery. She is wanting to know more about when some of things she has been working on would  before she would have to re-do some things.     2024: Has a colonoscopy next week.  Plans on doing surgery this coming summer when she has more time off from work. Patient's mother in law and grandmother in law have moved out of their house recently and that has been a big adjustment as it has kept her and family very busy. Prepping meals ahead of time. Eating a smaller lunch to avoid getting too tired after lunch. Getting in a lot of steps at work.     2024: Had a colonoscopy recently this month - everything turned out well.  Continues to work on lifestyle changes necessary for surgery. Has been working on  liquids from meal times. Still meal prepping her lunches for work. Finds it a little harder to make good nutritious meals for dinner now that her mother in law and grandmother in law moved out but trying to get back on track.     Physical activity - getting in a lot of steps at work and then has a busy life after work.     2024: Still doing Metformin and Topiramate. Doing well with her nutrition. Trying to make good choices during the holiday season. Continues to work on lifestyle and diet changes for surgery. Has some appointments coming up at the first of the year.     Doing well with physical activity - getting in a lot of steps, averaging at least 66621 steps at work     2025: Patient reports things are going well with her nutrition over the last month. Has been tracking her intake over the last couple of weeks to check in with herself on how she is doing with both her protein and fiber intake.  Getting in a lot of steps while at work and when doing activities with kids. Still being mindful of separation of liquids from meal times.     Has a follow up  with JAS Omalley next week.   Still need clearance letter from PCP.     February 2025:  Met with JAS Omalley last week for a follow up. Patient decided that she would like to start a GLP-1 medication. Has been taking Zepbound for the last 3 weeks. Feels this has been going really well. Does notice that the day after injection she is not hungry at all and will be very fatigued but knows she needs to continue to do her best with eating. Tries to make sure she is taking in some protein. Talked about trying snack sized meals every couple of hours on those days.     March 2025: Has increased her Zepbound dose to 5 mg. Has been tapering off the Topiramate medication.  Has been focusing on getting water, fiber and protein in her diet.  Has not been skipping meals. Eating multiple small meals per day. Started taking Vitamin B12 500 mcg to see if it helps with fatigue. Talked about some protein options for those days right after the injection that are a little harder to get in adequate nutrition.     Started taking Biotin supplement for hair loss.     April 2025: Has increased to Zepbound 7.5 mg - 2 weeks so far. No issues so far with this dose. Nutrition has been going well also. Liquid IV once a day to help keep up with hydration and getting in a little bit more electrolytes. Protein shakes. Still taking a Vitamin B12 supplement.  Has the Clean Simple Eats clear liquid protein powder coming soon that she is excited to try out.     Patient is very happy with the success she has been having with her weight loss and is thinking she may hold off on surgery now.         2/15/2024     2:47 PM   Recall Diet Questions Reviewed With Patient   Describe what you typically consume for breakfast (typical or most recent) Scrambled eggs english muffine   How many ounces of water, or other low calorie drinks, do you drink daily (8 oz=1 glass)? 64 oz or more   How many ounces of caffeine (coffee, tea, pop) do you drink  daily (8 oz=1 glass)? 16 oz   How many ounces of carbonated (pop, beer, sparkling water) drinks do you drinky daily (8 oz=1 glass)? 0 oz   How many ounces of juice, pop, sweet tea, sports drinks, protein drinks, other sweetened drinks, do you drink daily (8 oz=1 glass)? 0 oz   How many ounces of milk do you drink daily (8 oz=1 glass) 0 oz   How often do you drink alcohol? Monthly or less   If you do drink alcohol, how many drinks might you have in a day? (one drink = 5 oz. wine, 1 can/bottle of beer, 1 shot liquor) 1 or 2           2/15/2024     2:47 PM   Eating Habits   What foods do you crave? Sweets           2/15/2024     2:47 PM   Dining Out History Reviewed With Patient   How often do you dine out? Rarely.   Where do you dine out? (select all that apply) fast food chains   What types of food do you order when you dine out? Hamburger     EXERCISE: Busy with kids activities.         6/4/2024     3:39 PM   --   How often do you exercise? Never   What keeps you from being more active? 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   Patient has 3 kids ages 10, 7, 5.   Aunt had bariatric surgery.     NUTRITION DIAGNOSIS:  Obesity r/t long history of positive energy balance aeb BMI >30 kg/m2.    INTERVENTION:  Intervention Provided/Education Provided on post-op diet guidelines, vitamins/minerals essential post-operatively, GI anatomy of bariatric surgeries, ways to help prepare for post-op diet guidelines pre-operatively, portion/calorie-control, mindful eating and sources of protein.  Patient demonstrates understanding.     Personal barriers to making and continuing required life changes have been identified, and strategies to overcome those barriers have been recommended AND family and social supports have been assessed and strategies to strengthen those supports have been  recommended.    Provided pt with list of goals, RD contact information and resources listed below via [employer].           2/15/2024     2:47 PM   Questions Reviewed With Patient   How ready are you to make changes regarding your weight? Number 1 = Not ready at all to make changes up to 10 = very ready. 7   How confident are you that you can change? 1 = Not confident that you will be successful making changes up to 10 = very confident. 7     Expected Engagement: good    GOALS:  1) Continue with protein focused meals.   2) Incorporate fiber rich foods in your diet where you can.   3) Aim for 64 oz of water daily.     Follow Up: as needed or monthly if choosing to have surgery.     Time spent with patient: 7 minutes.  Tina Montenegro RD, LD      Again, thank you for allowing me to participate in the care of your patient.      Sincerely,    Tina Montenegro RD

## 2025-04-28 NOTE — NURSING NOTE
Current patient location: car    Is the patient currently in the state of [place]? YES    Visit mode: telephone    If the visit is dropped, the patient can be reconnected by:Text to cell phone:   Telephone Information:   Mobile [phone]       Will anyone else be joining the visit? NO  (If patient encounters technical issues they should call [phone]    Are changes needed to the allergy or medication list? N/A    Are refills needed on medications prescribed by this physician? NO    Rooming Documentation:  Not applicable      Reason for visit: RECHECK    Wt other than 24 hrs:    Pain more than one location:  no  Lara CASTORENA

## 2025-04-30 ENCOUNTER — VIRTUAL VISIT (OUTPATIENT)
Dept: ENDOCRINOLOGY | Facility: CLINIC | Age: 35
End: 2025-04-30
Payer: COMMERCIAL

## 2025-04-30 VITALS — BODY MASS INDEX: 32.02 KG/M2 | WEIGHT: 204 LBS | HEIGHT: 67 IN

## 2025-04-30 DIAGNOSIS — E66.812 CLASS 2 OBESITY DUE TO EXCESS CALORIES WITHOUT SERIOUS COMORBIDITY WITH BODY MASS INDEX (BMI) OF 39.0 TO 39.9 IN ADULT: Primary | ICD-10-CM

## 2025-04-30 DIAGNOSIS — E66.09 CLASS 2 OBESITY DUE TO EXCESS CALORIES WITHOUT SERIOUS COMORBIDITY WITH BODY MASS INDEX (BMI) OF 39.0 TO 39.9 IN ADULT: Primary | ICD-10-CM

## 2025-04-30 DIAGNOSIS — K76.0 NAFLD (NONALCOHOLIC FATTY LIVER DISEASE): ICD-10-CM

## 2025-04-30 DIAGNOSIS — E66.811 CLASS 1 OBESITY WITH SERIOUS COMORBIDITY AND BODY MASS INDEX (BMI) OF 32.0 TO 32.9 IN ADULT, UNSPECIFIED OBESITY TYPE: ICD-10-CM

## 2025-04-30 PROCEDURE — 98005 SYNCH AUDIO-VIDEO EST LOW 20: CPT | Performed by: NURSE PRACTITIONER

## 2025-04-30 PROCEDURE — 1126F AMNT PAIN NOTED NONE PRSNT: CPT | Mod: 95 | Performed by: NURSE PRACTITIONER

## 2025-04-30 RX ORDER — METFORMIN HYDROCHLORIDE 500 MG/1
1000 TABLET, EXTENDED RELEASE ORAL
Qty: 60 TABLET | Refills: 4 | Status: SHIPPED | OUTPATIENT
Start: 2025-04-30

## 2025-04-30 ASSESSMENT — PAIN SCALES - GENERAL: PAINLEVEL_OUTOF10: NO PAIN (0)

## 2025-04-30 NOTE — LETTER
2025       RE: Katelyn Erickson  [address]  [facility] [address]     Dear Colleague,    Thank you for referring your patient, Katelyn Erickson, to the [facility]. Please see a copy of my visit note below.      Return Medical Weight Management Note     Katelyn Erickson  MRN:  [id number]  :  1990  [place]:  2025    Dear Rose Phelps, WINDY CNP,    I had the pleasure of seeing your patient Katelyn Erickson. She is a 34 year old female who I am continuing to see for treatment of obesity related to:        2/15/2024     2:47 PM   --   I have the following health issues associated with obesity None of the above       Assessment & Plan  Problem List Items Addressed This Visit          Digestive    Class 2 obesity due to excess calories without serious comorbidity with body mass index (BMI) of 39.0 to 39.9 in adult - Primary     Finding zepbound helpful with appetite and cravings. No adverse side effects. Continues to see weight loss. Would like to avoid surgery for the time being. Will continue to consider in the future if needed.     Follow up 3 months   Continue zepbound 7.5mg   Continue metformin   Start strength training              Relevant Medications    metFORMIN (GLUCOPHAGE XR) 500 MG 24 hr tablet    tirzepatide-Weight Management (ZEPBOUND) 7.5 MG/0.5ML prefilled pen    NAFLD (nonalcoholic fatty liver disease)    Relevant Medications    metFORMIN (GLUCOPHAGE XR) 500 MG 24 hr tablet    tirzepatide-Weight Management (ZEPBOUND) 7.5 MG/0.5ML prefilled pen     Other Visit Diagnoses       Class 1 obesity with serious comorbidity and body mass index (BMI) of 32.0 to 32.9 in adult, unspecified obesity type        Relevant Medications    metFORMIN (GLUCOPHAGE XR) 500 MG 24 hr tablet    tirzepatide-Weight Management (ZEPBOUND) 7.5 MG/0.5ML prefilled pen               INTERVAL HISTORY:  NBS  2/20/2024 BMI 38.9 with MAFLD and with family hx of STERN syndrome. Wanting to better manage weight to reduce risks. Lots of thoughts about food. Last seen 6/2024 - tolerating topiramate addition, added metformin      Psych eval completed 9/18/2204- Dr. Viera  - recommended establishing with therapist for social anxiety - will need letter of support      Thinking about surgery this summer because she'll be off from school then.     Clearance:   Letter of support from therapist     Last seen with Irena Wilson PA-C  1/2025- had not established with therapist, interested in Glp1 (cancer risk provider approved),   3/2025 Katelyn Pagan [facility]     Wants to avoid surgery at this point     Anti-obesity medication history    Current:   Metformin   Bupropion (mood)   Zepbound 7.5mg   -fatigue improved  -no adverse side effects   -forgetting to eat day after injection   (Side effects going from 2.5 to 5mg and wanting to avoid that in the future)     Failed/ past:   -topiramate - stopped after starting the zepbound(fatigue)     Recent diet changes:   Moran sooner  Trying to avoid meals  Increasing protein     Recent exercise/activity changes:   Contemplating strength training     Recent stressors: manageable     Recent sleep changes: no issues     Vitamins/Labs: 1/2025     CURRENT WEIGHT:   204 lbs 0 oz    Initial Weight (lbs): 254.2 lbs  Last Visits Weight: 96.2 kg (212 lb)  Cumulative weight loss (lbs): 50.2  Weight Loss Percentage: 19.75%        4/30/2025    12:47 PM   Changes and Difficulties   I have made the following changes to my diet since my last visit: less calorie intake   With regards to my diet, I am still struggling with: no   I have made the following changes to my activity/exercise since my last visit: none   With regards to my activity/exercise, I am still struggling with: nothing         MEDICATIONS:   Current Outpatient Medications   Medication Sig Dispense Refill     buPROPion (WELLBUTRIN XL) 300 MG 24  "hr tablet Take 1 tablet (300 mg) by mouth every morning. 90 tablet 1     metFORMIN (GLUCOPHAGE XR) 500 MG 24 hr tablet Take 2 tablets (1,000 mg) by mouth daily with food. 60 tablet 4     tirzepatide-Weight Management (ZEPBOUND) 7.5 MG/0.5ML prefilled pen Inject 0.5 mLs (7.5 mg) subcutaneously every 7 days. 2 mL 1           4/30/2025    12:47 PM   Weight Loss Medication History Reviewed With Patient   Which weight loss medications are you currently taking on a regular basis? Metformin   Are you having any side effects from the weight loss medication that we have prescribed you? No       Office Visit on 01/03/2025   Component Date Value Ref Range Status         9/8/2023     9:06 AM   ALISON Score (Last Two)   ALISON Raw Score 37   Activation Score 79.2   ALISON Level 4         PHYSICAL EXAM:  Objective   Ht 1.702 m (5' 7\")   Wt 92.5 kg (204 lb)   LMP 02/22/2024   BMI 31.95 kg/m      Vitals - Patient Reported  Pain Score: No Pain (0)        GENERAL: alert and no distress  EYES: Eyes grossly normal to inspection.  No discharge or erythema, or obvious scleral/conjunctival abnormalities.  RESP: No audible wheeze, cough, or visible cyanosis.    SKIN: Visible skin clear. No significant rash, abnormal pigmentation or lesions.  NEURO: Cranial nerves grossly intact.  Mentation and speech appropriate for age.  PSYCH: Appropriate affect, tone, and pace of words        Sincerely,    Steffi Sánchez NP      20 minutes spent by me on the date of the encounter doing chart review, history and exam, documentation and further activities per the note    The longitudinal plan of care for the diagnosis(es)/condition(s) as documented were addressed during this visit. Due to the added complexity in care, I will continue to support Katelyn in the subsequent management and with ongoing continuity of care.    Virtual Visit Details    Type of service:  Video Visit     Originating Location (pt. Location): Home    Distant Location (provider location):  " Off-site  Platform used for Video Visit: Mili      Again, thank you for allowing me to participate in the care of your patient.      Sincerely,    Steffi Sánchez NP

## 2025-04-30 NOTE — NURSING NOTE
Current patient location: Patient declined to provide     Is the patient currently in the state of [place]? YES    Visit mode: VIDEO    If the visit is dropped, the patient can be reconnected by:VIDEO VISIT: Text to cell phone:   Telephone Information:   Mobile [phone]       Will anyone else be joining the visit? NO  (If patient encounters technical issues they should call [phone]    Are changes needed to the allergy or medication list? No    Are refills needed on medications prescribed by this physician? NO    Rooming Documentation:  Questionnaire(s) completed    Reason for visit: RECHECK    Ernst CASTORENA

## 2025-04-30 NOTE — PROGRESS NOTES
Return Medical Weight Management Note     Katelyn Erickson  MRN:  [id number]  :  1990  MO:  2025    Dear WINDY Schuster CNP,    I had the pleasure of seeing your patient Katelyn Erickson. She is a 34 year old female who I am continuing to see for treatment of obesity related to:        2/15/2024     2:47 PM   --   I have the following health issues associated with obesity None of the above       Assessment & Plan   Problem List Items Addressed This Visit          Digestive    Class 2 obesity due to excess calories without serious comorbidity with body mass index (BMI) of 39.0 to 39.9 in adult - Primary     Finding zepbound helpful with appetite and cravings. No adverse side effects. Continues to see weight loss. Would like to avoid surgery for the time being. Will continue to consider in the future if needed.     Follow up 3 months   Continue zepbound 7.5mg   Continue metformin   Start strength training              Relevant Medications    metFORMIN (GLUCOPHAGE XR) 500 MG 24 hr tablet    tirzepatide-Weight Management (ZEPBOUND) 7.5 MG/0.5ML prefilled pen    NAFLD (nonalcoholic fatty liver disease)    Relevant Medications    metFORMIN (GLUCOPHAGE XR) 500 MG 24 hr tablet    tirzepatide-Weight Management (ZEPBOUND) 7.5 MG/0.5ML prefilled pen     Other Visit Diagnoses       Class 1 obesity with serious comorbidity and body mass index (BMI) of 32.0 to 32.9 in adult, unspecified obesity type        Relevant Medications    metFORMIN (GLUCOPHAGE XR) 500 MG 24 hr tablet    tirzepatide-Weight Management (ZEPBOUND) 7.5 MG/0.5ML prefilled pen               INTERVAL HISTORY:  NBS 2024 BMI 38.9 with MAFLD and with family hx of STERN syndrome. Wanting to better manage weight to reduce risks. Lots of thoughts about food. Last seen 2024 - tolerating topiramate addition, added metformin      Psych eval completed 2204- Dr. Viera  - recommended establishing with therapist for social anxiety -  will need letter of support      Thinking about surgery this summer because she'll be off from school then.     Clearance:   Letter of support from therapist     Last seen with Irena Wilson PA-C  1/2025- had not established with therapist, interested in Glp1 (cancer risk provider approved),   3/2025 Katelyn Pagan [facility]     Wants to avoid surgery at this point     Anti-obesity medication history    Current:   Metformin   Bupropion (mood)   Zepbound 7.5mg   -fatigue improved  -no adverse side effects   -forgetting to eat day after injection   (Side effects going from 2.5 to 5mg and wanting to avoid that in the future)     Failed/ past:   -topiramate - stopped after starting the zepbound(fatigue)     Recent diet changes:   Moran sooner  Trying to avoid meals  Increasing protein     Recent exercise/activity changes:   Contemplating strength training     Recent stressors: manageable     Recent sleep changes: no issues     Vitamins/Labs: 1/2025     CURRENT WEIGHT:   204 lbs 0 oz    Initial Weight (lbs): 254.2 lbs  Last Visits Weight: 96.2 kg (212 lb)  Cumulative weight loss (lbs): 50.2  Weight Loss Percentage: 19.75%        4/30/2025    12:47 PM   Changes and Difficulties   I have made the following changes to my diet since my last visit: less calorie intake   With regards to my diet, I am still struggling with: no   I have made the following changes to my activity/exercise since my last visit: none   With regards to my activity/exercise, I am still struggling with: nothing         MEDICATIONS:   Current Outpatient Medications   Medication Sig Dispense Refill    buPROPion (WELLBUTRIN XL) 300 MG 24 hr tablet Take 1 tablet (300 mg) by mouth every morning. 90 tablet 1    metFORMIN (GLUCOPHAGE XR) 500 MG 24 hr tablet Take 2 tablets (1,000 mg) by mouth daily with food. 60 tablet 4    tirzepatide-Weight Management (ZEPBOUND) 7.5 MG/0.5ML prefilled pen Inject 0.5 mLs (7.5 mg) subcutaneously every 7 days. 2 mL 1            "4/30/2025    12:47 PM   Weight Loss Medication History Reviewed With Patient   Which weight loss medications are you currently taking on a regular basis? Metformin   Are you having any side effects from the weight loss medication that we have prescribed you? No       Office Visit on 01/03/2025   Component Date Value Ref Range Status         9/8/2023     9:06 AM   ALISON Score (Last Two)   ALISON Raw Score 37   Activation Score 79.2   ALISON Level 4         PHYSICAL EXAM:  Objective    Ht 1.702 m (5' 7\")   Wt 92.5 kg (204 lb)   LMP 02/22/2024   BMI 31.95 kg/m      Vitals - Patient Reported  Pain Score: No Pain (0)        GENERAL: alert and no distress  EYES: Eyes grossly normal to inspection.  No discharge or erythema, or obvious scleral/conjunctival abnormalities.  RESP: No audible wheeze, cough, or visible cyanosis.    SKIN: Visible skin clear. No significant rash, abnormal pigmentation or lesions.  NEURO: Cranial nerves grossly intact.  Mentation and speech appropriate for age.  PSYCH: Appropriate affect, tone, and pace of words        Sincerely,    Steffi Sánchez NP      20 minutes spent by me on the date of the encounter doing chart review, history and exam, documentation and further activities per the note    The longitudinal plan of care for the diagnosis(es)/condition(s) as documented were addressed during this visit. Due to the added complexity in care, I will continue to support Katelyn in the subsequent management and with ongoing continuity of care.    Virtual Visit Details    Type of service:  Video Visit     Originating Location (pt. Location): Home    Distant Location (provider location):  Off-site  Platform used for Video Visit: Mili"

## 2025-05-01 NOTE — ASSESSMENT & PLAN NOTE
Finding zepbound helpful with appetite and cravings. No adverse side effects. Continues to see weight loss. Would like to avoid surgery for the time being. Will continue to consider in the future if needed.     Follow up 3 months   Continue zepbound 7.5mg   Continue metformin   Start strength training

## 2025-05-05 ENCOUNTER — PATIENT OUTREACH (OUTPATIENT)
Dept: CARE COORDINATION | Facility: CLINIC | Age: 35
End: 2025-05-05
Payer: COMMERCIAL

## 2025-05-05 ENCOUNTER — TELEPHONE (OUTPATIENT)
Dept: ENDOCRINOLOGY | Facility: CLINIC | Age: 35
End: 2025-05-05
Payer: COMMERCIAL

## 2025-05-05 NOTE — TELEPHONE ENCOUNTER
Left Voicemail (1st Attempt) and Sent Mychart (1st Attempt) for the patient to call back and schedule the following:    Appointment type: Return Weight Management  Appointment mode: In Person or Virtual Visit  Provider: Steffi Sánchez NP  Return date: Approx. 7/30/25  Specialty phone number: [phone]    Additional Notes:   Reynaldo Sánchez

## 2025-05-08 ENCOUNTER — OFFICE VISIT (OUTPATIENT)
Dept: URGENT CARE | Facility: URGENT CARE | Age: 35
End: 2025-05-08
Payer: COMMERCIAL

## 2025-05-08 VITALS
TEMPERATURE: 98.3 F | RESPIRATION RATE: 23 BRPM | OXYGEN SATURATION: 98 % | BODY MASS INDEX: 32.27 KG/M2 | SYSTOLIC BLOOD PRESSURE: 124 MMHG | WEIGHT: 205.6 LBS | HEIGHT: 67 IN | HEART RATE: 82 BPM | DIASTOLIC BLOOD PRESSURE: 85 MMHG
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
## 2025-05-08 RX ORDER — PREDNISONE 20 MG/1
40 TABLET ORAL DAILY
Qty: 10 TABLET | Refills: 0 | Status: SHIPPED | OUTPATIENT
Start: 2025-05-08 | End: 2025-05-13

## 2025-05-08 RX ORDER — CEPHALEXIN 500 MG/1
500 CAPSULE ORAL 3 TIMES DAILY
Qty: 21 CAPSULE | Refills: 0 | Status: SHIPPED | OUTPATIENT
Start: 2025-05-08 | End: 2025-05-15

## 2025-05-08 NOTE — PROGRESS NOTES
SUBJECTIVE:  Katelyn Erickson is a 34 year old female who comes in with concerns for possible infection on her right breast.  Patient states that she was outside doing yard work last night and noticed that there was a lot of gnats flying around.  She showered and noticed a red bite debra on her right breast.  Today she has significant redness along with some irritation and more swelling and itching with some burning sensation.  She has not taken any medication.  She does not have any other rashes and there is no difficulty breathing or oral swelling.  She is otherwise at baseline health.    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    NO ACTIVE PROBLEMS      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    FH: colon cancer in relative <50 years old    Gestational diabetes mellitus (GDM) affecting pregnancy, antepartum    Jean syndrome    Adenomatous polyp of colon, unspecified part of colon    Class 2 obesity due to excess calories without serious comorbidity with body mass index (BMI) of 39.0 to 39.9 in adult    Anxiety    Moderate episode of recurrent major depressive disorder (H)    Polyp of colon    NAFLD (nonalcoholic fatty liver disease)    S/P hysterectomy     Current Outpatient Medications   Medication Sig Dispense Refill    buPROPion (WELLBUTRIN XL) 300 MG 24 hr tablet Take 1 tablet (300 mg) by mouth every morning. 90 tablet 1    metFORMIN (GLUCOPHAGE XR) 500 MG 24 hr tablet Take 2 tablets (1,000 mg) by mouth daily with food. 60 tablet 4    tirzepatide-Weight Management (ZEPBOUND) 7.5 MG/0.5ML prefilled pen Inject 0.5 mLs (7.5 mg) subcutaneously every 7 days. 2 mL 1     No current facility-administered medications for this visit.     Social History     Socioeconomic History    Marital status:      Spouse name: Not on file    Number of children: 3     Years of education: Not on file    Highest education level: Not on file   Occupational History     Employer: [employer]    Occupation: realtor   Tobacco Use    Smoking status: Former     Types: Cigarettes     Passive exposure: Past    Smokeless tobacco: Never    Tobacco comments:     half a pack a day   Vaping Use    Vaping status: Former   Substance and Sexual Activity    Alcohol use: Yes     Comment: rarely    Drug use: Never    Sexual activity: Yes     Partners: Male     Birth control/protection: Male Surgical   Other Topics Concern    Parent/sibling w/ CABG, MI or angioplasty before 65F 55M? No   Social History Narrative    ** Merged History Encounter **         Single, lives with boyfriend and his family.     Social Drivers of Health     Financial Resource Strain: Low Risk  (12/30/2024)    Financial Resource Strain     Within the past 12 months, have you or your family members you live with been unable to get utilities (heat, electricity) when it was really needed?: No   Food Insecurity: Low Risk  (12/30/2024)    Food Insecurity     Within the past 12 months, did you worry that your food would run out before you got money to buy more?: No     Within the past 12 months, did the food you bought just not last and you didn t have money to get more?: No   Transportation Needs: Low Risk  (12/30/2024)    Transportation Needs     Within the past 12 months, has lack of transportation kept you from medical appointments, getting your medicines, non-medical meetings or appointments, work, or from getting things that you need?: No   Physical Activity: Inactive (12/30/2024)    Exercise Vital Sign     Days of Exercise per Week: 0 days     Minutes of Exercise per Session: 0 min   Stress: No Stress Concern Present (12/30/2024)    [employer] - Occupational Stress Questionnaire     Feeling of Stress : Only a little   Social Connections: Unknown (12/30/2024)    Social Connection and  Isolation Panel [NHANES]     Frequency of Communication with Friends and Family: Not on file     Frequency of Social Gatherings with Friends and Family: Patient declined     Attends Yazidi Services: Not on file     Active Member of Clubs or Organizations: Not on file     Attends Club or Organization Meetings: Not on file     Marital Status: Not on file   Interpersonal Safety: Low Risk  (1/3/2025)    Interpersonal Safety     Do you feel physically and emotionally safe where you currently live?: Yes     Within the past 12 months, have you been hit, slapped, kicked or otherwise physically hurt by someone?: No     Within the past 12 months, have you been humiliated or emotionally abused in other ways by your partner or ex-partner?: No   Housing Stability: Low Risk  (12/30/2024)    Housing Stability     Do you have housing? : Yes     Are you worried about losing your housing?: No     ROS  negative other than stated above    Exam:  GENERAL APPEARANCE: healthy, alert and no distress  EYES: EOMI,  PERRL  HENT: Oral mucosa moist with no oral swelling noted  RESP: lungs clear to auscultation - no rales, rhonchi or wheezes  CV: regular rates and rhythm, normal S1 S2, no S3 or S4 and no murmur, click or rub -  SKIN: Right breast with 1 cm area of purpura with 2 inches of surrounding erythema that is slightly warm to the touch and blanches with pressure.  No open sores noted.    assessment/plan:  (W57.XXXA) Bug bite with infection, initial encounter  (primary encounter diagnosis)  Comment:   Plan: cephALEXin (KEFLEX) 500 MG capsule        Patient with insect bite to her right breast now with increasing redness itching.  Discussed with her that this is most likely a localized allergic reaction not a true infection.  Advised using over-the-counter antihistamines along with topical anti-itch cream.  Short burst of prednisone for now.  If redness worsens any fevers develop or symptoms fail to improve after the next several days  will start Keflex for possible infection but very unlikely to be infected with 1 day of symptoms.  Patient notes understanding is agreement with above plan    (W57.XXXA) Insect bite, unspecified site, initial encounter  Comment:   Plan: predniSONE (DELTASONE) 20 MG tablet

## 2025-05-08 NOTE — PROGRESS NOTES
Urgent Care Clinic Visit    Chief Complaint   Patient presents with    Insect Bites     Bug bite right breast last night, irritation, redness around it , swollen, itchy and burning,                5/8/2025     4:39 PM   Additional Questions   Roomed by tree   Accompanied by self

## 2025-05-09 ENCOUNTER — HOSPITAL ENCOUNTER (EMERGENCY)
Facility: CLINIC | Age: 35
Discharge: HOME | End: 2025-05-09
Attending: PHYSICIAN ASSISTANT | Admitting: PHYSICIAN ASSISTANT
Payer: COMMERCIAL

## 2025-05-09 VITALS
RESPIRATION RATE: 18 BRPM | OXYGEN SATURATION: 100 % | HEART RATE: 86 BPM | BODY MASS INDEX: 32.8 KG/M2 | SYSTOLIC BLOOD PRESSURE: 128 MMHG | DIASTOLIC BLOOD PRESSURE: 89 MMHG | WEIGHT: 209 LBS | TEMPERATURE: 98.4 F | HEIGHT: 67 IN
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
## 2025-05-09 DIAGNOSIS — S09.90XA HEAD INJURY, INITIAL ENCOUNTER: ICD-10-CM

## 2025-05-09 PROCEDURE — 99283 EMERGENCY DEPT VISIT LOW MDM: CPT

## 2025-05-09 ASSESSMENT — COLUMBIA-SUICIDE SEVERITY RATING SCALE - C-SSRS
1. IN THE PAST MONTH, HAVE YOU WISHED YOU WERE DEAD OR WISHED YOU COULD GO TO SLEEP AND NOT WAKE UP?: NO
6. HAVE YOU EVER DONE ANYTHING, STARTED TO DO ANYTHING, OR PREPARED TO DO ANYTHING TO END YOUR LIFE?: NO
2. HAVE YOU ACTUALLY HAD ANY THOUGHTS OF KILLING YOURSELF IN THE PAST MONTH?: NO

## 2025-05-09 ASSESSMENT — ACTIVITIES OF DAILY LIVING (ADL): ADLS_ACUITY_SCORE: 41
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
## 2025-05-15 ENCOUNTER — OFFICE VISIT (OUTPATIENT)
Dept: FAMILY MEDICINE | Facility: CLINIC | Age: 35
End: 2025-05-15
Payer: COMMERCIAL

## 2025-05-15 VITALS
SYSTOLIC BLOOD PRESSURE: 114 MMHG | HEART RATE: 80 BPM | BODY MASS INDEX: 31.71 KG/M2 | WEIGHT: 202 LBS | OXYGEN SATURATION: 97 % | RESPIRATION RATE: 12 BRPM | HEIGHT: 67 IN | TEMPERATURE: 98.3 F | DIASTOLIC BLOOD PRESSURE: 77 MMHG
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
## 2025-05-15 DIAGNOSIS — M79.10 MUSCLE TENSION PAIN: ICD-10-CM

## 2025-05-15 ASSESSMENT — ENCOUNTER SYMPTOMS
COUGH: 0
MYALGIAS: 1
HEADACHES: 0
SHORTNESS OF BREATH: 0
DIZZINESS: 0
CHILLS: 0
NERVOUS/ANXIOUS: 0
NECK STIFFNESS: 1
WEAKNESS: 0
FEVER: 0

## 2025-05-15 ASSESSMENT — PAIN SCALES - GENERAL: PAINLEVEL_OUTOF10: NO PAIN (0)
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
## 2025-05-15 NOTE — PATIENT INSTRUCTIONS
Continue with light activity and increase as tolerated.  Referral for massage.  Can try heat and ice. Ok to use over the counter pain reliever if needed.  Muscle rubs like icy hot can help.

## 2025-06-08 NOTE — PROGRESS NOTES
Follow Up Notes on Referred Patient    Date: 2025      RE: Katelyn Erickson  : 1990  MO: 2025        Katelyn Erickson is a 34 year old woman with a history of Jean syndrome, MLH 1 mutation.   She is here today for follow up and US review.     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  Of note, she tested negative for mutations in  30 other genes (APC, DEBORAH, BAP1, BARD1, BMPR1A, BRCA1, BRCA2, BRIP1, CDH1, CDKN2A, CDK4, CHEK2, EPCAM, GREM1, MITF, MLH1, MSH2, MSH6, MUTYH, NBN, PALB2, PMS2, POLE, POLD1, PTEN, RAD51C, RAD51D, SMAD4, STK11, and TP53) associated with inherited cancer risks.      She is closely followed with Upper GI endoscopy and colonoscopy which were negative.      3/11/24 Surgery: Total laparoscopic hysterectomy with bilateral salpingectomy, pelvic washings     Pathology: Benign Uterus, cervix and bilateral fallopian tubes  Cytology - negative     24: Normal pelvic ultrasound  3/31/25: Normal pelvic ultrasound. Personally reviewed      Today she comes to clinic and denies any concerns for her  visit. She denies any vaginal bleeding, no changes in her bowel or bladder habits, no nausea/emesis, no lower extremity edema, and no difficulties eating. She denies any abdominal discomfort/bloating, no fevers or chills, and no chest pain or shortness of breath. She is sexually active and has noticed that since her surgery orgasms are a little different; she does not feel as though she needs to use any lubricant. 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    NO ACTIVE PROBLEMS          Past Surgical History:    Past Surgical History:   Procedure Laterality Date    COLONOSCOPY  01/23/2012    Procedure:COLONOSCOPY; COLONOSCOPY; Surgeon:JAKOB PETERS; Location:[facility]    COLONOSCOPY      COLONOSCOPY N/A 09/08/2021    Procedure: COLONOSCOPY;  Surgeon: Joseph Flores MD;  Location: [facility]    COLONOSCOPY N/A 11/12/2024    Procedure: Colonoscopy;  Surgeon: Emeka Flores MD;  Location: [facility] OR    ESOPHAGOSCOPY, GASTROSCOPY, DUODENOSCOPY (EGD), COMBINED N/A 09/08/2021    Procedure: ESOPHAGOGASTRODUODENOSCOPY (EGD);  Surgeon: Joseph Flores MD;  Location:  GI    GYN SURGERY  3/11/2024    LAPAROSCOPIC HYSTERECTOMY TOTAL, SALPINGECTOMY N/A 03/11/2024    Procedure: TOTAL LAPAROSCOPIC HYSTERECTOMY, WITH BILATERAL SALPINGECTOMY. PELVIC WASHINGS;  Surgeon: Elida Gutierres MD;  Location: [facility]    NO HISTORY OF SURGERY         Current Medications:     Current Outpatient Medications   Medication Sig Dispense Refill    buPROPion (WELLBUTRIN XL) 300 MG 24 hr tablet Take 1 tablet (300 mg) by mouth every morning. 90 tablet 1    cyanocobalamin (VITAMIN B-12) 500 MCG tablet Take 500 mcg by mouth daily.      metFORMIN (GLUCOPHAGE XR) 500 MG 24 hr tablet Take 2 tablets (1,000 mg) by mouth daily with food. 60 tablet 4    tirzepatide-Weight Management (ZEPBOUND) 7.5 MG/0.5ML prefilled pen Inject 0.5 mLs (7.5 mg) subcutaneously every 7 days. 2 mL 3         Allergies:      No Known Allergies     Social History:     Social History     Tobacco Use    Smoking status: Former     Types: Cigarettes     Passive exposure: Past    Smokeless tobacco: Never    Tobacco comments:     half a pack a day   Substance Use Topics    Alcohol use: Yes     Comment: rarely       History   Drug Use Unknown         Family History:     The patient's family history is notable for     Family History   Problem Relation Age of Onset     Cancer - colorectal Mother         diagnosed at age 33 and  at age 34    Jean Syndrome Mother         MLH1    Colon Cancer Mother     Colon Cancer Maternal Grandmother     Cancer - colorectal Maternal Grandmother 49         age 53    Diabetes Maternal Grandmother     Cancer Maternal Grandfather         pancreatic cancer    Colon Cancer Maternal Aunt 37    Jean Syndrome Maternal Aunt         MLH1    Colon Cancer Maternal Aunt 48    Jean Syndrome Maternal Aunt         MLH1         Physical Exam:     /83   Pulse 69   Temp 98.4  F (36.9  C) (Oral)   Resp 16   Wt 90.3 kg (199 lb)   LMP 2024   SpO2 99%   BMI 31.17 kg/m    Body mass index is 31.17 kg/m .    General Appearance: healthy and alert, no distress     HEENT: no thyromegaly, no palpable nodules or masses        Cardiovascular: regular rate and rhythm, no gallops, rubs or murmurs     Respiratory: lungs clear, no rales, rhonchi or wheezes, normal diaphragmatic excursion    Musculoskeletal: extremities non tender and without edema    Skin: no lesions or rashes     Neurological: normal gait, no gross defects     Psychiatric: appropriate mood and affect                               Hematological: normal cervical, supraclavicular and inguinal lymph nodes     Gastrointestinal:       abdomen soft, non-tender, non-distended, no organomegaly or masses    Genitourinary: External genitalia and urethral meatus appears normal.  Vagina is smooth without nodularity or masses.  Cervix surgically absent.  Bimanual exam reveal no masses, nodularity or fullness.       Assessment:    Katelyn Erickson is a 34 year old woman with a history of Jean syndrome, MLH 1 mutation.   She is here today for follow up and US review.     28 minutes spent on the date of the encounter doing chart review, history and exam, documentation and further activities as noted above      Plan:     1.)       Reviewed recent US which is normal; continue with plan to have annual US  in light of MLH 1 mutation. Reviewed signs and symptoms for when she should contact the clinic or seek additional care. Patient to contact the clinic with any questions or concerns in the interim.  Answered all of her questions to the best of my ability.     2.) Genetic risk factors were assessed and she has a MLH 1 mutation. She is being followed by cancer risk management and has been having annual colonoscopy evaluation since age 19.     3.) Labs and/or tests ordered include:  US pelvis.     4.) Health maintenance issues addressed today include annual health maintenance and non-gynecologic issues with PCP.    WINDY Prabhakar, WHNP-BC, ANP-BC, AOCNP  Women's Health Nurse Practitioner  Adult Nurse Practitioner  Division of Gynecologic Oncology        CC  Patient Care Team:  Rose Phelps APRN CNP as PCP - General  Aaseby-Aguilera, Ramona Ann, PA-C Strong, Kristen, APRN CNP as Assigned PCP  Cydney Rachel APRN CNS as Nurse Practitioner (Medical Oncology)  Viviana Suazo MD as MD (Dermatology)  Annette Szymanski APRN CNP as Clinical Nurse Specialist (Hematology & Oncology)  Elida Gutierres MD as Assigned Cancer Care Provider  Randi Keene RD as Registered Dietitian (Dietitian, Registered)  Steffi Sánchez NP as Assigned Surgical Provider  Tho Roa MD as Assigned OBGYN Provider  Yandy Viera Ms, Psy.D, LP as Assigned Behavioral Health Provider  Katelyn Pagan RPH as Pharmacist (Pharmacist Ambulatory Care)  Katelyn Pagan RPH as Assigned MTM Pharmacist  Viviana Suazo MD as Assigned Dermatology Provider  SELF, REFERRED

## 2025-06-09 ENCOUNTER — ONCOLOGY VISIT (OUTPATIENT)
Dept: ONCOLOGY | Facility: CLINIC | Age: 35
End: 2025-06-09
Attending: NURSE PRACTITIONER
Payer: COMMERCIAL

## 2025-06-09 ENCOUNTER — VIRTUAL VISIT (OUTPATIENT)
Dept: PHARMACY | Facility: CLINIC | Age: 35
End: 2025-06-09
Payer: COMMERCIAL

## 2025-06-09 VITALS — BODY MASS INDEX: 30.61 KG/M2 | HEIGHT: 67 IN | WEIGHT: 195 LBS

## 2025-06-09 VITALS
DIASTOLIC BLOOD PRESSURE: 83 MMHG | BODY MASS INDEX: 31.17 KG/M2 | OXYGEN SATURATION: 99 % | HEART RATE: 69 BPM | SYSTOLIC BLOOD PRESSURE: 125 MMHG | RESPIRATION RATE: 16 BRPM | WEIGHT: 199 LBS | TEMPERATURE: 98.4 F

## 2025-06-09 DIAGNOSIS — Z15.09 LYNCH SYNDROME: ICD-10-CM

## 2025-06-09 DIAGNOSIS — K76.0 NAFLD (NONALCOHOLIC FATTY LIVER DISEASE): ICD-10-CM

## 2025-06-09 DIAGNOSIS — E66.811 CLASS 1 OBESITY WITH SERIOUS COMORBIDITY AND BODY MASS INDEX (BMI) OF 32.0 TO 32.9 IN ADULT, UNSPECIFIED OBESITY TYPE: ICD-10-CM

## 2025-06-09 DIAGNOSIS — Z15.09 MLH1-RELATED LYNCH SYNDROME (HNPCC2): Primary | ICD-10-CM

## 2025-06-09 PROCEDURE — 99213 OFFICE O/P EST LOW 20 MIN: CPT | Performed by: NURSE PRACTITIONER

## 2025-06-09 RX ORDER — MULTIVITAMIN WITH IRON
500 TABLET ORAL DAILY
COMMUNITY

## 2025-06-09 ASSESSMENT — PAIN SCALES - GENERAL
PAINLEVEL_OUTOF10: NO PAIN (0)
PAINLEVEL_OUTOF10: NO PAIN (0)

## 2025-06-09 NOTE — LETTER
2025      Katelyn Erickson  [facility] [address]      Dear Colleague,    Thank you for referring your patient, Katelyn Erickson, to the [facility]. Please see a copy of my visit note below.                Follow Up Notes on Referred Patient    Date: 2025      RE: Katelyn Erickson  : 1990  MO: 2025        Katelyn Erickson is a 34 year old woman with a history of Jean syndrome, MLH 1 mutation.   She is here today for follow up and US review.     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  Of note, she tested negative for mutations in  30 other genes (APC, DEBORAH, BAP1, BARD1, BMPR1A, BRCA1, BRCA2, BRIP1, CDH1, CDKN2A, CDK4, CHEK2, EPCAM, GREM1, MITF, MLH1, MSH2, MSH6, MUTYH, NBN, PALB2, PMS2, POLE, POLD1, PTEN, RAD51C, RAD51D, SMAD4, STK11, and TP53) associated with inherited cancer risks.      She is closely followed with Upper GI endoscopy and colonoscopy which were negative.      3/11/24 Surgery: Total laparoscopic hysterectomy with bilateral salpingectomy, pelvic washings     Pathology: Benign Uterus, cervix and bilateral fallopian tubes  Cytology - negative     24: Normal pelvic ultrasound  3/31/25: Normal pelvic ultrasound. Personally reviewed      Today she comes to clinic and denies any concerns for her  visit. She denies any vaginal bleeding, no changes in her bowel or bladder habits, no nausea/emesis, no lower extremity edema, and no difficulties eating. She denies any abdominal discomfort/bloating, no fevers or chills, and no chest pain or shortness of breath. She is sexually active and has noticed that since her surgery orgasms are a little different; she does not feel as though she needs to use any lubricant. 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     NO ACTIVE PROBLEMS          Past Surgical History:    Past Surgical History:   Procedure Laterality Date     COLONOSCOPY  01/23/2012    Procedure:COLONOSCOPY; COLONOSCOPY; Surgeon:JAKOB PETERS; Location:[facility]     COLONOSCOPY       COLONOSCOPY N/A 09/08/2021    Procedure: COLONOSCOPY;  Surgeon: Joseph Flores MD;  Location: [facility]     COLONOSCOPY N/A 11/12/2024    Procedure: Colonoscopy;  Surgeon: Emeka Flores MD;  Location: [facility] OR     ESOPHAGOSCOPY, GASTROSCOPY, DUODENOSCOPY (EGD), COMBINED N/A 09/08/2021    Procedure: ESOPHAGOGASTRODUODENOSCOPY (EGD);  Surgeon: Joseph Flores MD;  Location: [facility]     GYN SURGERY  3/11/2024     LAPAROSCOPIC HYSTERECTOMY TOTAL, SALPINGECTOMY N/A 03/11/2024    Procedure: TOTAL LAPAROSCOPIC HYSTERECTOMY, WITH BILATERAL SALPINGECTOMY. PELVIC WASHINGS;  Surgeon: Elida Gutierres MD;  Location: [facility]     NO HISTORY OF SURGERY         Current Medications:     Current Outpatient Medications   Medication Sig Dispense Refill     buPROPion (WELLBUTRIN XL) 300 MG 24 hr tablet Take 1 tablet (300 mg) by mouth every morning. 90 tablet 1     cyanocobalamin (VITAMIN B-12) 500 MCG tablet Take 500 mcg by mouth daily.       metFORMIN (GLUCOPHAGE XR) 500 MG 24 hr tablet Take 2 tablets (1,000 mg) by mouth daily with food. 60 tablet 4     tirzepatide-Weight Management (ZEPBOUND) 7.5 MG/0.5ML prefilled pen Inject 0.5 mLs (7.5 mg) subcutaneously every 7 days. 2 mL 3         Allergies:      No Known Allergies     Social History:     Social History     Tobacco Use     Smoking status: Former     Types: Cigarettes     Passive exposure: Past     Smokeless tobacco: Never      Tobacco comments:     half a pack a day   Substance Use Topics     Alcohol use: Yes     Comment: rarely       History   Drug Use Unknown         Family History:     The patient's family history is notable for     Family History   Problem Relation Age of Onset     Cancer - colorectal Mother         diagnosed at age 33 and  at age 34     Jean Syndrome Mother         MLH1     Colon Cancer Mother      Colon Cancer Maternal Grandmother      Cancer - colorectal Maternal Grandmother 49         age 53     Diabetes Maternal Grandmother      Cancer Maternal Grandfather         pancreatic cancer     Colon Cancer Maternal Aunt 37     Jean Syndrome Maternal Aunt         MLH1     Colon Cancer Maternal Aunt 48     Jean Syndrome Maternal Aunt         MLH1         Physical Exam:     /83   Pulse 69   Temp 98.4  F (36.9  C) (Oral)   Resp 16   Wt 90.3 kg (199 lb)   LMP 2024   SpO2 99%   BMI 31.17 kg/m    Body mass index is 31.17 kg/m .    General Appearance: healthy and alert, no distress     HEENT: no thyromegaly, no palpable nodules or masses        Cardiovascular: regular rate and rhythm, no gallops, rubs or murmurs     Respiratory: lungs clear, no rales, rhonchi or wheezes, normal diaphragmatic excursion    Musculoskeletal: extremities non tender and without edema    Skin: no lesions or rashes     Neurological: normal gait, no gross defects     Psychiatric: appropriate mood and affect                               Hematological: normal cervical, supraclavicular and inguinal lymph nodes     Gastrointestinal:       abdomen soft, non-tender, non-distended, no organomegaly or masses    Genitourinary: External genitalia and urethral meatus appears normal.  Vagina is smooth without nodularity or masses.  Cervix surgically absent.  Bimanual exam reveal no masses, nodularity or fullness.       Assessment:    Katelyn Erickson is a 34 year old woman with a history of Jean syndrome, MLH 1 mutation.   She is  here today for follow up and US review.     28 minutes spent on the date of the encounter doing chart review, history and exam, documentation and further activities as noted above      Plan:     1.)       Reviewed recent US which is normal; continue with plan to have annual US in light of MLH 1 mutation. Reviewed signs and symptoms for when she should contact the clinic or seek additional care. Patient to contact the clinic with any questions or concerns in the interim.  Answered all of her questions to the best of my ability.     2.) Genetic risk factors were assessed and she has a MLH 1 mutation. She is being followed by cancer risk management and has been having annual colonoscopy evaluation since age 19.     3.) Labs and/or tests ordered include:  US pelvis.     4.) Health maintenance issues addressed today include annual health maintenance and non-gynecologic issues with PCP.    WINDY Prabhakar, WHNP-BC, ANP-BC, AOCNP  Women's Health Nurse Practitioner  Adult Nurse Practitioner  Division of Gynecologic Oncology        CC  Patient Care Team:  Rose Phelps APRN CNP as PCP - General  Aaseby-Aguilera, Ramona Ann, PA-C Strong, Kristen, APRN CNP as Assigned PCP  Cydney Rachel APRN CNS as Nurse Practitioner (Medical Oncology)  Viviana Suazo MD as MD (Dermatology)  Annette Szymanski APRN CNP as Clinical Nurse Specialist (Hematology & Oncology)  Elida Gutierres MD as Assigned Cancer Care Provider  Randi Keene RD as Registered Dietitian (Dietitian, Registered)  Steffi áSnchez NP as Assigned Surgical Provider  Tho Roa MD as Assigned OBGYN Provider  Yandy Viera Ms, Psy.D, LP as Assigned Behavioral Health Provider  Katelyn Pagan RPH as Pharmacist (Pharmacist Ambulatory Care)  Katelyn Pagan RPH as Assigned MTM Pharmacist  Viviana Suazo MD as Assigned Dermatology Provider  SELF, REFERRED          Again, thank you for allowing me to participate in the care of your  patient.        Sincerely,        WINDY Saleem CNP    Electronically signed

## 2025-06-09 NOTE — NURSING NOTE
Current patient location: [address] [place] [id number]    Is the patient currently in the state of [place]? YES    Visit mode: VIDEO    If the visit is dropped, the patient can be reconnected by:VIDEO VISIT: Text to cell phone:   Telephone Information:   Mobile [phone]    and VIDEO VISIT: Send to e-mail at: [email or web address]    Will anyone else be joining the visit? NO  (If patient encounters technical issues they should call [phone])    Are changes needed to the allergy or medication list? Yes per pt taking otc supplements of vitamin b12 and vitamin d3+calcium daily    Are refills needed on medications prescribed by this physician? NO    Rooming Documentation:  Questionnaire(s) not pre-assigned    Reason for visit: Medication Therapy Management    Priscilla CASTORENA

## 2025-06-09 NOTE — PATIENT INSTRUCTIONS
"Recommendations from today's MTM visit:                                                      Continue Zepbound 7.5 mg once weekly   Keep up the great work with diet/exercise efforts! Consider adding resistance training - even starting with 15 min 2-3 days per week to help build muscle, boost metabolism, and build strong bones to help with healthy aging.   Will have weight management clinic reach out to schedule follow-up in 3 months with Steffi Sánchez CNP.  Weight management clinic scheduling line: [phone]    Follow-up: medication therapy management 5 months   It was great speaking with you today.  I value your experience and would be very thankful for your time in providing feedback in our clinic survey. In the next few days, you may receive an email or text message from [employer] with a link to a survey related to your  clinical pharmacist.\"     To schedule another MTM appointment, please call the clinic directly or you may call the MTM scheduling line at [phone].    My Clinical Pharmacist's contact information:                                                      Please feel free to contact me with any questions or concerns you have.      Katelyn Pagan, PharmD, AAHIVP  Medication Therapy Management Pharmacist      "
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
## 2025-06-09 NOTE — NURSING NOTE
"Oncology Rooming Note    June 9, 2025 11:08 AM   Katelyn Erickson is a 34 year old female who presents for:    Chief Complaint   Patient presents with    Oncology Clinic Visit     Adenomatous polyp of colon, unspecified part of colon     Initial Vitals: /83   Pulse 69   Temp 98.4  F (36.9  C) (Oral)   Resp 16   Wt 90.3 kg (199 lb)   LMP 02/22/2024   SpO2 99%   BMI 31.17 kg/m   Estimated body mass index is 31.17 kg/m  as calculated from the following:    Height as of an earlier encounter on 6/9/25: 1.702 m (5' 7\").    Weight as of this encounter: 90.3 kg (199 lb). Body surface area is 2.07 meters squared.  No Pain (0) Comment: Data Unavailable   Patient's last menstrual period was 02/22/2024.  Allergies reviewed: Yes  Medications reviewed: Yes    Medications: Medication refills not needed today.  Pharmacy name entered into [employer]:    [facility] [place], [place] - [address]  [id number] - [place], [place] - [address]  [address]    Frailty Screening:   Is the patient here for a new oncology consult visit in cancer care? 2. No    PHQ9:  Did this patient require a PHQ9?: No      Clinical concerns:        Karen Roe              "

## 2025-06-10 ENCOUNTER — TELEPHONE (OUTPATIENT)
Dept: ENDOCRINOLOGY | Facility: CLINIC | Age: 35
End: 2025-06-10
Payer: COMMERCIAL

## 2025-06-10 NOTE — TELEPHONE ENCOUNTER
Patient confirmed scheduled appointment:     Date: 10/30/25  & Wait list  Time: 3:30 PM  Visit type: Return Weight Management  Visit mode: Virtual Visit  Provider:  Steffi Sánchez NP  Location: [facility]    Additional Notes:

## 2025-07-07 ENCOUNTER — TELEPHONE (OUTPATIENT)
Dept: ENDOCRINOLOGY | Facility: CLINIC | Age: 35
End: 2025-07-07
Payer: COMMERCIAL

## 2025-07-07 NOTE — TELEPHONE ENCOUNTER
PA RENEWAL Initiation    Medication: ZEPBOUND 7.5 MG/0.5ML SC SOAJ  Insurance Company: [employer] ([facility]) - Phone [phone] Fax [phone]  Pharmacy Filling the Rx:    Filling Pharmacy Phone:    Filling Pharmacy Fax:    Start Date: 7/7/2025    [id number]

## 2025-07-09 NOTE — TELEPHONE ENCOUNTER
Prior Authorization RENEWAL Approval    Medication: ZEPBOUND 7.5 MG/0.5ML SC SOAJ  Authorization Effective Date: 7/7/2025  Authorization Expiration Date: 7/7/2026  Approved Dose/Quantity: 2ml per 28 days  Reference #: [id number]   Insurance Company: [employer] ([facility]) - Phone [phone] Fax [phone]  Expected CoPay: $    CoPay Card Available:      Financial Assistance Needed:   Which Pharmacy is filling the prescription:    Pharmacy Notified:   Patient Notified:

## 2025-07-16 ENCOUNTER — VIRTUAL VISIT (OUTPATIENT)
Dept: FAMILY MEDICINE | Facility: CLINIC | Age: 35
End: 2025-07-16
Payer: COMMERCIAL

## 2025-07-16 DIAGNOSIS — R23.3 EASY BRUISING: ICD-10-CM

## 2025-07-16 DIAGNOSIS — F33.1 MODERATE EPISODE OF RECURRENT MAJOR DEPRESSIVE DISORDER (H): ICD-10-CM

## 2025-07-16 DIAGNOSIS — H93.8X2 SENSATION OF PLUGGED EAR ON LEFT SIDE: ICD-10-CM

## 2025-07-16 DIAGNOSIS — F41.9 ANXIETY: Primary | ICD-10-CM

## 2025-07-16 PROCEDURE — 98006 SYNCH AUDIO-VIDEO EST MOD 30: CPT | Performed by: NURSE PRACTITIONER

## 2025-07-16 RX ORDER — BUPROPION HYDROCHLORIDE 300 MG/1
300 TABLET ORAL EVERY MORNING
Qty: 90 TABLET | Refills: 1 | Status: SHIPPED | OUTPATIENT
Start: 2025-07-16

## 2025-07-16 ASSESSMENT — ANXIETY QUESTIONNAIRES
7. FEELING AFRAID AS IF SOMETHING AWFUL MIGHT HAPPEN: NOT AT ALL
GAD7 TOTAL SCORE: 1
GAD7 TOTAL SCORE: 1
4. TROUBLE RELAXING: SEVERAL DAYS
1. FEELING NERVOUS, ANXIOUS, OR ON EDGE: NOT AT ALL
5. BEING SO RESTLESS THAT IT IS HARD TO SIT STILL: NOT AT ALL
IF YOU CHECKED OFF ANY PROBLEMS ON THIS QUESTIONNAIRE, HOW DIFFICULT HAVE THESE PROBLEMS MADE IT FOR YOU TO DO YOUR WORK, TAKE CARE OF THINGS AT HOME, OR GET ALONG WITH OTHER PEOPLE: NOT DIFFICULT AT ALL
GAD7 TOTAL SCORE: 1
6. BECOMING EASILY ANNOYED OR IRRITABLE: NOT AT ALL
2. NOT BEING ABLE TO STOP OR CONTROL WORRYING: NOT AT ALL
3. WORRYING TOO MUCH ABOUT DIFFERENT THINGS: NOT AT ALL
7. FEELING AFRAID AS IF SOMETHING AWFUL MIGHT HAPPEN: NOT AT ALL
8. IF YOU CHECKED OFF ANY PROBLEMS, HOW DIFFICULT HAVE THESE MADE IT FOR YOU TO DO YOUR WORK, TAKE CARE OF THINGS AT HOME, OR GET ALONG WITH OTHER PEOPLE?: NOT DIFFICULT AT ALL

## 2025-07-16 ASSESSMENT — PATIENT HEALTH QUESTIONNAIRE - PHQ9
SUM OF ALL RESPONSES TO PHQ QUESTIONS 1-9: 0
SUM OF ALL RESPONSES TO PHQ QUESTIONS 1-9: 0
10. IF YOU CHECKED OFF ANY PROBLEMS, HOW DIFFICULT HAVE THESE PROBLEMS MADE IT FOR YOU TO DO YOUR WORK, TAKE CARE OF THINGS AT HOME, OR GET ALONG WITH OTHER PEOPLE: NOT DIFFICULT AT ALL
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
## 2025-07-17 ENCOUNTER — PATIENT OUTREACH (OUTPATIENT)
Dept: CARE COORDINATION | Facility: CLINIC | Age: 35
End: 2025-07-17

## 2025-07-17 ENCOUNTER — LAB (OUTPATIENT)
Dept: LAB | Facility: CLINIC | Age: 35
End: 2025-07-17
Payer: COMMERCIAL

## 2025-07-17 DIAGNOSIS — R23.3 EASY BRUISING: ICD-10-CM

## 2025-07-17 LAB
APTT PPP: 32 SECONDS (ref 22–38)
BASOPHILS # BLD AUTO: 0 10E3/UL (ref 0–0.2)
BASOPHILS NFR BLD AUTO: 1 %
EOSINOPHIL # BLD AUTO: 0.1 10E3/UL (ref 0–0.7)
EOSINOPHIL NFR BLD AUTO: 1 %
ERYTHROCYTE [DISTWIDTH] IN BLOOD BY AUTOMATED COUNT: 12.8 % (ref 10–15)
HCT VFR BLD AUTO: 37.2 % (ref 35–47)
HGB BLD-MCNC: 12.6 G/DL (ref 11.7–15.7)
IMM GRANULOCYTES # BLD: 0 10E3/UL
IMM GRANULOCYTES NFR BLD: 0 %
INR PPP: 0.96 (ref 0.85–1.15)
LYMPHOCYTES # BLD AUTO: 2.1 10E3/UL (ref 0.8–5.3)
LYMPHOCYTES NFR BLD AUTO: 31 %
MCH RBC QN AUTO: 31.2 PG (ref 26.5–33)
MCHC RBC AUTO-ENTMCNC: 33.9 G/DL (ref 31.5–36.5)
MCV RBC AUTO: 92 FL (ref 78–100)
MONOCYTES # BLD AUTO: 0.3 10E3/UL (ref 0–1.3)
MONOCYTES NFR BLD AUTO: 5 %
NEUTROPHILS # BLD AUTO: 4.2 10E3/UL (ref 1.6–8.3)
NEUTROPHILS NFR BLD AUTO: 62 %
PLATELET # BLD AUTO: 318 10E3/UL (ref 150–450)
PROTHROMBIN TIME: 13.2 SECONDS (ref 11.8–14.8)
RBC # BLD AUTO: 4.04 10E6/UL (ref 3.8–5.2)
WBC # BLD AUTO: 6.7 10E3/UL (ref 4–11)

## 2025-07-21 ENCOUNTER — PATIENT OUTREACH (OUTPATIENT)
Dept: CARE COORDINATION | Facility: CLINIC | Age: 35
End: 2025-07-21
Payer: COMMERCIAL

## 2025-08-18 ENCOUNTER — OFFICE VISIT (OUTPATIENT)
Dept: DERMATOLOGY | Facility: CLINIC | Age: 35
End: 2025-08-18
Attending: DERMATOLOGY
Payer: COMMERCIAL

## 2025-08-18 DIAGNOSIS — L70.0 ACNE VULGARIS: Primary | ICD-10-CM

## 2025-08-18 DIAGNOSIS — L82.1 SK (SEBORRHEIC KERATOSIS): ICD-10-CM

## 2025-08-18 DIAGNOSIS — L65.9 LOSS OF HAIR: ICD-10-CM

## 2025-08-18 DIAGNOSIS — Z12.83 SKIN CANCER SCREENING: ICD-10-CM

## 2025-08-18 DIAGNOSIS — D22.9 MULTIPLE NEVI: ICD-10-CM

## 2025-08-18 PROCEDURE — 1126F AMNT PAIN NOTED NONE PRSNT: CPT | Performed by: DERMATOLOGY

## 2025-08-18 PROCEDURE — 99214 OFFICE O/P EST MOD 30 MIN: CPT | Mod: GC | Performed by: DERMATOLOGY

## 2025-08-18 RX ORDER — CLINDAMYCIN PHOSPHATE 10 UG/ML
LOTION TOPICAL
Qty: 60 ML | Refills: 11 | Status: SHIPPED | OUTPATIENT
Start: 2025-08-18

## 2025-08-18 ASSESSMENT — PAIN SCALES - GENERAL: PAINLEVEL_OUTOF10: NO PAIN (0)

## 2025-08-19 ENCOUNTER — PATIENT OUTREACH (OUTPATIENT)
Dept: CARE COORDINATION | Facility: CLINIC | Age: 35
End: 2025-08-19
Payer: COMMERCIAL

## 2025-08-21 ENCOUNTER — PATIENT OUTREACH (OUTPATIENT)
Dept: CARE COORDINATION | Facility: CLINIC | Age: 35
End: 2025-08-21
Payer: COMMERCIAL

## (undated) DEVICE — SU VICRYL 0 TIE 54" J608H

## (undated) DEVICE — ENDO TROCAR FIRST ENTRY KII FIOS Z-THRD 12X100MM CTF73

## (undated) DEVICE — TUBING IRRIG CYSTO/BLADDER SET 81" LF 2C4040

## (undated) DEVICE — ENDO BITE BLOCK ADULT OLYMPUS LATEX FREE MAJ-1632

## (undated) DEVICE — SUCTION MANIFOLD NEPTUNE 2 SYS 1 PORT 702-025-000

## (undated) DEVICE — BLADE CLIPPER SGL USE 9680

## (undated) DEVICE — ENDO TROCAR FIRST ENTRY KII FIOS Z-THRD 05X100MM CTF03

## (undated) DEVICE — KOH COLPOTOMIZER OCCLUDER  CPO-6

## (undated) DEVICE — DRSG PRIMAPORE 02X3" 7133

## (undated) DEVICE — KIT PATIENT POSITIONING PIGAZZI LATEX FREE 40580

## (undated) DEVICE — SUCTION IRR STRYKERFLOW II W/TIP 250-070-520

## (undated) DEVICE — PROTECTOR ARM ONE-STEP TRENDELENBURG 40418

## (undated) DEVICE — DEVICE SUTURE PASSER 14GA WECK EFX EFXSP2

## (undated) DEVICE — ESU PENCIL SMOKE EVAC W/ROCKER SWITCH 0703-047-000

## (undated) DEVICE — DRAPE SHEET MED 44X70" 9355

## (undated) DEVICE — NDL INSUFFLATION 13GA 120MM C2201

## (undated) DEVICE — LINEN TOWEL PACK X30 5481

## (undated) DEVICE — SPECIMEN CONTAINER 3OZ W/FORMALIN 59901

## (undated) DEVICE — SU WND CLOSURE RELOAD VLOC 180 ABS 0 GREEN 8" VLOCA008L

## (undated) DEVICE — ENDO TROCAR SLEEVE KII Z-THREADED 05X100MM CTS02

## (undated) DEVICE — Device

## (undated) DEVICE — TUBING SUCTION MEDI-VAC 1/4"X20' N620A

## (undated) DEVICE — SU MONOCRYL 4-0 PS-2 27" UND Y426H

## (undated) DEVICE — ESU GROUND PAD ADULT W/CORD E7507

## (undated) DEVICE — WIPES FOLEY CARE SURESTEP PROVON DFC100

## (undated) DEVICE — GLOVE BIOGEL PI MICRO INDICATOR UNDERGLOVE SZ 6.5 48965

## (undated) DEVICE — LINEN TOWEL PACK X6 WHITE 5487

## (undated) DEVICE — ESU LIGASURE MARYLAND VESSEL LAP 44CM XLONG LF1944

## (undated) DEVICE — JELLY LUBRICATING SURGILUBE 2OZ TUBE

## (undated) DEVICE — SOL NACL 0.9% INJ 1000ML BAG 2B1324X

## (undated) DEVICE — PREP DYNA-HEX 4% CHG SCRUB 4OZ BOTTLE MDS098710

## (undated) DEVICE — DEVICE ENDO STITCH APPLIER 10MM 173016

## (undated) DEVICE — SOL NACL 0.9% IRRIG 1000ML BOTTLE 2F7124

## (undated) DEVICE — ESU ENDO SCISSORS 5MM CVD 5DCS

## (undated) DEVICE — SOL WATER IRRIG 1000ML BOTTLE 2F7114

## (undated) DEVICE — KIT ENDO TURNOVER/PROCEDURE W/CLEAN A SCOPE LINERS 103888

## (undated) DEVICE — GOWN IMPERVIOUS 2XL BLUE

## (undated) DEVICE — PREP CHLORAPREP 26ML TINTED HI-LITE ORANGE 930815

## (undated) DEVICE — SOL WATER IRRIG 500ML BOTTLE 2F7113

## (undated) DEVICE — RETR ELEV / UTERINE MANIPULATOR V-CARE MED CUP 60-6085-201A

## (undated) DEVICE — KIT ENDO TURNOVER/PROCEDURE CARRY-ON 101822

## (undated) DEVICE — SU DERMABOND ADVANCED .7ML DNX12

## (undated) DEVICE — KIT ENDO FIRST STEP DISINFECTANT 200ML W/POUCH EP-4

## (undated) DEVICE — ENDO SCOPE WARMER SEAL  C3101

## (undated) DEVICE — SPECIMEN TRAP MUCOUS 40ML LUKI C30200A

## (undated) DEVICE — SUCTION MANIFOLD NEPTUNE 2 SYS 4 PORT 0702-020-000

## (undated) RX ORDER — HYDROMORPHONE HYDROCHLORIDE 1 MG/ML
INJECTION, SOLUTION INTRAMUSCULAR; INTRAVENOUS; SUBCUTANEOUS
Status: DISPENSED
Start: 2024-03-11

## (undated) RX ORDER — METRONIDAZOLE 500 MG/100ML
INJECTION, SOLUTION INTRAVENOUS
Status: DISPENSED
Start: 2024-03-11

## (undated) RX ORDER — ACETAMINOPHEN 325 MG/1
TABLET ORAL
Status: DISPENSED
Start: 2024-03-11

## (undated) RX ORDER — CEFAZOLIN SODIUM/WATER 2 G/20 ML
SYRINGE (ML) INTRAVENOUS
Status: DISPENSED
Start: 2024-03-11

## (undated) RX ORDER — FENTANYL CITRATE 50 UG/ML
INJECTION, SOLUTION INTRAMUSCULAR; INTRAVENOUS
Status: DISPENSED
Start: 2024-03-11

## (undated) RX ORDER — FENTANYL CITRATE-0.9 % NACL/PF 10 MCG/ML
PLASTIC BAG, INJECTION (ML) INTRAVENOUS
Status: DISPENSED
Start: 2024-03-11

## (undated) RX ORDER — BUPIVACAINE HYDROCHLORIDE 5 MG/ML
INJECTION, SOLUTION EPIDURAL; INTRACAUDAL
Status: DISPENSED
Start: 2024-03-11

## (undated) RX ORDER — OXYCODONE HYDROCHLORIDE 5 MG/1
TABLET ORAL
Status: DISPENSED
Start: 2024-03-11

## (undated) RX ORDER — DEXAMETHASONE SODIUM PHOSPHATE 4 MG/ML
INJECTION, SOLUTION INTRA-ARTICULAR; INTRALESIONAL; INTRAMUSCULAR; INTRAVENOUS; SOFT TISSUE
Status: DISPENSED
Start: 2024-03-11

## (undated) RX ORDER — HEPARIN SODIUM 5000 [USP'U]/.5ML
INJECTION, SOLUTION INTRAVENOUS; SUBCUTANEOUS
Status: DISPENSED
Start: 2024-03-11

## (undated) RX ORDER — PHENAZOPYRIDINE HYDROCHLORIDE 200 MG/1
TABLET, FILM COATED ORAL
Status: DISPENSED
Start: 2024-03-11

## (undated) RX ORDER — ONDANSETRON 2 MG/ML
INJECTION INTRAMUSCULAR; INTRAVENOUS
Status: DISPENSED
Start: 2024-03-11